# Patient Record
Sex: FEMALE | Race: WHITE | NOT HISPANIC OR LATINO | Employment: OTHER | URBAN - METROPOLITAN AREA
[De-identification: names, ages, dates, MRNs, and addresses within clinical notes are randomized per-mention and may not be internally consistent; named-entity substitution may affect disease eponyms.]

---

## 2017-11-15 ENCOUNTER — TRANSCRIBE ORDERS (OUTPATIENT)
Dept: MAMMOGRAPHY | Facility: HOSPITAL | Age: 72
End: 2017-11-15

## 2017-11-15 DIAGNOSIS — Z12.31 VISIT FOR SCREENING MAMMOGRAM: Primary | ICD-10-CM

## 2017-12-28 ENCOUNTER — HOSPITAL ENCOUNTER (OUTPATIENT)
Dept: MAMMOGRAPHY | Facility: HOSPITAL | Age: 72
Discharge: HOME | End: 2017-12-28
Attending: OBSTETRICS & GYNECOLOGY | Admitting: OBSTETRICS & GYNECOLOGY

## 2017-12-28 DIAGNOSIS — Z12.31 VISIT FOR SCREENING MAMMOGRAM: ICD-10-CM

## 2017-12-28 PROCEDURE — G0202 SCR MAMMO BI INCL CAD: HCPCS | Performed by: RADIOLOGY

## 2017-12-28 PROCEDURE — 77063 BREAST TOMOSYNTHESIS BI: CPT

## 2017-12-28 PROCEDURE — 77063 BREAST TOMOSYNTHESIS BI: CPT | Performed by: RADIOLOGY

## 2017-12-28 PROCEDURE — G0202 SCR MAMMO BI INCL CAD: HCPCS

## 2018-11-23 ENCOUNTER — TRANSCRIBE ORDERS (OUTPATIENT)
Dept: ADMINISTRATIVE | Facility: HOSPITAL | Age: 73
End: 2018-11-23

## 2018-11-23 DIAGNOSIS — Z12.31 VISIT FOR SCREENING MAMMOGRAM: Primary | ICD-10-CM

## 2019-01-14 ENCOUNTER — HOSPITAL ENCOUNTER (OUTPATIENT)
Dept: MAMMOGRAPHY | Facility: HOSPITAL | Age: 74
Discharge: HOME | End: 2019-01-14
Attending: OBSTETRICS & GYNECOLOGY | Admitting: OBSTETRICS & GYNECOLOGY

## 2019-01-14 DIAGNOSIS — Z12.31 VISIT FOR SCREENING MAMMOGRAM: ICD-10-CM

## 2019-01-14 PROCEDURE — 77063 BREAST TOMOSYNTHESIS BI: CPT

## 2019-01-14 PROCEDURE — 77063 BREAST TOMOSYNTHESIS BI: CPT | Performed by: RADIOLOGY

## 2019-01-14 PROCEDURE — 77067 SCR MAMMO BI INCL CAD: CPT

## 2019-01-14 PROCEDURE — 77067 SCR MAMMO BI INCL CAD: CPT | Performed by: RADIOLOGY

## 2020-01-07 ENCOUNTER — TRANSCRIBE ORDERS (OUTPATIENT)
Dept: ADMINISTRATIVE | Facility: HOSPITAL | Age: 75
End: 2020-01-07

## 2020-01-07 DIAGNOSIS — Z12.31 VISIT FOR SCREENING MAMMOGRAM: Primary | ICD-10-CM

## 2020-03-09 ENCOUNTER — HOSPITAL ENCOUNTER (OUTPATIENT)
Dept: MAMMOGRAPHY | Facility: HOSPITAL | Age: 75
Discharge: HOME | End: 2020-03-09
Admitting: OBSTETRICS & GYNECOLOGY

## 2020-03-09 DIAGNOSIS — Z12.31 VISIT FOR SCREENING MAMMOGRAM: ICD-10-CM

## 2020-03-09 PROCEDURE — 77067 SCR MAMMO BI INCL CAD: CPT | Performed by: RADIOLOGY

## 2020-03-09 PROCEDURE — 77063 BREAST TOMOSYNTHESIS BI: CPT | Performed by: RADIOLOGY

## 2020-03-09 PROCEDURE — 77063 BREAST TOMOSYNTHESIS BI: CPT

## 2020-03-09 PROCEDURE — 77067 SCR MAMMO BI INCL CAD: CPT

## 2020-11-20 ENCOUNTER — TRANSCRIBE ORDERS (OUTPATIENT)
Dept: ADMINISTRATIVE | Facility: HOSPITAL | Age: 75
End: 2020-11-20

## 2020-11-20 DIAGNOSIS — R94.39 ABNORMAL STRESS TEST: Primary | ICD-10-CM

## 2020-11-22 ENCOUNTER — APPOINTMENT (OUTPATIENT)
Dept: PREADMISSION TESTING | Facility: HOSPITAL | Age: 75
End: 2020-11-22

## 2020-11-22 PROCEDURE — U0004 COV-19 TEST NON-CDC HGH THRU: HCPCS

## 2020-11-22 PROCEDURE — C9803 HOPD COVID-19 SPEC COLLECT: HCPCS

## 2020-11-23 LAB — SARS-COV-2 RNA RESP QL NAA+PROBE: NOT DETECTED

## 2020-11-24 ENCOUNTER — APPOINTMENT (OUTPATIENT)
Dept: CARDIOLOGY | Facility: HOSPITAL | Age: 75
End: 2020-11-24

## 2020-11-24 ENCOUNTER — APPOINTMENT (OUTPATIENT)
Dept: GENERAL RADIOLOGY | Facility: HOSPITAL | Age: 75
End: 2020-11-24

## 2020-11-24 ENCOUNTER — ANESTHESIA EVENT (OUTPATIENT)
Dept: PERIOP | Facility: HOSPITAL | Age: 75
End: 2020-11-24

## 2020-11-24 ENCOUNTER — HOSPITAL ENCOUNTER (INPATIENT)
Facility: HOSPITAL | Age: 75
LOS: 8 days | Discharge: HOME HEALTH | End: 2020-12-03
Attending: INTERNAL MEDICINE | Admitting: THORACIC SURGERY (CARDIOTHORACIC VASCULAR SURGERY)

## 2020-11-24 DIAGNOSIS — R50.9 ACUTE FEBRILE ILLNESS: ICD-10-CM

## 2020-11-24 DIAGNOSIS — I48.0 PAROXYSMAL ATRIAL FIBRILLATION (HCC): ICD-10-CM

## 2020-11-24 DIAGNOSIS — R94.39 ABNORMAL STRESS TEST: ICD-10-CM

## 2020-11-24 DIAGNOSIS — I25.119 CORONARY ARTERY DISEASE INVOLVING NATIVE HEART WITH ANGINA PECTORIS, UNSPECIFIED VESSEL OR LESION TYPE (HCC): Primary | ICD-10-CM

## 2020-11-24 DIAGNOSIS — I25.119 CORONARY ARTERY DISEASE INVOLVING NATIVE CORONARY ARTERY OF NATIVE HEART WITH ANGINA PECTORIS (HCC): ICD-10-CM

## 2020-11-24 LAB
ABO GROUP BLD: NORMAL
ALBUMIN SERPL-MCNC: 4.4 G/DL (ref 3.5–5.2)
ALBUMIN/GLOB SERPL: 1.7 G/DL
ALP SERPL-CCNC: 76 U/L (ref 39–117)
ALT SERPL W P-5'-P-CCNC: 24 U/L (ref 1–33)
AMPHET+METHAMPHET UR QL: NEGATIVE
AMPHETAMINES UR QL: NEGATIVE
ANION GAP SERPL CALCULATED.3IONS-SCNC: 12 MMOL/L (ref 5–15)
ANION GAP SERPL CALCULATED.3IONS-SCNC: 15 MMOL/L (ref 5–15)
APTT PPP: 136.5 SECONDS (ref 24–37)
AST SERPL-CCNC: 31 U/L (ref 1–32)
B-HCG UR QL: NEGATIVE
BACTERIA UR QL AUTO: NORMAL /HPF
BARBITURATES UR QL SCN: NEGATIVE
BENZODIAZ UR QL SCN: NEGATIVE
BILIRUB SERPL-MCNC: 0.3 MG/DL (ref 0–1.2)
BILIRUB UR QL STRIP: NEGATIVE
BLD GP AB SCN SERPL QL: POSITIVE
BUN SERPL-MCNC: 26 MG/DL (ref 8–23)
BUN SERPL-MCNC: 26 MG/DL (ref 8–23)
BUN/CREAT SERPL: 21.8 (ref 7–25)
BUN/CREAT SERPL: 24.1 (ref 7–25)
BUPRENORPHINE SERPL-MCNC: NEGATIVE NG/ML
CALCIUM SPEC-SCNC: 9.4 MG/DL (ref 8.6–10.5)
CALCIUM SPEC-SCNC: 9.7 MG/DL (ref 8.6–10.5)
CANNABINOIDS SERPL QL: NEGATIVE
CHLORIDE SERPL-SCNC: 100 MMOL/L (ref 98–107)
CHLORIDE SERPL-SCNC: 101 MMOL/L (ref 98–107)
CHOLEST SERPL-MCNC: 181 MG/DL (ref 0–200)
CLARITY UR: CLEAR
CO2 SERPL-SCNC: 24 MMOL/L (ref 22–29)
CO2 SERPL-SCNC: 27 MMOL/L (ref 22–29)
COCAINE UR QL: NEGATIVE
COLOR UR: YELLOW
CREAT SERPL-MCNC: 1.08 MG/DL (ref 0.57–1)
CREAT SERPL-MCNC: 1.19 MG/DL (ref 0.57–1)
DEPRECATED RDW RBC AUTO: 47.3 FL (ref 37–54)
ERYTHROCYTE [DISTWIDTH] IN BLOOD BY AUTOMATED COUNT: 13.2 % (ref 12.3–15.4)
GFR SERPL CREATININE-BSD FRML MDRD: 44 ML/MIN/1.73
GFR SERPL CREATININE-BSD FRML MDRD: 49 ML/MIN/1.73
GLOBULIN UR ELPH-MCNC: 2.6 GM/DL
GLUCOSE BLDC GLUCOMTR-MCNC: 93 MG/DL (ref 70–130)
GLUCOSE SERPL-MCNC: 84 MG/DL (ref 65–99)
GLUCOSE SERPL-MCNC: 87 MG/DL (ref 65–99)
GLUCOSE UR STRIP-MCNC: NEGATIVE MG/DL
HBA1C MFR BLD: 5.5 % (ref 4.8–5.6)
HCT VFR BLD AUTO: 44.2 % (ref 34–46.6)
HDLC SERPL-MCNC: 50 MG/DL (ref 40–60)
HGB BLD-MCNC: 14.2 G/DL (ref 12–15.9)
HGB UR QL STRIP.AUTO: NEGATIVE
HYALINE CASTS UR QL AUTO: NORMAL /LPF
INR PPP: 1.19 (ref 0.85–1.16)
KETONES UR QL STRIP: NEGATIVE
LDLC SERPL CALC-MCNC: 105 MG/DL (ref 0–100)
LDLC/HDLC SERPL: 2.04 {RATIO}
LEUKOCYTE ESTERASE UR QL STRIP.AUTO: ABNORMAL
LISS SCREEN: NEGATIVE
MCH RBC QN AUTO: 31 PG (ref 26.6–33)
MCHC RBC AUTO-ENTMCNC: 32.1 G/DL (ref 31.5–35.7)
MCV RBC AUTO: 96.5 FL (ref 79–97)
METHADONE UR QL SCN: NEGATIVE
NITRITE UR QL STRIP: NEGATIVE
OPIATES UR QL: NEGATIVE
OXYCODONE UR QL SCN: NEGATIVE
PA ADP PRP-ACNC: 175 PRU
PCP UR QL SCN: NEGATIVE
PH UR STRIP.AUTO: 6.5 [PH] (ref 5–8)
PLATELET # BLD AUTO: 186 10*3/MM3 (ref 140–450)
PMV BLD AUTO: 9.8 FL (ref 6–12)
POTASSIUM SERPL-SCNC: 3.8 MMOL/L (ref 3.5–5.2)
POTASSIUM SERPL-SCNC: 3.8 MMOL/L (ref 3.5–5.2)
PROPOXYPH UR QL: NEGATIVE
PROT SERPL-MCNC: 7 G/DL (ref 6–8.5)
PROT UR QL STRIP: NEGATIVE
PROTHROMBIN TIME: 14.8 SECONDS (ref 11.5–14)
RBC # BLD AUTO: 4.58 10*6/MM3 (ref 3.77–5.28)
RBC # UR: NORMAL /HPF
REF LAB TEST METHOD: NORMAL
RH BLD: POSITIVE
SODIUM SERPL-SCNC: 139 MMOL/L (ref 136–145)
SODIUM SERPL-SCNC: 140 MMOL/L (ref 136–145)
SP GR UR STRIP: 1.02 (ref 1–1.03)
SQUAMOUS #/AREA URNS HPF: NORMAL /HPF
T&S EXPIRATION DATE: NORMAL
TRICYCLICS UR QL SCN: NEGATIVE
TRIGL SERPL-MCNC: 146 MG/DL (ref 0–150)
UROBILINOGEN UR QL STRIP: ABNORMAL
VLDLC SERPL-MCNC: 26 MG/DL (ref 5–40)
WARM AUTOANTIBODY: NORMAL
WBC # BLD AUTO: 5.96 10*3/MM3 (ref 3.4–10.8)
WBC UR QL AUTO: NORMAL /HPF

## 2020-11-24 PROCEDURE — 80053 COMPREHEN METABOLIC PANEL: CPT

## 2020-11-24 PROCEDURE — 93970 EXTREMITY STUDY: CPT

## 2020-11-24 PROCEDURE — 63710000001 MUPIROCIN 2 % OINTMENT 1 G TUBE: Performed by: PHYSICIAN ASSISTANT

## 2020-11-24 PROCEDURE — 85730 THROMBOPLASTIN TIME PARTIAL: CPT | Performed by: PHYSICIAN ASSISTANT

## 2020-11-24 PROCEDURE — 93458 L HRT ARTERY/VENTRICLE ANGIO: CPT | Performed by: INTERNAL MEDICINE

## 2020-11-24 PROCEDURE — 85576 BLOOD PLATELET AGGREGATION: CPT | Performed by: PHYSICIAN ASSISTANT

## 2020-11-24 PROCEDURE — 80061 LIPID PANEL: CPT | Performed by: PHYSICIAN ASSISTANT

## 2020-11-24 PROCEDURE — 94799 UNLISTED PULMONARY SVC/PX: CPT

## 2020-11-24 PROCEDURE — 25010000002 FENTANYL CITRATE (PF) 100 MCG/2ML SOLUTION: Performed by: INTERNAL MEDICINE

## 2020-11-24 PROCEDURE — 0 IOPAMIDOL PER 1 ML: Performed by: INTERNAL MEDICINE

## 2020-11-24 PROCEDURE — 63710000001 CHLORHEXIDINE 0.12 % SOLUTION: Performed by: PHYSICIAN ASSISTANT

## 2020-11-24 PROCEDURE — 93005 ELECTROCARDIOGRAM TRACING: CPT | Performed by: PHYSICIAN ASSISTANT

## 2020-11-24 PROCEDURE — 85610 PROTHROMBIN TIME: CPT | Performed by: PHYSICIAN ASSISTANT

## 2020-11-24 PROCEDURE — 86922 COMPATIBILITY TEST ANTIGLOB: CPT

## 2020-11-24 PROCEDURE — C1894 INTRO/SHEATH, NON-LASER: HCPCS | Performed by: INTERNAL MEDICINE

## 2020-11-24 PROCEDURE — 86870 RBC ANTIBODY IDENTIFICATION: CPT | Performed by: PHYSICIAN ASSISTANT

## 2020-11-24 PROCEDURE — 86901 BLOOD TYPING SEROLOGIC RH(D): CPT

## 2020-11-24 PROCEDURE — 99223 1ST HOSP IP/OBS HIGH 75: CPT | Performed by: THORACIC SURGERY (CARDIOTHORACIC VASCULAR SURGERY)

## 2020-11-24 PROCEDURE — 25010000002 MIDAZOLAM PER 1 MG: Performed by: INTERNAL MEDICINE

## 2020-11-24 PROCEDURE — C1769 GUIDE WIRE: HCPCS | Performed by: INTERNAL MEDICINE

## 2020-11-24 PROCEDURE — 80306 DRUG TEST PRSMV INSTRMNT: CPT | Performed by: PHYSICIAN ASSISTANT

## 2020-11-24 PROCEDURE — 83036 HEMOGLOBIN GLYCOSYLATED A1C: CPT | Performed by: PHYSICIAN ASSISTANT

## 2020-11-24 PROCEDURE — A9270 NON-COVERED ITEM OR SERVICE: HCPCS | Performed by: PHYSICIAN ASSISTANT

## 2020-11-24 PROCEDURE — 85027 COMPLETE CBC AUTOMATED: CPT

## 2020-11-24 PROCEDURE — 86901 BLOOD TYPING SEROLOGIC RH(D): CPT | Performed by: PHYSICIAN ASSISTANT

## 2020-11-24 PROCEDURE — 86850 RBC ANTIBODY SCREEN: CPT | Performed by: PHYSICIAN ASSISTANT

## 2020-11-24 PROCEDURE — 82962 GLUCOSE BLOOD TEST: CPT

## 2020-11-24 PROCEDURE — 86900 BLOOD TYPING SEROLOGIC ABO: CPT

## 2020-11-24 PROCEDURE — 81025 URINE PREGNANCY TEST: CPT | Performed by: PHYSICIAN ASSISTANT

## 2020-11-24 PROCEDURE — 81001 URINALYSIS AUTO W/SCOPE: CPT | Performed by: PHYSICIAN ASSISTANT

## 2020-11-24 PROCEDURE — 86920 COMPATIBILITY TEST SPIN: CPT

## 2020-11-24 PROCEDURE — 4A023N7 MEASUREMENT OF CARDIAC SAMPLING AND PRESSURE, LEFT HEART, PERCUTANEOUS APPROACH: ICD-10-PCS | Performed by: INTERNAL MEDICINE

## 2020-11-24 PROCEDURE — 86900 BLOOD TYPING SEROLOGIC ABO: CPT | Performed by: PHYSICIAN ASSISTANT

## 2020-11-24 PROCEDURE — 25010000002 HEPARIN (PORCINE) PER 1000 UNITS: Performed by: INTERNAL MEDICINE

## 2020-11-24 PROCEDURE — B2111ZZ FLUOROSCOPY OF MULTIPLE CORONARY ARTERIES USING LOW OSMOLAR CONTRAST: ICD-10-PCS | Performed by: INTERNAL MEDICINE

## 2020-11-24 PROCEDURE — 71045 X-RAY EXAM CHEST 1 VIEW: CPT

## 2020-11-24 RX ORDER — ENALAPRILAT 2.5 MG/2ML
1.25 INJECTION INTRAVENOUS ONCE
Status: COMPLETED | OUTPATIENT
Start: 2020-11-25 | End: 2020-11-24

## 2020-11-24 RX ORDER — CHLORHEXIDINE GLUCONATE 0.12 MG/ML
15 RINSE ORAL EVERY 12 HOURS
Status: COMPLETED | OUTPATIENT
Start: 2020-11-24 | End: 2020-11-25

## 2020-11-24 RX ORDER — IPRATROPIUM BROMIDE AND ALBUTEROL SULFATE 2.5; .5 MG/3ML; MG/3ML
3 SOLUTION RESPIRATORY (INHALATION) EVERY 4 HOURS PRN
Status: DISCONTINUED | OUTPATIENT
Start: 2020-11-24 | End: 2020-11-28

## 2020-11-24 RX ORDER — CHLORHEXIDINE GLUCONATE 500 MG/1
1 CLOTH TOPICAL EVERY 12 HOURS PRN
Status: COMPLETED | OUTPATIENT
Start: 2020-11-24 | End: 2020-11-25

## 2020-11-24 RX ORDER — FAMOTIDINE 10 MG/ML
20 INJECTION, SOLUTION INTRAVENOUS ONCE
Status: CANCELLED | OUTPATIENT
Start: 2020-11-24 | End: 2020-11-24

## 2020-11-24 RX ORDER — SODIUM CHLORIDE 0.9 % (FLUSH) 0.9 %
10 SYRINGE (ML) INJECTION EVERY 12 HOURS SCHEDULED
Status: DISCONTINUED | OUTPATIENT
Start: 2020-11-24 | End: 2020-12-02

## 2020-11-24 RX ORDER — NITROGLYCERIN 20 MG/100ML
10-50 INJECTION INTRAVENOUS
Status: DISCONTINUED | OUTPATIENT
Start: 2020-11-24 | End: 2020-11-26

## 2020-11-24 RX ORDER — SODIUM CHLORIDE 0.9 % (FLUSH) 0.9 %
10 SYRINGE (ML) INJECTION AS NEEDED
Status: CANCELLED | OUTPATIENT
Start: 2020-11-24

## 2020-11-24 RX ORDER — SODIUM CHLORIDE 9 MG/ML
250 INJECTION, SOLUTION INTRAVENOUS CONTINUOUS
Status: DISCONTINUED | OUTPATIENT
Start: 2020-11-24 | End: 2020-11-24

## 2020-11-24 RX ORDER — SODIUM CHLORIDE 0.9 % (FLUSH) 0.9 %
10 SYRINGE (ML) INJECTION AS NEEDED
Status: DISCONTINUED | OUTPATIENT
Start: 2020-11-24 | End: 2020-11-25

## 2020-11-24 RX ORDER — FENTANYL CITRATE 50 UG/ML
INJECTION, SOLUTION INTRAMUSCULAR; INTRAVENOUS AS NEEDED
Status: DISCONTINUED | OUTPATIENT
Start: 2020-11-24 | End: 2020-11-24 | Stop reason: HOSPADM

## 2020-11-24 RX ORDER — LIDOCAINE HYDROCHLORIDE 10 MG/ML
INJECTION, SOLUTION EPIDURAL; INFILTRATION; INTRACAUDAL; PERINEURAL AS NEEDED
Status: DISCONTINUED | OUTPATIENT
Start: 2020-11-24 | End: 2020-11-24 | Stop reason: HOSPADM

## 2020-11-24 RX ORDER — TEMAZEPAM 7.5 MG/1
7.5 CAPSULE ORAL NIGHTLY PRN
Status: DISCONTINUED | OUTPATIENT
Start: 2020-11-24 | End: 2020-12-03 | Stop reason: HOSPADM

## 2020-11-24 RX ORDER — ACETAMINOPHEN 325 MG/1
650 TABLET ORAL EVERY 4 HOURS PRN
Status: DISCONTINUED | OUTPATIENT
Start: 2020-11-24 | End: 2020-12-03 | Stop reason: HOSPADM

## 2020-11-24 RX ORDER — OLMESARTAN MEDOXOMIL 20 MG/1
20 TABLET ORAL DAILY
COMMUNITY

## 2020-11-24 RX ORDER — VITAMIN B COMPLEX
1 CAPSULE ORAL DAILY
COMMUNITY

## 2020-11-24 RX ORDER — ALPRAZOLAM 0.25 MG/1
0.25 TABLET ORAL 3 TIMES DAILY PRN
Status: DISCONTINUED | OUTPATIENT
Start: 2020-11-24 | End: 2020-12-03 | Stop reason: HOSPADM

## 2020-11-24 RX ORDER — ASPIRIN 325 MG
325 TABLET, DELAYED RELEASE (ENTERIC COATED) ORAL DAILY
Status: DISCONTINUED | OUTPATIENT
Start: 2020-11-24 | End: 2020-11-25

## 2020-11-24 RX ORDER — SODIUM CHLORIDE 0.9 % (FLUSH) 0.9 %
10 SYRINGE (ML) INJECTION EVERY 12 HOURS SCHEDULED
Status: CANCELLED | OUTPATIENT
Start: 2020-11-24

## 2020-11-24 RX ORDER — ASCORBIC ACID 500 MG
500 TABLET ORAL DAILY
Status: ON HOLD | COMMUNITY
End: 2020-12-03

## 2020-11-24 RX ORDER — MIDAZOLAM HYDROCHLORIDE 1 MG/ML
INJECTION INTRAMUSCULAR; INTRAVENOUS AS NEEDED
Status: DISCONTINUED | OUTPATIENT
Start: 2020-11-24 | End: 2020-11-24 | Stop reason: HOSPADM

## 2020-11-24 RX ADMIN — NITROGLYCERIN 20 MCG/MIN: 20 INJECTION INTRAVENOUS at 21:07

## 2020-11-24 RX ADMIN — CHLORHEXIDINE GLUCONATE 0.12% ORAL RINSE 15 ML: 1.2 LIQUID ORAL at 21:07

## 2020-11-24 RX ADMIN — SODIUM CHLORIDE, PRESERVATIVE FREE 10 ML: 5 INJECTION INTRAVENOUS at 21:10

## 2020-11-24 RX ADMIN — NITROGLYCERIN 35 MCG/MIN: 20 INJECTION INTRAVENOUS at 22:20

## 2020-11-24 RX ADMIN — NITROGLYCERIN 25 MCG/MIN: 20 INJECTION INTRAVENOUS at 21:17

## 2020-11-24 RX ADMIN — MUPIROCIN 1 APPLICATION: 20 OINTMENT TOPICAL at 21:07

## 2020-11-24 RX ADMIN — CHLORHEXIDINE GLUCONATE 1 APPLICATION: 500 CLOTH TOPICAL at 21:30

## 2020-11-24 RX ADMIN — NITROGLYCERIN 10 MCG/MIN: 20 INJECTION INTRAVENOUS at 18:36

## 2020-11-24 RX ADMIN — ENALAPRILAT 1.25 MG: 1.25 INJECTION INTRAVENOUS at 23:55

## 2020-11-24 RX ADMIN — ASPIRIN 325 MG: 325 TABLET, COATED ORAL at 12:53

## 2020-11-25 ENCOUNTER — ANESTHESIA (OUTPATIENT)
Dept: PERIOP | Facility: HOSPITAL | Age: 75
End: 2020-11-25

## 2020-11-25 ENCOUNTER — APPOINTMENT (OUTPATIENT)
Dept: PULMONOLOGY | Facility: HOSPITAL | Age: 75
End: 2020-11-25

## 2020-11-25 ENCOUNTER — APPOINTMENT (OUTPATIENT)
Dept: GENERAL RADIOLOGY | Facility: HOSPITAL | Age: 75
End: 2020-11-25

## 2020-11-25 LAB
ACT BLD: 120 SECONDS (ref 82–152)
ACT BLD: 125 SECONDS (ref 82–152)
ACT BLD: 142 SECONDS (ref 82–152)
ACT BLD: 499 SECONDS (ref 82–152)
ACT BLD: 527 SECONDS (ref 82–152)
ACT BLD: 560 SECONDS (ref 82–152)
ALBUMIN SERPL-MCNC: 4.2 G/DL (ref 3.5–5.2)
ALBUMIN SERPL-MCNC: 4.5 G/DL (ref 3.5–5.2)
ALBUMIN SERPL-MCNC: 4.8 G/DL (ref 3.5–5.2)
ALBUMIN/GLOB SERPL: 2.8 G/DL
ALP SERPL-CCNC: 46 U/L (ref 39–117)
ALT SERPL W P-5'-P-CCNC: 17 U/L (ref 1–33)
ANION GAP SERPL CALCULATED.3IONS-SCNC: 13 MMOL/L (ref 5–15)
ANION GAP SERPL CALCULATED.3IONS-SCNC: 13 MMOL/L (ref 5–15)
ANION GAP SERPL CALCULATED.3IONS-SCNC: 14 MMOL/L (ref 5–15)
ANION GAP SERPL CALCULATED.3IONS-SCNC: 15 MMOL/L (ref 5–15)
APTT PPP: 37.4 SECONDS (ref 24–37)
ARTERIAL PATENCY WRIST A: ABNORMAL
AST SERPL-CCNC: 34 U/L (ref 1–32)
ATMOSPHERIC PRESS: ABNORMAL MM[HG]
BASE EXCESS BLDA CALC-SCNC: -0.6 MMOL/L (ref 0–2)
BASE EXCESS BLDA CALC-SCNC: -1 MMOL/L (ref -5–5)
BASE EXCESS BLDA CALC-SCNC: -1.5 MMOL/L (ref 0–2)
BASE EXCESS BLDA CALC-SCNC: -1.9 MMOL/L (ref 0–2)
BASE EXCESS BLDA CALC-SCNC: -2 MMOL/L (ref -5–5)
BASE EXCESS BLDA CALC-SCNC: -2.2 MMOL/L (ref 0–2)
BASE EXCESS BLDA CALC-SCNC: 0 MMOL/L (ref -5–5)
BASE EXCESS BLDA CALC-SCNC: 0 MMOL/L (ref -5–5)
BASE EXCESS BLDA CALC-SCNC: 2 MMOL/L (ref -5–5)
BASE EXCESS BLDA CALC-SCNC: 3 MMOL/L (ref -5–5)
BDY SITE: ABNORMAL
BH CV ECHO MEAS - BSA(HAYCOCK): 2.1 M^2
BH CV ECHO MEAS - BSA: 2 M^2
BH CV ECHO MEAS - BZI_BMI: 38.3 KILOGRAMS/M^2
BH CV ECHO MEAS - BZI_METRIC_HEIGHT: 160 CM
BH CV ECHO MEAS - BZI_METRIC_WEIGHT: 98 KG
BH CV XLRA MEAS - DIST GSV CALF DIST LEFT: 0.4 CM
BH CV XLRA MEAS - DIST GSV CALF DIST RIGHT: 0.4 CM
BH CV XLRA MEAS - DIST LSV CALF DIST LEFT: 0.3 CM
BH CV XLRA MEAS - DIST LSV CALF DIST RIGHT: 0.3 CM
BH CV XLRA MEAS - GSV ANKLE DIST LEFT: 0.4 CM
BH CV XLRA MEAS - GSV ANKLE DIST RIGHT: 0.3 CM
BH CV XLRA MEAS - GSV ORIGIN DIST LEFT: 0.6 CM
BH CV XLRA MEAS - GSV ORIGIN DIST RIGHT: 0.7 CM
BH CV XLRA MEAS - MID GSV CALF LEFT: 0.4 CM
BH CV XLRA MEAS - MID GSV CALF RIGHT: 0.4 CM
BH CV XLRA MEAS - MID LSV CALF DIST LEFT: 0.4 CM
BH CV XLRA MEAS - MID LSV CALF DIST RIGHT: 0.4 CM
BH CV XLRA MEAS - PROX GSV CALF DIST LEFT: 0.3 CM
BH CV XLRA MEAS - PROX GSV THIGH  LEFT: 0.3 CM
BH CV XLRA MEAS - PROX GSV THIGH  RIGHT: 0.5 CM
BH CV XLRA MEAS - PROX LSV CALF DIST LEFT: 0.4 CM
BH CV XLRA MEAS - PROX LSV CALF DIST RIGHT: 0.3 CM
BILIRUB SERPL-MCNC: 1.1 MG/DL (ref 0–1.2)
BODY TEMPERATURE: 37 C
BUN SERPL-MCNC: 26 MG/DL (ref 8–23)
BUN SERPL-MCNC: 28 MG/DL (ref 8–23)
BUN/CREAT SERPL: 17.6 (ref 7–25)
BUN/CREAT SERPL: 20.6 (ref 7–25)
BUN/CREAT SERPL: 23.7 (ref 7–25)
BUN/CREAT SERPL: 24.3 (ref 7–25)
CA-I BLDA-SCNC: 0.92 MMOL/L (ref 1.2–1.32)
CA-I BLDA-SCNC: 0.96 MMOL/L (ref 1.2–1.32)
CA-I BLDA-SCNC: 0.99 MMOL/L (ref 1.2–1.32)
CA-I BLDA-SCNC: 1.09 MMOL/L (ref 1.2–1.32)
CA-I BLDA-SCNC: 1.17 MMOL/L (ref 1.2–1.32)
CA-I BLDA-SCNC: 1.19 MMOL/L (ref 1.2–1.32)
CA-I SERPL ISE-MCNC: 1.22 MMOL/L (ref 1.12–1.32)
CALCIUM SPEC-SCNC: 8.8 MG/DL (ref 8.6–10.5)
CALCIUM SPEC-SCNC: 8.9 MG/DL (ref 8.6–10.5)
CALCIUM SPEC-SCNC: 9.3 MG/DL (ref 8.6–10.5)
CALCIUM SPEC-SCNC: 9.5 MG/DL (ref 8.6–10.5)
CHLORIDE SERPL-SCNC: 101 MMOL/L (ref 98–107)
CHLORIDE SERPL-SCNC: 103 MMOL/L (ref 98–107)
CO2 BLDA-SCNC: 24 MMOL/L (ref 24–29)
CO2 BLDA-SCNC: 25 MMOL/L (ref 24–29)
CO2 BLDA-SCNC: 26 MMOL/L (ref 24–29)
CO2 BLDA-SCNC: 26 MMOL/L (ref 24–29)
CO2 BLDA-SCNC: 26.4 MMOL/L (ref 22–33)
CO2 BLDA-SCNC: 27 MMOL/L (ref 24–29)
CO2 BLDA-SCNC: 27 MMOL/L (ref 24–29)
CO2 BLDA-SCNC: 27.1 MMOL/L (ref 22–33)
CO2 BLDA-SCNC: 27.7 MMOL/L (ref 22–33)
CO2 BLDA-SCNC: 27.7 MMOL/L (ref 22–33)
CO2 SERPL-SCNC: 24 MMOL/L (ref 22–29)
CO2 SERPL-SCNC: 24 MMOL/L (ref 22–29)
CO2 SERPL-SCNC: 25 MMOL/L (ref 22–29)
CO2 SERPL-SCNC: 26 MMOL/L (ref 22–29)
COHGB MFR BLD: 0.9 % (ref 0–2)
COHGB MFR BLD: 1 % (ref 0–2)
COHGB MFR BLD: 1.1 % (ref 0–2)
COHGB MFR BLD: 1.2 % (ref 0–2)
CREAT SERPL-MCNC: 1.07 MG/DL (ref 0.57–1)
CREAT SERPL-MCNC: 1.18 MG/DL (ref 0.57–1)
CREAT SERPL-MCNC: 1.26 MG/DL (ref 0.57–1)
CREAT SERPL-MCNC: 1.48 MG/DL (ref 0.57–1)
DEPRECATED RDW RBC AUTO: 47.3 FL (ref 37–54)
DEPRECATED RDW RBC AUTO: 47.8 FL (ref 37–54)
DEPRECATED RDW RBC AUTO: 47.8 FL (ref 37–54)
EPAP: 0
ERYTHROCYTE [DISTWIDTH] IN BLOOD BY AUTOMATED COUNT: 13.2 % (ref 12.3–15.4)
ERYTHROCYTE [DISTWIDTH] IN BLOOD BY AUTOMATED COUNT: 13.2 % (ref 12.3–15.4)
ERYTHROCYTE [DISTWIDTH] IN BLOOD BY AUTOMATED COUNT: 13.4 % (ref 12.3–15.4)
GFR SERPL CREATININE-BSD FRML MDRD: 34 ML/MIN/1.73
GFR SERPL CREATININE-BSD FRML MDRD: 41 ML/MIN/1.73
GFR SERPL CREATININE-BSD FRML MDRD: 44 ML/MIN/1.73
GFR SERPL CREATININE-BSD FRML MDRD: 50 ML/MIN/1.73
GLOBULIN UR ELPH-MCNC: 1.7 GM/DL
GLUCOSE BLDC GLUCOMTR-MCNC: 104 MG/DL (ref 70–130)
GLUCOSE BLDC GLUCOMTR-MCNC: 106 MG/DL (ref 70–130)
GLUCOSE BLDC GLUCOMTR-MCNC: 106 MG/DL (ref 70–130)
GLUCOSE BLDC GLUCOMTR-MCNC: 107 MG/DL (ref 70–130)
GLUCOSE BLDC GLUCOMTR-MCNC: 107 MG/DL (ref 70–130)
GLUCOSE BLDC GLUCOMTR-MCNC: 108 MG/DL (ref 70–130)
GLUCOSE BLDC GLUCOMTR-MCNC: 129 MG/DL (ref 70–130)
GLUCOSE BLDC GLUCOMTR-MCNC: 130 MG/DL (ref 70–130)
GLUCOSE BLDC GLUCOMTR-MCNC: 130 MG/DL (ref 70–130)
GLUCOSE BLDC GLUCOMTR-MCNC: 133 MG/DL (ref 70–130)
GLUCOSE BLDC GLUCOMTR-MCNC: 135 MG/DL (ref 70–130)
GLUCOSE BLDC GLUCOMTR-MCNC: 141 MG/DL (ref 70–130)
GLUCOSE BLDC GLUCOMTR-MCNC: 147 MG/DL (ref 70–130)
GLUCOSE BLDC GLUCOMTR-MCNC: 151 MG/DL (ref 70–130)
GLUCOSE BLDC GLUCOMTR-MCNC: 156 MG/DL (ref 70–130)
GLUCOSE BLDC GLUCOMTR-MCNC: 175 MG/DL (ref 70–130)
GLUCOSE BLDC GLUCOMTR-MCNC: 179 MG/DL (ref 70–130)
GLUCOSE BLDC GLUCOMTR-MCNC: 191 MG/DL (ref 70–130)
GLUCOSE BLDC GLUCOMTR-MCNC: 192 MG/DL (ref 70–130)
GLUCOSE BLDC GLUCOMTR-MCNC: 194 MG/DL (ref 70–130)
GLUCOSE BLDC GLUCOMTR-MCNC: 96 MG/DL (ref 70–130)
GLUCOSE SERPL-MCNC: 101 MG/DL (ref 65–99)
GLUCOSE SERPL-MCNC: 124 MG/DL (ref 65–99)
GLUCOSE SERPL-MCNC: 130 MG/DL (ref 65–99)
GLUCOSE SERPL-MCNC: 180 MG/DL (ref 65–99)
HCO3 BLDA-SCNC: 23.2 MMOL/L (ref 22–26)
HCO3 BLDA-SCNC: 24 MMOL/L (ref 22–26)
HCO3 BLDA-SCNC: 24.6 MMOL/L (ref 22–26)
HCO3 BLDA-SCNC: 24.8 MMOL/L (ref 20–26)
HCO3 BLDA-SCNC: 25 MMOL/L (ref 22–26)
HCO3 BLDA-SCNC: 25.6 MMOL/L (ref 20–26)
HCO3 BLDA-SCNC: 25.6 MMOL/L (ref 22–26)
HCO3 BLDA-SCNC: 25.9 MMOL/L (ref 20–26)
HCO3 BLDA-SCNC: 26 MMOL/L (ref 20–26)
HCO3 BLDA-SCNC: 26.3 MMOL/L (ref 22–26)
HCT VFR BLD AUTO: 32 % (ref 34–46.6)
HCT VFR BLD AUTO: 34.5 % (ref 34–46.6)
HCT VFR BLD AUTO: 44.2 % (ref 34–46.6)
HCT VFR BLD CALC: 31.9 %
HCT VFR BLD CALC: 32 %
HCT VFR BLD CALC: 32.8 %
HCT VFR BLD CALC: 34.5 %
HCT VFR BLDA CALC: 27 % (ref 38–51)
HCT VFR BLDA CALC: 28 % (ref 38–51)
HCT VFR BLDA CALC: 30 % (ref 38–51)
HCT VFR BLDA CALC: 31 % (ref 38–51)
HCT VFR BLDA CALC: 34 % (ref 38–51)
HCT VFR BLDA CALC: 41 % (ref 38–51)
HGB BLD-MCNC: 10.1 G/DL (ref 12–15.9)
HGB BLD-MCNC: 10.7 G/DL (ref 12–15.9)
HGB BLD-MCNC: 13.5 G/DL (ref 12–15.9)
HGB BLDA-MCNC: 10.2 G/DL (ref 12–17)
HGB BLDA-MCNC: 10.4 G/DL (ref 14–18)
HGB BLDA-MCNC: 10.5 G/DL (ref 12–17)
HGB BLDA-MCNC: 10.5 G/DL (ref 14–18)
HGB BLDA-MCNC: 10.7 G/DL (ref 14–18)
HGB BLDA-MCNC: 11.3 G/DL (ref 14–18)
HGB BLDA-MCNC: 11.6 G/DL (ref 12–17)
HGB BLDA-MCNC: 13.9 G/DL (ref 12–17)
HGB BLDA-MCNC: 9.2 G/DL (ref 12–17)
HGB BLDA-MCNC: 9.5 G/DL (ref 12–17)
INHALED O2 CONCENTRATION: 100 %
INHALED O2 CONCENTRATION: 30 %
INHALED O2 CONCENTRATION: 40 %
INHALED O2 CONCENTRATION: 40 %
INR PPP: 1.26 (ref 0.85–1.16)
IPAP: 0
MAGNESIUM SERPL-MCNC: 1.9 MG/DL (ref 1.6–2.4)
MAGNESIUM SERPL-MCNC: 1.9 MG/DL (ref 1.6–2.4)
MAGNESIUM SERPL-MCNC: 2.1 MG/DL (ref 1.6–2.4)
MCH RBC QN AUTO: 29.3 PG (ref 26.6–33)
MCH RBC QN AUTO: 30.2 PG (ref 26.6–33)
MCH RBC QN AUTO: 30.6 PG (ref 26.6–33)
MCHC RBC AUTO-ENTMCNC: 30.5 G/DL (ref 31.5–35.7)
MCHC RBC AUTO-ENTMCNC: 31 G/DL (ref 31.5–35.7)
MCHC RBC AUTO-ENTMCNC: 31.6 G/DL (ref 31.5–35.7)
MCV RBC AUTO: 96.1 FL (ref 79–97)
MCV RBC AUTO: 97 FL (ref 79–97)
MCV RBC AUTO: 97.5 FL (ref 79–97)
METHGB BLD QL: 0.5 % (ref 0–1.5)
METHGB BLD QL: 0.5 % (ref 0–1.5)
METHGB BLD QL: 0.6 % (ref 0–1.5)
METHGB BLD QL: 0.7 % (ref 0–1.5)
MODALITY: ABNORMAL
NOTE: ABNORMAL
OXYHGB MFR BLDV: 95.3 % (ref 94–99)
OXYHGB MFR BLDV: 96.7 % (ref 94–99)
OXYHGB MFR BLDV: 96.8 % (ref 94–99)
OXYHGB MFR BLDV: 98.2 % (ref 94–99)
PA ADP PRP-ACNC: 207 PRU
PAW @ PEAK INSP FLOW SETTING VENT: 0 CMH2O
PCO2 BLDA: 34.5 MM HG (ref 35–45)
PCO2 BLDA: 35.6 MM HG (ref 35–45)
PCO2 BLDA: 35.8 MM HG (ref 35–45)
PCO2 BLDA: 37.2 MM HG (ref 35–45)
PCO2 BLDA: 40.3 MM HG (ref 35–45)
PCO2 BLDA: 42.2 MM HG (ref 35–45)
PCO2 BLDA: 47.7 MM HG (ref 35–45)
PCO2 BLDA: 52.3 MM HG (ref 35–45)
PCO2 BLDA: 56.7 MM HG (ref 35–45)
PCO2 BLDA: 58.5 MM HG (ref 35–45)
PCO2 TEMP ADJ BLD: 47.7 MM HG (ref 35–45)
PCO2 TEMP ADJ BLD: 52.3 MM HG (ref 35–45)
PCO2 TEMP ADJ BLD: 56.7 MM HG (ref 35–45)
PCO2 TEMP ADJ BLD: 58.5 MM HG (ref 35–45)
PEEP RESPIRATORY: 5 CM[H2O]
PH BLDA: 7.25 PH UNITS (ref 7.35–7.45)
PH BLDA: 7.27 PH UNITS (ref 7.35–7.45)
PH BLDA: 7.29 PH UNITS (ref 7.35–7.45)
PH BLDA: 7.34 PH UNITS (ref 7.35–7.45)
PH BLDA: 7.37 PH UNITS (ref 7.35–7.6)
PH BLDA: 7.4 PH UNITS (ref 7.35–7.6)
PH BLDA: 7.4 PH UNITS (ref 7.35–7.6)
PH BLDA: 7.45 PH UNITS (ref 7.35–7.6)
PH BLDA: 7.46 PH UNITS (ref 7.35–7.6)
PH BLDA: 7.48 PH UNITS (ref 7.35–7.6)
PH, TEMP CORRECTED: 7.25 PH UNITS
PH, TEMP CORRECTED: 7.27 PH UNITS
PH, TEMP CORRECTED: 7.29 PH UNITS
PH, TEMP CORRECTED: 7.34 PH UNITS
PHOSPHATE SERPL-MCNC: 2.9 MG/DL (ref 2.5–4.5)
PHOSPHATE SERPL-MCNC: 4.1 MG/DL (ref 2.5–4.5)
PLATELET # BLD AUTO: 166 10*3/MM3 (ref 140–450)
PLATELET # BLD AUTO: 180 10*3/MM3 (ref 140–450)
PLATELET # BLD AUTO: 193 10*3/MM3 (ref 140–450)
PMV BLD AUTO: 10.5 FL (ref 6–12)
PMV BLD AUTO: 9.8 FL (ref 6–12)
PMV BLD AUTO: 9.9 FL (ref 6–12)
PO2 BLDA: 108 MM HG (ref 83–108)
PO2 BLDA: 111 MM HG (ref 83–108)
PO2 BLDA: 201 MM HG (ref 83–108)
PO2 BLDA: 262 MMHG (ref 80–105)
PO2 BLDA: 322 MMHG (ref 80–105)
PO2 BLDA: 344 MMHG (ref 80–105)
PO2 BLDA: 388 MMHG (ref 80–105)
PO2 BLDA: 41 MMHG (ref 80–105)
PO2 BLDA: 77 MMHG (ref 80–105)
PO2 BLDA: 90.5 MM HG (ref 83–108)
PO2 TEMP ADJ BLD: 108 MM HG (ref 83–108)
PO2 TEMP ADJ BLD: 111 MM HG (ref 83–108)
PO2 TEMP ADJ BLD: 201 MM HG (ref 83–108)
PO2 TEMP ADJ BLD: 90.5 MM HG (ref 83–108)
POTASSIUM BLDA-SCNC: 3 MMOL/L (ref 3.5–4.9)
POTASSIUM BLDA-SCNC: 3.2 MMOL/L (ref 3.5–4.9)
POTASSIUM BLDA-SCNC: 3.4 MMOL/L (ref 3.5–4.9)
POTASSIUM BLDA-SCNC: 3.8 MMOL/L (ref 3.5–4.9)
POTASSIUM BLDA-SCNC: 4.1 MMOL/L (ref 3.5–4.9)
POTASSIUM BLDA-SCNC: 4.1 MMOL/L (ref 3.5–4.9)
POTASSIUM SERPL-SCNC: 3 MMOL/L (ref 3.5–5.2)
POTASSIUM SERPL-SCNC: 3.4 MMOL/L (ref 3.5–5.2)
POTASSIUM SERPL-SCNC: 3.5 MMOL/L (ref 3.5–5.2)
PROT SERPL-MCNC: 6.5 G/DL (ref 6–8.5)
PROTHROMBIN TIME: 15.5 SECONDS (ref 11.5–14)
PSV: 10 CMH2O
RBC # BLD AUTO: 3.3 10*6/MM3 (ref 3.77–5.28)
RBC # BLD AUTO: 3.54 10*6/MM3 (ref 3.77–5.28)
RBC # BLD AUTO: 4.6 10*6/MM3 (ref 3.77–5.28)
SAO2 % BLDA: 100 % (ref 95–98)
SAO2 % BLDA: 79 % (ref 95–98)
SAO2 % BLDA: 95 % (ref 95–98)
SET MECH RESP RATE: 10
SET MECH RESP RATE: 12
SODIUM BLD-SCNC: 138 MMOL/L (ref 138–146)
SODIUM BLD-SCNC: 138 MMOL/L (ref 138–146)
SODIUM BLD-SCNC: 139 MMOL/L (ref 138–146)
SODIUM BLD-SCNC: 139 MMOL/L (ref 138–146)
SODIUM BLD-SCNC: 140 MMOL/L (ref 138–146)
SODIUM BLD-SCNC: 141 MMOL/L (ref 138–146)
SODIUM SERPL-SCNC: 139 MMOL/L (ref 136–145)
SODIUM SERPL-SCNC: 141 MMOL/L (ref 136–145)
SODIUM SERPL-SCNC: 142 MMOL/L (ref 136–145)
SODIUM SERPL-SCNC: 142 MMOL/L (ref 136–145)
TOTAL RATE: 0 BREATHS/MINUTE
TOTAL RATE: 12 BREATHS/MINUTE
TOTAL RATE: 12 BREATHS/MINUTE
TOTAL RATE: 23 BREATHS/MINUTE
VENTILATOR MODE: ABNORMAL
VT ON VENT VENT: 400 ML
VT ON VENT VENT: 400 ML
WBC # BLD AUTO: 11.37 10*3/MM3 (ref 3.4–10.8)
WBC # BLD AUTO: 6.26 10*3/MM3 (ref 3.4–10.8)
WBC # BLD AUTO: 7.79 10*3/MM3 (ref 3.4–10.8)

## 2020-11-25 PROCEDURE — 94799 UNLISTED PULMONARY SVC/PX: CPT

## 2020-11-25 PROCEDURE — 02100Z9 BYPASS CORONARY ARTERY, ONE ARTERY FROM LEFT INTERNAL MAMMARY, OPEN APPROACH: ICD-10-PCS | Performed by: THORACIC SURGERY (CARDIOTHORACIC VASCULAR SURGERY)

## 2020-11-25 PROCEDURE — 06BP0ZZ EXCISION OF RIGHT SAPHENOUS VEIN, OPEN APPROACH: ICD-10-PCS | Performed by: THORACIC SURGERY (CARDIOTHORACIC VASCULAR SURGERY)

## 2020-11-25 PROCEDURE — 85347 COAGULATION TIME ACTIVATED: CPT

## 2020-11-25 PROCEDURE — 33517 CABG ARTERY-VEIN SINGLE: CPT | Performed by: THORACIC SURGERY (CARDIOTHORACIC VASCULAR SURGERY)

## 2020-11-25 PROCEDURE — P9041 ALBUMIN (HUMAN),5%, 50ML: HCPCS

## 2020-11-25 PROCEDURE — A9270 NON-COVERED ITEM OR SERVICE: HCPCS | Performed by: ANESTHESIOLOGY

## 2020-11-25 PROCEDURE — 85576 BLOOD PLATELET AGGREGATION: CPT | Performed by: PHYSICIAN ASSISTANT

## 2020-11-25 PROCEDURE — P9041 ALBUMIN (HUMAN),5%, 50ML: HCPCS | Performed by: PHYSICIAN ASSISTANT

## 2020-11-25 PROCEDURE — 63710000001 CHLORHEXIDINE 0.12 % SOLUTION: Performed by: PHYSICIAN ASSISTANT

## 2020-11-25 PROCEDURE — P9035 PLATELET PHERES LEUKOREDUCED: HCPCS

## 2020-11-25 PROCEDURE — 99024 POSTOP FOLLOW-UP VISIT: CPT | Performed by: THORACIC SURGERY (CARDIOTHORACIC VASCULAR SURGERY)

## 2020-11-25 PROCEDURE — 93005 ELECTROCARDIOGRAM TRACING: CPT | Performed by: THORACIC SURGERY (CARDIOTHORACIC VASCULAR SURGERY)

## 2020-11-25 PROCEDURE — 25010000002 FENTANYL CITRATE (PF) 100 MCG/2ML SOLUTION: Performed by: THORACIC SURGERY (CARDIOTHORACIC VASCULAR SURGERY)

## 2020-11-25 PROCEDURE — 021009W BYPASS CORONARY ARTERY, ONE ARTERY FROM AORTA WITH AUTOLOGOUS VENOUS TISSUE, OPEN APPROACH: ICD-10-PCS | Performed by: THORACIC SURGERY (CARDIOTHORACIC VASCULAR SURGERY)

## 2020-11-25 PROCEDURE — 83735 ASSAY OF MAGNESIUM: CPT | Performed by: PHYSICIAN ASSISTANT

## 2020-11-25 PROCEDURE — C1894 INTRO/SHEATH, NON-LASER: HCPCS | Performed by: THORACIC SURGERY (CARDIOTHORACIC VASCULAR SURGERY)

## 2020-11-25 PROCEDURE — 25010000002 VANCOMYCIN: Performed by: PHYSICIAN ASSISTANT

## 2020-11-25 PROCEDURE — 25010000002 MIDAZOLAM PER 1 MG: Performed by: ANESTHESIOLOGY

## 2020-11-25 PROCEDURE — 36430 TRANSFUSION BLD/BLD COMPNT: CPT

## 2020-11-25 PROCEDURE — 25010000003 POTASSIUM CHLORIDE PER 2 MEQ: Performed by: PHYSICIAN ASSISTANT

## 2020-11-25 PROCEDURE — 25810000003 DEXTROSE 5 % WITH KCL 20 MEQ 20-5 MEQ/L-% SOLUTION: Performed by: PHYSICIAN ASSISTANT

## 2020-11-25 PROCEDURE — 94010 BREATHING CAPACITY TEST: CPT | Performed by: INTERNAL MEDICINE

## 2020-11-25 PROCEDURE — 93005 ELECTROCARDIOGRAM TRACING: CPT | Performed by: PHYSICIAN ASSISTANT

## 2020-11-25 PROCEDURE — 80069 RENAL FUNCTION PANEL: CPT | Performed by: PHYSICIAN ASSISTANT

## 2020-11-25 PROCEDURE — 25010000002 ALBUMIN HUMAN 5% PER 50 ML

## 2020-11-25 PROCEDURE — C1751 CATH, INF, PER/CENT/MIDLINE: HCPCS | Performed by: ANESTHESIOLOGY

## 2020-11-25 PROCEDURE — 63710000001 FAMOTIDINE 20 MG TABLET: Performed by: ANESTHESIOLOGY

## 2020-11-25 PROCEDURE — 25010000002 PROPOFOL 10 MG/ML EMULSION: Performed by: ANESTHESIOLOGY

## 2020-11-25 PROCEDURE — 33517 CABG ARTERY-VEIN SINGLE: CPT | Performed by: PHYSICIAN ASSISTANT

## 2020-11-25 PROCEDURE — 25010000002 HEPARIN (PORCINE) PER 1000 UNITS: Performed by: ANESTHESIOLOGY

## 2020-11-25 PROCEDURE — A9270 NON-COVERED ITEM OR SERVICE: HCPCS | Performed by: PHYSICIAN ASSISTANT

## 2020-11-25 PROCEDURE — C1751 CATH, INF, PER/CENT/MIDLINE: HCPCS | Performed by: THORACIC SURGERY (CARDIOTHORACIC VASCULAR SURGERY)

## 2020-11-25 PROCEDURE — 82330 ASSAY OF CALCIUM: CPT

## 2020-11-25 PROCEDURE — 82947 ASSAY GLUCOSE BLOOD QUANT: CPT

## 2020-11-25 PROCEDURE — 85610 PROTHROMBIN TIME: CPT | Performed by: PHYSICIAN ASSISTANT

## 2020-11-25 PROCEDURE — A4648 IMPLANTABLE TISSUE MARKER: HCPCS | Performed by: THORACIC SURGERY (CARDIOTHORACIC VASCULAR SURGERY)

## 2020-11-25 PROCEDURE — 63710000001 MUPIROCIN 2 % OINTMENT 1 G TUBE: Performed by: PHYSICIAN ASSISTANT

## 2020-11-25 PROCEDURE — 25010000002 PROTAMINE SULFATE PER 10 MG: Performed by: PHYSICIAN ASSISTANT

## 2020-11-25 PROCEDURE — 25010000002 PROTAMINE SULFATE PER 10 MG: Performed by: ANESTHESIOLOGY

## 2020-11-25 PROCEDURE — 93010 ELECTROCARDIOGRAM REPORT: CPT | Performed by: INTERNAL MEDICINE

## 2020-11-25 PROCEDURE — 85014 HEMATOCRIT: CPT

## 2020-11-25 PROCEDURE — 71045 X-RAY EXAM CHEST 1 VIEW: CPT

## 2020-11-25 PROCEDURE — 25010000002 HEPARIN (PORCINE) PER 1000 UNITS: Performed by: THORACIC SURGERY (CARDIOTHORACIC VASCULAR SURGERY)

## 2020-11-25 PROCEDURE — 25010000002 ALBUMIN HUMAN 5% PER 50 ML: Performed by: PHYSICIAN ASSISTANT

## 2020-11-25 PROCEDURE — 82805 BLOOD GASES W/O2 SATURATION: CPT

## 2020-11-25 PROCEDURE — 25010000002 MAGNESIUM SULFATE 2 GM/50ML SOLUTION: Performed by: NURSE PRACTITIONER

## 2020-11-25 PROCEDURE — 84132 ASSAY OF SERUM POTASSIUM: CPT

## 2020-11-25 PROCEDURE — 84295 ASSAY OF SERUM SODIUM: CPT

## 2020-11-25 PROCEDURE — 06JY4ZZ INSPECTION OF LOWER VEIN, PERCUTANEOUS ENDOSCOPIC APPROACH: ICD-10-PCS | Performed by: THORACIC SURGERY (CARDIOTHORACIC VASCULAR SURGERY)

## 2020-11-25 PROCEDURE — 25010000002 PAPAVERINE PER 60 MG: Performed by: THORACIC SURGERY (CARDIOTHORACIC VASCULAR SURGERY)

## 2020-11-25 PROCEDURE — 85027 COMPLETE CBC AUTOMATED: CPT | Performed by: PHYSICIAN ASSISTANT

## 2020-11-25 PROCEDURE — 80053 COMPREHEN METABOLIC PANEL: CPT | Performed by: PHYSICIAN ASSISTANT

## 2020-11-25 PROCEDURE — 25010000003 CEFUROXIME SODIUM 1.5 G RECONSTITUTED SOLUTION: Performed by: PHYSICIAN ASSISTANT

## 2020-11-25 PROCEDURE — 82803 BLOOD GASES ANY COMBINATION: CPT

## 2020-11-25 PROCEDURE — 33533 CABG ARTERIAL SINGLE: CPT | Performed by: THORACIC SURGERY (CARDIOTHORACIC VASCULAR SURGERY)

## 2020-11-25 PROCEDURE — 85730 THROMBOPLASTIN TIME PARTIAL: CPT | Performed by: PHYSICIAN ASSISTANT

## 2020-11-25 PROCEDURE — 33533 CABG ARTERIAL SINGLE: CPT | Performed by: PHYSICIAN ASSISTANT

## 2020-11-25 PROCEDURE — 99222 1ST HOSP IP/OBS MODERATE 55: CPT | Performed by: INTERNAL MEDICINE

## 2020-11-25 PROCEDURE — 84132 ASSAY OF SERUM POTASSIUM: CPT | Performed by: PHYSICIAN ASSISTANT

## 2020-11-25 PROCEDURE — 25010000002 PROPOFOL 10 MG/ML EMULSION: Performed by: THORACIC SURGERY (CARDIOTHORACIC VASCULAR SURGERY)

## 2020-11-25 PROCEDURE — 82330 ASSAY OF CALCIUM: CPT | Performed by: PHYSICIAN ASSISTANT

## 2020-11-25 PROCEDURE — 94002 VENT MGMT INPAT INIT DAY: CPT

## 2020-11-25 PROCEDURE — 5A1221Z PERFORMANCE OF CARDIAC OUTPUT, CONTINUOUS: ICD-10-PCS | Performed by: THORACIC SURGERY (CARDIOTHORACIC VASCULAR SURGERY)

## 2020-11-25 PROCEDURE — 94010 BREATHING CAPACITY TEST: CPT

## 2020-11-25 DEVICE — DISK-SHAPED STYLE, SILICONE (1 PER STERILE PKG)
Type: IMPLANTABLE DEVICE | Site: HEART | Status: FUNCTIONAL
Brand: SCANLAN® RADIOMARK® GRAFT MARKERS

## 2020-11-25 DEVICE — LIGACLIP MCA MULTIPLE CLIP APPLIERS, 20 SMALL CLIPS
Type: IMPLANTABLE DEVICE | Site: LEG | Status: FUNCTIONAL
Brand: LIGACLIP

## 2020-11-25 RX ORDER — ALBUMIN, HUMAN INJ 5% 5 %
500 SOLUTION INTRAVENOUS ONCE
Status: COMPLETED | OUTPATIENT
Start: 2020-11-25 | End: 2020-11-25

## 2020-11-25 RX ORDER — NITROGLYCERIN 20 MG/100ML
5-200 INJECTION INTRAVENOUS CONTINUOUS PRN
Status: DISCONTINUED | OUTPATIENT
Start: 2020-11-25 | End: 2020-11-28

## 2020-11-25 RX ORDER — PROTAMINE SULFATE 10 MG/ML
INJECTION, SOLUTION INTRAVENOUS AS NEEDED
Status: DISCONTINUED | OUTPATIENT
Start: 2020-11-25 | End: 2020-11-25 | Stop reason: SURG

## 2020-11-25 RX ORDER — NITROGLYCERIN 20 MG/100ML
INJECTION INTRAVENOUS CONTINUOUS PRN
Status: DISCONTINUED | OUTPATIENT
Start: 2020-11-25 | End: 2020-11-25 | Stop reason: SURG

## 2020-11-25 RX ORDER — DEXMEDETOMIDINE HYDROCHLORIDE 4 UG/ML
.2-1.5 INJECTION, SOLUTION INTRAVENOUS CONTINUOUS PRN
Status: DISCONTINUED | OUTPATIENT
Start: 2020-11-25 | End: 2020-11-26

## 2020-11-25 RX ORDER — SODIUM CHLORIDE 9 MG/ML
INJECTION, SOLUTION INTRAVENOUS AS NEEDED
Status: DISCONTINUED | OUTPATIENT
Start: 2020-11-25 | End: 2020-11-25 | Stop reason: HOSPADM

## 2020-11-25 RX ORDER — ALBUMIN, HUMAN INJ 5% 5 %
SOLUTION INTRAVENOUS
Status: COMPLETED
Start: 2020-11-25 | End: 2020-11-25

## 2020-11-25 RX ORDER — SODIUM CHLORIDE, SODIUM LACTATE, POTASSIUM CHLORIDE, CALCIUM CHLORIDE 600; 310; 30; 20 MG/100ML; MG/100ML; MG/100ML; MG/100ML
9 INJECTION, SOLUTION INTRAVENOUS CONTINUOUS
Status: DISCONTINUED | OUTPATIENT
Start: 2020-11-25 | End: 2020-11-25

## 2020-11-25 RX ORDER — ASPIRIN 325 MG
325 TABLET, DELAYED RELEASE (ENTERIC COATED) ORAL DAILY
Status: DISCONTINUED | OUTPATIENT
Start: 2020-11-26 | End: 2020-12-02

## 2020-11-25 RX ORDER — LIDOCAINE HYDROCHLORIDE 10 MG/ML
0.5 INJECTION, SOLUTION EPIDURAL; INFILTRATION; INTRACAUDAL; PERINEURAL ONCE AS NEEDED
Status: DISCONTINUED | OUTPATIENT
Start: 2020-11-25 | End: 2020-11-25 | Stop reason: HOSPADM

## 2020-11-25 RX ORDER — POTASSIUM CHLORIDE 750 MG/1
40 CAPSULE, EXTENDED RELEASE ORAL AS NEEDED
Status: DISCONTINUED | OUTPATIENT
Start: 2020-11-25 | End: 2020-12-03 | Stop reason: HOSPADM

## 2020-11-25 RX ORDER — ONDANSETRON 2 MG/ML
4 INJECTION INTRAMUSCULAR; INTRAVENOUS EVERY 6 HOURS PRN
Status: DISCONTINUED | OUTPATIENT
Start: 2020-11-25 | End: 2020-12-03 | Stop reason: HOSPADM

## 2020-11-25 RX ORDER — MORPHINE SULFATE 2 MG/ML
2 INJECTION, SOLUTION INTRAMUSCULAR; INTRAVENOUS
Status: DISCONTINUED | OUTPATIENT
Start: 2020-11-25 | End: 2020-11-26

## 2020-11-25 RX ORDER — POTASSIUM CHLORIDE 1.5 G/1.77G
40 POWDER, FOR SOLUTION ORAL AS NEEDED
Status: DISCONTINUED | OUTPATIENT
Start: 2020-11-25 | End: 2020-12-03 | Stop reason: HOSPADM

## 2020-11-25 RX ORDER — OXYCODONE HYDROCHLORIDE AND ACETAMINOPHEN 5; 325 MG/1; MG/1
2 TABLET ORAL EVERY 4 HOURS PRN
Status: DISCONTINUED | OUTPATIENT
Start: 2020-11-25 | End: 2020-12-03 | Stop reason: HOSPADM

## 2020-11-25 RX ORDER — ALBUMIN, HUMAN INJ 5% 5 %
500 SOLUTION INTRAVENOUS AS NEEDED
Status: COMPLETED | OUTPATIENT
Start: 2020-11-25 | End: 2020-11-25

## 2020-11-25 RX ORDER — MAGNESIUM SULFATE HEPTAHYDRATE 40 MG/ML
2 INJECTION, SOLUTION INTRAVENOUS AS NEEDED
Status: DISCONTINUED | OUTPATIENT
Start: 2020-11-25 | End: 2020-12-03 | Stop reason: HOSPADM

## 2020-11-25 RX ORDER — PROTAMINE SULFATE 10 MG/ML
INJECTION, SOLUTION INTRAVENOUS
Status: DISPENSED
Start: 2020-11-25 | End: 2020-11-25

## 2020-11-25 RX ORDER — PHENYLEPHRINE HCL IN 0.9% NACL 0.5 MG/5ML
.5-3 SYRINGE (ML) INTRAVENOUS CONTINUOUS PRN
Status: DISCONTINUED | OUTPATIENT
Start: 2020-11-25 | End: 2020-11-28

## 2020-11-25 RX ORDER — POTASSIUM CHLORIDE 29.8 MG/ML
20 INJECTION INTRAVENOUS
Status: DISCONTINUED | OUTPATIENT
Start: 2020-11-25 | End: 2020-11-27

## 2020-11-25 RX ORDER — NOREPINEPHRINE BIT/0.9 % NACL 8 MG/250ML
.02-.3 INFUSION BOTTLE (ML) INTRAVENOUS CONTINUOUS PRN
Status: DISCONTINUED | OUTPATIENT
Start: 2020-11-25 | End: 2020-12-03 | Stop reason: HOSPADM

## 2020-11-25 RX ORDER — GLYCOPYRROLATE 0.2 MG/ML
INJECTION INTRAMUSCULAR; INTRAVENOUS AS NEEDED
Status: DISCONTINUED | OUTPATIENT
Start: 2020-11-25 | End: 2020-11-25 | Stop reason: SURG

## 2020-11-25 RX ORDER — HYDROCODONE BITARTRATE AND ACETAMINOPHEN 7.5; 325 MG/1; MG/1
1 TABLET ORAL EVERY 4 HOURS PRN
Status: DISCONTINUED | OUTPATIENT
Start: 2020-11-25 | End: 2020-12-03 | Stop reason: HOSPADM

## 2020-11-25 RX ORDER — DOBUTAMINE HYDROCHLORIDE 100 MG/100ML
2-20 INJECTION INTRAVENOUS CONTINUOUS PRN
Status: DISCONTINUED | OUTPATIENT
Start: 2020-11-25 | End: 2020-11-27

## 2020-11-25 RX ORDER — MIDAZOLAM HYDROCHLORIDE 1 MG/ML
INJECTION INTRAMUSCULAR; INTRAVENOUS AS NEEDED
Status: DISCONTINUED | OUTPATIENT
Start: 2020-11-25 | End: 2020-11-25 | Stop reason: SURG

## 2020-11-25 RX ORDER — PROTAMINE SULFATE 10 MG/ML
50 INJECTION, SOLUTION INTRAVENOUS ONCE
Status: COMPLETED | OUTPATIENT
Start: 2020-11-25 | End: 2020-11-25

## 2020-11-25 RX ORDER — METOPROLOL TARTRATE 5 MG/5ML
2.5 INJECTION INTRAVENOUS EVERY 6 HOURS SCHEDULED
Status: DISCONTINUED | OUTPATIENT
Start: 2020-11-25 | End: 2020-11-25

## 2020-11-25 RX ORDER — MEPERIDINE HYDROCHLORIDE 25 MG/ML
25 INJECTION INTRAMUSCULAR; INTRAVENOUS; SUBCUTANEOUS EVERY 4 HOURS PRN
Status: DISCONTINUED | OUTPATIENT
Start: 2020-11-25 | End: 2020-11-26

## 2020-11-25 RX ORDER — ACETAMINOPHEN 325 MG/1
650 TABLET ORAL EVERY 4 HOURS PRN
Status: DISCONTINUED | OUTPATIENT
Start: 2020-11-25 | End: 2020-11-26

## 2020-11-25 RX ORDER — THROMBIN HUMAN AND FIBRINOGEN 2; 5.5 [USP'U]/1; MG/1
PATCH TOPICAL AS NEEDED
Status: DISCONTINUED | OUTPATIENT
Start: 2020-11-25 | End: 2020-11-25 | Stop reason: HOSPADM

## 2020-11-25 RX ORDER — POTASSIUM CHLORIDE, DEXTROSE MONOHYDRATE 150; 5 MG/100ML; G/100ML
30 INJECTION, SOLUTION INTRAVENOUS CONTINUOUS
Status: DISCONTINUED | OUTPATIENT
Start: 2020-11-25 | End: 2020-11-27

## 2020-11-25 RX ORDER — SODIUM CHLORIDE 0.9 % (FLUSH) 0.9 %
30 SYRINGE (ML) INJECTION ONCE AS NEEDED
Status: DISCONTINUED | OUTPATIENT
Start: 2020-11-25 | End: 2020-11-25

## 2020-11-25 RX ORDER — CHLORHEXIDINE GLUCONATE 0.12 MG/ML
15 RINSE ORAL EVERY 12 HOURS SCHEDULED
Status: DISCONTINUED | OUTPATIENT
Start: 2020-11-25 | End: 2020-11-26

## 2020-11-25 RX ORDER — HEPARIN SODIUM 1000 [USP'U]/ML
INJECTION, SOLUTION INTRAVENOUS; SUBCUTANEOUS AS NEEDED
Status: DISCONTINUED | OUTPATIENT
Start: 2020-11-25 | End: 2020-11-25 | Stop reason: SURG

## 2020-11-25 RX ORDER — ATORVASTATIN CALCIUM 40 MG/1
40 TABLET, FILM COATED ORAL NIGHTLY
Status: DISCONTINUED | OUTPATIENT
Start: 2020-11-25 | End: 2020-12-03 | Stop reason: HOSPADM

## 2020-11-25 RX ORDER — NALOXONE HYDROCHLORIDE 0.4 MG/ML
0.2 INJECTION, SOLUTION INTRAMUSCULAR; INTRAVENOUS; SUBCUTANEOUS
Status: DISCONTINUED | OUTPATIENT
Start: 2020-11-25 | End: 2020-11-28

## 2020-11-25 RX ORDER — DOPAMINE HYDROCHLORIDE 160 MG/100ML
2-20 INJECTION, SOLUTION INTRAVENOUS CONTINUOUS PRN
Status: DISCONTINUED | OUTPATIENT
Start: 2020-11-25 | End: 2020-11-27

## 2020-11-25 RX ORDER — ASPIRIN 325 MG
325 TABLET ORAL ONCE
Status: COMPLETED | OUTPATIENT
Start: 2020-11-25 | End: 2020-11-25

## 2020-11-25 RX ORDER — MAGNESIUM SULFATE HEPTAHYDRATE 40 MG/ML
4 INJECTION, SOLUTION INTRAVENOUS AS NEEDED
Status: DISCONTINUED | OUTPATIENT
Start: 2020-11-25 | End: 2020-12-03 | Stop reason: HOSPADM

## 2020-11-25 RX ORDER — ROCURONIUM BROMIDE 10 MG/ML
INJECTION, SOLUTION INTRAVENOUS AS NEEDED
Status: DISCONTINUED | OUTPATIENT
Start: 2020-11-25 | End: 2020-11-25 | Stop reason: SURG

## 2020-11-25 RX ORDER — MAGNESIUM SULFATE 1 G/100ML
1 INJECTION INTRAVENOUS AS NEEDED
Status: DISCONTINUED | OUTPATIENT
Start: 2020-11-25 | End: 2020-12-03 | Stop reason: HOSPADM

## 2020-11-25 RX ORDER — ALBUTEROL SULFATE 2.5 MG/3ML
2.5 SOLUTION RESPIRATORY (INHALATION) EVERY 4 HOURS PRN
Status: DISCONTINUED | OUTPATIENT
Start: 2020-11-25 | End: 2020-11-26

## 2020-11-25 RX ORDER — PAPAVERINE HYDROCHLORIDE 30 MG/ML
INJECTION INTRAMUSCULAR; INTRAVENOUS AS NEEDED
Status: DISCONTINUED | OUTPATIENT
Start: 2020-11-25 | End: 2020-11-25 | Stop reason: HOSPADM

## 2020-11-25 RX ORDER — AMINOCAPROIC ACID 250 MG/ML
INJECTION, SOLUTION INTRAVENOUS AS NEEDED
Status: DISCONTINUED | OUTPATIENT
Start: 2020-11-25 | End: 2020-11-25 | Stop reason: SURG

## 2020-11-25 RX ORDER — FAMOTIDINE 20 MG/1
20 TABLET, FILM COATED ORAL ONCE
Status: COMPLETED | OUTPATIENT
Start: 2020-11-25 | End: 2020-11-25

## 2020-11-25 RX ORDER — SODIUM CHLORIDE 9 MG/ML
30 INJECTION, SOLUTION INTRAVENOUS CONTINUOUS PRN
Status: DISCONTINUED | OUTPATIENT
Start: 2020-11-25 | End: 2020-11-25

## 2020-11-25 RX ORDER — SUFENTANIL CITRATE 50 UG/ML
INJECTION EPIDURAL; INTRAVENOUS AS NEEDED
Status: DISCONTINUED | OUTPATIENT
Start: 2020-11-25 | End: 2020-11-25 | Stop reason: SURG

## 2020-11-25 RX ORDER — FENTANYL CITRATE 50 UG/ML
25 INJECTION, SOLUTION INTRAMUSCULAR; INTRAVENOUS
Status: DISCONTINUED | OUTPATIENT
Start: 2020-11-25 | End: 2020-11-26

## 2020-11-25 RX ADMIN — POTASSIUM CHLORIDE AND DEXTROSE MONOHYDRATE 30 ML/HR: 150; 5 INJECTION, SOLUTION INTRAVENOUS at 10:08

## 2020-11-25 RX ADMIN — ALBUMIN HUMAN 500 ML: 0.05 INJECTION, SOLUTION INTRAVENOUS at 15:14

## 2020-11-25 RX ADMIN — ROCURONIUM BROMIDE 30 MG: 10 INJECTION INTRAVENOUS at 09:06

## 2020-11-25 RX ADMIN — CHLORHEXIDINE GLUCONATE 1 APPLICATION: 500 CLOTH TOPICAL at 04:07

## 2020-11-25 RX ADMIN — ROCURONIUM BROMIDE 50 MG: 10 INJECTION INTRAVENOUS at 08:21

## 2020-11-25 RX ADMIN — POTASSIUM CHLORIDE 20 MEQ: 29.8 INJECTION, SOLUTION INTRAVENOUS at 18:52

## 2020-11-25 RX ADMIN — PROPOFOL 50 MCG/KG/MIN: 10 INJECTION, EMULSION INTRAVENOUS at 11:19

## 2020-11-25 RX ADMIN — ALBUMIN HUMAN 500 ML: 0.05 INJECTION, SOLUTION INTRAVENOUS at 12:44

## 2020-11-25 RX ADMIN — SUFENTANIL CITRATE 100 MCG: 50 INJECTION EPIDURAL; INTRAVENOUS at 08:21

## 2020-11-25 RX ADMIN — GLYCOPYRROLATE 0.4 MG: 0.2 INJECTION INTRAMUSCULAR; INTRAVENOUS at 07:04

## 2020-11-25 RX ADMIN — FENTANYL CITRATE 25 MCG: 0.05 INJECTION, SOLUTION INTRAMUSCULAR; INTRAVENOUS at 10:30

## 2020-11-25 RX ADMIN — SODIUM CHLORIDE 1.5 G: 900 INJECTION INTRAVENOUS at 13:10

## 2020-11-25 RX ADMIN — NITROGLYCERIN 0.1 MCG/KG/MIN: 20 INJECTION INTRAVENOUS at 07:58

## 2020-11-25 RX ADMIN — VANCOMYCIN HYDROCHLORIDE 1500 MG: 10 INJECTION, POWDER, LYOPHILIZED, FOR SOLUTION INTRAVENOUS at 06:42

## 2020-11-25 RX ADMIN — SUFENTANIL CITRATE 200 MCG: 50 INJECTION EPIDURAL; INTRAVENOUS at 07:04

## 2020-11-25 RX ADMIN — CHLORHEXIDINE GLUCONATE 0.12% ORAL RINSE 15 ML: 1.2 LIQUID ORAL at 06:15

## 2020-11-25 RX ADMIN — ASPIRIN 325 MG ORAL TABLET 325 MG: 325 PILL ORAL at 11:18

## 2020-11-25 RX ADMIN — PROPOFOL 35 MCG/KG/MIN: 10 INJECTION, EMULSION INTRAVENOUS at 09:37

## 2020-11-25 RX ADMIN — ALBUMIN, HUMAN INJ 5% 500 ML: 5 SOLUTION at 15:14

## 2020-11-25 RX ADMIN — MIDAZOLAM HYDROCHLORIDE 5 MG: 1 INJECTION, SOLUTION INTRAMUSCULAR; INTRAVENOUS at 08:21

## 2020-11-25 RX ADMIN — NOREPINEPHRINE BITARTRATE 0.02 MCG/KG/MIN: 1 INJECTION, SOLUTION, CONCENTRATE INTRAVENOUS at 18:46

## 2020-11-25 RX ADMIN — PROTAMINE SULFATE 50 MG: 10 INJECTION, SOLUTION INTRAVENOUS at 09:44

## 2020-11-25 RX ADMIN — CHLORHEXIDINE GLUCONATE 0.12% ORAL RINSE 15 ML: 1.2 LIQUID ORAL at 11:19

## 2020-11-25 RX ADMIN — AMINOCAPROIC ACID 10 G: 250 INJECTION, SOLUTION INTRAVENOUS at 07:04

## 2020-11-25 RX ADMIN — SODIUM CHLORIDE: 9 INJECTION, SOLUTION INTRAVENOUS at 07:04

## 2020-11-25 RX ADMIN — HEPARIN SODIUM 30000 UNITS: 1000 INJECTION, SOLUTION INTRAVENOUS; SUBCUTANEOUS at 08:01

## 2020-11-25 RX ADMIN — ALBUMIN HUMAN 500 ML: 0.05 INJECTION, SOLUTION INTRAVENOUS at 10:10

## 2020-11-25 RX ADMIN — EPHEDRINE SULFATE 5 MG: 50 INJECTION INTRAMUSCULAR; INTRAVENOUS; SUBCUTANEOUS at 07:04

## 2020-11-25 RX ADMIN — MIDAZOLAM HYDROCHLORIDE 5 MG: 1 INJECTION, SOLUTION INTRAMUSCULAR; INTRAVENOUS at 07:04

## 2020-11-25 RX ADMIN — HYDROCODONE BITARTRATE AND ACETAMINOPHEN 1 TABLET: 7.5; 325 TABLET ORAL at 21:11

## 2020-11-25 RX ADMIN — MUPIROCIN 1 APPLICATION: 20 OINTMENT TOPICAL at 06:15

## 2020-11-25 RX ADMIN — AMINOCAPROIC ACID 10 G: 250 INJECTION, SOLUTION INTRAVENOUS at 09:10

## 2020-11-25 RX ADMIN — ROCURONIUM BROMIDE 50 MG: 10 INJECTION INTRAVENOUS at 07:04

## 2020-11-25 RX ADMIN — CHLORHEXIDINE GLUCONATE 0.12% ORAL RINSE 15 ML: 1.2 LIQUID ORAL at 21:11

## 2020-11-25 RX ADMIN — PROTAMINE SULFATE 50 MG: 10 INJECTION, SOLUTION INTRAVENOUS at 10:44

## 2020-11-25 RX ADMIN — INSULIN HUMAN 2.8 UNITS/HR: 1 INJECTION, SOLUTION INTRAVENOUS at 16:27

## 2020-11-25 RX ADMIN — ATORVASTATIN CALCIUM 40 MG: 40 TABLET, FILM COATED ORAL at 21:11

## 2020-11-25 RX ADMIN — FENTANYL CITRATE 25 MCG: 0.05 INJECTION, SOLUTION INTRAMUSCULAR; INTRAVENOUS at 14:24

## 2020-11-25 RX ADMIN — FAMOTIDINE 20 MG: 20 TABLET, FILM COATED ORAL at 06:15

## 2020-11-25 RX ADMIN — SODIUM CHLORIDE, PRESERVATIVE FREE 10 ML: 5 INJECTION INTRAVENOUS at 21:12

## 2020-11-25 RX ADMIN — METOPROLOL TARTRATE 2.5 MG: 5 INJECTION INTRAVENOUS at 11:20

## 2020-11-25 RX ADMIN — MAGNESIUM SULFATE 2 G: 2 INJECTION INTRAVENOUS at 19:53

## 2020-11-25 RX ADMIN — ALPRAZOLAM 0.25 MG: 0.25 TABLET ORAL at 11:20

## 2020-11-25 RX ADMIN — PROTAMINE SULFATE 300 MG: 10 INJECTION, SOLUTION INTRAVENOUS at 09:09

## 2020-11-25 RX ADMIN — SODIUM CHLORIDE 1.5 G: 900 INJECTION INTRAVENOUS at 21:11

## 2020-11-25 RX ADMIN — OXYCODONE HYDROCHLORIDE AND ACETAMINOPHEN 2 TABLET: 5; 325 TABLET ORAL at 11:20

## 2020-11-26 ENCOUNTER — APPOINTMENT (OUTPATIENT)
Dept: GENERAL RADIOLOGY | Facility: HOSPITAL | Age: 75
End: 2020-11-26

## 2020-11-26 LAB
ALBUMIN SERPL-MCNC: 4.4 G/DL (ref 3.5–5.2)
ALBUMIN SERPL-MCNC: 4.6 G/DL (ref 3.5–5.2)
ALBUMIN/GLOB SERPL: 2 G/DL
ALP SERPL-CCNC: 53 U/L (ref 39–117)
ALT SERPL W P-5'-P-CCNC: 20 U/L (ref 1–33)
ANION GAP SERPL CALCULATED.3IONS-SCNC: 12 MMOL/L (ref 5–15)
ANION GAP SERPL CALCULATED.3IONS-SCNC: 13 MMOL/L (ref 5–15)
ARTERIAL PATENCY WRIST A: ABNORMAL
AST SERPL-CCNC: 46 U/L (ref 1–32)
ATMOSPHERIC PRESS: ABNORMAL MM[HG]
BASE EXCESS BLDA CALC-SCNC: -3.7 MMOL/L (ref 0–2)
BASE EXCESS BLDA CALC-SCNC: -4.3 MMOL/L (ref 0–2)
BASE EXCESS BLDA CALC-SCNC: -5.1 MMOL/L (ref 0–2)
BASOPHILS # BLD AUTO: 0.04 10*3/MM3 (ref 0–0.2)
BASOPHILS NFR BLD AUTO: 0.2 % (ref 0–1.5)
BDY SITE: ABNORMAL
BH BB BLOOD EXPIRATION DATE: NORMAL
BH BB BLOOD EXPIRATION DATE: NORMAL
BH BB BLOOD TYPE BARCODE: 5100
BH BB BLOOD TYPE BARCODE: 7300
BH BB DISPENSE STATUS: NORMAL
BH BB DISPENSE STATUS: NORMAL
BH BB PRODUCT CODE: NORMAL
BH BB PRODUCT CODE: NORMAL
BH BB UNIT NUMBER: NORMAL
BH BB UNIT NUMBER: NORMAL
BILIRUB SERPL-MCNC: 0.6 MG/DL (ref 0–1.2)
BODY TEMPERATURE: 37 C
BUN SERPL-MCNC: 29 MG/DL (ref 8–23)
BUN SERPL-MCNC: 29 MG/DL (ref 8–23)
BUN/CREAT SERPL: 16.9 (ref 7–25)
BUN/CREAT SERPL: 18.4 (ref 7–25)
CALCIUM SPEC-SCNC: 8.4 MG/DL (ref 8.6–10.5)
CALCIUM SPEC-SCNC: 8.8 MG/DL (ref 8.6–10.5)
CHLORIDE SERPL-SCNC: 104 MMOL/L (ref 98–107)
CHLORIDE SERPL-SCNC: 104 MMOL/L (ref 98–107)
CO2 BLDA-SCNC: 21.7 MMOL/L (ref 22–33)
CO2 BLDA-SCNC: 23.9 MMOL/L (ref 22–33)
CO2 BLDA-SCNC: 24.2 MMOL/L (ref 22–33)
CO2 SERPL-SCNC: 21 MMOL/L (ref 22–29)
CO2 SERPL-SCNC: 24 MMOL/L (ref 22–29)
COHGB MFR BLD: 0.9 % (ref 0–2)
COHGB MFR BLD: 1 % (ref 0–2)
COHGB MFR BLD: 1 % (ref 0–2)
CREAT SERPL-MCNC: 1.58 MG/DL (ref 0.57–1)
CREAT SERPL-MCNC: 1.72 MG/DL (ref 0.57–1)
DEPRECATED RDW RBC AUTO: 51.1 FL (ref 37–54)
EOSINOPHIL # BLD AUTO: 0.01 10*3/MM3 (ref 0–0.4)
EOSINOPHIL NFR BLD AUTO: 0.1 % (ref 0.3–6.2)
EPAP: 0
EPAP: 6
ERYTHROCYTE [DISTWIDTH] IN BLOOD BY AUTOMATED COUNT: 13.9 % (ref 12.3–15.4)
GFR SERPL CREATININE-BSD FRML MDRD: 29 ML/MIN/1.73
GFR SERPL CREATININE-BSD FRML MDRD: 32 ML/MIN/1.73
GLOBULIN UR ELPH-MCNC: 2.2 GM/DL
GLUCOSE BLDC GLUCOMTR-MCNC: 107 MG/DL (ref 70–130)
GLUCOSE BLDC GLUCOMTR-MCNC: 112 MG/DL (ref 70–130)
GLUCOSE BLDC GLUCOMTR-MCNC: 116 MG/DL (ref 70–130)
GLUCOSE BLDC GLUCOMTR-MCNC: 121 MG/DL (ref 70–130)
GLUCOSE BLDC GLUCOMTR-MCNC: 123 MG/DL (ref 70–130)
GLUCOSE BLDC GLUCOMTR-MCNC: 126 MG/DL (ref 70–130)
GLUCOSE BLDC GLUCOMTR-MCNC: 126 MG/DL (ref 70–130)
GLUCOSE BLDC GLUCOMTR-MCNC: 130 MG/DL (ref 70–130)
GLUCOSE BLDC GLUCOMTR-MCNC: 132 MG/DL (ref 70–130)
GLUCOSE BLDC GLUCOMTR-MCNC: 139 MG/DL (ref 70–130)
GLUCOSE BLDC GLUCOMTR-MCNC: 140 MG/DL (ref 70–130)
GLUCOSE BLDC GLUCOMTR-MCNC: 156 MG/DL (ref 70–130)
GLUCOSE BLDC GLUCOMTR-MCNC: 160 MG/DL (ref 70–130)
GLUCOSE BLDC GLUCOMTR-MCNC: 164 MG/DL (ref 70–130)
GLUCOSE BLDC GLUCOMTR-MCNC: 75 MG/DL (ref 70–130)
GLUCOSE BLDC GLUCOMTR-MCNC: 86 MG/DL (ref 70–130)
GLUCOSE BLDC GLUCOMTR-MCNC: 87 MG/DL (ref 70–130)
GLUCOSE SERPL-MCNC: 126 MG/DL (ref 65–99)
GLUCOSE SERPL-MCNC: 149 MG/DL (ref 65–99)
HCO3 BLDA-SCNC: 20.5 MMOL/L (ref 20–26)
HCO3 BLDA-SCNC: 22.5 MMOL/L (ref 20–26)
HCO3 BLDA-SCNC: 22.8 MMOL/L (ref 20–26)
HCT VFR BLD AUTO: 34.7 % (ref 34–46.6)
HCT VFR BLD CALC: 33.1 %
HCT VFR BLD CALC: 34.3 %
HCT VFR BLD CALC: 34.8 %
HGB BLD-MCNC: 10.5 G/DL (ref 12–15.9)
HGB BLDA-MCNC: 10.8 G/DL (ref 14–18)
HGB BLDA-MCNC: 11.2 G/DL (ref 14–18)
HGB BLDA-MCNC: 11.4 G/DL (ref 14–18)
IMM GRANULOCYTES # BLD AUTO: 0.13 10*3/MM3 (ref 0–0.05)
IMM GRANULOCYTES NFR BLD AUTO: 0.7 % (ref 0–0.5)
INHALED O2 CONCENTRATION: 32 %
INHALED O2 CONCENTRATION: 32 %
INHALED O2 CONCENTRATION: 45 %
INR PPP: 1.19 (ref 0.85–1.16)
IPAP: 0
IPAP: 16
LYMPHOCYTES # BLD AUTO: 0.63 10*3/MM3 (ref 0.7–3.1)
LYMPHOCYTES NFR BLD AUTO: 3.6 % (ref 19.6–45.3)
MAGNESIUM SERPL-MCNC: 2.7 MG/DL (ref 1.6–2.4)
MCH RBC QN AUTO: 30.3 PG (ref 26.6–33)
MCHC RBC AUTO-ENTMCNC: 30.3 G/DL (ref 31.5–35.7)
MCV RBC AUTO: 100 FL (ref 79–97)
METHGB BLD QL: 0.5 % (ref 0–1.5)
METHGB BLD QL: 0.5 % (ref 0–1.5)
METHGB BLD QL: 0.6 % (ref 0–1.5)
MODALITY: ABNORMAL
MONOCYTES # BLD AUTO: 0.76 10*3/MM3 (ref 0.1–0.9)
MONOCYTES NFR BLD AUTO: 4.3 % (ref 5–12)
NEUTROPHILS NFR BLD AUTO: 16.17 10*3/MM3 (ref 1.7–7)
NEUTROPHILS NFR BLD AUTO: 91.1 % (ref 42.7–76)
NOTE: ABNORMAL
NRBC BLD AUTO-RTO: 0 /100 WBC (ref 0–0.2)
OXYHGB MFR BLDV: 93 % (ref 94–99)
OXYHGB MFR BLDV: 95.2 % (ref 94–99)
OXYHGB MFR BLDV: 95.4 % (ref 94–99)
PAW @ PEAK INSP FLOW SETTING VENT: 0 CMH2O
PCO2 BLDA: 39.4 MM HG (ref 35–45)
PCO2 BLDA: 46.3 MM HG (ref 35–45)
PCO2 BLDA: 47.3 MM HG (ref 35–45)
PCO2 TEMP ADJ BLD: 39.4 MM HG (ref 35–45)
PCO2 TEMP ADJ BLD: 46.3 MM HG (ref 35–45)
PCO2 TEMP ADJ BLD: 47.3 MM HG (ref 35–45)
PH BLDA: 7.29 PH UNITS (ref 7.35–7.45)
PH BLDA: 7.3 PH UNITS (ref 7.35–7.45)
PH BLDA: 7.33 PH UNITS (ref 7.35–7.45)
PH, TEMP CORRECTED: 7.29 PH UNITS
PH, TEMP CORRECTED: 7.3 PH UNITS
PH, TEMP CORRECTED: 7.33 PH UNITS
PHOSPHATE SERPL-MCNC: 3.9 MG/DL (ref 2.5–4.5)
PLATELET # BLD AUTO: 218 10*3/MM3 (ref 140–450)
PMV BLD AUTO: 10.7 FL (ref 6–12)
PO2 BLDA: 68.6 MM HG (ref 83–108)
PO2 BLDA: 84.4 MM HG (ref 83–108)
PO2 BLDA: 85 MM HG (ref 83–108)
PO2 TEMP ADJ BLD: 68.6 MM HG (ref 83–108)
PO2 TEMP ADJ BLD: 84.4 MM HG (ref 83–108)
PO2 TEMP ADJ BLD: 85 MM HG (ref 83–108)
POTASSIUM SERPL-SCNC: 3.4 MMOL/L (ref 3.5–5.2)
POTASSIUM SERPL-SCNC: 3.9 MMOL/L (ref 3.5–5.2)
PROT SERPL-MCNC: 6.6 G/DL (ref 6–8.5)
PROTHROMBIN TIME: 14.8 SECONDS (ref 11.5–14)
QT INTERVAL: 554 MS
QT INTERVAL: 572 MS
QTC INTERVAL: 477 MS
QTC INTERVAL: 490 MS
RBC # BLD AUTO: 3.47 10*6/MM3 (ref 3.77–5.28)
SET MECH RESP RATE: 12
SODIUM SERPL-SCNC: 137 MMOL/L (ref 136–145)
SODIUM SERPL-SCNC: 141 MMOL/L (ref 136–145)
TOTAL RATE: 0 BREATHS/MINUTE
UNIT  ABO: NORMAL
UNIT  ABO: NORMAL
UNIT  RH: NORMAL
UNIT  RH: NORMAL
VENTILATOR MODE: ABNORMAL
WBC # BLD AUTO: 17.74 10*3/MM3 (ref 3.4–10.8)

## 2020-11-26 PROCEDURE — 25010000003 CEFUROXIME SODIUM 1.5 G RECONSTITUTED SOLUTION: Performed by: PHYSICIAN ASSISTANT

## 2020-11-26 PROCEDURE — 25010000002 FENTANYL CITRATE (PF) 100 MCG/2ML SOLUTION: Performed by: THORACIC SURGERY (CARDIOTHORACIC VASCULAR SURGERY)

## 2020-11-26 PROCEDURE — 99232 SBSQ HOSP IP/OBS MODERATE 35: CPT | Performed by: INTERNAL MEDICINE

## 2020-11-26 PROCEDURE — 85025 COMPLETE CBC W/AUTO DIFF WBC: CPT | Performed by: PHYSICIAN ASSISTANT

## 2020-11-26 PROCEDURE — 93005 ELECTROCARDIOGRAM TRACING: CPT | Performed by: THORACIC SURGERY (CARDIOTHORACIC VASCULAR SURGERY)

## 2020-11-26 PROCEDURE — 82962 GLUCOSE BLOOD TEST: CPT

## 2020-11-26 PROCEDURE — 85610 PROTHROMBIN TIME: CPT | Performed by: PHYSICIAN ASSISTANT

## 2020-11-26 PROCEDURE — 82805 BLOOD GASES W/O2 SATURATION: CPT

## 2020-11-26 PROCEDURE — 99024 POSTOP FOLLOW-UP VISIT: CPT | Performed by: THORACIC SURGERY (CARDIOTHORACIC VASCULAR SURGERY)

## 2020-11-26 PROCEDURE — 93005 ELECTROCARDIOGRAM TRACING: CPT | Performed by: PHYSICIAN ASSISTANT

## 2020-11-26 PROCEDURE — 93010 ELECTROCARDIOGRAM REPORT: CPT | Performed by: INTERNAL MEDICINE

## 2020-11-26 PROCEDURE — 94660 CPAP INITIATION&MGMT: CPT

## 2020-11-26 PROCEDURE — 80053 COMPREHEN METABOLIC PANEL: CPT | Performed by: THORACIC SURGERY (CARDIOTHORACIC VASCULAR SURGERY)

## 2020-11-26 PROCEDURE — 71045 X-RAY EXAM CHEST 1 VIEW: CPT

## 2020-11-26 PROCEDURE — 80069 RENAL FUNCTION PANEL: CPT | Performed by: PHYSICIAN ASSISTANT

## 2020-11-26 PROCEDURE — 94799 UNLISTED PULMONARY SVC/PX: CPT

## 2020-11-26 PROCEDURE — 83735 ASSAY OF MAGNESIUM: CPT | Performed by: PHYSICIAN ASSISTANT

## 2020-11-26 PROCEDURE — 63710000001 INSULIN DETEMIR PER 5 UNITS: Performed by: INTERNAL MEDICINE

## 2020-11-26 PROCEDURE — 25010000003 POTASSIUM CHLORIDE PER 2 MEQ: Performed by: PHYSICIAN ASSISTANT

## 2020-11-26 PROCEDURE — 25010000002 ATROPINE PER 0.01 MG: Performed by: INTERNAL MEDICINE

## 2020-11-26 RX ORDER — FENTANYL CITRATE 50 UG/ML
25 INJECTION, SOLUTION INTRAMUSCULAR; INTRAVENOUS
Status: DISCONTINUED | OUTPATIENT
Start: 2020-11-26 | End: 2020-11-28

## 2020-11-26 RX ORDER — CLOPIDOGREL BISULFATE 75 MG/1
75 TABLET ORAL DAILY
Status: DISCONTINUED | OUTPATIENT
Start: 2020-11-26 | End: 2020-12-03 | Stop reason: HOSPADM

## 2020-11-26 RX ORDER — POLYVINYL ALCOHOL 14 MG/ML
2 SOLUTION/ DROPS OPHTHALMIC
Status: DISCONTINUED | OUTPATIENT
Start: 2020-11-26 | End: 2020-12-03 | Stop reason: HOSPADM

## 2020-11-26 RX ORDER — ATROPINE SULFATE 0.4 MG/ML
0.4 AMPUL (ML) INJECTION ONCE
Status: COMPLETED | OUTPATIENT
Start: 2020-11-26 | End: 2020-11-26

## 2020-11-26 RX ADMIN — POLYVINYL ALCOHOL 2 DROP: 14 SOLUTION/ DROPS OPHTHALMIC at 21:25

## 2020-11-26 RX ADMIN — SODIUM CHLORIDE 1.5 G: 900 INJECTION INTRAVENOUS at 05:36

## 2020-11-26 RX ADMIN — ATORVASTATIN CALCIUM 40 MG: 40 TABLET, FILM COATED ORAL at 21:01

## 2020-11-26 RX ADMIN — HYDROCODONE BITARTRATE AND ACETAMINOPHEN 1 TABLET: 7.5; 325 TABLET ORAL at 18:04

## 2020-11-26 RX ADMIN — FENTANYL CITRATE 25 MCG: 0.05 INJECTION, SOLUTION INTRAMUSCULAR; INTRAVENOUS at 09:18

## 2020-11-26 RX ADMIN — TEMAZEPAM 7.5 MG: 7.5 CAPSULE ORAL at 21:38

## 2020-11-26 RX ADMIN — ATROPINE SULFATE 0.4 MG: 0.4 INJECTION, SOLUTION INTRAMUSCULAR; INTRAVENOUS; SUBCUTANEOUS at 11:45

## 2020-11-26 RX ADMIN — INSULIN DETEMIR 20 UNITS: 100 INJECTION, SOLUTION SUBCUTANEOUS at 11:56

## 2020-11-26 RX ADMIN — POTASSIUM CHLORIDE 20 MEQ: 29.8 INJECTION, SOLUTION INTRAVENOUS at 00:11

## 2020-11-26 RX ADMIN — CLOPIDOGREL BISULFATE 75 MG: 75 TABLET ORAL at 11:50

## 2020-11-26 RX ADMIN — SODIUM CHLORIDE 1.5 G: 900 INJECTION INTRAVENOUS at 14:55

## 2020-11-26 RX ADMIN — SODIUM CHLORIDE, PRESERVATIVE FREE 10 ML: 5 INJECTION INTRAVENOUS at 21:02

## 2020-11-26 RX ADMIN — SODIUM CHLORIDE 1.5 G: 900 INJECTION INTRAVENOUS at 21:01

## 2020-11-26 RX ADMIN — HYDROCODONE BITARTRATE AND ACETAMINOPHEN 1 TABLET: 7.5; 325 TABLET ORAL at 11:50

## 2020-11-26 RX ADMIN — POTASSIUM CHLORIDE 20 MEQ: 29.8 INJECTION, SOLUTION INTRAVENOUS at 06:57

## 2020-11-26 RX ADMIN — HYDROCODONE BITARTRATE AND ACETAMINOPHEN 1 TABLET: 7.5; 325 TABLET ORAL at 07:22

## 2020-11-26 RX ADMIN — FENTANYL CITRATE 25 MCG: 0.05 INJECTION, SOLUTION INTRAMUSCULAR; INTRAVENOUS at 18:09

## 2020-11-26 RX ADMIN — ASPIRIN 325 MG: 325 TABLET, COATED ORAL at 11:49

## 2020-11-26 RX ADMIN — POTASSIUM CHLORIDE 20 MEQ: 29.8 INJECTION, SOLUTION INTRAVENOUS at 05:36

## 2020-11-26 RX ADMIN — FENTANYL CITRATE 25 MCG: 0.05 INJECTION, SOLUTION INTRAMUSCULAR; INTRAVENOUS at 00:57

## 2020-11-26 RX ADMIN — OXYCODONE HYDROCHLORIDE AND ACETAMINOPHEN 2 TABLET: 5; 325 TABLET ORAL at 01:45

## 2020-11-27 ENCOUNTER — APPOINTMENT (OUTPATIENT)
Dept: GENERAL RADIOLOGY | Facility: HOSPITAL | Age: 75
End: 2020-11-27

## 2020-11-27 LAB
ANION GAP SERPL CALCULATED.3IONS-SCNC: 13 MMOL/L (ref 5–15)
ARTERIAL PATENCY WRIST A: ABNORMAL
ATMOSPHERIC PRESS: ABNORMAL MM[HG]
BASE EXCESS BLDA CALC-SCNC: -5.1 MMOL/L (ref 0–2)
BDY SITE: ABNORMAL
BH BB BLOOD EXPIRATION DATE: NORMAL
BH BB BLOOD EXPIRATION DATE: NORMAL
BH BB BLOOD TYPE BARCODE: 5100
BH BB BLOOD TYPE BARCODE: 5100
BH BB DISPENSE STATUS: NORMAL
BH BB DISPENSE STATUS: NORMAL
BH BB PRODUCT CODE: NORMAL
BH BB PRODUCT CODE: NORMAL
BH BB UNIT NUMBER: NORMAL
BH BB UNIT NUMBER: NORMAL
BODY TEMPERATURE: 37 C
BUN SERPL-MCNC: 25 MG/DL (ref 8–23)
BUN/CREAT SERPL: 16.2 (ref 7–25)
CALCIUM SPEC-SCNC: 8.3 MG/DL (ref 8.6–10.5)
CHLORIDE SERPL-SCNC: 103 MMOL/L (ref 98–107)
CO2 BLDA-SCNC: 20.8 MMOL/L (ref 22–33)
CO2 SERPL-SCNC: 21 MMOL/L (ref 22–29)
COHGB MFR BLD: 1 % (ref 0–2)
CREAT SERPL-MCNC: 1.54 MG/DL (ref 0.57–1)
CROSSMATCH INTERPRETATION: NORMAL
CROSSMATCH INTERPRETATION: NORMAL
DEPRECATED RDW RBC AUTO: 50.4 FL (ref 37–54)
EPAP: 0
ERYTHROCYTE [DISTWIDTH] IN BLOOD BY AUTOMATED COUNT: 14 % (ref 12.3–15.4)
GFR SERPL CREATININE-BSD FRML MDRD: 33 ML/MIN/1.73
GLUCOSE BLDC GLUCOMTR-MCNC: 104 MG/DL (ref 70–130)
GLUCOSE BLDC GLUCOMTR-MCNC: 116 MG/DL (ref 70–130)
GLUCOSE BLDC GLUCOMTR-MCNC: 119 MG/DL (ref 70–130)
GLUCOSE BLDC GLUCOMTR-MCNC: 124 MG/DL (ref 70–130)
GLUCOSE BLDC GLUCOMTR-MCNC: 94 MG/DL (ref 70–130)
GLUCOSE SERPL-MCNC: 110 MG/DL (ref 65–99)
HCO3 BLDA-SCNC: 19.7 MMOL/L (ref 20–26)
HCT VFR BLD AUTO: 34.5 % (ref 34–46.6)
HCT VFR BLD CALC: 32.7 %
HGB BLD-MCNC: 10.9 G/DL (ref 12–15.9)
HGB BLDA-MCNC: 10.7 G/DL (ref 14–18)
INHALED O2 CONCENTRATION: 32 %
IPAP: 0
MCH RBC QN AUTO: 30.7 PG (ref 26.6–33)
MCHC RBC AUTO-ENTMCNC: 31.6 G/DL (ref 31.5–35.7)
MCV RBC AUTO: 97.2 FL (ref 79–97)
METHGB BLD QL: 0.7 % (ref 0–1.5)
MODALITY: ABNORMAL
NOTE: ABNORMAL
OXYHGB MFR BLDV: 93.4 % (ref 94–99)
PAW @ PEAK INSP FLOW SETTING VENT: 0 CMH2O
PCO2 BLDA: 34.9 MM HG (ref 35–45)
PCO2 TEMP ADJ BLD: 34.9 MM HG (ref 35–45)
PH BLDA: 7.36 PH UNITS (ref 7.35–7.45)
PH, TEMP CORRECTED: 7.36 PH UNITS
PLATELET # BLD AUTO: 165 10*3/MM3 (ref 140–450)
PMV BLD AUTO: 10.7 FL (ref 6–12)
PO2 BLDA: 70.3 MM HG (ref 83–108)
PO2 TEMP ADJ BLD: 70.3 MM HG (ref 83–108)
POTASSIUM SERPL-SCNC: 3.3 MMOL/L (ref 3.5–5.2)
POTASSIUM SERPL-SCNC: 4.8 MMOL/L (ref 3.5–5.2)
QT INTERVAL: 462 MS
QT INTERVAL: 498 MS
QTC INTERVAL: 476 MS
QTC INTERVAL: 498 MS
RBC # BLD AUTO: 3.55 10*6/MM3 (ref 3.77–5.28)
SODIUM SERPL-SCNC: 137 MMOL/L (ref 136–145)
TOTAL RATE: 0 BREATHS/MINUTE
UNIT  ABO: NORMAL
UNIT  ABO: NORMAL
UNIT  RH: NORMAL
UNIT  RH: NORMAL
WBC # BLD AUTO: 13.76 10*3/MM3 (ref 3.4–10.8)

## 2020-11-27 PROCEDURE — 99232 SBSQ HOSP IP/OBS MODERATE 35: CPT | Performed by: INTERNAL MEDICINE

## 2020-11-27 PROCEDURE — 99024 POSTOP FOLLOW-UP VISIT: CPT | Performed by: THORACIC SURGERY (CARDIOTHORACIC VASCULAR SURGERY)

## 2020-11-27 PROCEDURE — 97162 PT EVAL MOD COMPLEX 30 MIN: CPT

## 2020-11-27 PROCEDURE — 93010 ELECTROCARDIOGRAM REPORT: CPT | Performed by: INTERNAL MEDICINE

## 2020-11-27 PROCEDURE — 82962 GLUCOSE BLOOD TEST: CPT

## 2020-11-27 PROCEDURE — 71045 X-RAY EXAM CHEST 1 VIEW: CPT

## 2020-11-27 PROCEDURE — 93280 PM DEVICE PROGR EVAL DUAL: CPT | Performed by: INTERNAL MEDICINE

## 2020-11-27 PROCEDURE — 80048 BASIC METABOLIC PNL TOTAL CA: CPT | Performed by: PHYSICIAN ASSISTANT

## 2020-11-27 PROCEDURE — 82805 BLOOD GASES W/O2 SATURATION: CPT

## 2020-11-27 PROCEDURE — 94799 UNLISTED PULMONARY SVC/PX: CPT

## 2020-11-27 PROCEDURE — 84132 ASSAY OF SERUM POTASSIUM: CPT | Performed by: NURSE PRACTITIONER

## 2020-11-27 PROCEDURE — 85027 COMPLETE CBC AUTOMATED: CPT | Performed by: PHYSICIAN ASSISTANT

## 2020-11-27 PROCEDURE — 93005 ELECTROCARDIOGRAM TRACING: CPT | Performed by: PHYSICIAN ASSISTANT

## 2020-11-27 PROCEDURE — 25010000002 FENTANYL CITRATE (PF) 100 MCG/2ML SOLUTION: Performed by: THORACIC SURGERY (CARDIOTHORACIC VASCULAR SURGERY)

## 2020-11-27 PROCEDURE — 94660 CPAP INITIATION&MGMT: CPT

## 2020-11-27 RX ORDER — DOPAMINE HYDROCHLORIDE 160 MG/100ML
2-20 INJECTION, SOLUTION INTRAVENOUS CONTINUOUS PRN
Status: DISCONTINUED | OUTPATIENT
Start: 2020-11-27 | End: 2020-12-02

## 2020-11-27 RX ORDER — DOBUTAMINE HYDROCHLORIDE 100 MG/100ML
2-20 INJECTION INTRAVENOUS CONTINUOUS PRN
Status: DISCONTINUED | OUTPATIENT
Start: 2020-11-27 | End: 2020-12-02

## 2020-11-27 RX ADMIN — POTASSIUM CHLORIDE 40 MEQ: 10 CAPSULE, COATED, EXTENDED RELEASE ORAL at 05:48

## 2020-11-27 RX ADMIN — HYDROCODONE BITARTRATE AND ACETAMINOPHEN 1 TABLET: 7.5; 325 TABLET ORAL at 13:57

## 2020-11-27 RX ADMIN — FENTANYL CITRATE 25 MCG: 0.05 INJECTION, SOLUTION INTRAMUSCULAR; INTRAVENOUS at 16:12

## 2020-11-27 RX ADMIN — FENTANYL CITRATE 25 MCG: 0.05 INJECTION, SOLUTION INTRAMUSCULAR; INTRAVENOUS at 12:50

## 2020-11-27 RX ADMIN — ALPRAZOLAM 0.25 MG: 0.25 TABLET ORAL at 01:53

## 2020-11-27 RX ADMIN — HYDROCODONE BITARTRATE AND ACETAMINOPHEN 1 TABLET: 7.5; 325 TABLET ORAL at 01:53

## 2020-11-27 RX ADMIN — OXYCODONE HYDROCHLORIDE AND ACETAMINOPHEN 2 TABLET: 5; 325 TABLET ORAL at 20:01

## 2020-11-27 RX ADMIN — HYDROCODONE BITARTRATE AND ACETAMINOPHEN 1 TABLET: 7.5; 325 TABLET ORAL at 05:48

## 2020-11-27 RX ADMIN — POTASSIUM CHLORIDE 40 MEQ: 1.5 FOR SOLUTION ORAL at 11:09

## 2020-11-27 RX ADMIN — CLOPIDOGREL BISULFATE 75 MG: 75 TABLET ORAL at 09:22

## 2020-11-27 RX ADMIN — ALPRAZOLAM 0.25 MG: 0.25 TABLET ORAL at 21:01

## 2020-11-27 RX ADMIN — FENTANYL CITRATE 25 MCG: 0.05 INJECTION, SOLUTION INTRAMUSCULAR; INTRAVENOUS at 10:26

## 2020-11-27 RX ADMIN — HYDROCODONE BITARTRATE AND ACETAMINOPHEN 1 TABLET: 7.5; 325 TABLET ORAL at 09:22

## 2020-11-27 RX ADMIN — ASPIRIN 325 MG: 325 TABLET, COATED ORAL at 09:22

## 2020-11-27 RX ADMIN — FENTANYL CITRATE 25 MCG: 0.05 INJECTION, SOLUTION INTRAMUSCULAR; INTRAVENOUS at 20:01

## 2020-11-27 RX ADMIN — ATORVASTATIN CALCIUM 40 MG: 40 TABLET, FILM COATED ORAL at 20:53

## 2020-11-28 ENCOUNTER — APPOINTMENT (OUTPATIENT)
Dept: GENERAL RADIOLOGY | Facility: HOSPITAL | Age: 75
End: 2020-11-28

## 2020-11-28 LAB
ANION GAP SERPL CALCULATED.3IONS-SCNC: 10 MMOL/L (ref 5–15)
BH BB BLOOD EXPIRATION DATE: NORMAL
BH BB BLOOD EXPIRATION DATE: NORMAL
BH BB BLOOD TYPE BARCODE: 5100
BH BB BLOOD TYPE BARCODE: 5100
BH BB DISPENSE STATUS: NORMAL
BH BB DISPENSE STATUS: NORMAL
BH BB PRODUCT CODE: NORMAL
BH BB PRODUCT CODE: NORMAL
BH BB UNIT NUMBER: NORMAL
BH BB UNIT NUMBER: NORMAL
BUN SERPL-MCNC: 33 MG/DL (ref 8–23)
BUN/CREAT SERPL: 25.6 (ref 7–25)
CALCIUM SPEC-SCNC: 8 MG/DL (ref 8.6–10.5)
CHLORIDE SERPL-SCNC: 106 MMOL/L (ref 98–107)
CO2 SERPL-SCNC: 21 MMOL/L (ref 22–29)
CREAT SERPL-MCNC: 1.29 MG/DL (ref 0.57–1)
CROSSMATCH INTERPRETATION: NORMAL
CROSSMATCH INTERPRETATION: NORMAL
DEPRECATED RDW RBC AUTO: 52.2 FL (ref 37–54)
ERYTHROCYTE [DISTWIDTH] IN BLOOD BY AUTOMATED COUNT: 14.3 % (ref 12.3–15.4)
GFR SERPL CREATININE-BSD FRML MDRD: 40 ML/MIN/1.73
GLUCOSE BLDC GLUCOMTR-MCNC: 114 MG/DL (ref 70–130)
GLUCOSE BLDC GLUCOMTR-MCNC: 159 MG/DL (ref 70–130)
GLUCOSE BLDC GLUCOMTR-MCNC: 165 MG/DL (ref 70–130)
GLUCOSE BLDC GLUCOMTR-MCNC: 98 MG/DL (ref 70–130)
GLUCOSE SERPL-MCNC: 97 MG/DL (ref 65–99)
HCT VFR BLD AUTO: 32.5 % (ref 34–46.6)
HGB BLD-MCNC: 10.1 G/DL (ref 12–15.9)
MCH RBC QN AUTO: 30.9 PG (ref 26.6–33)
MCHC RBC AUTO-ENTMCNC: 31.1 G/DL (ref 31.5–35.7)
MCV RBC AUTO: 99.4 FL (ref 79–97)
NT-PROBNP SERPL-MCNC: 6134 PG/ML (ref 0–1800)
PLATELET # BLD AUTO: 154 10*3/MM3 (ref 140–450)
PMV BLD AUTO: 10.4 FL (ref 6–12)
POTASSIUM SERPL-SCNC: 4.2 MMOL/L (ref 3.5–5.2)
QT INTERVAL: 466 MS
QT INTERVAL: 466 MS
QTC INTERVAL: 393 MS
QTC INTERVAL: 424 MS
RBC # BLD AUTO: 3.27 10*6/MM3 (ref 3.77–5.28)
SODIUM SERPL-SCNC: 137 MMOL/L (ref 136–145)
UNIT  ABO: NORMAL
UNIT  ABO: NORMAL
UNIT  RH: NORMAL
UNIT  RH: NORMAL
WBC # BLD AUTO: 9.53 10*3/MM3 (ref 3.4–10.8)

## 2020-11-28 PROCEDURE — 82962 GLUCOSE BLOOD TEST: CPT

## 2020-11-28 PROCEDURE — 83880 ASSAY OF NATRIURETIC PEPTIDE: CPT | Performed by: INTERNAL MEDICINE

## 2020-11-28 PROCEDURE — 71045 X-RAY EXAM CHEST 1 VIEW: CPT

## 2020-11-28 PROCEDURE — 99232 SBSQ HOSP IP/OBS MODERATE 35: CPT | Performed by: INTERNAL MEDICINE

## 2020-11-28 PROCEDURE — 97530 THERAPEUTIC ACTIVITIES: CPT

## 2020-11-28 PROCEDURE — 63710000001 INSULIN LISPRO (HUMAN) PER 5 UNITS: Performed by: INTERNAL MEDICINE

## 2020-11-28 PROCEDURE — 99231 SBSQ HOSP IP/OBS SF/LOW 25: CPT | Performed by: INTERNAL MEDICINE

## 2020-11-28 PROCEDURE — 25010000002 FENTANYL CITRATE (PF) 100 MCG/2ML SOLUTION: Performed by: THORACIC SURGERY (CARDIOTHORACIC VASCULAR SURGERY)

## 2020-11-28 PROCEDURE — 85027 COMPLETE CBC AUTOMATED: CPT | Performed by: PHYSICIAN ASSISTANT

## 2020-11-28 PROCEDURE — 80048 BASIC METABOLIC PNL TOTAL CA: CPT | Performed by: PHYSICIAN ASSISTANT

## 2020-11-28 RX ADMIN — CLOPIDOGREL BISULFATE 75 MG: 75 TABLET ORAL at 08:34

## 2020-11-28 RX ADMIN — ATORVASTATIN CALCIUM 40 MG: 40 TABLET, FILM COATED ORAL at 20:59

## 2020-11-28 RX ADMIN — ASPIRIN 325 MG: 325 TABLET, COATED ORAL at 08:34

## 2020-11-28 RX ADMIN — HYDROCODONE BITARTRATE AND ACETAMINOPHEN 1 TABLET: 7.5; 325 TABLET ORAL at 01:00

## 2020-11-28 RX ADMIN — HYDROCODONE BITARTRATE AND ACETAMINOPHEN 1 TABLET: 7.5; 325 TABLET ORAL at 05:07

## 2020-11-28 RX ADMIN — OXYCODONE HYDROCHLORIDE AND ACETAMINOPHEN 2 TABLET: 5; 325 TABLET ORAL at 08:34

## 2020-11-28 RX ADMIN — FENTANYL CITRATE 25 MCG: 0.05 INJECTION, SOLUTION INTRAMUSCULAR; INTRAVENOUS at 06:06

## 2020-11-28 RX ADMIN — SODIUM CHLORIDE, PRESERVATIVE FREE 10 ML: 5 INJECTION INTRAVENOUS at 20:59

## 2020-11-28 RX ADMIN — INSULIN LISPRO 3 UNITS: 100 INJECTION, SOLUTION INTRAVENOUS; SUBCUTANEOUS at 16:42

## 2020-11-28 RX ADMIN — INSULIN LISPRO 3 UNITS: 100 INJECTION, SOLUTION INTRAVENOUS; SUBCUTANEOUS at 08:34

## 2020-11-28 RX ADMIN — HYDROCODONE BITARTRATE AND ACETAMINOPHEN 1 TABLET: 7.5; 325 TABLET ORAL at 21:53

## 2020-11-29 LAB
ANION GAP SERPL CALCULATED.3IONS-SCNC: 9 MMOL/L (ref 5–15)
BASOPHILS # BLD AUTO: 0.05 10*3/MM3 (ref 0–0.2)
BASOPHILS NFR BLD AUTO: 0.5 % (ref 0–1.5)
BUN SERPL-MCNC: 33 MG/DL (ref 8–23)
BUN/CREAT SERPL: 27 (ref 7–25)
CALCIUM SPEC-SCNC: 8.7 MG/DL (ref 8.6–10.5)
CHLORIDE SERPL-SCNC: 109 MMOL/L (ref 98–107)
CO2 SERPL-SCNC: 23 MMOL/L (ref 22–29)
CREAT SERPL-MCNC: 1.22 MG/DL (ref 0.57–1)
DEPRECATED RDW RBC AUTO: 52.8 FL (ref 37–54)
EOSINOPHIL # BLD AUTO: 0.82 10*3/MM3 (ref 0–0.4)
EOSINOPHIL NFR BLD AUTO: 8.7 % (ref 0.3–6.2)
ERYTHROCYTE [DISTWIDTH] IN BLOOD BY AUTOMATED COUNT: 14.3 % (ref 12.3–15.4)
GFR SERPL CREATININE-BSD FRML MDRD: 43 ML/MIN/1.73
GLUCOSE BLDC GLUCOMTR-MCNC: 116 MG/DL (ref 70–130)
GLUCOSE BLDC GLUCOMTR-MCNC: 119 MG/DL (ref 70–130)
GLUCOSE BLDC GLUCOMTR-MCNC: 81 MG/DL (ref 70–130)
GLUCOSE BLDC GLUCOMTR-MCNC: 98 MG/DL (ref 70–130)
GLUCOSE SERPL-MCNC: 98 MG/DL (ref 65–99)
HCT VFR BLD AUTO: 34.9 % (ref 34–46.6)
HGB BLD-MCNC: 10.6 G/DL (ref 12–15.9)
IMM GRANULOCYTES # BLD AUTO: 0.08 10*3/MM3 (ref 0–0.05)
IMM GRANULOCYTES NFR BLD AUTO: 0.9 % (ref 0–0.5)
LYMPHOCYTES # BLD AUTO: 0.96 10*3/MM3 (ref 0.7–3.1)
LYMPHOCYTES NFR BLD AUTO: 10.2 % (ref 19.6–45.3)
MCH RBC QN AUTO: 30.5 PG (ref 26.6–33)
MCHC RBC AUTO-ENTMCNC: 30.4 G/DL (ref 31.5–35.7)
MCV RBC AUTO: 100.6 FL (ref 79–97)
MONOCYTES # BLD AUTO: 0.99 10*3/MM3 (ref 0.1–0.9)
MONOCYTES NFR BLD AUTO: 10.5 % (ref 5–12)
NEUTROPHILS NFR BLD AUTO: 6.5 10*3/MM3 (ref 1.7–7)
NEUTROPHILS NFR BLD AUTO: 69.2 % (ref 42.7–76)
NRBC BLD AUTO-RTO: 0 /100 WBC (ref 0–0.2)
PLATELET # BLD AUTO: 183 10*3/MM3 (ref 140–450)
PMV BLD AUTO: 10.3 FL (ref 6–12)
POTASSIUM SERPL-SCNC: 4.4 MMOL/L (ref 3.5–5.2)
RBC # BLD AUTO: 3.47 10*6/MM3 (ref 3.77–5.28)
SODIUM SERPL-SCNC: 141 MMOL/L (ref 136–145)
WBC # BLD AUTO: 9.4 10*3/MM3 (ref 3.4–10.8)

## 2020-11-29 PROCEDURE — 99231 SBSQ HOSP IP/OBS SF/LOW 25: CPT | Performed by: INTERNAL MEDICINE

## 2020-11-29 PROCEDURE — 97530 THERAPEUTIC ACTIVITIES: CPT

## 2020-11-29 PROCEDURE — 82962 GLUCOSE BLOOD TEST: CPT

## 2020-11-29 PROCEDURE — 85025 COMPLETE CBC W/AUTO DIFF WBC: CPT | Performed by: INTERNAL MEDICINE

## 2020-11-29 PROCEDURE — 94799 UNLISTED PULMONARY SVC/PX: CPT

## 2020-11-29 PROCEDURE — 99232 SBSQ HOSP IP/OBS MODERATE 35: CPT | Performed by: INTERNAL MEDICINE

## 2020-11-29 PROCEDURE — 80048 BASIC METABOLIC PNL TOTAL CA: CPT | Performed by: PHYSICIAN ASSISTANT

## 2020-11-29 RX ORDER — LOSARTAN POTASSIUM 50 MG/1
50 TABLET ORAL
Status: DISCONTINUED | OUTPATIENT
Start: 2020-11-29 | End: 2020-11-30

## 2020-11-29 RX ORDER — AMLODIPINE BESYLATE 10 MG/1
10 TABLET ORAL
Status: DISCONTINUED | OUTPATIENT
Start: 2020-11-29 | End: 2020-12-03 | Stop reason: HOSPADM

## 2020-11-29 RX ADMIN — ASPIRIN 325 MG: 325 TABLET, COATED ORAL at 08:31

## 2020-11-29 RX ADMIN — LOSARTAN POTASSIUM 50 MG: 50 TABLET, FILM COATED ORAL at 08:31

## 2020-11-29 RX ADMIN — ATORVASTATIN CALCIUM 40 MG: 40 TABLET, FILM COATED ORAL at 20:16

## 2020-11-29 RX ADMIN — OXYCODONE HYDROCHLORIDE AND ACETAMINOPHEN 2 TABLET: 5; 325 TABLET ORAL at 08:30

## 2020-11-29 RX ADMIN — CLOPIDOGREL BISULFATE 75 MG: 75 TABLET ORAL at 08:31

## 2020-11-29 RX ADMIN — OXYCODONE HYDROCHLORIDE AND ACETAMINOPHEN 2 TABLET: 5; 325 TABLET ORAL at 14:25

## 2020-11-29 RX ADMIN — SODIUM CHLORIDE, PRESERVATIVE FREE 10 ML: 5 INJECTION INTRAVENOUS at 22:31

## 2020-11-29 RX ADMIN — SODIUM CHLORIDE, PRESERVATIVE FREE 10 ML: 5 INJECTION INTRAVENOUS at 08:32

## 2020-11-29 RX ADMIN — AMLODIPINE BESYLATE 10 MG: 10 TABLET ORAL at 08:31

## 2020-11-29 RX ADMIN — HYDROCODONE BITARTRATE AND ACETAMINOPHEN 1 TABLET: 7.5; 325 TABLET ORAL at 03:01

## 2020-11-29 RX ADMIN — OXYCODONE HYDROCHLORIDE AND ACETAMINOPHEN 2 TABLET: 5; 325 TABLET ORAL at 20:16

## 2020-11-30 ENCOUNTER — TRANSCRIBE ORDERS (OUTPATIENT)
Dept: CARDIAC REHAB | Facility: HOSPITAL | Age: 75
End: 2020-11-30

## 2020-11-30 DIAGNOSIS — Z95.1 S/P CABG (CORONARY ARTERY BYPASS GRAFT): Primary | ICD-10-CM

## 2020-11-30 PROBLEM — I48.91 ATRIAL FIBRILLATION: Status: ACTIVE | Noted: 2020-11-30

## 2020-11-30 LAB
ALBUMIN SERPL-MCNC: 3.4 G/DL (ref 3.5–5.2)
ALBUMIN/GLOB SERPL: 1.3 G/DL
ALP SERPL-CCNC: 64 U/L (ref 39–117)
ALT SERPL W P-5'-P-CCNC: 14 U/L (ref 1–33)
ANION GAP SERPL CALCULATED.3IONS-SCNC: 11 MMOL/L (ref 5–15)
AST SERPL-CCNC: 23 U/L (ref 1–32)
BASOPHILS # BLD AUTO: 0.08 10*3/MM3 (ref 0–0.2)
BASOPHILS NFR BLD AUTO: 0.9 % (ref 0–1.5)
BILIRUB SERPL-MCNC: 0.4 MG/DL (ref 0–1.2)
BUN SERPL-MCNC: 36 MG/DL (ref 8–23)
BUN/CREAT SERPL: 30 (ref 7–25)
CALCIUM SPEC-SCNC: 8.6 MG/DL (ref 8.6–10.5)
CHLORIDE SERPL-SCNC: 108 MMOL/L (ref 98–107)
CO2 SERPL-SCNC: 20 MMOL/L (ref 22–29)
CREAT SERPL-MCNC: 1.2 MG/DL (ref 0.57–1)
DEPRECATED RDW RBC AUTO: 51.8 FL (ref 37–54)
EOSINOPHIL # BLD AUTO: 0.77 10*3/MM3 (ref 0–0.4)
EOSINOPHIL NFR BLD AUTO: 8.5 % (ref 0.3–6.2)
ERYTHROCYTE [DISTWIDTH] IN BLOOD BY AUTOMATED COUNT: 14.2 % (ref 12.3–15.4)
GFR SERPL CREATININE-BSD FRML MDRD: 44 ML/MIN/1.73
GLOBULIN UR ELPH-MCNC: 2.7 GM/DL
GLUCOSE BLDC GLUCOMTR-MCNC: 102 MG/DL (ref 70–130)
GLUCOSE BLDC GLUCOMTR-MCNC: 92 MG/DL (ref 70–130)
GLUCOSE BLDC GLUCOMTR-MCNC: 93 MG/DL (ref 70–130)
GLUCOSE BLDC GLUCOMTR-MCNC: 93 MG/DL (ref 70–130)
GLUCOSE SERPL-MCNC: 96 MG/DL (ref 65–99)
HCT VFR BLD AUTO: 34 % (ref 34–46.6)
HGB BLD-MCNC: 10.5 G/DL (ref 12–15.9)
IMM GRANULOCYTES # BLD AUTO: 0.1 10*3/MM3 (ref 0–0.05)
IMM GRANULOCYTES NFR BLD AUTO: 1.1 % (ref 0–0.5)
LYMPHOCYTES # BLD AUTO: 0.9 10*3/MM3 (ref 0.7–3.1)
LYMPHOCYTES NFR BLD AUTO: 9.9 % (ref 19.6–45.3)
MAGNESIUM SERPL-MCNC: 2.2 MG/DL (ref 1.6–2.4)
MCH RBC QN AUTO: 30.6 PG (ref 26.6–33)
MCHC RBC AUTO-ENTMCNC: 30.9 G/DL (ref 31.5–35.7)
MCV RBC AUTO: 99.1 FL (ref 79–97)
MONOCYTES # BLD AUTO: 0.94 10*3/MM3 (ref 0.1–0.9)
MONOCYTES NFR BLD AUTO: 10.4 % (ref 5–12)
NEUTROPHILS NFR BLD AUTO: 6.28 10*3/MM3 (ref 1.7–7)
NEUTROPHILS NFR BLD AUTO: 69.2 % (ref 42.7–76)
NRBC BLD AUTO-RTO: 0 /100 WBC (ref 0–0.2)
PHOSPHATE SERPL-MCNC: 2.9 MG/DL (ref 2.5–4.5)
PLATELET # BLD AUTO: 214 10*3/MM3 (ref 140–450)
PMV BLD AUTO: 9.6 FL (ref 6–12)
POTASSIUM SERPL-SCNC: 3.8 MMOL/L (ref 3.5–5.2)
PROT SERPL-MCNC: 6.1 G/DL (ref 6–8.5)
RBC # BLD AUTO: 3.43 10*6/MM3 (ref 3.77–5.28)
SODIUM SERPL-SCNC: 139 MMOL/L (ref 136–145)
WBC # BLD AUTO: 9.07 10*3/MM3 (ref 3.4–10.8)

## 2020-11-30 PROCEDURE — 93005 ELECTROCARDIOGRAM TRACING: CPT | Performed by: INTERNAL MEDICINE

## 2020-11-30 PROCEDURE — 99232 SBSQ HOSP IP/OBS MODERATE 35: CPT | Performed by: INTERNAL MEDICINE

## 2020-11-30 PROCEDURE — 85025 COMPLETE CBC W/AUTO DIFF WBC: CPT | Performed by: INTERNAL MEDICINE

## 2020-11-30 PROCEDURE — 94799 UNLISTED PULMONARY SVC/PX: CPT

## 2020-11-30 PROCEDURE — 82962 GLUCOSE BLOOD TEST: CPT

## 2020-11-30 PROCEDURE — 83735 ASSAY OF MAGNESIUM: CPT | Performed by: INTERNAL MEDICINE

## 2020-11-30 PROCEDURE — 25010000002 AMIODARONE IN DEXTROSE 5% 360-4.14 MG/200ML-% SOLUTION: Performed by: INTERNAL MEDICINE

## 2020-11-30 PROCEDURE — 80053 COMPREHEN METABOLIC PANEL: CPT | Performed by: INTERNAL MEDICINE

## 2020-11-30 PROCEDURE — 99024 POSTOP FOLLOW-UP VISIT: CPT | Performed by: THORACIC SURGERY (CARDIOTHORACIC VASCULAR SURGERY)

## 2020-11-30 PROCEDURE — 84100 ASSAY OF PHOSPHORUS: CPT | Performed by: INTERNAL MEDICINE

## 2020-11-30 PROCEDURE — 25010000002 AMIODARONE IN DEXTROSE 5% 150-4.21 MG/100ML-% SOLUTION

## 2020-11-30 RX ORDER — GABAPENTIN 300 MG/1
300 CAPSULE ORAL EVERY 12 HOURS SCHEDULED
Status: DISCONTINUED | OUTPATIENT
Start: 2020-11-30 | End: 2020-12-03 | Stop reason: HOSPADM

## 2020-11-30 RX ORDER — CETIRIZINE HYDROCHLORIDE 10 MG/1
10 TABLET ORAL DAILY
Status: DISCONTINUED | OUTPATIENT
Start: 2020-11-30 | End: 2020-12-03 | Stop reason: HOSPADM

## 2020-11-30 RX ORDER — LOSARTAN POTASSIUM 50 MG/1
100 TABLET ORAL
Status: DISCONTINUED | OUTPATIENT
Start: 2020-12-01 | End: 2020-12-03 | Stop reason: HOSPADM

## 2020-11-30 RX ORDER — DIPHENHYDRAMINE HYDROCHLORIDE, ZINC ACETATE 2; .1 G/100G; G/100G
CREAM TOPICAL 2 TIMES DAILY PRN
Status: DISCONTINUED | OUTPATIENT
Start: 2020-11-30 | End: 2020-12-03 | Stop reason: HOSPADM

## 2020-11-30 RX ORDER — LEVOTHYROXINE SODIUM 0.1 MG/1
100 TABLET ORAL
Status: DISCONTINUED | OUTPATIENT
Start: 2020-11-30 | End: 2020-12-03 | Stop reason: HOSPADM

## 2020-11-30 RX ADMIN — ASPIRIN 325 MG: 325 TABLET, COATED ORAL at 08:06

## 2020-11-30 RX ADMIN — LOSARTAN POTASSIUM 50 MG: 50 TABLET, FILM COATED ORAL at 08:06

## 2020-11-30 RX ADMIN — HYDROCODONE BITARTRATE AND ACETAMINOPHEN 1 TABLET: 7.5; 325 TABLET ORAL at 20:27

## 2020-11-30 RX ADMIN — GABAPENTIN 300 MG: 300 CAPSULE ORAL at 10:21

## 2020-11-30 RX ADMIN — AMIODARONE HYDROCHLORIDE 0.5 MG/MIN: 1.8 INJECTION, SOLUTION INTRAVENOUS at 10:21

## 2020-11-30 RX ADMIN — CLOPIDOGREL BISULFATE 75 MG: 75 TABLET ORAL at 08:06

## 2020-11-30 RX ADMIN — ATORVASTATIN CALCIUM 40 MG: 40 TABLET, FILM COATED ORAL at 20:00

## 2020-11-30 RX ADMIN — LEVOTHYROXINE SODIUM 100 MCG: 100 TABLET ORAL at 10:21

## 2020-11-30 RX ADMIN — SERTRALINE HYDROCHLORIDE 50 MG: 50 TABLET ORAL at 10:21

## 2020-11-30 RX ADMIN — GABAPENTIN 300 MG: 300 CAPSULE ORAL at 20:00

## 2020-11-30 RX ADMIN — AMLODIPINE BESYLATE 10 MG: 10 TABLET ORAL at 08:06

## 2020-11-30 RX ADMIN — CETIRIZINE HYDROCHLORIDE 10 MG: 10 TABLET, FILM COATED ORAL at 10:21

## 2020-11-30 RX ADMIN — SODIUM CHLORIDE, PRESERVATIVE FREE 10 ML: 5 INJECTION INTRAVENOUS at 20:00

## 2020-11-30 RX ADMIN — AMIODARONE HYDROCHLORIDE 150 MG: 1.5 INJECTION, SOLUTION INTRAVENOUS at 08:19

## 2020-12-01 ENCOUNTER — APPOINTMENT (OUTPATIENT)
Dept: GENERAL RADIOLOGY | Facility: HOSPITAL | Age: 75
End: 2020-12-01

## 2020-12-01 LAB
ANION GAP SERPL CALCULATED.3IONS-SCNC: 13 MMOL/L (ref 5–15)
BUN SERPL-MCNC: 28 MG/DL (ref 8–23)
BUN/CREAT SERPL: 26.7 (ref 7–25)
CALCIUM SPEC-SCNC: 9.1 MG/DL (ref 8.6–10.5)
CHLORIDE SERPL-SCNC: 107 MMOL/L (ref 98–107)
CO2 SERPL-SCNC: 21 MMOL/L (ref 22–29)
CREAT SERPL-MCNC: 1.05 MG/DL (ref 0.57–1)
DEPRECATED RDW RBC AUTO: 50 FL (ref 37–54)
ERYTHROCYTE [DISTWIDTH] IN BLOOD BY AUTOMATED COUNT: 14.3 % (ref 12.3–15.4)
GFR SERPL CREATININE-BSD FRML MDRD: 51 ML/MIN/1.73
GLUCOSE BLDC GLUCOMTR-MCNC: 112 MG/DL (ref 70–130)
GLUCOSE BLDC GLUCOMTR-MCNC: 116 MG/DL (ref 70–130)
GLUCOSE BLDC GLUCOMTR-MCNC: 148 MG/DL (ref 70–130)
GLUCOSE BLDC GLUCOMTR-MCNC: 98 MG/DL (ref 70–130)
GLUCOSE SERPL-MCNC: 109 MG/DL (ref 65–99)
HCT VFR BLD AUTO: 38.6 % (ref 34–46.6)
HGB BLD-MCNC: 11.9 G/DL (ref 12–15.9)
MAGNESIUM SERPL-MCNC: 1.9 MG/DL (ref 1.6–2.4)
MCH RBC QN AUTO: 29.5 PG (ref 26.6–33)
MCHC RBC AUTO-ENTMCNC: 30.8 G/DL (ref 31.5–35.7)
MCV RBC AUTO: 95.8 FL (ref 79–97)
PLATELET # BLD AUTO: 292 10*3/MM3 (ref 140–450)
PMV BLD AUTO: 9.7 FL (ref 6–12)
POTASSIUM SERPL-SCNC: 4.1 MMOL/L (ref 3.5–5.2)
RBC # BLD AUTO: 4.03 10*6/MM3 (ref 3.77–5.28)
SODIUM SERPL-SCNC: 141 MMOL/L (ref 136–145)
WBC # BLD AUTO: 11.63 10*3/MM3 (ref 3.4–10.8)

## 2020-12-01 PROCEDURE — 82962 GLUCOSE BLOOD TEST: CPT

## 2020-12-01 PROCEDURE — 97530 THERAPEUTIC ACTIVITIES: CPT

## 2020-12-01 PROCEDURE — 80048 BASIC METABOLIC PNL TOTAL CA: CPT | Performed by: THORACIC SURGERY (CARDIOTHORACIC VASCULAR SURGERY)

## 2020-12-01 PROCEDURE — 94799 UNLISTED PULMONARY SVC/PX: CPT

## 2020-12-01 PROCEDURE — 83735 ASSAY OF MAGNESIUM: CPT | Performed by: THORACIC SURGERY (CARDIOTHORACIC VASCULAR SURGERY)

## 2020-12-01 PROCEDURE — 25010000002 AMIODARONE IN DEXTROSE 5% 360-4.14 MG/200ML-% SOLUTION: Performed by: INTERNAL MEDICINE

## 2020-12-01 PROCEDURE — 99024 POSTOP FOLLOW-UP VISIT: CPT | Performed by: THORACIC SURGERY (CARDIOTHORACIC VASCULAR SURGERY)

## 2020-12-01 PROCEDURE — 85027 COMPLETE CBC AUTOMATED: CPT | Performed by: THORACIC SURGERY (CARDIOTHORACIC VASCULAR SURGERY)

## 2020-12-01 PROCEDURE — 71045 X-RAY EXAM CHEST 1 VIEW: CPT

## 2020-12-01 PROCEDURE — 93005 ELECTROCARDIOGRAM TRACING: CPT | Performed by: INTERNAL MEDICINE

## 2020-12-01 PROCEDURE — 99232 SBSQ HOSP IP/OBS MODERATE 35: CPT | Performed by: INTERNAL MEDICINE

## 2020-12-01 RX ORDER — AMOXICILLIN 250 MG
2 CAPSULE ORAL 2 TIMES DAILY
Status: DISCONTINUED | OUTPATIENT
Start: 2020-12-01 | End: 2020-12-03 | Stop reason: HOSPADM

## 2020-12-01 RX ORDER — HYDRALAZINE HYDROCHLORIDE 10 MG/1
10 TABLET, FILM COATED ORAL EVERY 12 HOURS SCHEDULED
Status: DISCONTINUED | OUTPATIENT
Start: 2020-12-01 | End: 2020-12-03 | Stop reason: HOSPADM

## 2020-12-01 RX ADMIN — GABAPENTIN 300 MG: 300 CAPSULE ORAL at 08:07

## 2020-12-01 RX ADMIN — GABAPENTIN 300 MG: 300 CAPSULE ORAL at 20:47

## 2020-12-01 RX ADMIN — DIPHENHYDRAMINE HYDROCHLORIDE, ZINC ACETATE 1 APPLICATION: 2; .1 CREAM TOPICAL at 00:08

## 2020-12-01 RX ADMIN — CETIRIZINE HYDROCHLORIDE 10 MG: 10 TABLET, FILM COATED ORAL at 08:07

## 2020-12-01 RX ADMIN — AMLODIPINE BESYLATE 10 MG: 10 TABLET ORAL at 08:07

## 2020-12-01 RX ADMIN — HYDRALAZINE HYDROCHLORIDE 10 MG: 10 TABLET ORAL at 13:10

## 2020-12-01 RX ADMIN — AMIODARONE HYDROCHLORIDE 0.5 MG/MIN: 1.8 INJECTION, SOLUTION INTRAVENOUS at 13:05

## 2020-12-01 RX ADMIN — HYDRALAZINE HYDROCHLORIDE 10 MG: 10 TABLET ORAL at 20:47

## 2020-12-01 RX ADMIN — CLOPIDOGREL BISULFATE 75 MG: 75 TABLET ORAL at 08:07

## 2020-12-01 RX ADMIN — SERTRALINE HYDROCHLORIDE 50 MG: 50 TABLET ORAL at 08:07

## 2020-12-01 RX ADMIN — DOCUSATE SODIUM 50 MG AND SENNOSIDES 8.6 MG 2 TABLET: 8.6; 5 TABLET, FILM COATED ORAL at 13:10

## 2020-12-01 RX ADMIN — SODIUM CHLORIDE, PRESERVATIVE FREE 10 ML: 5 INJECTION INTRAVENOUS at 08:07

## 2020-12-01 RX ADMIN — METOPROLOL TARTRATE 25 MG: 25 TABLET, FILM COATED ORAL at 20:47

## 2020-12-01 RX ADMIN — ASPIRIN 325 MG: 325 TABLET, COATED ORAL at 08:07

## 2020-12-01 RX ADMIN — SODIUM CHLORIDE, PRESERVATIVE FREE 10 ML: 5 INJECTION INTRAVENOUS at 20:50

## 2020-12-01 RX ADMIN — LOSARTAN POTASSIUM 100 MG: 50 TABLET, FILM COATED ORAL at 08:07

## 2020-12-01 RX ADMIN — LEVOTHYROXINE SODIUM 100 MCG: 100 TABLET ORAL at 06:48

## 2020-12-01 RX ADMIN — ATORVASTATIN CALCIUM 40 MG: 40 TABLET, FILM COATED ORAL at 20:47

## 2020-12-01 RX ADMIN — METOPROLOL TARTRATE 25 MG: 25 TABLET, FILM COATED ORAL at 08:08

## 2020-12-01 RX ADMIN — METOPROLOL TARTRATE 25 MG: 25 TABLET, FILM COATED ORAL at 15:50

## 2020-12-01 NOTE — NURSING NOTE
Cardiology Navigator Note      Patient Name:  Angélica De La Cruz  :  1945  DOS:  20    Active Hospital Problems    Diagnosis   • **CABG 20     Added automatically from request for surgery [id number]     • Atrial fibrillation (CMS/HCC)   • Hypertension   • CKD (chronic kidney disease)     elevated creat caused by long term use of lisinopril      • Abnormal stress test     Added automatically from request for surgery [id number]         Consult has been received.  Medical records have been reviewed.  Patient is a good candidate for the [facility].  Patient to be scheduled follow up one week  post discharge.     Echo Results:   Echo 2020 EF 55% with diastolic dysfunction.  Mild thickening of the mitral valve leaflets, mild MR, mitral annular calcification.  Moderate TR, RVSP 54 mmHg     Heart Failure Quality Measures    ACE I, ARB, ARNI (if EF <40%)     On losartan       Evidence-based Beta Blocker (EF<40%)    (Bisoprolol, Carvedilol, Metoprolol Succinate)  N/A      Aldosterone Antagonist (EF <40%)  N/A

## 2020-12-01 NOTE — PROGRESS NOTES
"                                 [facility] Progress Note      LOS: 6 days   Patient Care Team:  Sussy Bloom MD as PCP - General (Internal Medicine)    Chief Complaint: Chest pain    Subjective     Interval History: Return of atrial fibrillation with RVR this morning    Patient Complaints: Complains of palpitations this morning.  No chest pain or dyspnea      Review of Systems:   A 14 point review of systems was negative except as was stated in the HPI      Objective     Vital Sign Min/Max for last 24 hours  Temp  Min: 99 °F (37.2 °C)  Max: 100.1 °F (37.8 °C)   BP  Min: 106/68  Max: 180/61   Pulse  Min: 59  Max: 151   Resp  Min: 16  Max: 20   SpO2  Min: 88 %  Max: 96 %   No data recorded   No data recorded     Flowsheet Rows      First Filed Value   Admission Height  160 cm (63\") Documented at 11/24/2020 1219   Admission Weight  98.3 kg (216 lb 11.4 oz) Documented at 11/24/2020 1219          Physical Exam:  General Appearance: Alert, appears stated age and cooperative  Lungs: Clear to auscultation  Heart:: Irregular and rapid no Murmurs, Rubs or Gallops  Abdomen: Soft and nontender with adequate bowel sounds.  No organomegaly  Extremities: Trace edema  Pulses: Pulses palpable and equal bilaterally  Skin: Warm and dry with no rash  Psych: Normal     Results Review:     I reviewed the patient's new clinical results.  Results from last 7 days   Lab Units 12/01/20 0628 11/30/20 0336 11/29/20 0254   SODIUM mmol/L 141 139 141   POTASSIUM mmol/L 4.1 3.8 4.4   CHLORIDE mmol/L 107 108* 109*   CO2 mmol/L 21.0* 20.0* 23.0   BUN mg/dL 28* 36* 33*   CREATININE mg/dL 1.05* 1.20* 1.22*   GLUCOSE mg/dL 109* 96 98   CALCIUM mg/dL 9.1 8.6 8.7     Results from last 7 days   Lab Units 12/01/20 0628 11/30/20 0336 11/29/20  0254   WBC 10*3/mm3 11.63* 9.07 9.40   HEMOGLOBIN g/dL 11.9* 10.5* 10.6*   HEMATOCRIT % 38.6 34.0 34.9   PLATELETS 10*3/mm3 292 214 183     No results found for: TROPONINT  Results from last " 7 days   Lab Units 11/24/20  1230   CHOLESTEROL mg/dL 181   TRIGLYCERIDES mg/dL 146   HDL CHOL mg/dL 50   LDL CHOL mg/dL 105*     Results from last 7 days   Lab Units 11/26/20  0315 11/25/20  1024 11/24/20  1624   INR  1.19* 1.26* 1.19*     Results from last 7 days   Lab Units 11/27/20  0340   PH, ARTERIAL pH units 7.361   PO2 ART mm Hg 70.3*   PCO2, ARTERIAL mm Hg 34.9*   HCO3 ART mmol/L 19.7*           Medication Review: yes  Current Facility-Administered Medications   Medication Dose Route Frequency Provider Last Rate Last Admin   • acetaminophen (TYLENOL) tablet 650 mg  650 mg Oral Q4H PRN Jarred Che PA       • ALPRAZolam (XANAX) tablet 0.25 mg  0.25 mg Oral TID PRN Jarred Che PA   0.25 mg at 11/27/20 2101   • amiodarone 360 mg in 200 mL D5W infusion  0.5 mg/min Intravenous Continuous Timothy Albarran MD   Stopped at 11/30/20 1645   • amLODIPine (NORVASC) tablet 10 mg  10 mg Oral Q24H Leif Sahu MD   10 mg at 11/30/20 0806   • aspirin EC tablet 325 mg  325 mg Oral Daily Jarred Che PA   325 mg at 11/30/20 0806   • atorvastatin (LIPITOR) tablet 40 mg  40 mg Oral Nightly Jarred Che PA   40 mg at 11/30/20 2000   • cetirizine (zyrTEC) tablet 10 mg  10 mg Oral Daily Bola Forrester MD   10 mg at 11/30/20 1021   • clopidogrel (PLAVIX) tablet 75 mg  75 mg Oral Daily Jarred Che PA   75 mg at 11/30/20 0806   • diphenhydrAMINE-zinc acetate 2-0.1 % cream   Topical BID PRN Yair Walton APRN   1 application at 12/01/20 0008   • DOBUTamine (DOBUTREX) 1 mg/mL infusion  2-20 mcg/kg/min Intravenous Continuous PRN Chloe Mcfarland, PharmD       • DOPamine 400 mg in 250 mL D5W infusion  2-20 mcg/kg/min Intravenous Continuous PRN Chloe Mcfarland, PharmD       • gabapentin (NEURONTIN) capsule 300 mg  300 mg Oral Q12H Bola Forrester MD   300 mg at 11/30/20 2000   • HYDROcodone-acetaminophen (NORCO) 7.5-325 MG per tablet 1 tablet  1 tablet Oral Q4H PRN Wali Spicer  MD REINA   1 tablet at 11/30/20 2027   • insulin lispro (humaLOG) injection 0-14 Units  0-14 Units Subcutaneous TID Dale Henry MD   3 Units at 11/28/20 1642   • levothyroxine (SYNTHROID, LEVOTHROID) tablet 100 mcg  100 mcg Oral Q AM Bola Forrester MD   100 mcg at 12/01/20 0648   • losartan (COZAAR) tablet 100 mg  100 mg Oral Q24H Yovanny Jin PA       • magnesium sulfate 4 gram infusion - Mg less than or equal to 1mg/dL  4 g Intravenous PRN Shane Schmidt APRN        Or   • magnesium sulfate 3 gram infusion (1gm x 3) - Mg 1.1 - 1.5 mg/dL  1 g Intravenous PRN Shane Schmidt APRN        Or   • Magnesium Sulfate 2 gram infusion- Mg 1.6 - 1.9 mg/dL  2 g Intravenous PRN Shane Schmidt APRN 25 mL/hr at 11/25/20 1953 2 g at 11/25/20 1953   • norepinephrine (LEVOPHED) 8 mg in 250 mL NS infusion (premix)  0.02-0.3 mcg/kg/min Intravenous Continuous PRN Jarred Che PA   Stopped at 11/27/20 1000   • ondansetron (ZOFRAN) injection 4 mg  4 mg Intravenous Q6H PRN Jarred Che PA       • oxyCODONE-acetaminophen (PERCOCET) 5-325 MG per tablet 2 tablet  2 tablet Oral Q4H PRN Wali Spicer MD   2 tablet at 11/29/20 2016   • Pharmacy Consult - MTM   Does not apply Daily Chloe Mcfarland, PharmD   Given at 11/27/20 0922   • polyvinyl alcohol (LIQUIFILM) 1.4 % ophthalmic solution 2 drop  2 drop Both Eyes Q1H PRN Shane Schmidt APRN   2 drop at 11/26/20 2125   • potassium chloride (MICRO-K) CR capsule 40 mEq  40 mEq Oral PRN Jarred Che PA   40 mEq at 11/27/20 0548    Or   • potassium chloride (KLOR-CON) packet 40 mEq  40 mEq Oral PRN Jarred Che PA   40 mEq at 11/27/20 1109   • sertraline (ZOLOFT) tablet 50 mg  50 mg Oral Daily Bola Forrester MD   50 mg at 11/30/20 1021   • sodium chloride 0.9 % flush 10 mL  10 mL Intravenous Q12H Jarred Che PA   10 mL at 11/30/20 2000   • temazepam (RESTORIL) capsule 7.5 mg  7.5 mg Oral Nightly PRN Chinedu,  IDANIA Ríos   7.5 mg at 11/26/20 2138         CABG 11/25/20    Abnormal stress test    Hypertension    CKD (chronic kidney disease)    Atrial fibrillation (CMS/HCC)        Impression      CABG 11/25/2020; previously normal LV function by echocardiography 2015  Postop bradycardia   Atrial fibrillation with rapid ventricular rate this morning  Hypertension  Chronic kidney disease; stable    The patient converted to sinus rhythm on IV amiodarone yesterday.  IV amiodarone was discontinued because of heart rate in the 50s.  Subsequently she went back into atrial fibrillation with rapid ventricular rate earlier this morning.  She is now back on IV amiodarone at 0.5 mg/min    Plan     Continue IV amiodarone  Begin low-dose beta-blocker  We will defer anticoagulation in the event that the patient needs permanent pacer  We will keep in ICU for observation owing to the potential for bradycardia arrhythmias until this issue is resolved      Timothy Albarran MD  12/01/20  07:14 EST

## 2020-12-01 NOTE — PLAN OF CARE
Goal Outcome Evaluation:  Plan of Care Reviewed With: patient  Progress: improving  Outcome Summary: Outcome Summary: neuro: intact. Resp: RA-2L. Cardiac: has been in and out of a fib all day, if not in a.fib, then SB. Currently in A. Fib. GI: has tolerated diet well. NPO after MN for possible PPM tomorrow. : adequate uop. VSS

## 2020-12-01 NOTE — PROGRESS NOTES
Continued Stay Note  [facility]     Patient Name: Angélica De La Cruz  MRN: [id number]  Today's Date: 12/1/2020    Admit Date: 11/24/2020    Discharge Plan     Row Name 12/01/20 1044       Plan    Plan  Home with home health    Patient/Family in Agreement with Plan  yes    Plan Comments  Met with patient in the room.  Tubes out and patient feeling better. Today at 04:00, patient went in to a fib with RVR. Amiodarone drip restarted.  Patient not interested in rehab, but agrees to home health.  Referral called to Elsie with [facility].  Please notify of discharge.  Following    Final Discharge Disposition Code  06 - home with home health care        Discharge Codes    No documentation.       Expected Discharge Date and Time     Expected Discharge Date Expected Discharge Time    Nov 24, 2020             Humaira Jacob RN

## 2020-12-01 NOTE — PROGRESS NOTES
Intensive Care Follow-up     Hospital:  LOS: 6 days   Ms. Angélica De La Cruz, 75 y.o. female is followed for:   Coronary artery disease involving native heart with angina pectoris (CMS/HCC)   Followed postoperatively for medical needs including chronic kidney disease and hypertension     Subjective   Interval History:  The chart has been reviewed.  The patient has unfortunately gone back into atrial fibrillation.  Hemodynamics have remained otherwise stable and in fact she is a bit hypertensive particularly at nighttime.  Pain is well controlled.  Chest tubes were removed yesterday.    The patient's past medical, surgical and social history were reviewed and updated in Epic as appropriate.        Objective     Infusions:  amiodarone, 0.5 mg/min, Last Rate: Stopped (11/30/20 1645)  DOBUTamine, 2-20 mcg/kg/min  DOPamine, 2-20 mcg/kg/min  norepinephrine, 0.02-0.3 mcg/kg/min, Last Rate: Stopped (11/27/20 1000)      Medications:  amLODIPine, 10 mg, Oral, Q24H  aspirin, 325 mg, Oral, Daily  atorvastatin, 40 mg, Oral, Nightly  cetirizine, 10 mg, Oral, Daily  clopidogrel, 75 mg, Oral, Daily  gabapentin, 300 mg, Oral, Q12H  hydrALAZINE, 10 mg, Oral, Q12H  insulin lispro, 0-14 Units, Subcutaneous, TID AC  levothyroxine, 100 mcg, Oral, Q AM  losartan, 100 mg, Oral, Q24H  metoprolol tartrate, 25 mg, Oral, TID  pharmacy consult - MTM, , Does not apply, Daily  sertraline, 50 mg, Oral, Daily  sodium chloride, 10 mL, Intravenous, Q12H        Vital Sign Min/Max for last 24 hours  Temp  Min: 99 °F (37.2 °C)  Max: 100.1 °F (37.8 °C)   BP  Min: 106/68  Max: 180/61   Pulse  Min: 59  Max: 141   Resp  Min: 16  Max: 20   SpO2  Min: 88 %  Max: 96 %   No data recorded       Input/Output for last 24 hour shift  11/30 0701 - 12/01 0700  In: 758 [P.O.:711; I.V.:47]  Out: 1620 [Urine:1600]      Objective:  General Appearance:  Uncomfortable, well-appearing and in no acute distress.    Vital signs: (most recent): Blood pressure 116/84, pulse (!)  "141, temperature 99 °F (37.2 °C), temperature source Axillary, resp. rate 20, height 160 cm (62.99\"), weight 98 kg (216 lb), SpO2 93 %, not currently breastfeeding.    HEENT: Normal HEENT exam.    Lungs:  Normal effort and normal respiratory rate.  There are decreased breath sounds.  No rales, wheezes or rhonchi.    Heart: Tachycardia.  Irregular rhythm.  S1 normal and S2 normal.  No murmur or friction rub.   Chest: (Median sternotomy.   Wounds are dressed and dry.)  Abdomen: Bowel sounds are normal.   There is no abdominal tenderness.     Extremities: Normal range of motion.  There is no dependent edema.    Neurological: Patient is alert and oriented to person, place and time.    Pupils:  Pupils are equal, round, and reactive to light.  Pupils are equal.   Skin:  Warm.              Results from last 7 days   Lab Units 12/01/20  0628 11/30/20  0336 11/29/20  0254   WBC 10*3/mm3 11.63* 9.07 9.40   HEMOGLOBIN g/dL 11.9* 10.5* 10.6*   PLATELETS 10*3/mm3 292 214 183     Results from last 7 days   Lab Units 12/01/20  0628 11/30/20  0336 11/29/20  0254  11/26/20  0315  11/25/20  1425   SODIUM mmol/L 141 139 141   < > 141   < > 142   POTASSIUM mmol/L 4.1 3.8 4.4   < > 3.4*   < > 3.0*   CO2 mmol/L 21.0* 20.0* 23.0   < > 24.0   < > 26.0   BUN mg/dL 28* 36* 33*   < > 29*   < > 26*   CREATININE mg/dL 1.05* 1.20* 1.22*   < > 1.72*   < > 1.26*   MAGNESIUM mg/dL 1.9 2.2  --   --  2.7*   < > 1.9   PHOSPHORUS mg/dL  --  2.9  --   --  3.9  --  4.1   GLUCOSE mg/dL 109* 96 98   < > 149*   < > 124*    < > = values in this interval not displayed.     Estimated Creatinine Clearance: 50 mL/min (A) (by C-G formula based on SCr of 1.05 mg/dL (H)).    Results from last 7 days   Lab Units 11/27/20  0340   PH, ARTERIAL pH units 7.361   PCO2, ARTERIAL mm Hg 34.9*   PO2 ART mm Hg 70.3*         I reviewed the patient's results and images.     Assessment/Plan   Impression        CABG 11/25/20    Abnormal stress test    Hypertension    CKD (chronic " kidney disease)    Atrial fibrillation (CMS/HCC)       Plan        Restart home hydralazine twice daily.  Plan may be for eventual permanent pacemaker placement due to bradycardia which is intermittent.  Renal function has improved and is at or near her baseline currently.  Continue to avoid all nephrotoxins.  She will remain in the intensive care unit for close monitoring today.    Plan of care and goals reviewed with mulitdisciplinary/antibiotic stewardship team during rounds.   I discussed the patient's findings and my recommendations with patient and nursing staff       Bola Forrester MD, [facility]  Pulmonology and Critical Care Medicine

## 2020-12-01 NOTE — PLAN OF CARE
Problem: Adult Inpatient Plan of Care  Goal: Plan of Care Review  Recent Flowsheet Documentation  Taken 12/1/2020 0935 by Marlene Miller, PT  Progress: improving  Plan of Care Reviewed With: patient  Outcome Summary: patient is in A-fib with RVR but wants to try ambulation she is able to ambulate 40 ft with min assist with HR peaking at 160 for a short burst. Patient converts back to NSR with HR 60 when she returned to the room. patient is limited by ECG changes we will continue to progress mobility as tolerated   Goal Outcome Evaluation:  Plan of Care Reviewed With: patient  Progress: improving  Outcome Summary: patient is in A-fib with RVR but wants to try ambulation she is able to ambulate 40 ft with min assist with HR peaking at 160 for a short burst. Patient converts back to NSR with HR 60 when she returned to the room. patient is limited by ECG changes we will continue to progress mobility as tolerated

## 2020-12-01 NOTE — PROGRESS NOTES
CTS Progress Note       LOS: 6 days   Patient Care Team:  Sussy Bloom MD as PCP - General (Internal Medicine)    Chief Complaint: Coronary artery disease involving native heart with angina pectoris (CMS/HCC)    Vital Signs:  Temp:  [99 °F (37.2 °C)-100.1 °F (37.8 °C)] 99 °F (37.2 °C)  Heart Rate:  [] 141  Resp:  [16-20] 20  BP: (106-180)/() 116/84    Physical Exam:       Results:   Results from last 7 days   Lab Units 12/01/20  0628   WBC 10*3/mm3 11.63*   HEMOGLOBIN g/dL 11.9*   HEMATOCRIT % 38.6   PLATELETS 10*3/mm3 292     Results from last 7 days   Lab Units 12/01/20  0628   SODIUM mmol/L 141   POTASSIUM mmol/L 4.1   CHLORIDE mmol/L 107   CO2 mmol/L 21.0*   BUN mg/dL 28*   CREATININE mg/dL 1.05*   GLUCOSE mg/dL 109*   CALCIUM mg/dL 9.1           Imaging Results (Last 24 Hours)     Procedure Component Value Units Date/Time    XR Chest 1 View [[id number]] Resulted: 12/01/20 0410     Updated: 12/01/20 0416          Assessment      CABG 11/25/20    Abnormal stress test    Hypertension    CKD (chronic kidney disease)    Atrial fibrillation (CMS/HCC)    Currently on room air and progressing satisfactorily.  Still in atrial fibrillation.  Plans for plus or minus pacemaker need per cardiology.    Plan   As above    Please note that portions of this note were completed with a voice recognition program. Efforts were made to edit the dictations, but occasionally words are mistranscribed.    Wali Spicer MD  12/01/20  07:21 EST

## 2020-12-01 NOTE — THERAPY TREATMENT NOTE
Patient Name: Angélica De La Cruz  : 1945    MRN: [id number]                              Today's Date: 2020       Admit Date: 2020    Visit Dx:     ICD-10-CM ICD-9-CM   1. Coronary artery disease involving native heart with angina pectoris, unspecified vessel or lesion type (CMS/HCC)  I25.119 414.01     413.9   2. Abnormal stress test  R94.39 794.39     Patient Active Problem List   Diagnosis   • Acute febrile illness   • Abnormal stress test   • CABG 20   • Hypertension   • CKD (chronic kidney disease)   • Atrial fibrillation (CMS/HCC)     Past Medical History:   Diagnosis Date   • Arthritis    • CKD (chronic kidney disease)     elevated creat caused by long term use of lisinopril    • Fibromyalgia    • Hypertension    • Lung nodule seen on imaging study    • AMANDA treated with BiPAP    • Peritonitis (CMS/HCC)      Past Surgical History:   Procedure Laterality Date   • APPENDECTOMY     • CARDIAC CATHETERIZATION N/A 2020    Procedure: Left Heart Cath;  Surgeon: Sanya Borges MD;  Location: [facility] CATH INVASIVE LOCATION;  Service: Cardiovascular;  Laterality: N/A;   • CATARACT EXTRACTION, BILATERAL Bilateral    • CHOLECYSTECTOMY     • CORONARY ARTERY BYPASS GRAFT N/A 2020    Procedure: MEDIAN STERNOTOMY, CORONARY ARTERY BYPASS GRAFTING X2, UTILIZNG THE LEFT  INTERNAL MAMMARY ARTERY GRAFT, ENDOSCOPIC VEIN HARVESTING CONVERTED TO OPEN OF THE RIGHT GREATER SAPHENOUS VEIN;  Surgeon: Wali Spicer MD;  Location: [facility] OR;  Service: Cardiothoracic;  Laterality: N/A;  VO: 0807  VR: 0807     • HYSTERECTOMY  1984    with BSO     General Information     Row Name 20 0932          Physical Therapy Time and Intention    Document Type  therapy note (daily note)  -JOSEFINA     Mode of Treatment  physical therapy  -JOSEFINA     Row Name 20 0932          General Information    Patient Profile Reviewed  yes  -JOSEFINA     Prior Level of Function  independent:;gait;transfer;ADL's;bed  mobility  -JOSEFINA     Existing Precautions/Restrictions  cardiac;sternal  -JOSEFINA     Barriers to Rehab  medically complex  -JOSEFINA     Row Name 12/01/20 0932          Living Environment    Lives With  spouse  -JOSEFINA     Row Name 12/01/20 0932          Cognition    Orientation Status (Cognition)  oriented x 4  -JOSEFINA     Row Name 12/01/20 0932          Safety Issues, Functional Mobility    Safety Issues Affecting Function (Mobility)  safety precautions follow-through/compliance;safety precaution awareness  -JOSEFINA     Impairments Affecting Function (Mobility)  balance;endurance/activity tolerance;strength;shortness of breath  -JOSEFINA       User Key  (r) = Recorded By, (t) = Taken By, (c) = Cosigned By    Initials Name Provider Type    JOSEFINA Marlene Miller, PT Physical Therapist        Mobility     Row Name 12/01/20 0932          Bed Mobility    Bed Mobility  rolling left;rolling right;scooting/bridging;supine-sit  -JOSEFINA     Rolling Left Leland (Bed Mobility)  minimum assist (75% patient effort)  -JOSEFINA     Rolling Right [place] (Bed Mobility)  minimum assist (75% patient effort)  -JOSEFINA     Scooting/Bridging [place] (Bed Mobility)  minimum assist (75% patient effort)  -JOSEFINA     Supine-Sit [place] (Bed Mobility)  moderate assist (50% patient effort)  -JOSEFINA     Assistive Device (Bed Mobility)  draw sheet;head of bed elevated  -JOSEFINA     Row Name 12/01/20 0932          Transfers    Comment (Transfers)  patient transfers to and from the commode  -     Row Name 12/01/20 0932          Bed-Chair Transfer    Bed-Chair Leland (Transfers)  minimum assist (75% patient effort)  -JOSEFINA     Row Name 12/01/20 0932          Sit-Stand Transfer    Sit-Stand Leland (Transfers)  minimum assist (75% patient effort)  -JOSEFINA     Row Name 12/01/20 0932          Gait/Stairs (Locomotion)    Leland Level (Gait)  minimum assist (75% patient effort)  -JOSEFINA     Distance in Feet (Gait)  40  -JOSEFINA     Deviations/Abnormal Patterns (Gait)  stride length  decreased  -JOSEFINA     Bilateral Gait Deviations  forward flexed posture  -JOSEFINA     Comment (Gait/Stairs)  patient uses tele monitor for support. She is limited due to being in A-fib with RVR patient converts back to NSR post ambulating  -JOSEFINA       User Key  (r) = Recorded By, (t) = Taken By, (c) = Cosigned By    Initials Name Provider Type    Marlene Curiel PT Physical Therapist        Obj/Interventions     Row Name 12/01/20 0934          Motor Skills    Therapeutic Exercise  hip;knee;ankle  -     Row Name 12/01/20 0934          Hip (Therapeutic Exercise)    Hip (Therapeutic Exercise)  AROM (active range of motion)  -JOSEFINA     Hip AROM (Therapeutic Exercise)  bilateral;flexion;extension;sitting;10 repetitions  -     Row Name 12/01/20 0934          Knee (Therapeutic Exercise)    Knee AROM (Therapeutic Exercise)  bilateral;flexion;extension;sitting;10 repetitions  -     Row Name 12/01/20 0934          Ankle (Therapeutic Exercise)    Ankle (Therapeutic Exercise)  AROM (active range of motion)  -JOSEFINA     Ankle AROM (Therapeutic Exercise)  bilateral;dorsiflexion;plantarflexion;sitting;10 repetitions  -     Row Name 12/01/20 0934          Balance    Balance Assessment  sitting static balance;sitting dynamic balance;standing static balance;standing dynamic balance  -JOSEFINA     Static Sitting Balance  WNL  -JOSEFINA     Dynamic Sitting Balance  WNL  -JOSEFINA     Static Standing Balance  mild impairment  -JOSEFINA     Dynamic Standing Balance  mild impairment  -JOSEFINA     Balance Interventions  sit to stand;weight shifting activity;dynamic reaching  -JOSEFINA       User Key  (r) = Recorded By, (t) = Taken By, (c) = Cosigned By    Initials Name Provider Type    Marlene Curiel PT Physical Therapist        Goals/Plan    No documentation.       Clinical Impression     Row Name 12/01/20 0935          Pain Scale: Numbers Pre/Post-Treatment    Pretreatment Pain Rating  2/10  -JOSEFINA     Posttreatment Pain Rating  3/10  -JOSEFINA     Pain Location -  Orientation  incisional  -JOSEFINA     Pain Location  chest  -JOSEFINA     Pain Intervention(s)  Repositioned;Ambulation/increased activity;Splinting  -JOSEFINA     Row Name 12/01/20 0935          Plan of Care Review    Plan of Care Reviewed With  patient  -JOSEFINA     Progress  improving  -JOSEFINA     Outcome Summary  patient is in A-fib with RVR but wants to try ambulation she is able to ambulate 40 ft with min assist with HR peaking at 160 for a short burst. Patient converts back to NSR with HR 60 when she returned to the room. patient is limited by ECG changes we will continue to progress mobility as tolerated  -JOSEFINA     Row Name 12/01/20 0935          Therapy Assessment/Plan (PT)    Patient/Family Therapy Goals Statement (PT)  return to PLOF  -JOSEFINA     Rehab Potential (PT)  good, to achieve stated therapy goals  -JOSEFINA     Criteria for Skilled Interventions Met (PT)  yes;skilled treatment is necessary  -JOSEFINA     Row Name 12/01/20 0935          Vital Signs    Pre Systolic BP Rehab  104  -JOSEFINA     Pre Treatment Diastolic BP  56  -JOSEFINA     Post Systolic BP Rehab  116  -JOSEFINA     Post Treatment Diastolic BP  58  -JOSEFINA     Pretreatment Heart Rate (beats/min)  130 patient in A-fib with RVR Rn OK'd ambulation  -JOSEFINA     Intratreatment Heart Rate (beats/min)  160  -JOSEFINA     Posttreatment Heart Rate (beats/min)  60 patient converted to NSR post ambulation  -JOSEFINA     Pre SpO2 (%)  95  -JOSEFINA     O2 Delivery Pre Treatment  room air  -JOSEFINA     Post SpO2 (%)  94  -JOSEFINA     O2 Delivery Post Treatment  room air  -JOSEFINA     Pre Patient Position  Supine  -JOSEFINA     Intra Patient Position  Standing  -JOSEFINA     Post Patient Position  Sitting  -JOSEFINA     Row Name 12/01/20 0935          Positioning and Restraints    Pre-Treatment Position  in bed  -JOSEFINA     Post Treatment Position  chair  -JOSEFINA     In Chair  notified nsg;reclined;sitting;call light within reach;encouraged to call for assist  -JOSEFINA       User Key  (r) = Recorded By, (t) = Taken By, (c) = Cosigned By    Initials Name Provider Type    JOSEFINA  Marlene Miller, PT Physical Therapist        Outcome Measures     Row Name 12/01/20 0940          How much help from another person do you currently need...    Turning from your back to your side while in flat bed without using bedrails?  3  -JOSEFINA     Moving from lying on back to sitting on the side of a flat bed without bedrails?  2  -JOSEFINA     Moving to and from a bed to a chair (including a wheelchair)?  3  -JOSEFINA     Standing up from a chair using your arms (e.g., wheelchair, bedside chair)?  3  -JOSEFINA     Climbing 3-5 steps with a railing?  2  -JOSEFINA     To walk in hospital room?  3  -JOSEFINA     AM-PAC 6 Clicks Score (PT)  16  -JOSEFINA       User Key  (r) = Recorded By, (t) = Taken By, (c) = Cosigned By    Initials Name Provider Type    Marlene Curiel PT Physical Therapist        Physical Therapy Education                 Title: PT OT SLP Therapies (In Progress)     Topic: Physical Therapy (In Progress)     Point: Mobility training (In Progress)     Learning Progress Summary           Patient Acceptance, E, NR by JOSEFINA at 12/1/2020 0800    Acceptance, E, NR by KG at 11/29/2020 0844    Acceptance, E, NR by KG at 11/28/2020 0835    Acceptance, E, NR by JOSEFINA at 11/27/2020 1050                   Point: Home exercise program (In Progress)     Learning Progress Summary           Patient Acceptance, E, NR by JOSEFINA at 12/1/2020 0800    Acceptance, E, NR by KG at 11/29/2020 0844    Acceptance, E, NR by KG at 11/28/2020 0835    Acceptance, E, NR by JOSEFINA at 11/27/2020 1050                   Point: Body mechanics (In Progress)     Learning Progress Summary           Patient Acceptance, E, NR by JOSEFINA at 12/1/2020 0800    Acceptance, E, NR by KG at 11/29/2020 0844    Acceptance, E, NR by KG at 11/28/2020 0835    Acceptance, E, NR by JOSEFINA at 11/27/2020 1050                   Point: Precautions (In Progress)     Learning Progress Summary           Patient Acceptance, E, NR by JOSEFINA at 12/1/2020 0800    Acceptance, E, NR by KG at 11/29/2020 0844     Acceptance, E, NR by KG at 11/28/2020 0835    Acceptance, E, NR by JOSEFINA at 11/27/2020 1050                               User Key     Initials Effective Dates Name Provider Type Discipline    JOSEFINA 06/19/15 -  Marlene Miller PT Physical Therapist PT    KG 05/22/20 -  Lesley Hernandez PT Physical Therapist PT              PT Recommendation and Plan  Planned Therapy Interventions (PT): balance training, bed mobility training, gait training, home exercise program, transfer training, strengthening  Plan of Care Reviewed With: patient  Progress: improving  Outcome Summary: patient is in A-fib with RVR but wants to try ambulation she is able to ambulate 40 ft with min assist with HR peaking at 160 for a short burst. Patient converts back to NSR with HR 60 when she returned to the room. patient is limited by ECG changes we will continue to progress mobility as tolerated     Time Calculation:   PT Charges     Row Name 12/01/20 0941             Time Calculation    Start Time  0800  -JOSEFINA      PT Received On  12/01/20  -JOSEFINA      PT Goal Re-Cert Due Date  01/07/21  -JOSEFINA         Time Calculation- PT    Total Timed Code Minutes- PT  24 minute(s)  -JOSEFINA         Timed Charges    17609 - PT Therapeutic Activity Minutes  24  -JOSEFINA        User Key  (r) = Recorded By, (t) = Taken By, (c) = Cosigned By    Initials Name Provider Type    Marlene Curiel PT Physical Therapist        Therapy Charges for Today     Code Description Service Date Service Provider Modifiers Qty    [id number]  PT THERAPEUTIC ACT EA 15 MIN 12/1/2020 Marlene Miller PT GP 2          PT G-Codes  Outcome Measure Options: AM-PAC 6 Clicks Basic Mobility (PT)  AM-PAC 6 Clicks Score (PT): 16    Marlene Miller PT  12/1/2020

## 2020-12-01 NOTE — PLAN OF CARE
Goal Outcome Evaluation:  Plan of Care Reviewed With: patient  Progress: improving  Outcome Summary: Pt neuro intact. Pt sats >92% on room air.  Pt sr/sb until 0400 and went into afib with rvr.  Amiodarone gtt restarted. will continue to monitor.  at 12/1/2020 0734

## 2020-12-01 NOTE — PROGRESS NOTES
Clinical Nutrition     Multidisciplinary Rounds      Patient Name: Angélica De La Cruz  Date of Encounter: 12/01/20 09:53 EST  MRN: [id number]  Admission date: 11/24/2020  PAT. NAGA to continue to follow per protocol.       Current diet: Diet Regular; Cardiac, Consistent Carbohydrate  Orders Placed This Encounter      Dietary Nutrition Supplements Ensure HP   Ensure HP 2x/day    Intervention:  Follow treatment plan  Care plan reviewed    Follow up:   Per protocol      Vianney Doll RD  09:53 EST  Time: 10min

## 2020-12-02 LAB
ANION GAP SERPL CALCULATED.3IONS-SCNC: 12 MMOL/L (ref 5–15)
BASOPHILS # BLD AUTO: 0.04 10*3/MM3 (ref 0–0.2)
BASOPHILS NFR BLD AUTO: 0.3 % (ref 0–1.5)
BUN SERPL-MCNC: 34 MG/DL (ref 8–23)
BUN/CREAT SERPL: 30.6 (ref 7–25)
CALCIUM SPEC-SCNC: 8.8 MG/DL (ref 8.6–10.5)
CHLORIDE SERPL-SCNC: 105 MMOL/L (ref 98–107)
CO2 SERPL-SCNC: 20 MMOL/L (ref 22–29)
CREAT SERPL-MCNC: 1.11 MG/DL (ref 0.57–1)
DEPRECATED RDW RBC AUTO: 52.1 FL (ref 37–54)
EOSINOPHIL # BLD AUTO: 0.84 10*3/MM3 (ref 0–0.4)
EOSINOPHIL NFR BLD AUTO: 7.3 % (ref 0.3–6.2)
ERYTHROCYTE [DISTWIDTH] IN BLOOD BY AUTOMATED COUNT: 14.3 % (ref 12.3–15.4)
GFR SERPL CREATININE-BSD FRML MDRD: 48 ML/MIN/1.73
GLUCOSE BLDC GLUCOMTR-MCNC: 100 MG/DL (ref 70–130)
GLUCOSE BLDC GLUCOMTR-MCNC: 118 MG/DL (ref 70–130)
GLUCOSE BLDC GLUCOMTR-MCNC: 90 MG/DL (ref 70–130)
GLUCOSE SERPL-MCNC: 83 MG/DL (ref 65–99)
HCT VFR BLD AUTO: 35.2 % (ref 34–46.6)
HGB BLD-MCNC: 10.5 G/DL (ref 12–15.9)
IMM GRANULOCYTES # BLD AUTO: 0.14 10*3/MM3 (ref 0–0.05)
IMM GRANULOCYTES NFR BLD AUTO: 1.2 % (ref 0–0.5)
LYMPHOCYTES # BLD AUTO: 1.02 10*3/MM3 (ref 0.7–3.1)
LYMPHOCYTES NFR BLD AUTO: 8.8 % (ref 19.6–45.3)
MAGNESIUM SERPL-MCNC: 2 MG/DL (ref 1.6–2.4)
MCH RBC QN AUTO: 29.3 PG (ref 26.6–33)
MCHC RBC AUTO-ENTMCNC: 29.8 G/DL (ref 31.5–35.7)
MCV RBC AUTO: 98.3 FL (ref 79–97)
MONOCYTES # BLD AUTO: 1.11 10*3/MM3 (ref 0.1–0.9)
MONOCYTES NFR BLD AUTO: 9.6 % (ref 5–12)
NEUTROPHILS NFR BLD AUTO: 72.8 % (ref 42.7–76)
NEUTROPHILS NFR BLD AUTO: 8.41 10*3/MM3 (ref 1.7–7)
NRBC BLD AUTO-RTO: 0 /100 WBC (ref 0–0.2)
PLATELET # BLD AUTO: 266 10*3/MM3 (ref 140–450)
PMV BLD AUTO: 9.6 FL (ref 6–12)
POTASSIUM SERPL-SCNC: 3.5 MMOL/L (ref 3.5–5.2)
QT INTERVAL: 346 MS
QT INTERVAL: 562 MS
QTC INTERVAL: 522 MS
QTC INTERVAL: 522 MS
RBC # BLD AUTO: 3.58 10*6/MM3 (ref 3.77–5.28)
SODIUM SERPL-SCNC: 137 MMOL/L (ref 136–145)
WBC # BLD AUTO: 11.56 10*3/MM3 (ref 3.4–10.8)

## 2020-12-02 PROCEDURE — 93005 ELECTROCARDIOGRAM TRACING: CPT | Performed by: INTERNAL MEDICINE

## 2020-12-02 PROCEDURE — 85025 COMPLETE CBC W/AUTO DIFF WBC: CPT | Performed by: INTERNAL MEDICINE

## 2020-12-02 PROCEDURE — 97530 THERAPEUTIC ACTIVITIES: CPT

## 2020-12-02 PROCEDURE — 82962 GLUCOSE BLOOD TEST: CPT

## 2020-12-02 PROCEDURE — 99232 SBSQ HOSP IP/OBS MODERATE 35: CPT | Performed by: INTERNAL MEDICINE

## 2020-12-02 PROCEDURE — 83735 ASSAY OF MAGNESIUM: CPT | Performed by: PHYSICIAN ASSISTANT

## 2020-12-02 PROCEDURE — 94799 UNLISTED PULMONARY SVC/PX: CPT

## 2020-12-02 PROCEDURE — 80048 BASIC METABOLIC PNL TOTAL CA: CPT | Performed by: INTERNAL MEDICINE

## 2020-12-02 PROCEDURE — 99024 POSTOP FOLLOW-UP VISIT: CPT | Performed by: THORACIC SURGERY (CARDIOTHORACIC VASCULAR SURGERY)

## 2020-12-02 RX ORDER — ASPIRIN 81 MG/1
81 TABLET ORAL DAILY
Status: DISCONTINUED | OUTPATIENT
Start: 2020-12-03 | End: 2020-12-03 | Stop reason: HOSPADM

## 2020-12-02 RX ADMIN — ACETAMINOPHEN 650 MG: 325 TABLET, FILM COATED ORAL at 20:30

## 2020-12-02 RX ADMIN — GABAPENTIN 300 MG: 300 CAPSULE ORAL at 20:13

## 2020-12-02 RX ADMIN — ATORVASTATIN CALCIUM 40 MG: 40 TABLET, FILM COATED ORAL at 20:13

## 2020-12-02 RX ADMIN — HYDRALAZINE HYDROCHLORIDE 10 MG: 10 TABLET ORAL at 08:23

## 2020-12-02 RX ADMIN — LOSARTAN POTASSIUM 100 MG: 50 TABLET, FILM COATED ORAL at 08:23

## 2020-12-02 RX ADMIN — DOCUSATE SODIUM 50 MG AND SENNOSIDES 8.6 MG 2 TABLET: 8.6; 5 TABLET, FILM COATED ORAL at 08:23

## 2020-12-02 RX ADMIN — HYDRALAZINE HYDROCHLORIDE 10 MG: 10 TABLET ORAL at 20:13

## 2020-12-02 RX ADMIN — METOPROLOL TARTRATE 25 MG: 25 TABLET, FILM COATED ORAL at 08:23

## 2020-12-02 RX ADMIN — LEVOTHYROXINE SODIUM 100 MCG: 100 TABLET ORAL at 05:49

## 2020-12-02 RX ADMIN — CLOPIDOGREL BISULFATE 75 MG: 75 TABLET ORAL at 08:23

## 2020-12-02 RX ADMIN — SODIUM CHLORIDE, PRESERVATIVE FREE 10 ML: 5 INJECTION INTRAVENOUS at 08:31

## 2020-12-02 RX ADMIN — METOPROLOL TARTRATE 25 MG: 25 TABLET, FILM COATED ORAL at 20:13

## 2020-12-02 RX ADMIN — ASPIRIN 325 MG: 325 TABLET, COATED ORAL at 08:23

## 2020-12-02 RX ADMIN — SERTRALINE HYDROCHLORIDE 50 MG: 50 TABLET ORAL at 08:23

## 2020-12-02 RX ADMIN — CETIRIZINE HYDROCHLORIDE 10 MG: 10 TABLET, FILM COATED ORAL at 08:23

## 2020-12-02 RX ADMIN — GABAPENTIN 300 MG: 300 CAPSULE ORAL at 08:22

## 2020-12-02 RX ADMIN — AMLODIPINE BESYLATE 10 MG: 10 TABLET ORAL at 08:22

## 2020-12-02 NOTE — PLAN OF CARE
Goal Outcome Evaluation:  Plan of Care Reviewed With: patient  Progress: improving  Outcome Summary: Pt alert and oriented, sats >92% on room air. Sinus john rate 48-62 throughout day with no ectopy. Pt ambulated with minimal assistance. Pt tolerating regular diet. UOP adequate. Will continue to monitor.

## 2020-12-02 NOTE — PLAN OF CARE
Goal Outcome Evaluation:  Plan of Care Reviewed With: patient  Progress: improving    Patient Aox4/4. VSS. 2Lm of oxygen and amio at 0.25 per cardiology. Patient complains of no pain. Walked 220ft with standby assistance. NPO since midnight; awaiting cardiology final decision for pacemaker.

## 2020-12-02 NOTE — PROGRESS NOTES
"                                 [facility] Progress Note      LOS: 7 days   Patient Care Team:  Sussy Bloom MD as PCP - General (Internal Medicine)    Chief Complaint: Chest pain    Subjective     Interval History: No A. fib through the night    Patient Complaints: Complains of palpitations this morning.  No chest pain or dyspnea      Review of Systems:   A 14 point review of systems was negative except as was stated in the HPI      Objective     Vital Sign Min/Max for last 24 hours  Temp  Min: 98 °F (36.7 °C)  Max: 100.4 °F (38 °C)   BP  Min: 104/89  Max: 143/55   Pulse  Min: 45  Max: 156   Resp  Min: 20  Max: 22   SpO2  Min: 82 %  Max: 98 %   Flow (L/min)  Min: 2  Max: 2   No data recorded     Flowsheet Rows      First Filed Value   Admission Height  160 cm (63\") Documented at 11/24/2020 1219   Admission Weight  98.3 kg (216 lb 11.4 oz) Documented at 11/24/2020 1219          Physical Exam:  General Appearance: Alert, appears stated age and cooperative  Lungs: Clear to auscultation  Heart:: Regular rate and rhythm no Murmurs, Rubs or Gallops  Abdomen: Soft and nontender with adequate bowel sounds.  No organomegaly  Extremities: Trace edema  Pulses: Pulses palpable and equal bilaterally  Skin: Warm and dry with no rash  Psych: Normal     Results Review:     I reviewed the patient's new clinical results.  Results from last 7 days   Lab Units 12/02/20 0354 12/01/20 0628 11/30/20  0336   SODIUM mmol/L 137 141 139   POTASSIUM mmol/L 3.5 4.1 3.8   CHLORIDE mmol/L 105 107 108*   CO2 mmol/L 20.0* 21.0* 20.0*   BUN mg/dL 34* 28* 36*   CREATININE mg/dL 1.11* 1.05* 1.20*   GLUCOSE mg/dL 83 109* 96   CALCIUM mg/dL 8.8 9.1 8.6     Results from last 7 days   Lab Units 12/02/20 0354 12/01/20 0628 11/30/20  0336   WBC 10*3/mm3 11.56* 11.63* 9.07   HEMOGLOBIN g/dL 10.5* 11.9* 10.5*   HEMATOCRIT % 35.2 38.6 34.0   PLATELETS 10*3/mm3 266 292 214     No results found for: TROPONINT      Results from last 7 " days   Lab Units 11/26/20  0315 11/25/20  1024   INR  1.19* 1.26*     Results from last 7 days   Lab Units 11/27/20  0340   PH, ARTERIAL pH units 7.361   PO2 ART mm Hg 70.3*   PCO2, ARTERIAL mm Hg 34.9*   HCO3 ART mmol/L 19.7*           Medication Review: yes  Current Facility-Administered Medications   Medication Dose Route Frequency Provider Last Rate Last Admin   • acetaminophen (TYLENOL) tablet 650 mg  650 mg Oral Q4H PRN Jarred Che PA       • ALPRAZolam (XANAX) tablet 0.25 mg  0.25 mg Oral TID PRN Jarred Che PA   0.25 mg at 11/27/20 2101   • amiodarone 360 mg in 200 mL D5W infusion  0.25 mg/min Intravenous Continuous Timothy Albarran MD   Stopped at 12/02/20 0713   • amLODIPine (NORVASC) tablet 10 mg  10 mg Oral Q24H Leif Sahu MD   10 mg at 12/01/20 0807   • aspirin EC tablet 325 mg  325 mg Oral Daily Jarred Che PA   325 mg at 12/01/20 0807   • atorvastatin (LIPITOR) tablet 40 mg  40 mg Oral Nightly Jarred Che PA   40 mg at 12/01/20 2047   • cetirizine (zyrTEC) tablet 10 mg  10 mg Oral Daily Bola Forrester MD   10 mg at 12/01/20 0807   • clopidogrel (PLAVIX) tablet 75 mg  75 mg Oral Daily Jarred Che PA   75 mg at 12/01/20 0807   • diphenhydrAMINE-zinc acetate 2-0.1 % cream   Topical BID PRN Yair Walton APRN   1 application at 12/01/20 0008   • DOBUTamine (DOBUTREX) 1 mg/mL infusion  2-20 mcg/kg/min Intravenous Continuous PRN Chloe Mcfarland, PharmD       • DOPamine 400 mg in 250 mL D5W infusion  2-20 mcg/kg/min Intravenous Continuous PRN Chloe Mcfarland, PharmD       • gabapentin (NEURONTIN) capsule 300 mg  300 mg Oral Q12H Bola Forrester MD   300 mg at 12/01/20 2047   • hydrALAZINE (APRESOLINE) tablet 10 mg  10 mg Oral Q12H Bola Forrester MD   10 mg at 12/01/20 2047   • HYDROcodone-acetaminophen (NORCO) 7.5-325 MG per tablet 1 tablet  1 tablet Oral Q4H PRN Wali Spicer MD   1 tablet at 11/30/20 2027   • insulin lispro (humaLOG)  injection 0-14 Units  0-14 Units Subcutaneous TID AC Dale Lyon MD   3 Units at 11/28/20 1642   • levothyroxine (SYNTHROID, LEVOTHROID) tablet 100 mcg  100 mcg Oral Q AM Bola Forrester MD   100 mcg at 12/02/20 0549   • losartan (COZAAR) tablet 100 mg  100 mg Oral Q24H Yovanny Jin PA   100 mg at 12/01/20 0807   • magnesium sulfate 4 gram infusion - Mg less than or equal to 1mg/dL  4 g Intravenous PRN Shane Schmidt APRN        Or   • magnesium sulfate 3 gram infusion (1gm x 3) - Mg 1.1 - 1.5 mg/dL  1 g Intravenous PRN Shane Schmidt APRN        Or   • Magnesium Sulfate 2 gram infusion- Mg 1.6 - 1.9 mg/dL  2 g Intravenous PRN Shane Schmidt APRN 25 mL/hr at 11/25/20 1953 2 g at 11/25/20 1953   • metoprolol tartrate (LOPRESSOR) tablet 25 mg  25 mg Oral Q12H Timothy Albarran MD       • norepinephrine (LEVOPHED) 8 mg in 250 mL NS infusion (premix)  0.02-0.3 mcg/kg/min Intravenous Continuous PRN Jarred Che PA   Stopped at 11/27/20 1000   • ondansetron (ZOFRAN) injection 4 mg  4 mg Intravenous Q6H PRN Jarred Che PA       • oxyCODONE-acetaminophen (PERCOCET) 5-325 MG per tablet 2 tablet  2 tablet Oral Q4H PRN Wali Spicer MD   2 tablet at 11/29/20 2016   • Pharmacy Consult - MTM   Does not apply Daily Chloe Mcfarland, PharmD   Given at 11/27/20 0922   • polyvinyl alcohol (LIQUIFILM) 1.4 % ophthalmic solution 2 drop  2 drop Both Eyes Q1H PRN Shane Schmidt APRN   2 drop at 11/26/20 2125   • potassium chloride (MICRO-K) CR capsule 40 mEq  40 mEq Oral PRN Jarred Che PA   40 mEq at 11/27/20 0548    Or   • potassium chloride (KLOR-CON) packet 40 mEq  40 mEq Oral PRN Jarred Che PA   40 mEq at 11/27/20 1109   • sennosides-docusate (PERICOLACE) 8.6-50 MG per tablet 2 tablet  2 tablet Oral BID Bola Forrester MD   2 tablet at 12/01/20 1310   • sertraline (ZOLOFT) tablet 50 mg  50 mg Oral Daily Bola Forrester MD   50 mg at 12/01/20  0807   • sodium chloride 0.9 % flush 10 mL  10 mL Intravenous Q12H Jarred Che PA   10 mL at 12/01/20 2050   • temazepam (RESTORIL) capsule 7.5 mg  7.5 mg Oral Nightly PRN Jarred Che PA   7.5 mg at 11/26/20 2138         CABG 11/25/20    Abnormal stress test    Hypertension    CKD (chronic kidney disease)    Atrial fibrillation (CMS/HCC)        Impression      CABG 11/25/2020; previously normal LV function by echocardiography 2015  Postop bradycardia   Atrial fibrillation with rapid ventricular rate this morning  Hypertension  Chronic kidney disease; stable    The patient converted to sinus rhythm on IV amiodarone yesterday.  IV amiodarone was discontinued because of heart rate in the 50s.  Subsequently she went back into atrial fibrillation with rapid ventricular rate earlier this morning.  She is now back on IV amiodarone at 0.5 mg/min    Plan     Discontinue IV amnio  Continue metoprolol  Defer anticoagulation in the event that the patient needs permanent pacer  We will keep in ICU for observation owing to the potential for bradycardia arrhythmias until this issue is resolved.  She has maintained sinus rhythm overnight.  If she has no other tachy arrhythmias today I plan for discharge in the morning      Timothy Albarran MD  12/02/20  07:24 EST

## 2020-12-02 NOTE — PROGRESS NOTES
Continued Stay Note   [facility]     Patient Name: Angélica De La Cruz  MRN: [id number]  Today's Date: 12/2/2020    Admit Date: 11/24/2020    Discharge Plan     Row Name 12/02/20 1155       Plan    Plan  Home with home health    Patient/Family in Agreement with Plan  yes    Plan Comments  Met with patient in the room.  Working with therapy.  Orders for home health with  [facility].  Need to notify them of discharge.  Following    Final Discharge Disposition Code  06 - home with home health care        Discharge Codes    No documentation.       Expected Discharge Date and Time     Expected Discharge Date Expected Discharge Time    Nov 24, 2020             Humaira Jacob RN

## 2020-12-02 NOTE — PLAN OF CARE
Problem: Adult Inpatient Plan of Care  Goal: Plan of Care Review  Recent Flowsheet Documentation  Taken 12/2/2020 1149 by Lesley Hernandez, PT  Progress: improving  Plan of Care Reviewed With: patient  Outcome Summary: Pt increased ambulation distance to 220ft with CGA and B UE support on tele monitor. VC's for upright posture and increased stride length. Pt demonstrated improved manjinder and stability. Required three standing rest breaks due to increased fatigue. Continue to progress as appropriate.

## 2020-12-02 NOTE — PROGRESS NOTES
"Intensive Care Follow-up     Hospital:  LOS: 7 days   Ms. Angélica De La Cruz, 75 y.o. female is followed for:   Coronary artery disease involving native heart with angina pectoris (CMS/HCC)     Followed postoperatively for medical needs including hypertension and hyperglycemia  Subjective   Interval History:  The chart has been reviewed.  The patient is much more comfortable today.  She remains somewhat bradycardic but is not symptomatic with this currently.  Hemodynamics have been stable.    The patient's past medical, surgical and social history were reviewed and updated in Epic as appropriate.        Objective     Infusions:  norepinephrine, 0.02-0.3 mcg/kg/min, Last Rate: Stopped (11/27/20 1000)      Medications:  amLODIPine, 10 mg, Oral, Q24H  [START ON 12/3/2020] aspirin, 81 mg, Oral, Daily  atorvastatin, 40 mg, Oral, Nightly  cetirizine, 10 mg, Oral, Daily  clopidogrel, 75 mg, Oral, Daily  gabapentin, 300 mg, Oral, Q12H  hydrALAZINE, 10 mg, Oral, Q12H  insulin lispro, 0-14 Units, Subcutaneous, TID AC  levothyroxine, 100 mcg, Oral, Q AM  losartan, 100 mg, Oral, Q24H  metoprolol tartrate, 25 mg, Oral, Q12H  pharmacy consult - [place], , Does not apply, Daily  senna-docusate sodium, 2 tablet, Oral, BID  sertraline, 50 mg, Oral, Daily        Vital Sign Min/Max for last 24 hours  Temp  Min: 98 °F (36.7 °C)  Max: 99.6 °F (37.6 °C)   BP  Min: 105/39  Max: 143/55   Pulse  Min: 45  Max: 131   Resp  Min: 18  Max: 22   SpO2  Min: 82 %  Max: 98 %   Flow (L/min)  Min: 2  Max: 2       Input/Output for last 24 hour shift  12/01 0701 - 12/02 0700  In: 789 [P.O.:537; I.V.:252]  Out: 950 [Urine:950]      Objective:  General Appearance:  Uncomfortable, well-appearing and in no acute distress.    Vital signs: (most recent): Blood pressure (!) 105/39, pulse (!) 48, temperature 98.1 °F (36.7 °C), temperature source Oral, resp. rate 20, height 160 cm (62.99\"), weight 98 kg (216 lb), SpO2 96 %, not currently breastfeeding.    HEENT: " Normal HEENT exam.    Lungs:  Normal effort and normal respiratory rate.  There are decreased breath sounds.  No rales, wheezes or rhonchi.    Heart: Bradycardia.  Irregular rhythm.  S1 normal and S2 normal.  No murmur or friction rub.   Chest: (Median sternotomy.   Wounds are dry.)  Abdomen: Bowel sounds are normal.   There is no abdominal tenderness.     Extremities: Normal range of motion.  There is no dependent edema.    Neurological: Patient is alert and oriented to person, place and time.    Pupils:  Pupils are equal, round, and reactive to light.  Pupils are equal.   Skin:  Warm.              Results from last 7 days   Lab Units 12/02/20  0354 12/01/20 0628 11/30/20  0336   WBC 10*3/mm3 11.56* 11.63* 9.07   HEMOGLOBIN g/dL 10.5* 11.9* 10.5*   PLATELETS 10*3/mm3 266 292 214     Results from last 7 days   Lab Units 12/02/20  0354 12/01/20 0628 11/30/20  0336  11/26/20  0315  11/25/20  1425   SODIUM mmol/L 137 141 139   < > 141   < > 142   POTASSIUM mmol/L 3.5 4.1 3.8   < > 3.4*   < > 3.0*   CO2 mmol/L 20.0* 21.0* 20.0*   < > 24.0   < > 26.0   BUN mg/dL 34* 28* 36*   < > 29*   < > 26*   CREATININE mg/dL 1.11* 1.05* 1.20*   < > 1.72*   < > 1.26*   MAGNESIUM mg/dL 2.0 1.9 2.2  --  2.7*   < > 1.9   PHOSPHORUS mg/dL  --   --  2.9  --  3.9  --  4.1   GLUCOSE mg/dL 83 109* 96   < > 149*   < > 124*    < > = values in this interval not displayed.     Estimated Creatinine Clearance: 47.3 mL/min (A) (by C-G formula based on SCr of 1.11 mg/dL (H)).    Results from last 7 days   Lab Units 11/27/20  0340   PH, ARTERIAL pH units 7.361   PCO2, ARTERIAL mm Hg 34.9*   PO2 ART mm Hg 70.3*         I reviewed the patient's results and images.     Assessment/Plan   Impression        CABG 11/25/20    Abnormal stress test    Hypertension    CKD (chronic kidney disease)    Atrial fibrillation (CMS/HCC)       Plan        Patient will be watched off of amiodarone today.  She will remain in the unit and we will watch her bradycardia.   If she remains stable, she may not require a permanent pacemaker.  Avoid nephrotoxins.  Renal function is stable.  Mobilize as tolerated in the unit.    Plan of care and goals reviewed with mulitdisciplinary/antibiotic stewardship team during rounds.   I discussed the patient's findings and my recommendations with patient and nursing staff       Bola Forrester MD, [facility]  Pulmonology and Critical Care Medicine

## 2020-12-02 NOTE — THERAPY TREATMENT NOTE
Patient Name: Angélica De La Cruz  : 1945    MRN: [id number]                              Today's Date: 2020       Admit Date: 2020    Visit Dx:     ICD-10-CM ICD-9-CM   1. Coronary artery disease involving native heart with angina pectoris, unspecified vessel or lesion type (CMS/HCC)  I25.119 414.01     413.9   2. Abnormal stress test  R94.39 794.39   3. Coronary artery disease involving native coronary artery of native heart with angina pectoris (CMS/HCC)  I25.119 414.01     413.9   4. Paroxysmal atrial fibrillation (CMS/HCC)  I48.0 427.31   5. Acute febrile illness  R50.9 780.60     Patient Active Problem List   Diagnosis   • Acute febrile illness   • Abnormal stress test   • CABG 20   • Hypertension   • CKD (chronic kidney disease)   • Atrial fibrillation (CMS/HCC)     Past Medical History:   Diagnosis Date   • Arthritis    • CKD (chronic kidney disease)     elevated creat caused by long term use of lisinopril    • Fibromyalgia    • Hypertension    • Lung nodule seen on imaging study    • AMANDA treated with BiPAP    • Peritonitis (CMS/HCC)      Past Surgical History:   Procedure Laterality Date   • APPENDECTOMY     • CARDIAC CATHETERIZATION N/A 2020    Procedure: Left Heart Cath;  Surgeon: Sanya Borges MD;  Location:  [facility];  Service: Cardiovascular;  Laterality: N/A;   • CATARACT EXTRACTION, BILATERAL Bilateral    • CHOLECYSTECTOMY     • CORONARY ARTERY BYPASS GRAFT N/A 2020    Procedure: MEDIAN STERNOTOMY, CORONARY ARTERY BYPASS GRAFTING X2, UTILIZNG THE LEFT  INTERNAL MAMMARY ARTERY GRAFT, ENDOSCOPIC VEIN HARVESTING CONVERTED TO OPEN OF THE RIGHT GREATER SAPHENOUS VEIN;  Surgeon: Wali Spicer MD;  Location: [facility];  Service: Cardiothoracic;  Laterality: N/A;  VO: 0807  VR: 0807     • HYSTERECTOMY  1984    with BSO     General Information     Row Name 20 1142          Physical Therapy Time and Intention    Document Type   therapy note (daily note)  -KG     Mode of Treatment  physical therapy  -KG     Row Name 12/02/20 1142          General Information    Existing Precautions/Restrictions  cardiac;fall;sternal  -KG     Row Name 12/02/20 1142          Cognition    Orientation Status (Cognition)  oriented x 4  -KG     Row Name 12/02/20 1142          Safety Issues, Functional Mobility    Safety Issues Affecting Function (Mobility)  insight into deficits/self-awareness;safety precaution awareness;safety precautions follow-through/compliance  -KG     Impairments Affecting Function (Mobility)  balance;endurance/activity tolerance;postural/trunk control;strength;shortness of breath  -KG       User Key  (r) = Recorded By, (t) = Taken By, (c) = Cosigned By    Initials Name Provider Type    KG Lesley Hernandez, PT Physical Therapist        Mobility     Row Name 12/02/20 1144          Bed Mobility    Comment (Bed Mobility)  UIC  -KG     Row Name 12/02/20 1144          Transfers    Comment (Transfers)  VC's for sequencing and hand placement to maintain sternal precautions. Toilet transfer to [facility] with CGA.  -KG     Row Name 12/02/20 1144          Sit-Stand Transfer    Sit-Stand [place] (Transfers)  minimum assist (75% patient effort);verbal cues  -KG     Row Name 12/02/20 1144          Gait/Stairs (Locomotion)    [place] Level (Gait)  contact guard;verbal cues  -KG     Assistive Device (Gait)  other (see comments) B UE support on tele monitor  -KG     Distance in Feet (Gait)  220  -KG     Deviations/Abnormal Patterns (Gait)  base of support, narrow;manjinder decreased;stride length decreased  -KG     Bilateral Gait Deviations  forward flexed posture;heel strike decreased  -KG     Comment (Gait/Stairs)  Pt demonstrated step through gait pattern with slow manjinder and decreased step length. Improved manjinder with continued forward ambulation. VC's for upright posture. Pt required three standing rest breaks due to fatigue. Denied any c/o  pain.  -KG       User Key  (r) = Recorded By, (t) = Taken By, (c) = Cosigned By    Initials Name Provider Type    KG Lesley Hernandez PT Physical Therapist        Obj/Interventions     Row Name 12/02/20 1147          Balance    Balance Assessment  sitting static balance;standing static balance;standing dynamic balance  -KG     Static Sitting Balance  WFL;supported;sitting in chair  -KG     Static Standing Balance  mild impairment;supported;standing  -KG     Dynamic Standing Balance  mild impairment;supported;standing  -KG       User Key  (r) = Recorded By, (t) = Taken By, (c) = Cosigned By    Initials Name Provider Type    KG Lesley Hernandez, PT Physical Therapist        Goals/Plan    No documentation.       Clinical Impression     Row Name 12/02/20 1149          Pain    Additional Documentation  Pain Scale: Numbers Pre/Post-Treatment (Group)  -KG     Row Name 12/02/20 1149          Pain Scale: Numbers Pre/Post-Treatment    Pretreatment Pain Rating  0/10 - no pain  -KG     Posttreatment Pain Rating  0/10 - no pain  -KG     Row Name 12/02/20 1149          Plan of Care Review    Plan of Care Reviewed With  patient  -KG     Progress  improving  -KG     Outcome Summary  Pt increased ambulation distance to 220ft with CGA and B UE support on tele monitor. VC's for upright posture and increased stride length. Pt demonstrated improved manjinder and stability. Required three standing rest breaks due to increased fatigue. Continue to progress as appropriate.  -KG     Row Name 12/02/20 1149          Vital Signs    Pre Systolic BP Rehab  142  -KG     Pre Treatment Diastolic BP  64  -KG     Post Systolic BP Rehab  116  -KG     Post Treatment Diastolic BP  45  -KG     Pretreatment Heart Rate (beats/min)  52  -KG     Posttreatment Heart Rate (beats/min)  52  -KG     Pre SpO2 (%)  96  -KG     O2 Delivery Pre Treatment  room air  -KG     Post SpO2 (%)  99  -KG     O2 Delivery Post Treatment  room air  -KG     Pre Patient  Position  Sitting  -KG     Intra Patient Position  Standing  -KG     Post Patient Position  Sitting  -KG     Row Name 12/02/20 1149          Positioning and Restraints    Pre-Treatment Position  sitting in chair/recliner  -KG     Post Treatment Position  bsc  -KG     On BS commode  notified nsg;sitting;call light within reach;encouraged to call for assist  -KG       User Key  (r) = Recorded By, (t) = Taken By, (c) = Cosigned By    Initials Name Provider Type    Lesley Reddy PT Physical Therapist        Outcome Measures     Row Name 12/02/20 1151          How much help from another person do you currently need...    Turning from your back to your side while in flat bed without using bedrails?  3  -KG     Moving from lying on back to sitting on the side of a flat bed without bedrails?  2  -KG     Moving to and from a bed to a chair (including a wheelchair)?  3  -KG     Standing up from a chair using your arms (e.g., wheelchair, bedside chair)?  3  -KG     Climbing 3-5 steps with a railing?  2  -KG     To walk in hospital room?  3  -KG     AM-PAC 6 Clicks Score (PT)  16  -KG     Row Name 12/02/20 1151          Functional Assessment    Outcome Measure Options  AM-PAC 6 Clicks Basic Mobility (PT)  -KG       User Key  (r) = Recorded By, (t) = Taken By, (c) = Cosigned By    Initials Name Provider Type    Lesley Reddy PT Physical Therapist        Physical Therapy Education                 Title: PT OT SLP Therapies (Done)     Topic: Physical Therapy (Done)     Point: Mobility training (Done)     Learning Progress Summary           Patient Acceptance, E, NR,VU by KG at 12/2/2020 0918    Acceptance, E, NR by JOSEFINA at 12/1/2020 0800    Acceptance, E, NR by KG at 11/29/2020 0844    Acceptance, E, NR by KG at 11/28/2020 0835    Acceptance, E, NR by JOSEFINA at 11/27/2020 1050                   Point: Home exercise program (Done)     Learning Progress Summary           Patient Acceptance, E, NR,VU by KG at  12/2/2020 0918    Acceptance, E, NR by JOSEFINA at 12/1/2020 0800    Acceptance, E, NR by KG at 11/29/2020 0844    Acceptance, E, NR by KG at 11/28/2020 0835    Acceptance, E, NR by JOSEFINA at 11/27/2020 1050                   Point: Body mechanics (Done)     Learning Progress Summary           Patient Acceptance, E, NR,VU by KG at 12/2/2020 0918    Acceptance, E, NR by JOSEFINA at 12/1/2020 0800    Acceptance, E, NR by KG at 11/29/2020 0844    Acceptance, E, NR by KG at 11/28/2020 0835    Acceptance, E, NR by JOSEFINA at 11/27/2020 1050                   Point: Precautions (Done)     Learning Progress Summary           Patient Acceptance, E, NR,VU by KG at 12/2/2020 0918    Acceptance, E, NR by JOSEFINA at 12/1/2020 0800    Acceptance, E, NR by KG at 11/29/2020 0844    Acceptance, E, NR by KG at 11/28/2020 0835    Acceptance, E, NR by JOSEFINA at 11/27/2020 1050                               User Key     Initials Effective Dates Name Provider Type Discipline    JOSEFINA 06/19/15 -  Marlene Miller, PT Physical Therapist PT    KG 05/22/20 -  Lesley Hernandez, PT Physical Therapist PT              PT Recommendation and Plan     Plan of Care Reviewed With: patient  Progress: improving  Outcome Summary: Pt increased ambulation distance to 220ft with CGA and B UE support on tele monitor. VC's for upright posture and increased stride length. Pt demonstrated improved manjinder and stability. Required three standing rest breaks due to increased fatigue. Continue to progress as appropriate.     Time Calculation:   PT Charges     Row Name 12/02/20 0918             Time Calculation    Start Time  0918  -KG      PT Received On  12/02/20  -KG      PT Goal Re-Cert Due Date  12/07/20  -KG         Time Calculation- PT    Total Timed Code Minutes- PT  24 minute(s)  -KG         Timed Charges    08825 - PT Therapeutic Activity Minutes  24  -KG        User Key  (r) = Recorded By, (t) = Taken By, (c) = Cosigned By    Initials Name Provider Type    Lesley Reddy  LUIS, PT Physical Therapist        Therapy Charges for Today     Code Description Service Date Service Provider Modifiers Qty    [id number] HC PT THERAPEUTIC ACT EA 15 MIN 12/2/2020 Lesley Hernandez, PT GP 2          PT G-Codes  Outcome Measure Options: AM-PAC 6 Clicks Basic Mobility (PT)  AM-PAC 6 Clicks Score (PT): 16    Romina Hernandez, PT  12/2/2020

## 2020-12-02 NOTE — PROGRESS NOTES
Clinical Nutrition     Multidisciplinary Rounds      Patient Name: Angélica De La Cruz  Date of Encounter: 12/02/20 09:52 EST  MRN: [id number]  Admission date: 11/24/2020    PAT. NAGA to continue to follow per protocol.     Current diet: Diet Regular; Cardiac, Consistent Carbohydrate  Orders Placed This Encounter      Dietary Nutrition Supplements Ensure HP  Ensure HP 2x/day    Intervention:  Follow treatment plan  Care plan reviewed    Follow up:   Per protocol      Vianney Doll RD  09:52 EST  Time: 10min

## 2020-12-02 NOTE — PROGRESS NOTES
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 pectoris (CMS/HCC)    Vital Signs:  Temp:  [98 °F (36.7 °C)-100.4 °F (38 °C)] 98.1 °F (36.7 °C)  Heart Rate:  [] 51  Resp:  [20-22] 20  BP: (104-143)/(46-99) 133/79    Physical Exam:       Results:   Results from last 7 days   Lab Units 12/02/20  0354   WBC 10*3/mm3 11.56*   HEMOGLOBIN g/dL 10.5*   HEMATOCRIT % 35.2   PLATELETS 10*3/mm3 266     Results from last 7 days   Lab Units 12/02/20  0354   SODIUM mmol/L 137   POTASSIUM mmol/L 3.5   CHLORIDE mmol/L 105   CO2 mmol/L 20.0*   BUN mg/dL 34*   CREATININE mg/dL 1.11*   GLUCOSE mg/dL 83   CALCIUM mg/dL 8.8           Imaging Results (Last 24 Hours)     Procedure Component Value Units Date/Time    XR Chest 1 View [[id number]] Collected: 12/01/20 0822     Updated: 12/01/20 1304    Narrative:      EXAMINATION: XR CHEST 1 VW-      INDICATION: Pneumothorax.      COMPARISON: Chest x-ray 11/28/2020.     FINDINGS: Interval removal of mediastinal and pleural drains with  support hardware remaining only right internal jugular central venous  catheter. No pneumothorax or large effusion. Cardiac size enlarged  status post median sternotomy and CABG persistent increased central  pulmonary vascularity.           Impression:      Apart from right internal jugular central venous catheter  sheath support hardware has been removed without pneumothorax or large  effusion. Persistent central pulmonary vascular congestion.     D:  12/01/2020  E:  12/01/2020     This report was finalized on 12/1/2020 1:01 PM by Dr. Edinson Castle.             Assessment      CABG 11/25/20    Abnormal stress test    Hypertension    CKD (chronic kidney disease)    Atrial fibrillation (CMS/HCC)    Discussed with Dr. Albarran.  Rhythm per cardiology    Plan   As above    Please note that portions of this note were  completed with a voice recognition program. Efforts were made to edit the dictations, but occasionally words are mistranscribed.    Wali Spicer MD  12/02/20  07:32 EST

## 2020-12-03 VITALS
HEIGHT: 63 IN | BODY MASS INDEX: 38.27 KG/M2 | OXYGEN SATURATION: 97 % | WEIGHT: 216 LBS | HEART RATE: 51 BPM | DIASTOLIC BLOOD PRESSURE: 51 MMHG | SYSTOLIC BLOOD PRESSURE: 130 MMHG | RESPIRATION RATE: 18 BRPM | TEMPERATURE: 97.9 F

## 2020-12-03 LAB
ANION GAP SERPL CALCULATED.3IONS-SCNC: 14 MMOL/L (ref 5–15)
BUN SERPL-MCNC: 37 MG/DL (ref 8–23)
BUN/CREAT SERPL: 33.3 (ref 7–25)
CALCIUM SPEC-SCNC: 8.6 MG/DL (ref 8.6–10.5)
CHLORIDE SERPL-SCNC: 105 MMOL/L (ref 98–107)
CO2 SERPL-SCNC: 19 MMOL/L (ref 22–29)
CREAT SERPL-MCNC: 1.11 MG/DL (ref 0.57–1)
DEPRECATED RDW RBC AUTO: 51.3 FL (ref 37–54)
ERYTHROCYTE [DISTWIDTH] IN BLOOD BY AUTOMATED COUNT: 14.1 % (ref 12.3–15.4)
GFR SERPL CREATININE-BSD FRML MDRD: 48 ML/MIN/1.73
GLUCOSE BLDC GLUCOMTR-MCNC: 107 MG/DL (ref 70–130)
GLUCOSE BLDC GLUCOMTR-MCNC: 95 MG/DL (ref 70–130)
GLUCOSE SERPL-MCNC: 100 MG/DL (ref 65–99)
HCT VFR BLD AUTO: 38 % (ref 34–46.6)
HGB BLD-MCNC: 11.8 G/DL (ref 12–15.9)
MCH RBC QN AUTO: 30.6 PG (ref 26.6–33)
MCHC RBC AUTO-ENTMCNC: 31.1 G/DL (ref 31.5–35.7)
MCV RBC AUTO: 98.7 FL (ref 79–97)
PLATELET # BLD AUTO: 316 10*3/MM3 (ref 140–450)
PMV BLD AUTO: 9.4 FL (ref 6–12)
POTASSIUM SERPL-SCNC: 3.5 MMOL/L (ref 3.5–5.2)
QT INTERVAL: 568 MS
QTC INTERVAL: 523 MS
RBC # BLD AUTO: 3.85 10*6/MM3 (ref 3.77–5.28)
SODIUM SERPL-SCNC: 138 MMOL/L (ref 136–145)
WBC # BLD AUTO: 12.59 10*3/MM3 (ref 3.4–10.8)

## 2020-12-03 PROCEDURE — 93005 ELECTROCARDIOGRAM TRACING: CPT | Performed by: INTERNAL MEDICINE

## 2020-12-03 PROCEDURE — 97530 THERAPEUTIC ACTIVITIES: CPT

## 2020-12-03 PROCEDURE — 94640 AIRWAY INHALATION TREATMENT: CPT

## 2020-12-03 PROCEDURE — 82962 GLUCOSE BLOOD TEST: CPT

## 2020-12-03 PROCEDURE — 99024 POSTOP FOLLOW-UP VISIT: CPT | Performed by: PHYSICIAN ASSISTANT

## 2020-12-03 PROCEDURE — 94799 UNLISTED PULMONARY SVC/PX: CPT

## 2020-12-03 PROCEDURE — 99024 POSTOP FOLLOW-UP VISIT: CPT | Performed by: THORACIC SURGERY (CARDIOTHORACIC VASCULAR SURGERY)

## 2020-12-03 PROCEDURE — 80048 BASIC METABOLIC PNL TOTAL CA: CPT | Performed by: THORACIC SURGERY (CARDIOTHORACIC VASCULAR SURGERY)

## 2020-12-03 PROCEDURE — 85027 COMPLETE CBC AUTOMATED: CPT | Performed by: THORACIC SURGERY (CARDIOTHORACIC VASCULAR SURGERY)

## 2020-12-03 RX ORDER — IPRATROPIUM BROMIDE AND ALBUTEROL SULFATE 2.5; .5 MG/3ML; MG/3ML
3 SOLUTION RESPIRATORY (INHALATION) EVERY 4 HOURS PRN
Status: DISCONTINUED | OUTPATIENT
Start: 2020-12-03 | End: 2020-12-03 | Stop reason: HOSPADM

## 2020-12-03 RX ORDER — LOSARTAN POTASSIUM 100 MG/1
100 TABLET ORAL
Qty: 30 TABLET | Refills: 3 | Status: SHIPPED | OUTPATIENT
Start: 2020-12-04 | End: 2020-12-03

## 2020-12-03 RX ORDER — IPRATROPIUM BROMIDE AND ALBUTEROL SULFATE 2.5; .5 MG/3ML; MG/3ML
3 SOLUTION RESPIRATORY (INHALATION)
Status: DISCONTINUED | OUTPATIENT
Start: 2020-12-03 | End: 2020-12-03

## 2020-12-03 RX ORDER — AMLODIPINE BESYLATE 10 MG/1
10 TABLET ORAL
Qty: 10 TABLET | Refills: 3 | Status: SHIPPED | OUTPATIENT
Start: 2020-12-04

## 2020-12-03 RX ORDER — ATORVASTATIN CALCIUM 40 MG/1
40 TABLET, FILM COATED ORAL NIGHTLY
Qty: 30 TABLET | Refills: 3 | Status: SHIPPED | OUTPATIENT
Start: 2020-12-03 | End: 2023-02-28 | Stop reason: ALTCHOICE

## 2020-12-03 RX ORDER — CLOPIDOGREL BISULFATE 75 MG/1
75 TABLET ORAL DAILY
Qty: 30 TABLET | Refills: 3 | Status: SHIPPED | OUTPATIENT
Start: 2020-12-03 | End: 2020-12-03

## 2020-12-03 RX ORDER — HYDROCODONE BITARTRATE AND ACETAMINOPHEN 7.5; 325 MG/1; MG/1
1 TABLET ORAL EVERY 4 HOURS PRN
Qty: 20 TABLET | Refills: 0 | Status: SHIPPED | OUTPATIENT
Start: 2020-12-03 | End: 2020-12-05

## 2020-12-03 RX ORDER — CLOPIDOGREL BISULFATE 75 MG/1
75 TABLET ORAL DAILY
Qty: 30 TABLET | Refills: 3 | Status: SHIPPED | OUTPATIENT
Start: 2020-12-03 | End: 2023-02-28

## 2020-12-03 RX ADMIN — CETIRIZINE HYDROCHLORIDE 10 MG: 10 TABLET, FILM COATED ORAL at 08:55

## 2020-12-03 RX ADMIN — LOSARTAN POTASSIUM 100 MG: 50 TABLET, FILM COATED ORAL at 08:55

## 2020-12-03 RX ADMIN — IPRATROPIUM BROMIDE AND ALBUTEROL SULFATE 3 ML: 2.5; .5 SOLUTION RESPIRATORY (INHALATION) at 08:11

## 2020-12-03 RX ADMIN — LEVOTHYROXINE SODIUM 100 MCG: 100 TABLET ORAL at 05:49

## 2020-12-03 RX ADMIN — GABAPENTIN 300 MG: 300 CAPSULE ORAL at 08:55

## 2020-12-03 RX ADMIN — TEMAZEPAM 7.5 MG: 7.5 CAPSULE ORAL at 00:51

## 2020-12-03 RX ADMIN — POTASSIUM CHLORIDE 40 MEQ: 10 CAPSULE, COATED, EXTENDED RELEASE ORAL at 05:48

## 2020-12-03 RX ADMIN — SERTRALINE HYDROCHLORIDE 50 MG: 50 TABLET ORAL at 08:55

## 2020-12-03 RX ADMIN — HYDRALAZINE HYDROCHLORIDE 10 MG: 10 TABLET ORAL at 08:55

## 2020-12-03 RX ADMIN — CLOPIDOGREL BISULFATE 75 MG: 75 TABLET ORAL at 08:55

## 2020-12-03 RX ADMIN — METOPROLOL TARTRATE 25 MG: 25 TABLET, FILM COATED ORAL at 08:55

## 2020-12-03 RX ADMIN — AMLODIPINE BESYLATE 10 MG: 10 TABLET ORAL at 08:55

## 2020-12-03 RX ADMIN — IPRATROPIUM BROMIDE AND ALBUTEROL SULFATE 3 ML: 2.5; .5 SOLUTION RESPIRATORY (INHALATION) at 05:24

## 2020-12-03 RX ADMIN — ASPIRIN 81 MG: 81 TABLET, FILM COATED ORAL at 08:55

## 2020-12-03 RX ADMIN — DOCUSATE SODIUM 50 MG AND SENNOSIDES 8.6 MG 2 TABLET: 8.6; 5 TABLET, FILM COATED ORAL at 08:55

## 2020-12-03 NOTE — NURSING NOTE
Discharge info: D/C instructions explained to pt. She has the Norco script stapled to the instructions. The pharmacist is going over all meds w/ pt. He is printing a new Med Rec sheet for the pt to be added to the AVS forms. Pt states understanding of wound care and is aware of upcoming appointments and that Home Health will be following her. Case Management, Humaira, stated that she has set up the home health. Transport to be at bedside shortly. Family is to be waiting at main entrance. ----------MISTY Rand RN--------------------

## 2020-12-03 NOTE — PLAN OF CARE
Goal Outcome Evaluation:  Plan of Care Reviewed With: patient  Progress: no change  Outcome Summary: Pt ambulated 200ft with CGA and B UE support on tele monitor. Cueing for upright posture and increased stride length. Pt required multiple standing rest breaks due to c/o pain in R side. Pt denied any chest pain. Continue to progress as appropriate.

## 2020-12-03 NOTE — THERAPY TREATMENT NOTE
Patient Name: Angélica De La Cruz  : 1945    MRN: [id number]                              Today's Date: 12/3/2020       Admit Date: 2020    Visit Dx:     ICD-10-CM ICD-9-CM   1. Coronary artery disease involving native heart with angina pectoris, unspecified vessel or lesion type (CMS/HCC)  I25.119 414.01     413.9   2. Abnormal stress test  R94.39 794.39   3. Coronary artery disease involving native coronary artery of native heart with angina pectoris (CMS/HCC)  I25.119 414.01     413.9   4. Paroxysmal atrial fibrillation (CMS/HCC)  I48.0 427.31   5. Acute febrile illness  R50.9 780.60     Patient Active Problem List   Diagnosis   • Acute febrile illness   • Abnormal stress test   • CABG 20   • Hypertension   • CKD (chronic kidney disease)   • Atrial fibrillation (CMS/HCC)     Past Medical History:   Diagnosis Date   • Arthritis    • CKD (chronic kidney disease)     elevated creat caused by long term use of lisinopril    • Fibromyalgia    • Hypertension    • Lung nodule seen on imaging study    • AMANDA treated with BiPAP    • Peritonitis (CMS/HCC)      Past Surgical History:   Procedure Laterality Date   • APPENDECTOMY     • CARDIAC CATHETERIZATION N/A 2020    Procedure: Left Heart Cath;  Surgeon: Sanya Borges MD;  Location:  [facility];  Service: Cardiovascular;  Laterality: N/A;   • CATARACT EXTRACTION, BILATERAL Bilateral    • CHOLECYSTECTOMY     • CORONARY ARTERY BYPASS GRAFT N/A 2020    Procedure: MEDIAN STERNOTOMY, CORONARY ARTERY BYPASS GRAFTING X2, UTILIZNG THE LEFT  INTERNAL MAMMARY ARTERY GRAFT, ENDOSCOPIC VEIN HARVESTING CONVERTED TO OPEN OF THE RIGHT GREATER SAPHENOUS VEIN;  Surgeon: Wali Spicer MD;  Location: [facility] OR;  Service: Cardiothoracic;  Laterality: N/A;  VO: 0807  VR: 0807     • HYSTERECTOMY  1984    with BSO     General Information     Row Name 20 1158          Physical Therapy Time and Intention    Document Type   therapy note (daily note)  -KG     Mode of Treatment  physical therapy  -KG     Row Name 12/03/20 1158          General Information    Existing Precautions/Restrictions  cardiac;fall;sternal  -KG     Row Name 12/03/20 1158          Cognition    Orientation Status (Cognition)  oriented x 4  -KG     Row Name 12/03/20 1158          Safety Issues, Functional Mobility    Safety Issues Affecting Function (Mobility)  insight into deficits/self-awareness;safety precaution awareness;safety precautions follow-through/compliance  -KG     Impairments Affecting Function (Mobility)  balance;endurance/activity tolerance;postural/trunk control;strength;shortness of breath  -KG       User Key  (r) = Recorded By, (t) = Taken By, (c) = Cosigned By    Initials Name Provider Type    KG Lesley Hernandez, PT Physical Therapist        Mobility     Row Name 12/03/20 1158          Bed Mobility    Bed Mobility  scooting/bridging;sit-supine  -KG     Scooting/Bridging [place] (Bed Mobility)  moderate assist (50% patient effort);2 person assist;verbal cues  -KG     Sit-Supine [place] (Bed Mobility)  maximum assist (25% patient effort);2 person assist;verbal cues  -KG     Assistive Device (Bed Mobility)  draw sheet;head of bed elevated  -KG     Comment (Bed Mobility)  VC's for sequencing. Pt required assistance at trunk and BLEs when returning to supine position.  -KG     Row Name 12/03/20 1158          Transfers    Comment (Transfers)  VC's for sequencing and hand placement to maintain sternal precautions.  -KG     Row Name 12/03/20 1158          Sit-Stand Transfer    Sit-Stand [place] (Transfers)  contact guard;verbal cues  -KG     Row Name 12/03/20 1158          Gait/Stairs (Locomotion)    [place] Level (Gait)  contact guard;verbal cues  -KG     Assistive Device (Gait)  other (see comments) B UE support  -KG     Distance in Feet (Gait)  200  -KG     Deviations/Abnormal Patterns (Gait)  base of support, narrow;manjinder  decreased;stride length decreased  -KG     Bilateral Gait Deviations  forward flexed posture;heel strike decreased  -KG     Comment (Gait/Stairs)  Pt demonstrated step through gait pattern with slow manjinder and decreased step length. VC's for upright posture and increased stride length. Pt required several standing rest breaks due to c/o pain in R side; RN notified.  -KG       User Key  (r) = Recorded By, (t) = Taken By, (c) = Cosigned By    Initials Name Provider Type    Lesley Reddy PT Physical Therapist        Obj/Interventions     Row Name 12/03/20 1200          Balance    Balance Assessment  sitting static balance;standing static balance;standing dynamic balance  -KG     Static Sitting Balance  WFL;supported;sitting in chair  -KG     Static Standing Balance  WFL;supported;standing  -KG     Dynamic Standing Balance  mild impairment;supported;standing  -KG       User Key  (r) = Recorded By, (t) = Taken By, (c) = Cosigned By    Initials Name Provider Type    Lesley Reddy PT Physical Therapist        Goals/Plan    No documentation.       Clinical Impression     Row Name 12/03/20 1201          Pain    Additional Documentation  Pain Scale: Numbers Pre/Post-Treatment (Group)  -KG     Row Name 12/03/20 1201          Pain Scale: Numbers Pre/Post-Treatment    Pretreatment Pain Rating  0/10 - no pain  -KG     Posttreatment Pain Rating  0/10 - no pain  -KG     Row Name 12/03/20 1201          Plan of Care Review    Plan of Care Reviewed With  patient  -KG     Progress  no change  -KG     Outcome Summary  Pt ambulated 200ft with CGA and B UE support on tele monitor. Cueing for upright posture and increased stride length. Pt required multiple standing rest breaks due to c/o pain in R side. Pt denied any chest pain. Continue to progress as appropriate.  -KG     Row Name 12/03/20 1201          Vital Signs    Pre Systolic BP Rehab  157  -KG     Pre Treatment Diastolic BP  73  -KG     Post Systolic BP  Rehab  137  -KG     Post Treatment Diastolic BP  56  -KG     Pretreatment Heart Rate (beats/min)  57  -KG     Posttreatment Heart Rate (beats/min)  57  -KG     Pre SpO2 (%)  95  -KG     O2 Delivery Pre Treatment  room air  -KG     Post SpO2 (%)  94  -KG     O2 Delivery Post Treatment  room air  -KG     Pre Patient Position  Sitting  -KG     Intra Patient Position  Standing  -KG     Post Patient Position  Supine  -KG     Row Name 12/03/20 1201          Positioning and Restraints    Pre-Treatment Position  sitting in chair/recliner  -KG     Post Treatment Position  bed  -KG     In Bed  supine;call light within reach;encouraged to call for assist;with nsg;side rails up x3;RUE elevated;LUE elevated  -KG       User Key  (r) = Recorded By, (t) = Taken By, (c) = Cosigned By    Initials Name Provider Type    Lesley Reddy, PT Physical Therapist        Outcome Measures     Row Name 12/03/20 1203          How much help from another person do you currently need...    Turning from your back to your side while in flat bed without using bedrails?  3  -KG     Moving from lying on back to sitting on the side of a flat bed without bedrails?  2  -KG     Moving to and from a bed to a chair (including a wheelchair)?  3  -KG     Standing up from a chair using your arms (e.g., wheelchair, bedside chair)?  3  -KG     Climbing 3-5 steps with a railing?  2  -KG     To walk in hospital room?  3  -KG     AM-PAC 6 Clicks Score (PT)  16  -KG     Row Name 12/03/20 1203          Functional Assessment    Outcome Measure Options  AM-PAC 6 Clicks Basic Mobility (PT)  -KG       User Key  (r) = Recorded By, (t) = Taken By, (c) = Cosigned By    Initials Name Provider Type    Lesley Reddy, PT Physical Therapist        Physical Therapy Education                 Title: PT OT SLP Therapies (Done)     Topic: Physical Therapy (Done)     Point: Mobility training (Done)     Learning Progress Summary           Patient EMILIANO Wolf VU, DU  by KG at 12/3/2020 0917    Acceptance, E, NR,VU by KG at 12/2/2020 0918    Acceptance, E, NR by JOSEFINA at 12/1/2020 0800    Acceptance, E, NR by KG at 11/29/2020 0844    Acceptance, E, NR by KG at 11/28/2020 0835    Acceptance, E, NR by JOSEFINA at 11/27/2020 1050                   Point: Home exercise program (Done)     Learning Progress Summary           Patient Acceptance, E, VU,DU by KG at 12/3/2020 0917    Acceptance, E, NR,VU by KG at 12/2/2020 0918    Acceptance, E, NR by JOSEFINA at 12/1/2020 0800    Acceptance, E, NR by KG at 11/29/2020 0844    Acceptance, E, NR by KG at 11/28/2020 0835    Acceptance, E, NR by JOSEFINA at 11/27/2020 1050                   Point: Body mechanics (Done)     Learning Progress Summary           Patient Acceptance, E, VU,DU by KG at 12/3/2020 0917    Acceptance, E, NR,VU by KG at 12/2/2020 0918    Acceptance, E, NR by JOSEFINA at 12/1/2020 0800    Acceptance, E, NR by KG at 11/29/2020 0844    Acceptance, E, NR by KG at 11/28/2020 0835    Acceptance, E, NR by JOSEFINA at 11/27/2020 1050                   Point: Precautions (Done)     Learning Progress Summary           Patient Acceptance, E, VU,DU by KG at 12/3/2020 0917    Acceptance, E, NR,VU by KG at 12/2/2020 0918    Acceptance, E, NR by JOSEFINA at 12/1/2020 0800    Acceptance, E, NR by KG at 11/29/2020 0844    Acceptance, E, NR by KG at 11/28/2020 0835    Acceptance, E, NR by JOSEFINA at 11/27/2020 1050                               User Key     Initials Effective Dates Name Provider Type Discipline    JOSEFINA 06/19/15 -  Marlene Miller, PT Physical Therapist PT    KG 05/22/20 -  Lesley Hernandez PT Physical Therapist PT              PT Recommendation and Plan     Plan of Care Reviewed With: patient  Progress: no change  Outcome Summary: Pt ambulated 200ft with CGA and B UE support on tele monitor. Cueing for upright posture and increased stride length. Pt required multiple standing rest breaks due to c/o pain in R side. Pt denied any chest pain. Continue to  progress as appropriate.     Time Calculation:   PT Charges     Row Name 12/03/20 0917             Time Calculation    Start Time  0917  -KG      PT Received On  12/03/20  -KG      PT Goal Re-Cert Due Date  12/07/20  -KG         Time Calculation- PT    Total Timed Code Minutes- PT  23 minute(s)  -KG         Timed Charges    45358 - PT Therapeutic Activity Minutes  23  -KG        User Key  (r) = Recorded By, (t) = Taken By, (c) = Cosigned By    Initials Name Provider Type    KG Lesley Hernandez, PT Physical Therapist        Therapy Charges for Today     Code Description Service Date Service Provider Modifiers Qty    11984260190 HC PT THERAPEUTIC ACT EA 15 MIN 12/2/2020 Lesley Hernandez, PT GP 2    60598143657 HC PT THERAPEUTIC ACT EA 15 MIN 12/3/2020 Lesley Hernandez, PT GP 2          PT G-Codes  Outcome Measure Options: AM-PAC 6 Clicks Basic Mobility (PT)  AM-PAC 6 Clicks Score (PT): 16    Romina Hernandez PT  12/3/2020

## 2020-12-03 NOTE — PROGRESS NOTES
Clinical Nutrition     Reason for Visit: MDR, Follow-up protocol    Patient Name: Angélica De aL Cruz  YOB: 1945  MRN: [id number]  Date of Encounter: 12/03/20 10:36 EST  Admission date: 11/24/2020    Nutrition Assessment   Admission Problem List:  CAD  s/p CABG x2 11-25-20  Afib  Bradycardia    PMH:   She  has a past medical history of Arthritis, CKD (chronic kidney disease), Fibromyalgia, Hypertension, Lung nodule seen on imaging study, AMANDA treated with BiPAP, and Peritonitis (CMS/HCC).  PSxH:  She  has a past surgical history that includes Cholecystectomy; Appendectomy; Hysterectomy (1984); Cardiac catheterization (N/A, 11/24/2020); Cataract extraction, bilateral (Bilateral, 2018); and Coronary artery bypass graft (N/A, 11/25/2020).  Substance history: Tobacco remote    Reported/Observed/Food/Nutrition Related History:     Pt sitting up in chair, eating breakfast, reports fait appetite, is eager to go home    Labs reviewed     Results from last 7 days   Lab Units 12/03/20  0259  11/30/20  0336   SODIUM mmol/L 138   < > 139   POTASSIUM mmol/L 3.5   < > 3.8   CHLORIDE mmol/L 105   < > 108*   CO2 mmol/L 19.0*   < > 20.0*   BUN mg/dL 37*   < > 36*   CREATININE mg/dL 1.11*   < > 1.20*   CALCIUM mg/dL 8.6   < > 8.6   BILIRUBIN mg/dL  --   --  0.4   ALK PHOS U/L  --   --  64   ALT (SGPT) U/L  --   --  14   AST (SGOT) U/L  --   --  23   GLUCOSE mg/dL 100*   < > 96    < > = values in this interval not displayed.       Results from last 7 days   Lab Units 12/03/20  0656 12/02/20  2010 12/02/20  1607 12/02/20  1100 12/02/20  0655 12/01/20  2022   GLUCOSE mg/dL 95 100 118 107 90 112     Lab Results   Lab Value Date/Time    HGBA1C 5.50 11/24/2020 1230       Medications reviewed   Pertinent:insulin, pericolace    Intake/Ouptut 24 hrs (7:00AM - 6:59 AM)     Intake & Output (last day)       12/02 0701 - 12/03 0700 12/03 0701 - 12/04 0700    P.O. 750 250    I.V. (mL/kg)      Total Intake(mL/kg) 750  (7.7) 250 (2.6)    Urine (mL/kg/hr) 1000 (0.4)     Stool      Total Output 1000     Net -250 +250                     GI:wnl    SKIN: surgical sites,rash    Current Nutrition Prescription     PO: Diet Regular; Cardiac, Consistent Carbohydrate  Oral Nutrition Supplement: Ensure HP 2x/day    Intake: 36% of 7 meals    Nutrition Diagnosis     Date:11-29-20, 12-3  Problem Inadequate oral intake   Etiology Per Clinical Status: post op   Signs/Symptoms 36% intake      Nutrition Intervention   1.  Follow treatment progress, Interview for preferences, Menu provided, Menu adjusted, Adjusted supplement    ENSURE HP 3x/day  Goal:   General: Nutrition support treatment  PO: Increase intake      Monitoring/Evaluation:   Per protocol  Vianney Doll RD  Time Spent: 30min

## 2020-12-03 NOTE — PROGRESS NOTES
CTS Progress Note       LOS: 8 days   Patient Care Team:  Sussy Bloom MD as PCP - General (Internal Medicine)    Chief Complaint: Coronary artery disease involving native heart with angina pectoris (CMS/HCC)    Vital Signs:  Temp:  [98 °F (36.7 °C)-98.4 °F (36.9 °C)] 98.4 °F (36.9 °C)  Heart Rate:  [48-57] 53  Resp:  [16-20] 16  BP: (105-164)/(39-76) 141/53    Physical Exam:  Sternal incision is satisfactory     Results:   Results from last 7 days   Lab Units 12/03/20  0259   WBC 10*3/mm3 12.59*   HEMOGLOBIN g/dL 11.8*   HEMATOCRIT % 38.0   PLATELETS 10*3/mm3 316     Results from last 7 days   Lab Units 12/03/20  0259   SODIUM mmol/L 138   POTASSIUM mmol/L 3.5   CHLORIDE mmol/L 105   CO2 mmol/L 19.0*   BUN mg/dL 37*   CREATININE mg/dL 1.11*   GLUCOSE mg/dL 100*   CALCIUM mg/dL 8.6           Imaging Results (Last 24 Hours)     ** No results found for the last 24 hours. **          Assessment      CABG 11/25/20    Abnormal stress test    Hypertension    CKD (chronic kidney disease)    Atrial fibrillation (CMS/[facility])        Plan   Home when satisfactory with cardiology    Please note that portions of this note were completed with a voice recognition program. Efforts were made to edit the dictations, but occasionally words are mistranscribed.    Wali Spicer MD  12/03/20  07:00 EST

## 2020-12-03 NOTE — PLAN OF CARE
Goal Outcome Evaluation:  Plan of Care Reviewed With: patient  Progress: improving  Patient AOX4/4. Patient heart rhythm has been bradycardic all night, but patient has tolerated this well. Plans to discharge in AM if no further changes.

## 2020-12-03 NOTE — DISCHARGE SUMMARY
CTS Discharge Summary    Patient Care Team:  Sussy Bloom MD as PCP - General (Internal Medicine)      Date of Admission: 11/24/2020 12:02 PM  Date of Discharge: 12/3/2020    Discharge Diagnosis  Past Medical History:   Diagnosis Date   • Arthritis    • CKD (chronic kidney disease)     elevated creat caused by long term use of lisinopril    • Fibromyalgia    • Hypertension    • Lung nodule seen on imaging study    • AMANDA treated with BiPAP    • Peritonitis (CMS/HCC)      Patient Active Problem List   Diagnosis   • Acute febrile illness   • Abnormal stress test   • CABG 11/25/20   • Hypertension   • CKD (chronic kidney disease)   • Atrial fibrillation (CMS/HCC)           CABG 11/25/20    Abnormal stress test    Hypertension    CKD (chronic kidney disease)    Atrial fibrillation (CMS/HCC)      History of Present IllnessPatient is a 75 y.o. female with a history of hypertension, hyperlipidemia, pulmonary hypertension, and a family history of coronary artery disease who presented with complaints of progressively worsening fatigue over the last several months.  She reports shortness of breath with exertion, and intermittent chest tightness that radiates to her left shoulder.  Patient states that symptoms improve with rest.  She underwent abnormal stress test and cardiac catheterization today revealed severe LAD disease for which we have been asked to evaluate.  Of note, patient reports bilateral LE vein stripping. Patient denies current chest pain or shortness of breath.        Hospital Course  Patient is a 75 y.o. female after admission the patient remained hemodynamically stable.  Was seen by Dr. Spicer in consultation.  Was scheduled for surgery the following morning.  The following morning the patient taken operating suite and underwent coronary artery bypass grafting x2.  Patient telemetry procedure well.  Was closed, taken to the cardiothoracic unit in stable condition.  Stop was seen by the hospital  intensivist on the intensive care unit.  Postop day #1, the patient here and hemodynamically stable except her heart rate occasionally drops into the 30s.  She is currently paced.  [place] catheter is removed.  Patient was started on Plavix on 11/27/2020.  [place] catheter was able to be removed.  Day 2 postop, chest tubes with minimal drainage.  They were ready to come out however the patient still required pacing on a regular basis thus the tubes were left in as to not dislodge them.  Through the weekend the patient continued to use the pacing wires intermittently.  Still occasionally with heart rates in the 40s.  By Monday, the chest tubes minimal drainage and the patient was able to go without her pacing wires.  They were both removed on 11/30/2020.  She was now in and out of atrial fibrillation at times.  With controlled ventricular response.  She converted back to a normal sinus rhythm and was able to be discharged home on 12/3/2020.  Procedures Performed  Procedure(s):  MEDIAN STERNOTOMY, CORONARY ARTERY BYPASS GRAFTING X2, UTILIZNG THE LEFT  INTERNAL MAMMARY ARTERY GRAFT, ENDOSCOPIC VEIN HARVESTING CONVERTED TO OPEN OF THE RIGHT GREATER SAPHENOUS VEIN       Consults:   Consults     Date and Time Order Name Status Description    11/25/2020 1016 Inpatient Consult to Cardiology Completed             Discharge Medications     Discharge Medications      New Medications      Instructions Start Date   atorvastatin 40 MG tablet  Commonly known as: LIPITOR  Notes to patient: This medication is for your cholesterol  If you notice severe cramping pain in your extremities with no explanation, stop taking and call your doctor.  If you notice yellowing of the eyes or skin, call your doctor.   40 mg, Oral, Nightly      clopidogrel 75 MG tablet  Commonly known as: PLAVIX  Notes to patient: This medication is for your heart and patency of new grafts.  Monitor for uncontrolled bleeding.   75 mg, Oral, Daily       HYDROcodone-acetaminophen 7.5-325 MG per tablet  Commonly known as: NORCO  Notes to patient: This medication is for your post-operative pain.  Take only as needed.  May cause drowsiness or dizziness.   1 tablet, Oral, Every 4 Hours PRN      metoprolol tartrate 25 MG tablet  Commonly known as: LOPRESSOR  Notes to patient: This medication is for your heart and blood pressure.  Monitor your blood pressure and heart rate while taking this medication.   25 mg, Oral, Every 12 Hours Scheduled         Changes to Medications      Instructions Start Date   amLODIPine 10 MG tablet  Commonly known as: NORVASC  What changed:   · how much to take  · when to take this  Notes to patient: This medication is like what you took at home.  Note this is a dose change  Start tomorrow   10 mg, Oral, Every 24 Hours Scheduled   Start Date: December 4, 2020        Continue These Medications      Instructions Start Date   aspirin 81 MG chewable tablet   81 mg, Oral, Daily      estradiol 0.0375 MG/24HR  Commonly known as: CLIMARA   1 patch, Transdermal, Weekly      fluticasone 50 MCG/ACT nasal spray  Commonly known as: FLONASE   2 sprays, Nasal, Daily, Administer 2 sprays in each nostril for each dose.       furosemide 20 MG tablet  Commonly known as: LASIX   20 mg, Oral, Daily      gabapentin 600 MG tablet  Commonly known as: NEURONTIN   600 mg, Oral, 2 Times Daily      hydrALAZINE 25 MG tablet  Commonly known as: APRESOLINE   25 mg, Oral, 2 Times Daily      levothyroxine 100 MCG tablet  Commonly known as: SYNTHROID, LEVOTHROID   100 mcg, Oral, Daily      multivitamin tablet tablet  Commonly known as: THERAGRAN   1 tablet, Oral, Daily      olmesartan 20 MG tablet  Commonly known as: BENICAR   20 mg, Oral, Daily      potassium chloride 10 MEQ CR tablet  Commonly known as: K-DUR,KLOR-CON   10 mEq, Oral, Daily      sertraline 50 MG tablet  Commonly known as: ZOLOFT   50 mg, Oral, Daily      vitamin b complex capsule capsule   1 capsule, Oral,  Daily             Discharge Diet:   Diet Instructions     Diet: Cardiac; Thin      Discharge Diet: Cardiac    Fluid Consistency: Thin        Low Salt, low fat diet.                  Activity at Discharge:   Activity Instructions     Gradually Increase Activity Until at Pre-Hospitalization Level      Lifting Restrictions      Type of Restriction: Lifting    Lifting Restrictions: Lifting Restriction (Indicate Limit)    Weight Limit (Pounds): 10    Length of Lifting Restriction: 3 WEEKS        Do not drive while taking narcotics    Follow-up Appointments  Future Appointments   Date Time Provider Department Center   1/5/2021  1:00 PM ORIENTATION -  [facility]  [facility]     This discharge took greater than 30 minutes to compile     IDANIA Spivey  12/03/20  11:58 EST

## 2020-12-03 NOTE — PROGRESS NOTES
Continued Stay Note  [facility]     Patient Name: Angélica De La Cruz  MRN: [id number]  Today's Date: 12/3/2020    Admit Date: 11/24/2020    Discharge Plan     Row Name 12/03/20 1110       Plan    Plan  Home with home health    Patient/Family in Agreement with Plan  yes    Plan Comments  Met with patient in the room.  Plan for discharge home today.  Spouse is transporting her.  Home health arranged with [facility] and they will call to schedule first visit.  Phone call to Elsie with notification of discharge home today.   No DME needs.    Final Discharge Disposition Code  06 - home with home health care        Discharge Codes    No documentation.       Expected Discharge Date and Time     Expected Discharge Date Expected Discharge Time    Dec 3, 2020             Humaira Jacob RN

## 2020-12-03 NOTE — PROGRESS NOTES
"                                 [facility] Progress Note      LOS: 8 days   Patient Care Team:  Sussy Bloom MD as PCP - General (Internal Medicine)    Chief Complaint: Chest pain    Subjective     Interval History: No A. fib through the night    Patient Complaints:   No chest pain or dyspnea      Review of Systems:   A 14 point review of systems was negative except as was stated in the HPI      Objective     Vital Sign Min/Max for last 24 hours  Temp  Min: 98 °F (36.7 °C)  Max: 98.4 °F (36.9 °C)   BP  Min: 105/39  Max: 164/65   Pulse  Min: 48  Max: 57   Resp  Min: 16  Max: 20   SpO2  Min: 91 %  Max: 100 %   Flow (L/min)  Min: 2  Max: 2   No data recorded     Flowsheet Rows      First Filed Value   Admission Height  160 cm (63\") Documented at 11/24/2020 1219   Admission Weight  98.3 kg (216 lb 11.4 oz) Documented at 11/24/2020 1219          Physical Exam:  General Appearance: Alert, appears stated age and cooperative  Lungs: Clear to auscultation  Heart:: Regular rate and rhythm no Murmurs, Rubs or Gallops  Abdomen: Soft and nontender with adequate bowel sounds.  No organomegaly  Extremities: Trace edema  Pulses: Pulses palpable and equal bilaterally  Skin: Warm and dry with no rash  Psych: Normal     Results Review:     I reviewed the patient's new clinical results.  Results from last 7 days   Lab Units 12/03/20 0259 12/02/20 0354 12/01/20 0628   SODIUM mmol/L 138 137 141   POTASSIUM mmol/L 3.5 3.5 4.1   CHLORIDE mmol/L 105 105 107   CO2 mmol/L 19.0* 20.0* 21.0*   BUN mg/dL 37* 34* 28*   CREATININE mg/dL 1.11* 1.11* 1.05*   GLUCOSE mg/dL 100* 83 109*   CALCIUM mg/dL 8.6 8.8 9.1     Results from last 7 days   Lab Units 12/03/20 0259 12/02/20 0354 12/01/20 0628   WBC 10*3/mm3 12.59* 11.56* 11.63*   HEMOGLOBIN g/dL 11.8* 10.5* 11.9*   HEMATOCRIT % 38.0 35.2 38.6   PLATELETS 10*3/mm3 316 266 292     No results found for: TROPONINT          Results from last 7 days   Lab Units 11/27/20  0340 "   PH, ARTERIAL pH units 7.361   PO2 ART mm Hg 70.3*   PCO2, ARTERIAL mm Hg 34.9*   HCO3 ART mmol/L 19.7*           Medication Review: yes  Current Facility-Administered Medications   Medication Dose Route Frequency Provider Last Rate Last Admin   • acetaminophen (TYLENOL) tablet 650 mg  650 mg Oral Q4H PRN Jarred Che PA   650 mg at 12/02/20 2030   • ALPRAZolam (XANAX) tablet 0.25 mg  0.25 mg Oral TID PRN Jarred Che PA   0.25 mg at 11/27/20 2101   • amLODIPine (NORVASC) tablet 10 mg  10 mg Oral Q24H Leif Sahu MD   10 mg at 12/02/20 0822   • aspirin EC tablet 81 mg  81 mg Oral Daily Timothy Albarran MD       • atorvastatin (LIPITOR) tablet 40 mg  40 mg Oral Nightly Jarred Che PA   40 mg at 12/02/20 2013   • cetirizine (zyrTEC) tablet 10 mg  10 mg Oral Daily Bola Forrester MD   10 mg at 12/02/20 0823   • clopidogrel (PLAVIX) tablet 75 mg  75 mg Oral Daily Jarred Che PA   75 mg at 12/02/20 0823   • diphenhydrAMINE-zinc acetate 2-0.1 % cream   Topical BID PRN Yair Walton APRN   1 application at 12/01/20 0008   • gabapentin (NEURONTIN) capsule 300 mg  300 mg Oral Q12H Bola Forrester MD   300 mg at 12/02/20 2013   • hydrALAZINE (APRESOLINE) tablet 10 mg  10 mg Oral Q12H Bola Forrester MD   10 mg at 12/02/20 2013   • HYDROcodone-acetaminophen (NORCO) 7.5-325 MG per tablet 1 tablet  1 tablet Oral Q4H PRN Wali Spicer MD   1 tablet at 11/30/20 2027   • insulin lispro (humaLOG) injection 0-14 Units  0-14 Units Subcutaneous TID AC Dale Lyon MD   3 Units at 11/28/20 1642   • ipratropium-albuterol (DUO-NEB) nebulizer solution 3 mL  3 mL Nebulization Q4H PRN Harriett Muro DNP, APRN   3 mL at 12/03/20 0524   • levothyroxine (SYNTHROID, LEVOTHROID) tablet 100 mcg  100 mcg Oral Q AM Bola Forrester MD   100 mcg at 12/03/20 0549   • losartan (COZAAR) tablet 100 mg  100 mg Oral Q24H Yovanny Jin PA   100 mg at 12/02/20 0823   •  magnesium sulfate 4 gram infusion - Mg less than or equal to 1mg/dL  4 g Intravenous PRN Shane Schmidt APRN        Or   • magnesium sulfate 3 gram infusion (1gm x 3) - Mg 1.1 - 1.5 mg/dL  1 g Intravenous PRN Shane Schmidt APRN        Or   • Magnesium Sulfate 2 gram infusion- Mg 1.6 - 1.9 mg/dL  2 g Intravenous PRN Shane Schmidt APRN 25 mL/hr at 11/25/20 1953 2 g at 11/25/20 1953   • metoprolol tartrate (LOPRESSOR) tablet 25 mg  25 mg Oral Q12H Timothy Albarran MD   25 mg at 12/02/20 2013   • norepinephrine (LEVOPHED) 8 mg in 250 mL NS infusion (premix)  0.02-0.3 mcg/kg/min Intravenous Continuous PRN Jarred Che PA   Stopped at 11/27/20 1000   • ondansetron (ZOFRAN) injection 4 mg  4 mg Intravenous Q6H PRN aJrred Che PA       • oxyCODONE-acetaminophen (PERCOCET) 5-325 MG per tablet 2 tablet  2 tablet Oral Q4H PRN Wali Spicer MD   2 tablet at 11/29/20 2016   • Pharmacy Consult - MTM   Does not apply Daily Chloe Mcfarland, PharmD   Given at 11/27/20 0922   • polyvinyl alcohol (LIQUIFILM) 1.4 % ophthalmic solution 2 drop  2 drop Both Eyes Q1H PRN Shane Schmidt APRN   2 drop at 11/26/20 2125   • potassium chloride (MICRO-K) CR capsule 40 mEq  40 mEq Oral PRN Jarred Che PA   40 mEq at 12/03/20 0548    Or   • potassium chloride (KLOR-CON) packet 40 mEq  40 mEq Oral PRN Jarred Che PA   40 mEq at 11/27/20 1109   • sennosides-docusate (PERICOLACE) 8.6-50 MG per tablet 2 tablet  2 tablet Oral BID Bola Forrester MD   2 tablet at 12/02/20 0823   • sertraline (ZOLOFT) tablet 50 mg  50 mg Oral Daily Bola Forrester MD   50 mg at 12/02/20 0823   • temazepam (RESTORIL) capsule 7.5 mg  7.5 mg Oral Nightly PRN Jarred Che PA   7.5 mg at 12/03/20 0051         CABG 11/25/20    Abnormal stress test    Hypertension    CKD (chronic kidney disease)    Atrial fibrillation (CMS/HCC)        Impression      CABG 11/25/2020; previously normal LV  function by echocardiography 2015  Postop bradycardia   Atrial fibrillation maintaining sinus rhythm  Hypertension  Chronic kidney disease; stable        Plan     Discharge today okay from cardiology standpoint  Continue metoprolol 25 mg twice daily  Follow-up 1 month office      Timothy Albarran MD  12/03/20  07:49 EST

## 2020-12-04 ENCOUNTER — READMISSION MANAGEMENT (OUTPATIENT)
Dept: CALL CENTER | Facility: HOSPITAL | Age: 75
End: 2020-12-04

## 2020-12-04 NOTE — OUTREACH NOTE
Prep Survey      Responses   [facility] patient discharged from?  [place]   Is LACE score < 7 ?  No   Eligibility  Readm Mgmt   Discharge diagnosis  Severe LAD disease [s/p CABG 11/25/20]   Does the patient have one of the following disease processes/diagnoses(primary or secondary)?  Cardiothoracic surgery   Does the patient have Home health ordered?  Yes   What is the Home health agency?   [facility]    Is there a DME ordered?  No   Comments regarding appointments  Needs f/u scheduled   Medication alerts for this patient  continue aspirin, start plavix   Prep survey completed?  Yes          Jaylin Geiger RN

## 2020-12-07 ENCOUNTER — TELEPHONE (OUTPATIENT)
Dept: CARDIAC SURGERY | Facility: CLINIC | Age: 75
End: 2020-12-07

## 2020-12-07 LAB
QT INTERVAL: 334 MS
QTC INTERVAL: 517 MS

## 2020-12-07 NOTE — TELEPHONE ENCOUNTER
Daria, with [employer] , called to report that she stopped to do a visit with the patient. She thinks that the patient may have pulled a scab or a clot off with the dressing she was changing earlier and that it has stopped bleeding now. Daria changed the patient's dressing again and left her with some extra dressing supplies. She told the patient to wet the dressing tomorrow before she changes it, to avoid pulling anything off with it and causing the area to bleed again. Told Daria to call us with any other questions or concerns.

## 2020-12-07 NOTE — TELEPHONE ENCOUNTER
Home health OT called stating that home OT and PT was ordered but not skilled nursing. Mrs. De La Cruz is having bloody drainage from chest tube site. She has changed bandage this morning and by 1:00 it was saturated with blood again. Is it ok to order skilled nursing to evaluate? Also, is this just due to her being on Plavix? Does she need to be seen this week? She has a current appointment with heart and valve clinic this Thursday 12/10/20.      Thanks  Jason

## 2020-12-10 ENCOUNTER — OFFICE VISIT (OUTPATIENT)
Dept: CARDIOLOGY | Facility: HOSPITAL | Age: 75
End: 2020-12-10

## 2020-12-10 ENCOUNTER — READMISSION MANAGEMENT (OUTPATIENT)
Dept: CALL CENTER | Facility: HOSPITAL | Age: 75
End: 2020-12-10

## 2020-12-10 ENCOUNTER — HOSPITAL ENCOUNTER (OUTPATIENT)
Dept: CARDIOLOGY | Facility: HOSPITAL | Age: 75
Discharge: HOME | End: 2020-12-10

## 2020-12-10 ENCOUNTER — LAB (OUTPATIENT)
Dept: LAB | Facility: HOSPITAL | Age: 75
End: 2020-12-10

## 2020-12-10 VITALS
HEIGHT: 63 IN | DIASTOLIC BLOOD PRESSURE: 62 MMHG | SYSTOLIC BLOOD PRESSURE: 122 MMHG | BODY MASS INDEX: 38.18 KG/M2 | OXYGEN SATURATION: 97 % | WEIGHT: 215.5 LBS | TEMPERATURE: 97.7 F | RESPIRATION RATE: 18 BRPM | HEART RATE: 54 BPM

## 2020-12-10 DIAGNOSIS — I10 ESSENTIAL HYPERTENSION: ICD-10-CM

## 2020-12-10 DIAGNOSIS — R00.1 BRADYCARDIA: ICD-10-CM

## 2020-12-10 DIAGNOSIS — I48.0 PAF (PAROXYSMAL ATRIAL FIBRILLATION) (HCC): ICD-10-CM

## 2020-12-10 DIAGNOSIS — Z95.1 S/P CABG (CORONARY ARTERY BYPASS GRAFT): ICD-10-CM

## 2020-12-10 DIAGNOSIS — I25.10 CORONARY ARTERY DISEASE INVOLVING NATIVE CORONARY ARTERY OF NATIVE HEART WITHOUT ANGINA PECTORIS: ICD-10-CM

## 2020-12-10 DIAGNOSIS — I25.10 CORONARY ARTERY DISEASE INVOLVING NATIVE CORONARY ARTERY OF NATIVE HEART WITHOUT ANGINA PECTORIS: Primary | ICD-10-CM

## 2020-12-10 LAB
ANION GAP SERPL CALCULATED.3IONS-SCNC: 12.3 MMOL/L (ref 5–15)
BUN SERPL-MCNC: 27 MG/DL (ref 8–23)
BUN/CREAT SERPL: 21.3 (ref 7–25)
CALCIUM SPEC-SCNC: 9.7 MG/DL (ref 8.6–10.5)
CHLORIDE SERPL-SCNC: 106 MMOL/L (ref 98–107)
CO2 SERPL-SCNC: 24.7 MMOL/L (ref 22–29)
CREAT SERPL-MCNC: 1.27 MG/DL (ref 0.57–1)
DEPRECATED RDW RBC AUTO: 42.6 FL (ref 37–54)
ERYTHROCYTE [DISTWIDTH] IN BLOOD BY AUTOMATED COUNT: 12.7 % (ref 12.3–15.4)
GFR SERPL CREATININE-BSD FRML MDRD: 41 ML/MIN/1.73
GLUCOSE SERPL-MCNC: 69 MG/DL (ref 65–99)
HCT VFR BLD AUTO: 35.2 % (ref 34–46.6)
HGB BLD-MCNC: 11.3 G/DL (ref 12–15.9)
MCH RBC QN AUTO: 29.7 PG (ref 26.6–33)
MCHC RBC AUTO-ENTMCNC: 32.1 G/DL (ref 31.5–35.7)
MCV RBC AUTO: 92.4 FL (ref 79–97)
PLATELET # BLD AUTO: 414 10*3/MM3 (ref 140–450)
PMV BLD AUTO: 9.2 FL (ref 6–12)
POTASSIUM SERPL-SCNC: 4.1 MMOL/L (ref 3.5–5.2)
QT INTERVAL: 504 MS
QTC INTERVAL: 472 MS
RBC # BLD AUTO: 3.81 10*6/MM3 (ref 3.77–5.28)
SODIUM SERPL-SCNC: 143 MMOL/L (ref 136–145)
WBC # BLD AUTO: 14.21 10*3/MM3 (ref 3.4–10.8)

## 2020-12-10 PROCEDURE — 85027 COMPLETE CBC AUTOMATED: CPT

## 2020-12-10 PROCEDURE — 36415 COLL VENOUS BLD VENIPUNCTURE: CPT

## 2020-12-10 PROCEDURE — 80048 BASIC METABOLIC PNL TOTAL CA: CPT

## 2020-12-10 PROCEDURE — 99214 OFFICE O/P EST MOD 30 MIN: CPT | Performed by: NURSE PRACTITIONER

## 2020-12-10 PROCEDURE — 93010 ELECTROCARDIOGRAM REPORT: CPT | Performed by: INTERNAL MEDICINE

## 2020-12-10 PROCEDURE — 93005 ELECTROCARDIOGRAM TRACING: CPT | Performed by: NURSE PRACTITIONER

## 2020-12-10 RX ORDER — ESTRADIOL 0.07 MG/D
PATCH TRANSDERMAL
COMMUNITY
Start: 2020-10-16 | End: 2023-02-28 | Stop reason: ALTCHOICE

## 2020-12-10 RX ORDER — HYDROCHLOROTHIAZIDE 50 MG/1
50 TABLET ORAL DAILY
COMMUNITY

## 2020-12-10 RX ORDER — LEVOCETIRIZINE DIHYDROCHLORIDE 5 MG/1
5 TABLET, FILM COATED ORAL DAILY
COMMUNITY

## 2020-12-10 RX ORDER — GUAIFENESIN 600 MG/1
1 TABLET, EXTENDED RELEASE ORAL EVERY 24 HOURS
COMMUNITY

## 2020-12-10 NOTE — OUTREACH NOTE
CT Surgery Week 2 Survey      Responses   [facility] patient discharged from?  [place]   Does the patient have one of the following disease processes/diagnoses(primary or secondary)?  Cardiothoracic surgery   Week 2 attempt successful?  Yes   Call start time  1617   Call end time  1629   Discharge diagnosis  Severe LAD disease   Meds reviewed with patient/caregiver?  Yes   Is the patient having any side effects they believe may be caused by any medication additions or changes?  No   Does the patient have all medications related to this admission filled (includes all antibiotics, pain medications, cardiac medications, etc.)  Yes   Is the patient taking all medications as directed (includes completed medication regime)?  Yes   Does the patient have a primary care provider?   Yes   Does the patient have an appointment scheduled with their C/T surgeon?  Yes   Has the patient kept scheduled appointments due by today?  Yes   What is the Home health agency?   [facility]    Has home health visited the patient within 72 hours of discharge?  Yes   Home health comments  HH PT   Psychosocial issues?  No   Comments  states HH nurse made visit to check incisions and cT removal sites   Did the patient receive a copy of their discharge instructions?  Yes   Nursing interventions  Reviewed instructions with patient   What is the patient's perception of their health status since discharge?  Improving   Nursing interventions  Nurse provided patient education   Is the patient/caregiver able to teach back normal signs of recovery?  Pain or discomfort at incisional site   Nursing interventions  Reassured on normal signs of recovery   Is the patient /caregiver able to teach back basic post-op care?  Drive as instructed by MD in discharge instructions, Take showers only when approved by MD-sponge bathe until then, No tub bath, swimming, or hot tub until instructed by MD, Keep incision areas clean, dry and  protected, Do not remove steri-strips, Lifting as instructed by MD in discharge instructions, Continue use of incentive spirometry at least 1 week post discharge, Practice 'cough and deep breath' [uses pillow for deep breathing, uses Respirex]   Is the patient/caregiver able to teach back signs and symptoms of incisional infection?  Increased redness, swelling or pain at the incisonal site, Increased drainage or bleeding, Incisional warmth, Pus or odor from incision, Fever   Is the patient/caregiver able to teach back steps to recovery at home?  Set small, achievable goals for return to baseline health, Rest and rebuild strength, gradually increase activity, Eat a well-balance diet   Is the patient /caregiver able to teach back the importance of cardiac rehab?  Yes [will start 1/5/20]   Is the patient/caregiver able to teach back the hierarchy of who to call/visit for symptoms/problems? PCP, Specialist, Home health nurse, Urgent Care, ED, 911  Yes   Additional teach back comments  pt using pillow to cough, etc, denies pain, free of congestion, states is hopeful to have dietary consult with card rehab for tips on menus, etc for lowfat, low sodium   Week 2 call completed?  Yes          Dagmar Leyva RN

## 2020-12-16 ENCOUNTER — READMISSION MANAGEMENT (OUTPATIENT)
Dept: CALL CENTER | Facility: HOSPITAL | Age: 75
End: 2020-12-16

## 2020-12-16 NOTE — OUTREACH NOTE
CT Surgery Week 3 Survey      Responses   [facility] patient discharged from?  [place]   Does the patient have one of the following disease processes/diagnoses(primary or secondary)?  Cardiothoracic surgery   Week 3 attempt successful?  Yes   Call start time  1557   Call end time  1601   Medication alerts for this patient  no new medication addeds   Meds reviewed with patient/caregiver?  Yes   Is the patient taking all medications as directed (includes completed medication regime)?  Yes   Comments regarding appointments  appointments are scheduled   Has the patient kept scheduled appointments due by today?  Yes   What is the patient's perception of their health status since discharge?  Improving   Week 3 call completed?  Yes   Wrap up additional comments  concerned about leg where shania harvested but home health  feels it is fine          Keturah Stern RN

## 2020-12-29 ENCOUNTER — READMISSION MANAGEMENT (OUTPATIENT)
Dept: CALL CENTER | Facility: HOSPITAL | Age: 75
End: 2020-12-29

## 2020-12-29 NOTE — OUTREACH NOTE
CT Surgery Week 4 Survey      Responses   [facility] patient discharged from?  [place]   Does the patient have one of the following disease processes/diagnoses(primary or secondary)?  Cardiothoracic surgery   Week 4 attempt successful?  Yes   Call start time  0859   Call end time  0906   Discharge diagnosis  Severe LAD disease   Meds reviewed with patient/caregiver?  Yes   Is the patient taking all medications as directed (includes completed medication regime)?  Yes   Has the patient kept scheduled appointments due by today?  Yes   Comments  Have another appt 12/30   Is the patient still receiving Home Health Services?  Yes   What is the patient's perception of their health status since discharge?  Improving   If the patient is a current smoker, are they able to teach back resources for cessation?  Not a smoker   Week 4 Call Completed?  Yes   Would the patient like one additional call?  No   Graduated  Yes   Is the patient interested in additional calls from an ambulatory ?  NOTE:  applies to high risk patients requiring additional follow-up.  No   Did the patient feel the follow up calls were helpful during their recovery period?  No   Was the number of calls appropriate?  No   Wrap up additional comments  Seeing surgeon again 12/30. Patient had a scheduling conflict with cardiac rehab, is now down for 1/2/21.           Elsie Wong, RN

## 2020-12-30 ENCOUNTER — OFFICE VISIT (OUTPATIENT)
Dept: CARDIAC SURGERY | Facility: CLINIC | Age: 75
End: 2020-12-30

## 2020-12-30 VITALS
SYSTOLIC BLOOD PRESSURE: 175 MMHG | TEMPERATURE: 98.4 F | DIASTOLIC BLOOD PRESSURE: 85 MMHG | OXYGEN SATURATION: 93 % | WEIGHT: 211.8 LBS | HEART RATE: 57 BPM | HEIGHT: 63 IN | BODY MASS INDEX: 37.53 KG/M2

## 2020-12-30 DIAGNOSIS — I25.119 CORONARY ARTERY DISEASE INVOLVING NATIVE CORONARY ARTERY OF NATIVE HEART WITH ANGINA PECTORIS (HCC): ICD-10-CM

## 2020-12-30 PROCEDURE — 71046 X-RAY EXAM CHEST 2 VIEWS: CPT | Performed by: NURSE PRACTITIONER

## 2020-12-30 PROCEDURE — 99024 POSTOP FOLLOW-UP VISIT: CPT | Performed by: NURSE PRACTITIONER

## 2020-12-30 NOTE — PROGRESS NOTES
[facility] Cardiothoracic Surgery Office Follow Up Note     Date of Encounter: 12/30/2020     MRN Number: [id number]  Name: Angélica De La Cruz  Phone Number: [phone]     Referred By: No ref. provider found  PCP: Sussy Bloom MD    Chief Complaint:    Chief Complaint   Patient presents with   • Coronary Artery Disease     Hospital follow up s/p CABG on 11/25       History of Present Illness:    Angélica De La Cruz is a 75 y.o. female with a history of hypertension, chronic kidney disease and coronary artery disease status post CABG x2 with endoscopic exploration of the right great saphenous vein with an open harvesting of the right saphenous vein at the ankle on 11/25/2020 with Dr. Spicer.  She presents today in postoperative follow-up.  Patient did have postoperative atrial fibrillation as well as bradycardia into the 30s which stabilized.  Patient has been seen by her primary care provider and had labs obtained.  She is planning to initiate cardiac rehab.  She is not made an appointment for postop follow-up with Dr. Borges.  Patient does have some dyspnea on exertion, but this is improving.    Review of Systems:  Review of Systems   Constitution: Negative for chills, decreased appetite, fever and malaise/fatigue.   Cardiovascular: Positive for dyspnea on exertion. Negative for chest pain, claudication, irregular heartbeat, leg swelling, near-syncope, orthopnea, palpitations and syncope.   Respiratory: Negative for cough, hemoptysis, shortness of breath, sputum production and wheezing.    Hematologic/Lymphatic: Negative for bleeding problem. Does not bruise/bleed easily.   Skin: Negative for color change, poor wound healing and rash.   Musculoskeletal: Negative for back pain, falls and joint pain.   Gastrointestinal: Negative for abdominal pain, constipation, diarrhea, nausea and vomiting.   Neurological: Positive for dizziness (just a couple of times ). Negative for focal weakness, numbness and  paresthesias.   Psychiatric/Behavioral: Negative for depression. The patient does not have insomnia.        I have reviewed the review of systems as entered by my clinical support staff and have updated it as appropriate.       Allergies:  Allergies   Allergen Reactions   • Adhesive Tape Itching and Rash   • Codeine Delirium   • Morphine And Related Delirium   • Cefaclor Other (See Comments)   • Oxycodone-Acetaminophen Itching   • Sulfa Antibiotics    • Doxycycline Rash   • Erythromycin Rash   • Oxytetracycline Rash   • Penicillins Rash     Tolerates cefuroxime       Medications:      Current Outpatient Medications:   •  amLODIPine (NORVASC) 10 MG tablet, Take 1 tablet by mouth Daily., Disp: 10 tablet, Rfl: 3  •  aspirin 81 MG chewable tablet, Chew 81 mg daily., Disp: , Rfl:   •  atorvastatin (LIPITOR) 40 MG tablet, Take 1 tablet by mouth Every Night., Disp: 30 tablet, Rfl: 3  •  B Complex Vitamins (vitamin b complex) capsule capsule, Take 1 capsule by mouth Daily., Disp: , Rfl:   •  clopidogrel (PLAVIX) 75 MG tablet, Take 1 tablet by mouth Daily., Disp: 30 tablet, Rfl: 3  •  estradiol (CLIMARA) 0.0375 MG/24HR, Place 1 patch on the skin 1 (one) time per week., Disp: , Rfl:   •  estradiol (CLIMARA) 0.075 MG/24HR patch, , Disp: , Rfl:   •  fluticasone (FLONASE) 50 MCG/ACT nasal spray, 2 sprays into each nostril daily. Administer 2 sprays in each nostril for each dose., Disp: , Rfl:   •  furosemide (LASIX) 20 MG tablet, Take 20 mg by mouth daily., Disp: , Rfl:   •  gabapentin (NEURONTIN) 600 MG tablet, Take 600 mg by mouth 2 (Two) Times a Day., Disp: , Rfl:   •  guaiFENesin (Mucinex) 600 MG 12 hr tablet, Take 1 tablet by mouth Daily., Disp: , Rfl:   •  hydrALAZINE (APRESOLINE) 25 MG tablet, Take 25 mg by mouth 2 (Two) Times a Day., Disp: , Rfl:   •  hydroCHLOROthiazide (HYDRODIURIL) 50 MG tablet, Take 50 mg by mouth Daily., Disp: , Rfl:   •  levocetirizine (Xyzal) 5 MG tablet, Take 5 mg by mouth Daily., Disp: , Rfl:    •  levothyroxine (SYNTHROID, LEVOTHROID) 100 MCG tablet, Take 100 mcg by mouth daily., Disp: , Rfl:   •  metoprolol tartrate (LOPRESSOR) 25 MG tablet, Take 1 tablet by mouth Every 12 (Twelve) Hours., Disp: 30 tablet, Rfl: 3  •  Multiple Vitamins-Minerals (MULTIVITAMIN PO), Take 1 tablet by mouth Daily., Disp: , Rfl:   •  olmesartan (BENICAR) 20 MG tablet, Take 20 mg by mouth Daily., Disp: , Rfl:   •  potassium chloride (K-DUR,KLOR-CON) 10 MEQ CR tablet, Take 10 mEq by mouth daily., Disp: , Rfl:   •  sertraline (ZOLOFT) 50 MG tablet, Take 50 mg by mouth Daily., Disp: , Rfl:     Social History     Socioeconomic History   • Marital status:      Spouse name: Not on file   • Number of children: Not on file   • Years of education: Not on file   • Highest education level: Not on file   Tobacco Use   • Smoking status: Former Smoker     Quit date:      Years since quittin.0   • Smokeless tobacco: Never Used   Substance and Sexual Activity   • Alcohol use: No   • Drug use: No   • Sexual activity: Defer   Social History Narrative    Caffeine: half and half coffee daily , thomas tea daily        Family History   Problem Relation Age of Onset   • Heart disease Mother    • Cerebral aneurysm Father    • Heart disease Brother    • Heart disease Maternal Grandmother    • No Known Problems Half-Sister    • No Known Problems Half-Sister    • Kidney failure Half-Brother    • Sudden death Half-Brother    • Breast cancer Neg Hx    • Ovarian cancer Neg Hx        Past Medical History:   Diagnosis Date   • Arthritis    • CKD (chronic kidney disease)     elevated creat caused by long term use of lisinopril    • Fibromyalgia    • Hypertension    • Lung nodule seen on imaging study    • AMANDA treated with BiPAP    • Peritonitis (CMS/HCC)        Past Surgical History:   Procedure Laterality Date   • APPENDECTOMY     • CARDIAC CATHETERIZATION N/A 2020    Procedure: Left Heart Cath;  Surgeon: Sanya Borges MD;   "Location: [facility] CATH INVASIVE LOCATION;  Service: Cardiovascular;  Laterality: N/A;   • CATARACT EXTRACTION, BILATERAL Bilateral 2018   • CHOLECYSTECTOMY     • CORONARY ARTERY BYPASS GRAFT N/A 2020    Procedure: MEDIAN STERNOTOMY, CORONARY ARTERY BYPASS GRAFTING X2, UTILIZNG THE LEFT  INTERNAL MAMMARY ARTERY GRAFT, ENDOSCOPIC VEIN HARVESTING CONVERTED TO OPEN OF THE RIGHT GREATER SAPHENOUS VEIN;  Surgeon: Wali Spicer MD;  Location: [facility] OR;  Service: Cardiothoracic;  Laterality: N/A;  VO: 0807  VR: 0807     • HYSTERECTOMY  1984    with BSO       Physical Exam:  Vital Signs:    Vitals:    20 1308   BP: 175/85   BP Location: Right arm   Patient Position: Sitting   Pulse: 57   Temp: 98.4 °F (36.9 °C)   SpO2: 93%   Weight: 96.1 kg (211 lb 12.8 oz)   Height: 160 cm (63\")     Body mass index is 37.52 kg/m².     Physical Exam  Vitals signs and nursing note reviewed.   Constitutional:       Appearance: Normal appearance. She is well-developed and well-groomed.   HENT:      Head: Normocephalic and atraumatic.   Neck:      Musculoskeletal: Neck supple.   Cardiovascular:      Rate and Rhythm: Normal rate and regular rhythm.      Heart sounds: Normal heart sounds, S1 normal and S2 normal. No murmur. No friction rub.   Pulmonary:      Comments: Unlabored, Clear to auscultation bilaterally  Abdominal:      General: Bowel sounds are normal.      Palpations: Abdomen is soft.      Tenderness: There is no abdominal tenderness.   Musculoskeletal:      Right lower le+ Pitting Edema present.      Left lower leg: No edema.   Skin:     General: Skin is warm and dry.      Comments: Incisions: Sternal/MT sites intact  No surrounding erythema, edema or induration    Right ankle incision with scabbed area at the distal incision with small amount of sanguinous drainage.   Neurological:      Mental Status: She is alert and oriented to person, place, and time.   Psychiatric:         Attention and Perception: Attention " normal.         Mood and Affect: Mood normal.         Speech: Speech normal.         Behavior: Behavior is cooperative.         Labs/Imaging:    Xr Chest 2 View    Result Date: 12/31/2020  Stable chronic changes seen diffusely throughout the lung fields bilaterally. Patient is status post median sternotomy with borderline enlargement of the heart.  D:  12/31/2020 E:  12/31/2020      Xr Chest 1 View    Result Date: 12/1/2020  Apart from right internal jugular central venous catheter sheath support hardware has been removed without pneumothorax or large effusion. Persistent central pulmonary vascular congestion.  D:  12/01/2020 E:  12/01/2020  This report was finalized on 12/1/2020 1:01 PM by Dr. Edinson Castle.         Assessment / Plan:  Diagnoses and all orders for this visit:    1. Coronary artery disease involving native coronary artery of native heart with angina pectoris (CMS/[facility])     Angélica De La Cruz is a 75 y.o. female with a history of hypertension, chronic kidney disease and coronary artery disease status post CABG x2 with endoscopic exploration of the right great saphenous vein with an open harvesting of the right saphenous vein at the ankle on 11/25/2020 with Dr. Spicer.  Patient is doing well from postoperative standpoint.  Chest x-ray is stable.  She does have small amount of irritation at her right ankle open harvest SVG site.  Advised patient to avoid socks or shoes rubbing her incision.  Otherwise incisions are stable.  BP in office today 175/85.  Home BP log with 's-130's/50's-70's with HR's in the 50's.  Patient to make appointment with Dr. Borges as well as to initiate cardiac rehab.  Will have patient to follow-up in 2 to 3 weeks for wound check and at that time if her right ankle site has healed, we will follow her on an as-needed basis.    Vianney Faye, MARCO  [facility] Cardiothoracic Surgery    Please note that portions of this note may have been completed with a voice  recognition program. Efforts were made to edit the dictations, but occasionally words are mistranscribed.

## 2020-12-31 ENCOUNTER — OUTSIDE FACILITY SERVICE (OUTPATIENT)
Dept: CARDIAC SURGERY | Facility: CLINIC | Age: 75
End: 2020-12-31

## 2020-12-31 PROCEDURE — G0180 MD CERTIFICATION HHA PATIENT: HCPCS | Performed by: THORACIC SURGERY (CARDIOTHORACIC VASCULAR SURGERY)

## 2021-01-05 ENCOUNTER — TREATMENT (OUTPATIENT)
Dept: CARDIAC REHAB | Facility: HOSPITAL | Age: 76
End: 2021-01-05

## 2021-01-05 VITALS
OXYGEN SATURATION: 97 % | SYSTOLIC BLOOD PRESSURE: 126 MMHG | HEART RATE: 55 BPM | HEIGHT: 63 IN | WEIGHT: 210.8 LBS | BODY MASS INDEX: 37.35 KG/M2 | DIASTOLIC BLOOD PRESSURE: 64 MMHG

## 2021-01-05 DIAGNOSIS — Z95.1 S/P CABG (CORONARY ARTERY BYPASS GRAFT): ICD-10-CM

## 2021-01-05 PROCEDURE — 93798 PHYS/QHP OP CAR RHAB W/ECG: CPT

## 2021-01-05 NOTE — PROGRESS NOTES
Cardiac Rehab Initial Assessment      Name: Angélica De La Cruz  :1945 Allergies:Adhesive tape, Codeine, Morphine and related, Cefaclor, Oxycodone-acetaminophen, Sulfa antibiotics, Doxycycline, Erythromycin, Oxytetracycline, and Penicillins   MRN: [id number] 75 y.o. Physician: Sussy Bloom MD   Primary Diagnosis:    Diagnosis Plan   1. S/P CABG (coronary artery bypass graft)      Event Date: 2020 Specialist: SAULO Borges   Secondary Diagnosis: HTN Risk Stratification:High Risk Note Author: Dori Guardado RN     Cardiovascular History: Comments none     EXERCISE AT HOME  no  na  N/A    EF: na%      Source: na          Ambulatory Status:Independent  Ambulatory Fall Risk Assessed on Initial Visit: no 6 Minute Walk Pre- Cardiac Rehab:  Distance:324steps      RPE:11  Max. HR: 57       SPO2:97    MET: 1.8  MPH: na             RPD: na  Resting BP: 126/64 LA, na RA    Peak BP: 136/70  Recovery BP: 138/70  Comments: na      NUTRITION  Lipids:yes If yes, labs as follows;  Total: No components found for: CHOLESTEROL  HDL:   HDL Cholesterol   Date Value Ref Range Status   2020 50 40 - 60 mg/dL Final    Lipids continued:  LDL:  LDL Cholesterol    Date Value Ref Range Status   2020 105 (H) 0 - 100 mg/dL Final     Triglyceride: No components found for: TRIGLYCERIDE   Weight Management:                 Weight: 210.8lbs  Height: 63 inches                                   BMI: There is no height or weight on file to calculate BMI.  Waist Circumference: na  inches   Alcohol Use: none Diabetes:No    Last HGBA1C with date if applicable:No components found for: A1C         SOCIAL HISTORY  Social History     Socioeconomic History   • Marital status:      Spouse name: Not on file   • Number of children: Not on file   • Years of education: Not on file   • Highest education level: Not on file   Tobacco Use   • Smoking status: Former Smoker     Quit date:      Years since quittin.0   •  Smokeless tobacco: Never Used   Substance and Sexual Activity   • Alcohol use: No   • Drug use: No   • Sexual activity: Defer   Social History Narrative    Caffeine: half and half coffee daily , thomas tea daily        Educational Level (choose one that applies) high school diploma/GED Learning Barriers:Ready to Learn    Family Support:yes    Living Arrangement: lives with their family    Risk Factors: Heredity  Yes If Yes mother and Obesity  Yes     Tobacco Adjunct: N/A  na      Comorbidities: na     PSYCHOSOCIAL  Clinical Depression: no    Stress: yes     Assess presence or absence of depression using a valid screening tool: yes      PHYSICAL ASSESSMENT  Influenza vaccine: yes  Pneumococcal vaccine: yes          Angina: no    Describe angina scale of 0 - 4: 0 = none    Today are you having incisional pain? Yes. If, Yes, Scale: 4    na    Today are you having any other pain? N/A. If, Yes, Scale: 0    na Diagnosed with Hypertension:yes    Heart Sounds: S1S2     Lung Sounds: diminished breath sounds:  diffuse         Assessment: na Orthopedic Problems: left knee arthritis    Are you being hurt, hit, or frightened by anyone at home or in your life? no    Are you being neglected by a caregiver? No Shoulder flexibility/Range of motion: Average     Recommended arm activity: Arm Ergometer    Chair sit and reach within: 0 inches   Leg flexibility: Average    Leg Strength/Balance/Five times sit to stand: 0 seconds.   na  Chose one: Fall Risk    Recommended stretching: Chair    Assessment: na    Family attends IA: no Time of arrival: 0915  Time of departure: 1030     Patient Goals: Exercise 150 minutes per week by discharge         1/5/2021  09:41 EDNA Guardado RN

## 2021-01-08 ENCOUNTER — DOCUMENTATION (OUTPATIENT)
Dept: CARDIAC REHAB | Facility: HOSPITAL | Age: 76
End: 2021-01-08

## 2021-01-08 NOTE — PROGRESS NOTES
Patient called today to cancel Phase II cardiac rehab appointment.  Her  fell and she is attending to him.

## 2021-01-11 ENCOUNTER — TELEPHONE (OUTPATIENT)
Dept: CARDIAC SURGERY | Facility: CLINIC | Age: 76
End: 2021-01-11

## 2021-01-11 ENCOUNTER — TREATMENT (OUTPATIENT)
Dept: CARDIAC REHAB | Facility: HOSPITAL | Age: 76
End: 2021-01-11

## 2021-01-11 DIAGNOSIS — Z95.1 S/P CABG (CORONARY ARTERY BYPASS GRAFT): Primary | ICD-10-CM

## 2021-01-11 PROCEDURE — 93798 PHYS/QHP OP CAR RHAB W/ECG: CPT

## 2021-01-11 NOTE — TELEPHONE ENCOUNTER
Pt called stating she is having diarrhea and nose bleeds for about a week now. Pt had CABG x2 on 11/25/20 and D/C on 12/03/20. I explained to her to see ENT or PCP , she does see Cardiology on Wednesday (1/13/21) which she will explain this to him as well. She f/u with us next on 1/21/21 with MARCO Carrasco.

## 2021-01-12 ENCOUNTER — APPOINTMENT (OUTPATIENT)
Dept: CARDIAC REHAB | Facility: HOSPITAL | Age: 76
End: 2021-01-12

## 2021-01-13 ENCOUNTER — APPOINTMENT (OUTPATIENT)
Dept: CARDIAC REHAB | Facility: HOSPITAL | Age: 76
End: 2021-01-13

## 2021-01-13 ENCOUNTER — TREATMENT (OUTPATIENT)
Dept: CARDIAC REHAB | Facility: HOSPITAL | Age: 76
End: 2021-01-13

## 2021-01-13 DIAGNOSIS — Z95.1 S/P CABG (CORONARY ARTERY BYPASS GRAFT): Primary | ICD-10-CM

## 2021-01-13 PROCEDURE — 93798 PHYS/QHP OP CAR RHAB W/ECG: CPT

## 2021-01-14 ENCOUNTER — APPOINTMENT (OUTPATIENT)
Dept: CARDIAC REHAB | Facility: HOSPITAL | Age: 76
End: 2021-01-14

## 2021-01-15 ENCOUNTER — TELEPHONE (OUTPATIENT)
Dept: CARDIAC REHAB | Facility: HOSPITAL | Age: 76
End: 2021-01-15

## 2021-01-15 ENCOUNTER — APPOINTMENT (OUTPATIENT)
Dept: CARDIAC REHAB | Facility: HOSPITAL | Age: 76
End: 2021-01-15

## 2021-01-18 ENCOUNTER — TELEPHONE (OUTPATIENT)
Dept: CARDIAC REHAB | Facility: HOSPITAL | Age: 76
End: 2021-01-18

## 2021-01-18 ENCOUNTER — APPOINTMENT (OUTPATIENT)
Dept: CARDIAC REHAB | Facility: HOSPITAL | Age: 76
End: 2021-01-18

## 2021-01-19 ENCOUNTER — APPOINTMENT (OUTPATIENT)
Dept: CARDIAC REHAB | Facility: HOSPITAL | Age: 76
End: 2021-01-19

## 2021-01-20 ENCOUNTER — APPOINTMENT (OUTPATIENT)
Dept: CARDIAC REHAB | Facility: HOSPITAL | Age: 76
End: 2021-01-20

## 2021-01-20 ENCOUNTER — TELEPHONE (OUTPATIENT)
Dept: CARDIAC REHAB | Facility: HOSPITAL | Age: 76
End: 2021-01-20

## 2021-01-21 ENCOUNTER — APPOINTMENT (OUTPATIENT)
Dept: CARDIAC REHAB | Facility: HOSPITAL | Age: 76
End: 2021-01-21

## 2021-01-21 ENCOUNTER — OFFICE VISIT (OUTPATIENT)
Dept: CARDIAC SURGERY | Facility: CLINIC | Age: 76
End: 2021-01-21

## 2021-01-21 VITALS
BODY MASS INDEX: 36.68 KG/M2 | TEMPERATURE: 97.3 F | SYSTOLIC BLOOD PRESSURE: 170 MMHG | OXYGEN SATURATION: 98 % | HEART RATE: 54 BPM | HEIGHT: 63 IN | WEIGHT: 207 LBS | DIASTOLIC BLOOD PRESSURE: 100 MMHG

## 2021-01-21 DIAGNOSIS — I25.119 CORONARY ARTERY DISEASE INVOLVING NATIVE HEART WITH ANGINA PECTORIS, UNSPECIFIED VESSEL OR LESION TYPE (HCC): Primary | ICD-10-CM

## 2021-01-21 PROCEDURE — 99024 POSTOP FOLLOW-UP VISIT: CPT | Performed by: NURSE PRACTITIONER

## 2021-01-21 NOTE — PROGRESS NOTES
[facility] Cardiothoracic Surgery Office Follow Up Note     Date of Encounter: 01/21/2021     MRN Number: [id number]  Name: Angélica De La Cruz  Phone Number: [phone]     Referred By: No ref. provider found  PCP: Sussy Bloom MD    Chief Complaint:    Chief Complaint   Patient presents with   • Post-op Follow-up     2-3 WK FU to Eval Right Lower Extremity Vein [place]-Nosebleeds have stopped, But Diarrhea Continues   • Coronary Artery Disease       History of Present Illness:    Angélica De La Cruz is a 76 y.o. female with a history of hypertension, chronic kidney disease and CAD s/p CABG x2 with endoscopic exploration of the right great saphenous vein with an open harvesting of the right saphenous vein at the ankle on 11/25/2020 with Dr. Spicer.  She presents today for wound check of her right SVG ankle site.  Last seen in the office on 12/30/2020 and postoperative follow-up.  She has been doing well and has been attending cardiac rehab.  She has follow-up with Dr. Borges.  She has had some issues with nosebleeds and diarrhea, which are improving, and is being followed by her PCP/ENT.  Her right lower extremity wound has improved.  She denies any erythema, drainage, fevers or chills.  She does have some ongoing chest tenderness.    Review of Systems:  Review of Systems   Constitution: Negative for chills, decreased appetite, fever and malaise/fatigue.   Cardiovascular: Positive for dyspnea on exertion. Negative for chest pain, claudication, irregular heartbeat, leg swelling, near-syncope, orthopnea, palpitations and syncope.   Respiratory: Negative for cough, hemoptysis, shortness of breath, sputum production and wheezing.    Hematologic/Lymphatic: Negative for bleeding problem. Bruises/bleeds easily.   Skin: Positive for poor wound healing (right lower exremity harvest site). Negative for color change and rash.   Musculoskeletal: Positive for arthritis. Negative for back pain, falls and  joint pain.   Gastrointestinal: Positive for diarrhea. Negative for abdominal pain, constipation, nausea and vomiting.   Neurological: Negative for focal weakness, numbness and paresthesias.   Psychiatric/Behavioral: Negative for depression. The patient does not have insomnia.        I have reviewed the review of systems as entered by my clinical support staff and have updated it as appropriate.       Allergies:  Allergies   Allergen Reactions   • Adhesive Tape Itching and Rash   • Codeine Delirium   • Morphine And Related Delirium   • Cefaclor Other (See Comments)   • Oxycodone-Acetaminophen Itching   • Sulfa Antibiotics    • Doxycycline Rash   • Erythromycin Rash   • Oxytetracycline Rash   • Penicillins Rash     Tolerates cefuroxime       Medications:      Current Outpatient Medications:   •  amLODIPine (NORVASC) 10 MG tablet, Take 1 tablet by mouth Daily., Disp: 10 tablet, Rfl: 3  •  aspirin 81 MG chewable tablet, Chew 81 mg daily., Disp: , Rfl:   •  atorvastatin (LIPITOR) 40 MG tablet, Take 1 tablet by mouth Every Night., Disp: 30 tablet, Rfl: 3  •  B Complex Vitamins (vitamin b complex) capsule capsule, Take 1 capsule by mouth Daily., Disp: , Rfl:   •  clopidogrel (PLAVIX) 75 MG tablet, Take 1 tablet by mouth Daily., Disp: 30 tablet, Rfl: 3  •  gabapentin (NEURONTIN) 600 MG tablet, Take 600 mg by mouth 2 (Two) Times a Day., Disp: , Rfl:   •  guaiFENesin (Mucinex) 600 MG 12 hr tablet, Take 1 tablet by mouth Daily., Disp: , Rfl:   •  hydrALAZINE (APRESOLINE) 25 MG tablet, Take 10 mg by mouth 2 (Two) Times a Day., Disp: , Rfl:   •  hydroCHLOROthiazide (HYDRODIURIL) 50 MG tablet, Take 50 mg by mouth Daily., Disp: , Rfl:   •  levocetirizine (Xyzal) 5 MG tablet, Take 5 mg by mouth Daily., Disp: , Rfl:   •  levothyroxine (SYNTHROID, LEVOTHROID) 100 MCG tablet, Take 100 mcg by mouth daily., Disp: , Rfl:   •  metoprolol tartrate (LOPRESSOR) 25 MG tablet, Take 1 tablet by mouth Every 12 (Twelve) Hours., Disp: 30 tablet,  Rfl: 3  •  Multiple Vitamins-Minerals (MULTIVITAMIN PO), Take 1 tablet by mouth Daily., Disp: , Rfl:   •  olmesartan (BENICAR) 20 MG tablet, Take 40 mg by mouth Daily., Disp: , Rfl:   •  potassium chloride (K-DUR,KLOR-CON) 10 MEQ CR tablet, Take 10 mEq by mouth daily., Disp: , Rfl:   •  estradiol (CLIMARA) 0.0375 MG/24HR, Place 1 patch on the skin 1 (one) time per week., Disp: , Rfl:   •  estradiol (CLIMARA) 0.075 MG/24HR patch, , Disp: , Rfl:   •  fluticasone (FLONASE) 50 MCG/ACT nasal spray, 2 sprays into each nostril daily. Administer 2 sprays in each nostril for each dose., Disp: , Rfl:   •  furosemide (LASIX) 20 MG tablet, Take 20 mg by mouth daily., Disp: , Rfl:   •  sertraline (ZOLOFT) 50 MG tablet, Take 50 mg by mouth Daily., Disp: , Rfl:     Social History     Socioeconomic History   • Marital status:      Spouse name: Not on file   • Number of children: 2   • Years of education: Not on file   • Highest education level: Not on file   Occupational History   • Occupation: Hairdresser     Comment: Retired   Tobacco Use   • Smoking status: Former Smoker     Packs/day: 1.00     Years: 15.00     Pack years: 15.00     Types: Cigarettes     Quit date:      Years since quittin.0   • Smokeless tobacco: Never Used   Substance and Sexual Activity   • Alcohol use: No   • Drug use: No   • Sexual activity: Defer   Social History Narrative    Caffeine: half and half coffee daily , thomas tea daily. Lives in [place].       Family History   Problem Relation Age of Onset   • Heart disease Mother    • Cerebral aneurysm Father    • Heart disease Brother    • Heart disease Maternal Grandmother    • No Known Problems Half-Sister    • No Known Problems Half-Sister    • Kidney failure Half-Brother    • Sudden death Half-Brother    • Breast cancer Neg Hx    • Ovarian cancer Neg Hx        Past Medical History:   Diagnosis Date   • Arthritis    • CKD (chronic kidney disease)     elevated creat caused by long term use  "of lisinopril    • Coronary artery disease    • Fibromyalgia    • Hypertension    • Lung nodule seen on imaging study    • AMANDA treated with BiPAP    • Peritonitis (CMS/HCC)        Past Surgical History:   Procedure Laterality Date   • APPENDECTOMY     • CARDIAC CATHETERIZATION N/A 11/24/2020    Procedure: Left Heart Cath;  Surgeon: Sanya Borges MD;  Location:  [facility];  Service: Cardiovascular;  Laterality: N/A;   • CATARACT EXTRACTION, BILATERAL Bilateral 2018   • CHOLECYSTECTOMY     • CORONARY ARTERY BYPASS GRAFT N/A 11/25/2020    Procedure: MEDIAN STERNOTOMY, CORONARY ARTERY BYPASS GRAFTING X2, UTILIZNG THE LEFT  INTERNAL MAMMARY ARTERY GRAFT, ENDOSCOPIC VEIN HARVESTING CONVERTED TO OPEN OF THE RIGHT GREATER SAPHENOUS VEIN;  Surgeon: Wali Spicer MD;  Location:  [facility] OR;  Service: Cardiothoracic;  Laterality: N/A;  VO: 0807  VR: 0807     • HYSTERECTOMY  1984    with BSO       Physical Exam:  Vital Signs:    Vitals:    01/21/21 0809   BP: 170/100   BP Location: Left arm   Patient Position: Sitting   Pulse: 54   Temp: 97.3 °F (36.3 °C)   SpO2: 98%   Weight: 93.9 kg (207 lb)   Height: 160 cm (63\")     Body mass index is 36.67 kg/m².     Physical Exam  Vitals signs and nursing note reviewed.   Constitutional:       Appearance: Normal appearance. She is well-developed and well-groomed.   HENT:      Head: Normocephalic and atraumatic.   Neck:      Musculoskeletal: Neck supple.   Cardiovascular:      Rate and Rhythm: Normal rate and regular rhythm.      Heart sounds: Normal heart sounds, S1 normal and S2 normal. No murmur. No friction rub.   Pulmonary:      Comments: Unlabored, Clear to auscultation bilaterally  Abdominal:      General: Bowel sounds are normal.      Palpations: Abdomen is soft.      Tenderness: There is no abdominal tenderness.   Musculoskeletal:      Right lower leg: No edema.      Left lower leg: No edema.   Skin:     General: Skin is warm and dry.      Comments: " Incisions: Sternal/MT/EVH site intact  No surrounding erythema, hematoma or induration    Right SVG ankle site: Scabbed area with minimal surrounding erythema, appears to be irritation.  With no induration or drainage.   Neurological:      Mental Status: She is alert and oriented to person, place, and time.   Psychiatric:         Attention and Perception: Attention normal.         Mood and Affect: Mood normal.         Speech: Speech normal.         Behavior: Behavior is cooperative.         Labs/Imaging:    Xr Chest 2 View    Result Date: 12/31/2020  Stable chronic changes seen diffusely throughout the lung fields bilaterally. Patient is status post median sternotomy with borderline enlargement of the heart.  D:  12/31/2020 E:  12/31/2020  This report was finalized on 12/31/2020 3:51 PM by Dr. Shannon Adams MD.         Assessment / Plan:  Diagnoses and all orders for this visit:    1. Coronary artery disease involving native heart with angina pectoris, unspecified vessel or lesion type (CMS/HCC) (Primary)     Angélica De La Cruz is a 76 y.o. female with a history of hypertension, chronic kidney disease and CAD s/p CABG x2 with endoscopic exploration of the right great saphenous vein with an open harvesting of the right saphenous vein at the ankle on 11/25/2020 with Dr. Spicer.  Patient is doing well from a postoperative standpoint.  Have advised patient to place a gauze on right SVG ankle site when wearing shoes that will rub on the incision.  Patient to call our office with any worsening erythema, drainage or induration, fevers or chills.  /100 in office, but pt hasn't taken her morning BP meds.  Home BP's running 130's/70's.  She follows closely with her PCP and Dr. Borges.  Patient is a part-time hairdresser and have asked her to wait until 12 weeks to restart doing hair secondary to chest tenderness. At this time we will follow-up with patient on an as-needed basis.    Vianney Faye,  [facility]  Psychiatric Cardiothoracic Surgery    Please note that portions of this note may have been completed with a voice recognition program. Efforts were made to edit the dictations, but occasionally words are mistranscribed.

## 2021-01-22 ENCOUNTER — APPOINTMENT (OUTPATIENT)
Dept: CARDIAC REHAB | Facility: HOSPITAL | Age: 76
End: 2021-01-22

## 2021-01-25 ENCOUNTER — APPOINTMENT (OUTPATIENT)
Dept: CARDIAC REHAB | Facility: HOSPITAL | Age: 76
End: 2021-01-25

## 2021-01-26 ENCOUNTER — APPOINTMENT (OUTPATIENT)
Dept: CARDIAC REHAB | Facility: HOSPITAL | Age: 76
End: 2021-01-26

## 2021-01-27 ENCOUNTER — APPOINTMENT (OUTPATIENT)
Dept: CARDIAC REHAB | Facility: HOSPITAL | Age: 76
End: 2021-01-27

## 2021-01-28 ENCOUNTER — APPOINTMENT (OUTPATIENT)
Dept: CARDIAC REHAB | Facility: HOSPITAL | Age: 76
End: 2021-01-28

## 2021-01-29 ENCOUNTER — APPOINTMENT (OUTPATIENT)
Dept: CARDIAC REHAB | Facility: HOSPITAL | Age: 76
End: 2021-01-29

## 2021-02-01 ENCOUNTER — APPOINTMENT (OUTPATIENT)
Dept: CARDIAC REHAB | Facility: HOSPITAL | Age: 76
End: 2021-02-01

## 2021-02-02 ENCOUNTER — APPOINTMENT (OUTPATIENT)
Dept: CARDIAC REHAB | Facility: HOSPITAL | Age: 76
End: 2021-02-02

## 2021-02-03 ENCOUNTER — APPOINTMENT (OUTPATIENT)
Dept: CARDIAC REHAB | Facility: HOSPITAL | Age: 76
End: 2021-02-03

## 2021-02-04 ENCOUNTER — APPOINTMENT (OUTPATIENT)
Dept: CARDIAC REHAB | Facility: HOSPITAL | Age: 76
End: 2021-02-04

## 2021-02-05 ENCOUNTER — APPOINTMENT (OUTPATIENT)
Dept: CARDIAC REHAB | Facility: HOSPITAL | Age: 76
End: 2021-02-05

## 2021-02-08 ENCOUNTER — APPOINTMENT (OUTPATIENT)
Dept: CARDIAC REHAB | Facility: HOSPITAL | Age: 76
End: 2021-02-08

## 2021-02-09 ENCOUNTER — APPOINTMENT (OUTPATIENT)
Dept: CARDIAC REHAB | Facility: HOSPITAL | Age: 76
End: 2021-02-09

## 2021-02-10 ENCOUNTER — APPOINTMENT (OUTPATIENT)
Dept: CARDIAC REHAB | Facility: HOSPITAL | Age: 76
End: 2021-02-10

## 2021-02-11 ENCOUNTER — APPOINTMENT (OUTPATIENT)
Dept: CARDIAC REHAB | Facility: HOSPITAL | Age: 76
End: 2021-02-11

## 2021-02-12 ENCOUNTER — APPOINTMENT (OUTPATIENT)
Dept: CARDIAC REHAB | Facility: HOSPITAL | Age: 76
End: 2021-02-12

## 2021-02-15 ENCOUNTER — APPOINTMENT (OUTPATIENT)
Dept: CARDIAC REHAB | Facility: HOSPITAL | Age: 76
End: 2021-02-15

## 2021-02-16 ENCOUNTER — APPOINTMENT (OUTPATIENT)
Dept: CARDIAC REHAB | Facility: HOSPITAL | Age: 76
End: 2021-02-16

## 2021-02-17 ENCOUNTER — APPOINTMENT (OUTPATIENT)
Dept: CARDIAC REHAB | Facility: HOSPITAL | Age: 76
End: 2021-02-17

## 2021-02-18 ENCOUNTER — APPOINTMENT (OUTPATIENT)
Dept: CARDIAC REHAB | Facility: HOSPITAL | Age: 76
End: 2021-02-18

## 2021-02-19 ENCOUNTER — APPOINTMENT (OUTPATIENT)
Dept: CARDIAC REHAB | Facility: HOSPITAL | Age: 76
End: 2021-02-19

## 2021-02-22 ENCOUNTER — APPOINTMENT (OUTPATIENT)
Dept: CARDIAC REHAB | Facility: HOSPITAL | Age: 76
End: 2021-02-22

## 2021-02-23 ENCOUNTER — APPOINTMENT (OUTPATIENT)
Dept: CARDIAC REHAB | Facility: HOSPITAL | Age: 76
End: 2021-02-23

## 2021-02-24 ENCOUNTER — APPOINTMENT (OUTPATIENT)
Dept: CARDIAC REHAB | Facility: HOSPITAL | Age: 76
End: 2021-02-24

## 2021-02-25 ENCOUNTER — TELEPHONE (OUTPATIENT)
Dept: CARDIAC REHAB | Facility: HOSPITAL | Age: 76
End: 2021-02-25

## 2021-02-25 ENCOUNTER — APPOINTMENT (OUTPATIENT)
Dept: CARDIAC REHAB | Facility: HOSPITAL | Age: 76
End: 2021-02-25

## 2021-02-25 NOTE — TELEPHONE ENCOUNTER
Staff contacted Patient in regards to her return to Phase II Cardiac Rehab. Patient states that she is at her sister's  and will call staff back next week.

## 2021-02-26 ENCOUNTER — APPOINTMENT (OUTPATIENT)
Dept: CARDIAC REHAB | Facility: HOSPITAL | Age: 76
End: 2021-02-26

## 2021-03-01 ENCOUNTER — APPOINTMENT (OUTPATIENT)
Dept: CARDIAC REHAB | Facility: HOSPITAL | Age: 76
End: 2021-03-01

## 2021-03-02 ENCOUNTER — APPOINTMENT (OUTPATIENT)
Dept: CARDIAC REHAB | Facility: HOSPITAL | Age: 76
End: 2021-03-02

## 2021-03-03 ENCOUNTER — APPOINTMENT (OUTPATIENT)
Dept: CARDIAC REHAB | Facility: HOSPITAL | Age: 76
End: 2021-03-03

## 2021-03-04 ENCOUNTER — APPOINTMENT (OUTPATIENT)
Dept: CARDIAC REHAB | Facility: HOSPITAL | Age: 76
End: 2021-03-04

## 2021-03-05 ENCOUNTER — APPOINTMENT (OUTPATIENT)
Dept: CARDIAC REHAB | Facility: HOSPITAL | Age: 76
End: 2021-03-05

## 2021-03-08 ENCOUNTER — APPOINTMENT (OUTPATIENT)
Dept: CARDIAC REHAB | Facility: HOSPITAL | Age: 76
End: 2021-03-08

## 2021-03-09 ENCOUNTER — APPOINTMENT (OUTPATIENT)
Dept: CARDIAC REHAB | Facility: HOSPITAL | Age: 76
End: 2021-03-09

## 2021-03-10 ENCOUNTER — APPOINTMENT (OUTPATIENT)
Dept: CARDIAC REHAB | Facility: HOSPITAL | Age: 76
End: 2021-03-10

## 2021-03-11 ENCOUNTER — APPOINTMENT (OUTPATIENT)
Dept: CARDIAC REHAB | Facility: HOSPITAL | Age: 76
End: 2021-03-11

## 2021-03-15 ENCOUNTER — TELEPHONE (OUTPATIENT)
Dept: CARDIAC REHAB | Facility: HOSPITAL | Age: 76
End: 2021-03-15

## 2021-03-15 NOTE — TELEPHONE ENCOUNTER
Patient's  called to cancel his wife's Phase II Cardiac Rehab session related to arthritic pain. She plans to return on 3/17/21.

## 2021-03-19 ENCOUNTER — TELEPHONE (OUTPATIENT)
Dept: CARDIAC REHAB | Facility: HOSPITAL | Age: 76
End: 2021-03-19

## 2021-03-26 ENCOUNTER — APPOINTMENT (OUTPATIENT)
Dept: CARDIAC REHAB | Facility: HOSPITAL | Age: 76
End: 2021-03-26

## 2021-03-29 ENCOUNTER — TELEPHONE (OUTPATIENT)
Dept: CARDIAC REHAB | Facility: HOSPITAL | Age: 76
End: 2021-03-29

## 2021-03-29 ENCOUNTER — APPOINTMENT (OUTPATIENT)
Dept: CARDIAC REHAB | Facility: HOSPITAL | Age: 76
End: 2021-03-29

## 2021-03-29 NOTE — TELEPHONE ENCOUNTER
Staff called patient related to her absence from Phase II Cardiac Rehab program since 1/13/21.  She states she has ongoing health issues she is addressing with her physician. She is unable to return to program. Staff educated Ms. De La Cruz on home exercise guidelines. Teach back verified. Staff will close her account at this time.

## 2021-03-31 ENCOUNTER — APPOINTMENT (OUTPATIENT)
Dept: CARDIAC REHAB | Facility: HOSPITAL | Age: 76
End: 2021-03-31

## 2021-04-02 ENCOUNTER — APPOINTMENT (OUTPATIENT)
Dept: CARDIAC REHAB | Facility: HOSPITAL | Age: 76
End: 2021-04-02

## 2021-04-03 ENCOUNTER — HOSPITAL ENCOUNTER (EMERGENCY)
Facility: HOSPITAL | Age: 76
Discharge: HOME | End: 2021-04-04
Attending: EMERGENCY MEDICINE | Admitting: EMERGENCY MEDICINE

## 2021-04-03 VITALS
TEMPERATURE: 97.9 F | SYSTOLIC BLOOD PRESSURE: 175 MMHG | BODY MASS INDEX: 34.49 KG/M2 | RESPIRATION RATE: 16 BRPM | WEIGHT: 202 LBS | OXYGEN SATURATION: 99 % | HEIGHT: 64 IN | DIASTOLIC BLOOD PRESSURE: 72 MMHG | HEART RATE: 62 BPM

## 2021-04-03 DIAGNOSIS — R04.0 NOSEBLEED: Primary | ICD-10-CM

## 2021-04-03 DIAGNOSIS — Z79.01 CHRONIC ANTICOAGULATION: ICD-10-CM

## 2021-04-03 PROCEDURE — 99281 EMR DPT VST MAYX REQ PHY/QHP: CPT

## 2021-04-03 RX ORDER — LIDOCAINE HYDROCHLORIDE 40 MG/ML
1 SOLUTION TOPICAL ONCE
Status: DISCONTINUED | OUTPATIENT
Start: 2021-04-03 | End: 2021-04-04 | Stop reason: HOSPADM

## 2021-04-04 NOTE — ED PROVIDER NOTES
EMERGENCY DEPARTMENT ENCOUNTER      Pt Name: Angélica De La Cruz  MRN: [id number]  YOB: 1945  Date of evaluation: 4/3/2021  Provider: Tom Chino DO    CHIEF COMPLAINT       Chief Complaint   Patient presents with   • Nose Bleed         HISTORY OF PRESENT ILLNESS  (Location/Symptom, Timing/Onset, Context/Setting, Quality, Duration, Modifying Factors, Severity.)   Angélica De La Cruz is a 76 y.o. female who presents to the emergency department for evaluation of a recurrent nosebleed of on the right nares.  She does follow with ENT specialist for this issue and she is on blood thinners.  She states she had bleeding on and off since this morning.  After arrival to the ED she used some of her Afrin nasal spray the bleeding has since subsided.  She does not have any current packing in place.  Denies any falls, injury or trauma.  Besides a nosebleed the patient states she feels well with normal at her baseline.  Denies any chest pain or difficulty breathing, no other acute systemic complaints.      Nursing notes were reviewed.    REVIEW OF SYSTEMS    (2-9 systems for level 4, 10 or more for level 5)   ROS:  General:  No fevers, no chills, no weakness  Cardiovascular:  No chest pain, no palpitations  Respiratory:  No shortness of breath, no cough, no wheezing  Gastrointestinal:  No pain, no nausea, no vomiting, no diarrhea  Musculoskeletal:  No muscle pain, no joint pain  Skin:  No rash  Neurologic:  No headache  Psychiatric:  No anxiety  Genitourinary:  No dysuria, no hematuria    Except as noted above the remainder of the review of systems was reviewed and negative.       PAST MEDICAL HISTORY     Past Medical History:   Diagnosis Date   • Arthritis    • CKD (chronic kidney disease)     elevated creat caused by long term use of lisinopril    • Coronary artery disease    • Fibromyalgia    • Hypertension    • Lung nodule seen on imaging study    • AMANDA treated with BiPAP    • Peritonitis (CMS/HCC)           SURGICAL HISTORY       Past Surgical History:   Procedure Laterality Date   • APPENDECTOMY     • CARDIAC CATHETERIZATION N/A 11/24/2020    Procedure: Left Heart Cath;  Surgeon: Sanya Borges MD;  Location:  [facility];  Service: Cardiovascular;  Laterality: N/A;   • CATARACT EXTRACTION, BILATERAL Bilateral 2018   • CHOLECYSTECTOMY     • CORONARY ARTERY BYPASS GRAFT N/A 11/25/2020    Procedure: MEDIAN STERNOTOMY, CORONARY ARTERY BYPASS GRAFTING X2, UTILIZNG THE LEFT  INTERNAL MAMMARY ARTERY GRAFT, ENDOSCOPIC VEIN HARVESTING CONVERTED TO OPEN OF THE RIGHT GREATER SAPHENOUS VEIN;  Surgeon: Wali Spicer MD;  Location:  [facility] OR;  Service: Cardiothoracic;  Laterality: N/A;  VO: 0807  VR: 0807     • HYSTERECTOMY  1984    with BSO         CURRENT MEDICATIONS       Current Facility-Administered Medications:   •  lidocaine (XYLOCAINE) 4 % external solution 1 application, 1 application, Topical, Once, Tom Chino DO  •  phenylephrine (RAFA-SYNEPHRINE) 0.25 % nasal spray 2 spray, 2 spray, Each Nare, Q4H PRN, Tom Chino DO  •  Tranexamic Acid Sterile Solution 500 mg, 500 mg, Topical, Once, Tom Chino DO    Current Outpatient Medications:   •  amLODIPine (NORVASC) 10 MG tablet, Take 1 tablet by mouth Daily., Disp: 10 tablet, Rfl: 3  •  aspirin 81 MG chewable tablet, Chew 81 mg daily., Disp: , Rfl:   •  atorvastatin (LIPITOR) 40 MG tablet, Take 1 tablet by mouth Every Night., Disp: 30 tablet, Rfl: 3  •  B Complex Vitamins (vitamin b complex) capsule capsule, Take 1 capsule by mouth Daily., Disp: , Rfl:   •  clopidogrel (PLAVIX) 75 MG tablet, Take 1 tablet by mouth Daily., Disp: 30 tablet, Rfl: 3  •  estradiol (CLIMARA) 0.0375 MG/24HR, Place 1 patch on the skin 1 (one) time per week., Disp: , Rfl:   •  estradiol (CLIMARA) 0.075 MG/24HR patch, , Disp: , Rfl:   •  fluticasone (FLONASE) 50 MCG/ACT nasal spray, 2 sprays into each nostril daily. Administer 2  sprays in each nostril for each dose., Disp: , Rfl:   •  furosemide (LASIX) 20 MG tablet, Take 20 mg by mouth daily., Disp: , Rfl:   •  gabapentin (NEURONTIN) 600 MG tablet, Take 600 mg by mouth 2 (Two) Times a Day., Disp: , Rfl:   •  guaiFENesin (Mucinex) 600 MG 12 hr tablet, Take 1 tablet by mouth Daily., Disp: , Rfl:   •  hydrALAZINE (APRESOLINE) 25 MG tablet, Take 10 mg by mouth 2 (Two) Times a Day., Disp: , Rfl:   •  hydroCHLOROthiazide (HYDRODIURIL) 50 MG tablet, Take 50 mg by mouth Daily., Disp: , Rfl:   •  levocetirizine (Xyzal) 5 MG tablet, Take 5 mg by mouth Daily., Disp: , Rfl:   •  levothyroxine (SYNTHROID, LEVOTHROID) 100 MCG tablet, Take 100 mcg by mouth daily., Disp: , Rfl:   •  metoprolol tartrate (LOPRESSOR) 25 MG tablet, Take 1 tablet by mouth Every 12 (Twelve) Hours., Disp: 30 tablet, Rfl: 3  •  Multiple Vitamins-Minerals (MULTIVITAMIN PO), Take 1 tablet by mouth Daily., Disp: , Rfl:   •  olmesartan (BENICAR) 20 MG tablet, Take 40 mg by mouth Daily., Disp: , Rfl:   •  potassium chloride (K-DUR,KLOR-CON) 10 MEQ CR tablet, Take 10 mEq by mouth daily., Disp: , Rfl:   •  sertraline (ZOLOFT) 50 MG tablet, Take 50 mg by mouth Daily., Disp: , Rfl:     ALLERGIES     Adhesive tape, Codeine, Morphine and related, Cefaclor, Oxycodone-acetaminophen, Sulfa antibiotics, Doxycycline, Erythromycin, Oxytetracycline, and Penicillins    FAMILY HISTORY       Family History   Problem Relation Age of Onset   • Heart disease Mother    • Cerebral aneurysm Father    • Heart disease Brother    • Heart disease Maternal Grandmother    • No Known Problems Half-Sister    • No Known Problems Half-Sister    • Kidney failure Half-Brother    • Sudden death Half-Brother    • Breast cancer Neg Hx    • Ovarian cancer Neg Hx           SOCIAL HISTORY       Social History     Socioeconomic History   • Marital status:      Spouse name: Not on file   • Number of children: 2   • Years of education: Not on file   • Highest education  "level: Not on file   Tobacco Use   • Smoking status: Former Smoker     Packs/day: 1.00     Years: 15.00     Pack years: 15.00     Types: Cigarettes     Quit date:      Years since quittin.2   • Smokeless tobacco: Never Used   Substance and Sexual Activity   • Alcohol use: No   • Drug use: No   • Sexual activity: Defer         PHYSICAL EXAM    (up to 7 for level 4, 8 or more for level 5)     Vitals:    21 2150   BP: 175/72   BP Location: Right arm   Patient Position: Sitting   Pulse: 62   Resp: 16   Temp: 97.9 °F (36.6 °C)   TempSrc: Oral   SpO2: 99%   Weight: 91.6 kg (202 lb)   Height: 162.6 cm (64\")       Physical Exam  General : Patient is awake, alert, oriented, in no acute distress, nontoxic appearing  HEENT: Pupils are equally round and reactive to light, EOMI, conjunctivae clear, sclerae white, there is no injection no icterus.  Oral mucosa is moist, no exudate. Uvula is midline.  Bilateral nares are clear, there is a scab formation along the inner aspect of the nasal septum, there is no septal hematoma, no active bleeding or drainage, no drainage in the posterior pharynx.  Neck: Neck is supple, full range of motion, trachea midline  Cardiac: Heart regular rate, rhythm  Lungs: Lungs are clear to auscultation  Abdomen: Abdomen is soft, nontender, nondistended. There are no firm or pulsatile masses, no rebound rigidity or guarding.   Musculoskeletal: 5 out of 5 strength in all 4 extremities.  No focal muscle deficits are appreciated  Neuro: Motor intact, sensory intact, level of consciousness is normal  Dermatology: Skin is warm and dry  Psych: Mentation is grossly normal, cognition is grossly normal. Affect is appropriate.      DIAGNOSTIC RESULTS     EKG: All EKG's are interpreted by the Emergency Department Physician who either signs or Co-signs this chart in the absence of a cardiologist.    No orders to display       RADIOLOGY:   Non-plain film images such as CT, Ultrasound and MRI are read by " "the radiologist. Plain radiographic images are visualized and preliminarily interpreted by the emergency physician with the below findings:      [] Radiologist's Report Reviewed:  No orders to display         ED BEDSIDE ULTRASOUND:   Performed by ED Physician - none    LABS:    I have reviewed and interpreted all of the currently available lab results from this visit (if applicable):  Results for orders placed or performed during the hospital encounter of 12/10/20   ECG 12 Lead   Result Value Ref Range    QT Interval 504 ms    QTC Interval 472 ms        All other labs were within normal range or not returned as of this dictation.      EMERGENCY DEPARTMENT COURSE and DIFFERENTIAL DIAGNOSIS/MDM:   Vitals:    Vitals:    04/03/21 2150   BP: 175/72   BP Location: Right arm   Patient Position: Sitting   Pulse: 62   Resp: 16   Temp: 97.9 °F (36.6 °C)   TempSrc: Oral   SpO2: 99%   Weight: 91.6 kg (202 lb)   Height: 162.6 cm (64\")            Patient with right nosebleed prior to arrival, which did resolve prior to my evaluation.  No active bleeding or posterior pharyngeal bleeding.  I discussed with the patient that the there is no signs of current bleeding which I feel we can withhold any type of packing at this time.  Patient would prefer this as well she does have her materials at home she can continue to use for nosebleed control should it return.  She states she can follow-up with her ENT specialist this upcoming week as previously planned.  If she has any recurrent bleed which is uncontrollable at home she can always return back to the emergency department. I encouraged the patient to return to the emergency department immediately for ANY concerns, worsening, new complaints, or if symptoms persist and unable to seek follow-up in a timely fashion.  The patient/family expressed understanding and agreement with this plan.  The patient will follow-up with their PCP in 1-2 days for reevaluation.       MEDICATIONS " ADMINISTERED IN ED:  Medications   phenylephrine (RAFA-SYNEPHRINE) 0.25 % nasal spray 2 spray (has no administration in time range)   lidocaine (XYLOCAINE) 4 % external solution 1 application (has no administration in time range)   Tranexamic Acid Sterile Solution 500 mg (has no administration in time range)       PROCEDURES:  Procedures    CRITICAL CARE TIME    Total Critical Care time was 0 minutes, excluding separately reportable procedures.   There was a high probability of clinically significant/life threatening deterioration in the patient's condition which required my urgent intervention.      FINAL IMPRESSION      1. Nosebleed    2. Chronic anticoagulation          DISPOSITION/PLAN     ED Disposition     ED Disposition Condition Comment    Discharge Stable           PATIENT REFERRED TO:  Luis Alfredo Rachel MD  [address]  [place] [address]  [phone]    Schedule an appointment as soon as possible for a visit   Your ENT specialist    Sussy Bloom MD  [address] [id number] [phone]    In 2 days      [facility] Emergency Department  [address] [id number] [phone]    If symptoms worsen      DISCHARGE MEDICATIONS:     Medication List      CONTINUE taking these medications    amLODIPine 10 MG tablet  Commonly known as: NORVASC  Take 1 tablet by mouth Daily.     aspirin 81 MG chewable tablet     atorvastatin 40 MG tablet  Commonly known as: LIPITOR  Take 1 tablet by mouth Every Night.     clopidogrel 75 MG tablet  Commonly known as: PLAVIX  Take 1 tablet by mouth Daily.     * estradiol 0.0375 MG/24HR  Commonly known as: CLIMARA     * estradiol 0.075 MG/24HR patch  Commonly known as: CLIMARA     fluticasone 50 MCG/ACT nasal spray  Commonly known as: FLONASE     furosemide 20 MG tablet  Commonly known as: LASIX     gabapentin 600 MG tablet  Commonly known as: NEURONTIN     hydrALAZINE 25 MG tablet  Commonly  known as: APRESOLINE     hydroCHLOROthiazide 50 MG tablet  Commonly known as: HYDRODIURIL     levothyroxine 100 MCG tablet  Commonly known as: SYNTHROID, LEVOTHROID     metoprolol tartrate 25 MG tablet  Commonly known as: LOPRESSOR  Take 1 tablet by mouth Every 12 (Twelve) Hours.     Mucinex 600 MG 12 hr tablet  Generic drug: guaiFENesin     multivitamin tablet tablet  Commonly known as: THERAGRAN     olmesartan 20 MG tablet  Commonly known as: BENICAR     potassium chloride 10 MEQ CR tablet  Commonly known as: K-DUR,KLOR-CON     sertraline 50 MG tablet  Commonly known as: ZOLOFT     vitamin b complex capsule capsule     Xyzal 5 MG tablet  Generic drug: levocetirizine         * This list has 2 medication(s) that are the same as other medications prescribed for you. Read the directions carefully, and ask your doctor or other care provider to review them with you.                    Comment: Please note this report has been produced using speech recognition software.      Tom Chino DO  Attending Emergency Physician               Tom Chino DO  04/04/21 3275

## 2021-04-04 NOTE — DISCHARGE INSTRUCTIONS
Take your medications as prescribed, humidified air, use your home therapies if your nosebleed restarts as well as the items given to you today.  Return to the ED if your symptoms return and you cannot stop the bleeding.

## 2021-04-05 ENCOUNTER — APPOINTMENT (OUTPATIENT)
Dept: CARDIAC REHAB | Facility: HOSPITAL | Age: 76
End: 2021-04-05

## 2021-04-07 ENCOUNTER — APPOINTMENT (OUTPATIENT)
Dept: CARDIAC REHAB | Facility: HOSPITAL | Age: 76
End: 2021-04-07

## 2021-04-09 ENCOUNTER — APPOINTMENT (OUTPATIENT)
Dept: CARDIAC REHAB | Facility: HOSPITAL | Age: 76
End: 2021-04-09

## 2021-04-12 ENCOUNTER — APPOINTMENT (OUTPATIENT)
Dept: CARDIAC REHAB | Facility: HOSPITAL | Age: 76
End: 2021-04-12

## 2021-04-14 ENCOUNTER — APPOINTMENT (OUTPATIENT)
Dept: CARDIAC REHAB | Facility: HOSPITAL | Age: 76
End: 2021-04-14

## 2021-04-16 ENCOUNTER — APPOINTMENT (OUTPATIENT)
Dept: CARDIAC REHAB | Facility: HOSPITAL | Age: 76
End: 2021-04-16

## 2021-04-19 ENCOUNTER — APPOINTMENT (OUTPATIENT)
Dept: CARDIAC REHAB | Facility: HOSPITAL | Age: 76
End: 2021-04-19

## 2021-04-21 ENCOUNTER — APPOINTMENT (OUTPATIENT)
Dept: CARDIAC REHAB | Facility: HOSPITAL | Age: 76
End: 2021-04-21

## 2021-06-08 ENCOUNTER — TRANSCRIBE ORDERS (OUTPATIENT)
Dept: ADMINISTRATIVE | Facility: HOSPITAL | Age: 76
End: 2021-06-08

## 2021-06-08 DIAGNOSIS — Z12.31 VISIT FOR SCREENING MAMMOGRAM: Primary | ICD-10-CM

## 2021-07-21 ENCOUNTER — HOSPITAL ENCOUNTER (OUTPATIENT)
Dept: MAMMOGRAPHY | Facility: HOSPITAL | Age: 76
Discharge: HOME | End: 2021-07-21
Admitting: OBSTETRICS & GYNECOLOGY

## 2021-07-21 DIAGNOSIS — Z12.31 VISIT FOR SCREENING MAMMOGRAM: ICD-10-CM

## 2021-07-21 PROCEDURE — 77063 BREAST TOMOSYNTHESIS BI: CPT | Performed by: RADIOLOGY

## 2021-07-21 PROCEDURE — 77067 SCR MAMMO BI INCL CAD: CPT | Performed by: RADIOLOGY

## 2021-07-21 PROCEDURE — 77067 SCR MAMMO BI INCL CAD: CPT

## 2021-07-21 PROCEDURE — 77063 BREAST TOMOSYNTHESIS BI: CPT

## 2022-06-21 ENCOUNTER — TRANSCRIBE ORDERS (OUTPATIENT)
Dept: ADMINISTRATIVE | Facility: HOSPITAL | Age: 77
End: 2022-06-21

## 2022-06-21 DIAGNOSIS — Z12.31 VISIT FOR SCREENING MAMMOGRAM: Primary | ICD-10-CM

## 2022-07-22 ENCOUNTER — HOSPITAL ENCOUNTER (OUTPATIENT)
Dept: MAMMOGRAPHY | Facility: HOSPITAL | Age: 77
Discharge: HOME | End: 2022-07-22
Admitting: INTERNAL MEDICINE

## 2022-07-22 DIAGNOSIS — Z12.31 VISIT FOR SCREENING MAMMOGRAM: ICD-10-CM

## 2022-07-22 PROCEDURE — 77067 SCR MAMMO BI INCL CAD: CPT

## 2022-07-22 PROCEDURE — 77067 SCR MAMMO BI INCL CAD: CPT | Performed by: RADIOLOGY

## 2022-07-22 PROCEDURE — 77063 BREAST TOMOSYNTHESIS BI: CPT

## 2022-07-22 PROCEDURE — 77063 BREAST TOMOSYNTHESIS BI: CPT | Performed by: RADIOLOGY

## 2023-02-28 ENCOUNTER — LAB (OUTPATIENT)
Dept: PULMONOLOGY | Facility: CLINIC | Age: 78
End: 2023-02-28
Payer: MEDICARE

## 2023-02-28 ENCOUNTER — OFFICE VISIT (OUTPATIENT)
Dept: PULMONOLOGY | Facility: CLINIC | Age: 78
End: 2023-02-28
Payer: MEDICARE

## 2023-02-28 VITALS
BODY MASS INDEX: 31.41 KG/M2 | RESPIRATION RATE: 16 BRPM | TEMPERATURE: 98 F | HEART RATE: 56 BPM | OXYGEN SATURATION: 92 % | SYSTOLIC BLOOD PRESSURE: 124 MMHG | WEIGHT: 184 LBS | DIASTOLIC BLOOD PRESSURE: 54 MMHG | HEIGHT: 64 IN

## 2023-02-28 DIAGNOSIS — J30.89 NON-SEASONAL ALLERGIC RHINITIS, UNSPECIFIED TRIGGER: ICD-10-CM

## 2023-02-28 DIAGNOSIS — J45.998 OTHER ASTHMA: ICD-10-CM

## 2023-02-28 DIAGNOSIS — G47.33 OSA (OBSTRUCTIVE SLEEP APNEA): ICD-10-CM

## 2023-02-28 DIAGNOSIS — R05.3 CHRONIC COUGH: ICD-10-CM

## 2023-02-28 DIAGNOSIS — R06.02 SHORTNESS OF BREATH: Primary | ICD-10-CM

## 2023-02-28 LAB
ALBUMIN SERPL-MCNC: 4.2 G/DL (ref 3.5–5.2)
ALBUMIN/GLOB SERPL: 1.4 G/DL
ALP SERPL-CCNC: 96 U/L (ref 39–117)
ALT SERPL W P-5'-P-CCNC: 18 U/L (ref 1–33)
ANION GAP SERPL CALCULATED.3IONS-SCNC: 12 MMOL/L (ref 5–15)
AST SERPL-CCNC: 27 U/L (ref 1–32)
BASOPHILS # BLD AUTO: 0.06 10*3/MM3 (ref 0–0.2)
BASOPHILS NFR BLD AUTO: 1.1 % (ref 0–1.5)
BILIRUB SERPL-MCNC: 0.4 MG/DL (ref 0–1.2)
BUN SERPL-MCNC: 27 MG/DL (ref 8–23)
BUN/CREAT SERPL: 21.1 (ref 7–25)
CALCIUM SPEC-SCNC: 9.4 MG/DL (ref 8.6–10.5)
CHLORIDE SERPL-SCNC: 105 MMOL/L (ref 98–107)
CO2 SERPL-SCNC: 25 MMOL/L (ref 22–29)
CREAT SERPL-MCNC: 1.28 MG/DL (ref 0.57–1)
DEPRECATED RDW RBC AUTO: 46.2 FL (ref 37–54)
EGFRCR SERPLBLD CKD-EPI 2021: 42.4 ML/MIN/1.73
EOSINOPHIL # BLD AUTO: 0.4 10*3/MM3 (ref 0–0.4)
EOSINOPHIL NFR BLD AUTO: 7.4 % (ref 0.3–6.2)
ERYTHROCYTE [DISTWIDTH] IN BLOOD BY AUTOMATED COUNT: 14.2 % (ref 12.3–15.4)
GLOBULIN UR ELPH-MCNC: 3.1 GM/DL
GLUCOSE SERPL-MCNC: 80 MG/DL (ref 65–99)
HCT VFR BLD AUTO: 40.5 % (ref 34–46.6)
HGB BLD-MCNC: 12.8 G/DL (ref 12–15.9)
IMM GRANULOCYTES # BLD AUTO: 0.02 10*3/MM3 (ref 0–0.05)
IMM GRANULOCYTES NFR BLD AUTO: 0.4 % (ref 0–0.5)
LYMPHOCYTES # BLD AUTO: 0.89 10*3/MM3 (ref 0.7–3.1)
LYMPHOCYTES NFR BLD AUTO: 16.5 % (ref 19.6–45.3)
MCH RBC QN AUTO: 28.3 PG (ref 26.6–33)
MCHC RBC AUTO-ENTMCNC: 31.6 G/DL (ref 31.5–35.7)
MCV RBC AUTO: 89.6 FL (ref 79–97)
MONOCYTES # BLD AUTO: 0.42 10*3/MM3 (ref 0.1–0.9)
MONOCYTES NFR BLD AUTO: 7.8 % (ref 5–12)
NEUTROPHILS NFR BLD AUTO: 3.62 10*3/MM3 (ref 1.7–7)
NEUTROPHILS NFR BLD AUTO: 66.8 % (ref 42.7–76)
NRBC BLD AUTO-RTO: 0 /100 WBC (ref 0–0.2)
PLATELET # BLD AUTO: 183 10*3/MM3 (ref 140–450)
PMV BLD AUTO: 10.9 FL (ref 6–12)
POTASSIUM SERPL-SCNC: 3.5 MMOL/L (ref 3.5–5.2)
PROT SERPL-MCNC: 7.3 G/DL (ref 6–8.5)
RBC # BLD AUTO: 4.52 10*6/MM3 (ref 3.77–5.28)
SODIUM SERPL-SCNC: 142 MMOL/L (ref 136–145)
WBC NRBC COR # BLD: 5.41 10*3/MM3 (ref 3.4–10.8)

## 2023-02-28 PROCEDURE — 86235 NUCLEAR ANTIGEN ANTIBODY: CPT | Performed by: NURSE PRACTITIONER

## 2023-02-28 PROCEDURE — 86003 ALLG SPEC IGE CRUDE XTRC EA: CPT | Performed by: NURSE PRACTITIONER

## 2023-02-28 PROCEDURE — 86606 ASPERGILLUS ANTIBODY: CPT | Performed by: NURSE PRACTITIONER

## 2023-02-28 PROCEDURE — 86602 ANTINOMYCES ANTIBODY: CPT | Performed by: NURSE PRACTITIONER

## 2023-02-28 PROCEDURE — 86038 ANTINUCLEAR ANTIBODIES: CPT | Performed by: NURSE PRACTITIONER

## 2023-02-28 PROCEDURE — 94726 PLETHYSMOGRAPHY LUNG VOLUMES: CPT | Performed by: NURSE PRACTITIONER

## 2023-02-28 PROCEDURE — 82785 ASSAY OF IGE: CPT | Performed by: NURSE PRACTITIONER

## 2023-02-28 PROCEDURE — 80053 COMPREHEN METABOLIC PANEL: CPT | Performed by: NURSE PRACTITIONER

## 2023-02-28 PROCEDURE — 86037 ANCA TITER EACH ANTIBODY: CPT | Performed by: NURSE PRACTITIONER

## 2023-02-28 PROCEDURE — 85025 COMPLETE CBC W/AUTO DIFF WBC: CPT | Performed by: NURSE PRACTITIONER

## 2023-02-28 PROCEDURE — 86225 DNA ANTIBODY NATIVE: CPT | Performed by: NURSE PRACTITIONER

## 2023-02-28 PROCEDURE — 83516 IMMUNOASSAY NONANTIBODY: CPT | Performed by: NURSE PRACTITIONER

## 2023-02-28 PROCEDURE — 87206 SMEAR FLUORESCENT/ACID STAI: CPT | Performed by: NURSE PRACTITIONER

## 2023-02-28 PROCEDURE — 86671 FUNGUS NES ANTIBODY: CPT | Performed by: NURSE PRACTITIONER

## 2023-02-28 PROCEDURE — 87205 SMEAR GRAM STAIN: CPT | Performed by: NURSE PRACTITIONER

## 2023-02-28 PROCEDURE — 94729 DIFFUSING CAPACITY: CPT | Performed by: NURSE PRACTITIONER

## 2023-02-28 PROCEDURE — 87116 MYCOBACTERIA CULTURE: CPT | Performed by: NURSE PRACTITIONER

## 2023-02-28 PROCEDURE — 99213 OFFICE O/P EST LOW 20 MIN: CPT | Performed by: NURSE PRACTITIONER

## 2023-02-28 PROCEDURE — 86609 BACTERIUM ANTIBODY: CPT | Performed by: NURSE PRACTITIONER

## 2023-02-28 PROCEDURE — 94010 BREATHING CAPACITY TEST: CPT | Performed by: NURSE PRACTITIONER

## 2023-02-28 PROCEDURE — 87070 CULTURE OTHR SPECIMN AEROBIC: CPT | Performed by: NURSE PRACTITIONER

## 2023-02-28 PROCEDURE — 86331 IMMUNODIFFUSION OUCHTERLONY: CPT | Performed by: NURSE PRACTITIONER

## 2023-02-28 PROCEDURE — 82104 ALPHA-1-ANTITRYPSIN PHENO: CPT | Performed by: NURSE PRACTITIONER

## 2023-02-28 PROCEDURE — 82103 ALPHA-1-ANTITRYPSIN TOTAL: CPT | Performed by: NURSE PRACTITIONER

## 2023-02-28 RX ORDER — ATORVASTATIN CALCIUM 40 MG/1
40 TABLET, FILM COATED ORAL DAILY
COMMUNITY

## 2023-02-28 NOTE — PROGRESS NOTES
[facility] Pulmonary Initial Evaluation    CHIEF COMPLAINT    Shortness of breath/cough    Referred by:  Sussy Bloom MD    HISTORY OF PRESENT ILLNESS    Angélica De La Cruz is a 78 y.o.female here today for an initial evaluation of her cough and shortness of breath.  She states that she has been on several rounds of antibiotics and steroids per PCP since January and has had a lingering nonproductive cough since this time.  She was referred to our office for further interventions.    She states she did have severe allergies as a child and had recurrent infections with the season changes.  She denies any exposure to TB.  She denies any chemical or environmental exposures in the past.  She had to use heavy cleaning supplies when growing up but never had any difficulties breathing.  She denies any history of lung cancer in her first-degree relatives.    She quit smoking in 1973 and has a 15-pack-year history.    She denies any sputum production or hemoptysis.  She typically will produce clear sputum.  She denies any chest pain or palpitations.  She does follow closely with Dr. Borges.  She did miss her appointment last month and needs to reschedule.  She denies any lower extremity edema or calf tenderness.    She does have a lot of allergy symptoms.  She is taking Xyzal daily.  She has Flonase but does not use it on a regular basis.  She will occasionally cough up some light yellow secretions in the mornings.    She does have a CPAP and wears it nightly.  She denies any sleeping difficulties.    Patient Active Problem List   Diagnosis   • Acute febrile illness   • Abnormal stress test   • CABG 11/25/20   • Hypertension   • CKD (chronic kidney disease)   • Atrial fibrillation (HCC)   • Chronic cough   • Non-seasonal allergic rhinitis   • AMANDA (obstructive sleep apnea)       Allergies   Allergen Reactions   • Adhesive Tape Itching and Rash   • Codeine Delirium   • Morphine And Related Delirium   • Cefaclor Other (See Comments)    • Oxycodone-Acetaminophen Itching   • Sulfa Antibiotics    • Doxycycline Rash   • Erythromycin Rash   • Oxytetracycline Rash   • Penicillins Rash     Tolerates cefuroxime       Current Outpatient Medications:   •  amLODIPine (NORVASC) 10 MG tablet, Take 1 tablet by mouth Daily. (Patient taking differently: Take 1 tablet by mouth 2 (Two) Times a Day.), Disp: 10 tablet, Rfl: 3  •  aspirin 81 MG chewable tablet, Chew 1 tablet Daily., Disp: , Rfl:   •  atorvastatin (LIPITOR) 40 MG tablet, Take 1 tablet by mouth Daily., Disp: , Rfl:   •  B Complex Vitamins (vitamin b complex) capsule capsule, Take 1 capsule by mouth Daily., Disp: , Rfl:   •  fluticasone (FLONASE) 50 MCG/ACT nasal spray, 2 sprays into the nostril(s) as directed by provider Daily. Administer 2 sprays in each nostril for each dose., Disp: , Rfl:   •  gabapentin (NEURONTIN) 600 MG tablet, Take 300 mg by mouth 4 (Four) Times a Day., Disp: , Rfl:   •  guaiFENesin (MUCINEX) 600 MG 12 hr tablet, Take 1 tablet by mouth Daily., Disp: , Rfl:   •  hydrALAZINE (APRESOLINE) 25 MG tablet, Take 1 tablet by mouth 2 (Two) Times a Day., Disp: , Rfl:   •  hydroCHLOROthiazide (HYDRODIURIL) 50 MG tablet, Take 1 tablet by mouth Daily., Disp: , Rfl:   •  levocetirizine (XYZAL) 5 MG tablet, Take 1 tablet by mouth Daily., Disp: , Rfl:   •  levothyroxine (SYNTHROID, LEVOTHROID) 100 MCG tablet, Take 1 tablet by mouth Daily., Disp: , Rfl:   •  Multiple Vitamins-Minerals (MULTIVITAMIN PO), Take 1 tablet by mouth Daily., Disp: , Rfl:   •  olmesartan (BENICAR) 20 MG tablet, Take 1 tablet by mouth Daily., Disp: , Rfl:   •  potassium chloride (K-DUR,KLOR-CON) 10 MEQ CR tablet, Take 1 tablet by mouth Daily., Disp: , Rfl:   •  sertraline (ZOLOFT) 50 MG tablet, Take 1 tablet by mouth Daily., Disp: , Rfl:   MEDICATION LIST AND ALLERGIES REVIEWED.    Social History     Tobacco Use   • Smoking status: Former     Packs/day: 1.00     Years: 15.00     Pack years: 15.00     Types: Cigarettes      "Quit date: 1973     Years since quittin.1   • Smokeless tobacco: Never   Substance Use Topics   • Alcohol use: No   • Drug use: No       FAMILY AND SOCIAL HISTORY REVIEWED.    Review of Systems   Constitutional: Positive for activity change and fatigue. Negative for appetite change, fever and unexpected weight change.   HENT: Positive for congestion, postnasal drip, rhinorrhea, sinus pressure, sore throat, tinnitus and voice change.    Eyes: Negative for visual disturbance.   Respiratory: Positive for cough and shortness of breath. Negative for chest tightness and wheezing.    Cardiovascular: Negative for chest pain, palpitations and leg swelling.   Gastrointestinal: Negative for abdominal distention, abdominal pain, nausea and vomiting.   Endocrine: Positive for polydipsia. Negative for cold intolerance, heat intolerance and polyphagia.   Genitourinary: Negative for difficulty urinating and urgency.   Musculoskeletal: Negative for arthralgias, back pain and neck pain.   Skin: Negative for color change and pallor.   Allergic/Immunologic: Positive for environmental allergies and food allergies.   Neurological: Positive for light-headedness. Negative for dizziness, syncope and weakness.   Hematological: Negative for adenopathy. Bruises/bleeds easily.   Psychiatric/Behavioral: Negative for agitation and behavioral problems.   .    /54 (BP Location: Left arm, Patient Position: Sitting, Cuff Size: Adult)   Pulse 56   Temp 98 °F (36.7 °C) (Infrared)   Resp 16   Ht 162.6 cm (64\")   Wt 83.5 kg (184 lb)   SpO2 92% Comment: RRA  BMI 31.58 kg/m²     Immunization History   Administered Date(s) Administered   • COVID-19 (PFIZER) PURPLE CAP 2021, 2021, 10/21/2021, 10/29/2021   • Fluzone High Dose =>65 Years ([employer] ONLY) 09/10/2018, 2019   • Fluzone High-Dose 65+yrs 10/29/2021, 10/10/2022   • Fluzone Quad >6mos (Multi-dose) 2017, 2020, 10/21/2021   • Hepatitis A 2018   • " INFLUENZA SPLIT TRI 10/29/2021   • Pneumococcal Conjugate 13-Valent (PCV13) 04/17/2017   • Pneumococcal Polysaccharide (PPSV23) 04/28/2009   • TD Preservative Free 07/01/2019   • Zostavax 10/07/2016       Physical Exam  Vitals and nursing note reviewed.   Constitutional:       Appearance: She is well-developed. She is not diaphoretic.   HENT:      Head: Normocephalic and atraumatic.   Eyes:      Pupils: Pupils are equal, round, and reactive to light.   Neck:      Thyroid: No thyromegaly.   Cardiovascular:      Rate and Rhythm: Normal rate and regular rhythm.      Heart sounds: Normal heart sounds. No murmur heard.    No friction rub. No gallop.   Pulmonary:      Effort: Pulmonary effort is normal. No respiratory distress.      Breath sounds: Normal breath sounds. No wheezing or rales.   Chest:      Chest wall: No tenderness.   Abdominal:      General: Bowel sounds are normal.      Palpations: Abdomen is soft.      Tenderness: There is no abdominal tenderness.   Musculoskeletal:         General: No swelling. Normal range of motion.      Cervical back: Normal range of motion and neck supple.   Lymphadenopathy:      Cervical: No cervical adenopathy.   Skin:     General: Skin is warm and dry.      Capillary Refill: Capillary refill takes less than 2 seconds.   Neurological:      Mental Status: She is alert and oriented to person, place, and time.   Psychiatric:         Behavior: Behavior normal.           RESULTS    PFTS in the office today, read by me, FVC 2.15 86% predicted, FEV1 1.53 83% predicted, FEV1/FVC 71% predicted, TLC 4.34 96% predicted, DLCO 52% predicted, no obstruction, no restriction and reduced DLCO.    Chest x-ray on 1/11/2023: No acute cardiopulmonary changes.    PROBLEM LIST    Problem List Items Addressed This Visit        Allergies and Adverse Reactions    Non-seasonal allergic rhinitis    Relevant Orders    Allergens, Zone 8       Pulmonary and Pneumonias    Chronic cough    Relevant Orders     Respiratory Culture - Sputum, Cough    AFB Culture - , Cough    CT Chest Hi Resolution    Hypersensitivity Pneumonitis Profile    CBC & Differential    Comprehensive Metabolic Panel    FABIOLA With / DsDNA, RNP, Sjogrens A / B, Aguilar    ANCA Panel    Aspergillus Antibodies    Aspergillus Fumigatus IgE    IgE Level    Alpha - 1 - Antitrypsin Phenotype    Fungal Antibodies, Quantitative Double Immunodiffusion    Pulmonary Function Test (Completed)       Sleep    AMANDA (obstructive sleep apnea)   Other Visit Diagnoses     Shortness of breath    -  Primary    Relevant Orders    CT Chest Hi Resolution    Hypersensitivity Pneumonitis Profile    CBC & Differential    Comprehensive Metabolic Panel    FABIOLA With / DsDNA, RNP, Sjogrens A / B, Aguilar    ANCA Panel    Aspergillus Antibodies    Aspergillus Fumigatus IgE    IgE Level    Alpha - 1 - Antitrypsin Phenotype    Fungal Antibodies, Quantitative Double Immunodiffusion    Other asthma        Relevant Orders    Aspergillus Fumigatus IgE            DISCUSSION    Ms. De La Cruz is here for an initial evaluation of her shortness of breath.  We did review her full PFTs in the office today and she has no obstruction or restriction.  She is currently using the ProAir rescue inhaler as needed.  I did advise her to continue using this as needed for shortness of breath or wheezing.  She does not meet any criteria for inhalers at this time.    We did review her chest x-ray in the office today and I am going to order an HRCT to further evaluate her shortness of breath and coughing.  I will call her once I receive the results.    We will draw some labs today to rule out any other causes of her cough or shortness of breath.    I do suspect that her allergies are playing a role with her coughing and did encourage her to use the Flonase 1 spray daily with the Xyzal regularly.    I did encourage her to use Mucinex twice a day instead of daily to see if this help thin her secretions out at  nighttime.    She will follow-up in 4 to 6 weeks or sooner if her symptoms worsen.  Advised her to call with any additional concerns or questions.    I personally spent a total of 35 minutes on patient visit today including chart review, face to face with the patient obtaining the history and physical exam, review of pertinent images and tests, counseling and discussion and/or coordination of care as described above, and documentation.  Total time excludes time spent on other separate services such as performing procedures or test interpretation, if applicable.        Taylor Carroin, APRN  02/28/202309:29 EST  Electronically signed     Please note that portions of this note were completed with a voice recognition program.        CC: Sussy Bloom MD

## 2023-03-02 LAB
ANA SER QL: POSITIVE
BACTERIA SPEC RESP CULT: NORMAL
CENTROMERE B AB SER-ACNC: <0.2 AI (ref 0–0.9)
CHROMATIN AB SERPL-ACNC: 0.4 AI (ref 0–0.9)
DSDNA AB SER-ACNC: 10 IU/ML (ref 0–9)
ENA JO1 AB SER-ACNC: <0.2 AI (ref 0–0.9)
ENA RNP AB SER-ACNC: 0.2 AI (ref 0–0.9)
ENA SCL70 AB SER-ACNC: <0.2 AI (ref 0–0.9)
ENA SM AB SER-ACNC: <0.2 AI (ref 0–0.9)
ENA SS-A AB SER-ACNC: <0.2 AI (ref 0–0.9)
ENA SS-B AB SER-ACNC: <0.2 AI (ref 0–0.9)
GRAM STN SPEC: NORMAL
Lab: ABNORMAL

## 2023-03-03 LAB
C-ANCA TITR SER IF: ABNORMAL TITER
MYELOPEROXIDASE AB SER IA-ACNC: 1.7 UNITS (ref 0–0.9)
P-ANCA ATYPICAL TITR SER IF: ABNORMAL TITER
P-ANCA TITR SER IF: ABNORMAL TITER
PROTEINASE3 AB SER IA-ACNC: 2.5 UNITS (ref 0–0.9)

## 2023-03-05 LAB
A FLAVUS AB SER QL ID: NEGATIVE
A FUMIGATUS AB SER QL ID: NEGATIVE
A NIGER AB SER QL ID: NEGATIVE

## 2023-03-06 LAB
A ALTERNATA IGE QN: <0.1 KU/L
A FUMIGATUS IGE QN: <0.1 KU/L
A1AT PHENOTYP SERPL IFE: ABNORMAL
A1AT SERPL-MCNC: 194 MG/DL (ref 101–187)
AMER ROACH IGE QN: <0.1 KU/L
BAHIA GRASS IGE QN: <0.1 KU/L
BERMUDA GRASS IGE QN: <0.1 KU/L
BOXELDER IGE QN: <0.1 KU/L
C HERBARUM IGE QN: <0.1 KU/L
CAT DANDER IGE QN: <0.1 KU/L
CMN PIGWEED IGE QN: <0.1 KU/L
COMMON RAGWEED IGE QN: <0.1 KU/L
CONV CLASS DESCRIPTION: NORMAL
D FARINAE IGE QN: <0.1 KU/L
D PTERONYSS IGE QN: <0.1 KU/L
DOG DANDER IGE QN: <0.1 KU/L
ENGL PLANTAIN IGE QN: <0.1 KU/L
HAZELNUT POLN IGE QN: <0.1 KU/L
JOHNSON GRASS IGE QN: <0.1 KU/L
KENT BLUE GRASS IGE QN: <0.1 KU/L
LONDON PLANE IGE QN: <0.1 KU/L
M RACEMOSUS IGE QN: <0.1 KU/L
MT JUNIPER IGE QN: <0.1 KU/L
MUGWORT IGE QN: <0.1 KU/L
NETTLE IGE QN: <0.1 KU/L
P NOTATUM IGE QN: <0.1 KU/L
S BOTRYOSUM IGE QN: <0.1 KU/L
SHEEP SORREL IGE QN: <0.1 KU/L
SWEET GUM IGE QN: <0.1 KU/L
WHITE ELM IGE QN: <0.1 KU/L
WHITE HICKORY IGE QN: <0.1 KU/L
WHITE MULBERRY IGE QN: <0.1 KU/L
WHITE OAK IGE QN: <0.1 KU/L

## 2023-03-08 LAB
A FUMIGATUS IGE QN: <0.1 KU/L
A FUMIGATUS IGG SER QL: NEGATIVE
A PULLULANS IGG SER QL: NEGATIVE
IGE SERPL-ACNC: 10 IU/ML (ref 6–495)
LACEYELLA SACCHARI AB SER QL ID: NEGATIVE
PIGEON SERUM IGG QL: NEGATIVE
S RECTIVIRGULA IGG SER QL ID: NEGATIVE
T VULGARIS IGG SER QL: NEGATIVE

## 2023-03-22 ENCOUNTER — HOSPITAL ENCOUNTER (OUTPATIENT)
Dept: CT IMAGING | Facility: HOSPITAL | Age: 78
Discharge: HOME | End: 2023-03-22
Admitting: NURSE PRACTITIONER
Payer: MEDICARE

## 2023-03-22 DIAGNOSIS — R06.02 SHORTNESS OF BREATH: ICD-10-CM

## 2023-03-22 DIAGNOSIS — R05.3 CHRONIC COUGH: ICD-10-CM

## 2023-03-22 PROCEDURE — 71250 CT THORAX DX C-: CPT

## 2023-03-28 ENCOUNTER — OFFICE VISIT (OUTPATIENT)
Dept: PULMONOLOGY | Facility: CLINIC | Age: 78
End: 2023-03-28
Payer: MEDICARE

## 2023-03-28 VITALS
BODY MASS INDEX: 32.42 KG/M2 | TEMPERATURE: 97.6 F | SYSTOLIC BLOOD PRESSURE: 122 MMHG | OXYGEN SATURATION: 97 % | HEIGHT: 64 IN | DIASTOLIC BLOOD PRESSURE: 70 MMHG | HEART RATE: 78 BPM | WEIGHT: 189.9 LBS

## 2023-03-28 DIAGNOSIS — R05.3 CHRONIC COUGH: ICD-10-CM

## 2023-03-28 DIAGNOSIS — G47.33 OSA (OBSTRUCTIVE SLEEP APNEA): ICD-10-CM

## 2023-03-28 DIAGNOSIS — J30.89 NON-SEASONAL ALLERGIC RHINITIS, UNSPECIFIED TRIGGER: Primary | ICD-10-CM

## 2023-03-28 PROCEDURE — 1159F MED LIST DOCD IN RCRD: CPT | Performed by: NURSE PRACTITIONER

## 2023-03-28 PROCEDURE — 99214 OFFICE O/P EST MOD 30 MIN: CPT | Performed by: NURSE PRACTITIONER

## 2023-03-28 PROCEDURE — 3074F SYST BP LT 130 MM HG: CPT | Performed by: NURSE PRACTITIONER

## 2023-03-28 PROCEDURE — 3078F DIAST BP <80 MM HG: CPT | Performed by: NURSE PRACTITIONER

## 2023-03-28 PROCEDURE — 1160F RVW MEDS BY RX/DR IN RCRD: CPT | Performed by: NURSE PRACTITIONER

## 2023-03-29 NOTE — PROGRESS NOTES
[facility] Pulmonary Follow up    CHIEF COMPLAINT    Cough    HISTORY OF PRESENT ILLNESS    Angélica De La Cruz is a 78 y.o.female here today for follow-up of her cough.  She was last seen in the office by me about a month ago.  She has had lab work and an HRCT performed since her last appointment and is here today to discuss results.    She was originally referred to our office for chronic cough that she had had for about 6 weeks after having bronchitis and upper respiratory infection.    She states that her cough has improved she does continue to have a dry nonproductive cough occasionally.    She denies any sputum production or hemoptysis.  She denies any chest pain or palpitations.  She denies any lower extremity edema or calf tenderness.    She does continue to take Xyzal, Flonase for her allergies.    She also uses her CPAP every night for AMANDA.  She denies any sleeping difficulties.    She quit smoking in 1973.    She does follow with a rheumatologist for her arthritis.    Patient Active Problem List   Diagnosis   • Acute febrile illness   • Abnormal stress test   • CABG 11/25/20   • Hypertension   • CKD (chronic kidney disease)   • Atrial fibrillation (HCC)   • Chronic cough   • Non-seasonal allergic rhinitis   • AMANDA (obstructive sleep apnea)       Allergies   Allergen Reactions   • Adhesive Tape Itching and Rash   • Codeine Delirium   • Morphine And Related Delirium   • Cefaclor Other (See Comments)   • Oxycodone-Acetaminophen Itching   • Sulfa Antibiotics    • Doxycycline Rash   • Erythromycin Rash   • Oxytetracycline Rash   • Penicillins Rash     Tolerates cefuroxime       Current Outpatient Medications:   •  amLODIPine (NORVASC) 10 MG tablet, Take 1 tablet by mouth Daily. (Patient taking differently: Take 1 tablet by mouth 2 (Two) Times a Day.), Disp: 10 tablet, Rfl: 3  •  aspirin 81 MG chewable tablet, Chew 1 tablet Daily., Disp: , Rfl:   •  atorvastatin (LIPITOR) 40 MG tablet, Take 1 tablet by mouth Daily.,  Disp: , Rfl:   •  B Complex Vitamins (vitamin b complex) capsule capsule, Take 1 capsule by mouth Daily., Disp: , Rfl:   •  fluticasone (FLONASE) 50 MCG/ACT nasal spray, 2 sprays into the nostril(s) as directed by provider Daily. Administer 2 sprays in each nostril for each dose., Disp: , Rfl:   •  gabapentin (NEURONTIN) 600 MG tablet, Take 300 mg by mouth 4 (Four) Times a Day., Disp: , Rfl:   •  guaiFENesin (MUCINEX) 600 MG 12 hr tablet, Take 1 tablet by mouth Daily., Disp: , Rfl:   •  hydrALAZINE (APRESOLINE) 25 MG tablet, Take 1 tablet by mouth 2 (Two) Times a Day., Disp: , Rfl:   •  hydroCHLOROthiazide (HYDRODIURIL) 50 MG tablet, Take 1 tablet by mouth Daily., Disp: , Rfl:   •  levocetirizine (XYZAL) 5 MG tablet, Take 1 tablet by mouth Daily., Disp: , Rfl:   •  levothyroxine (SYNTHROID, LEVOTHROID) 100 MCG tablet, Take 1 tablet by mouth Daily., Disp: , Rfl:   •  Multiple Vitamins-Minerals (MULTIVITAMIN PO), Take 1 tablet by mouth Daily., Disp: , Rfl:   •  olmesartan (BENICAR) 20 MG tablet, Take 1 tablet by mouth Daily., Disp: , Rfl:   •  potassium chloride (K-DUR,KLOR-CON) 10 MEQ CR tablet, Take 1 tablet by mouth Daily., Disp: , Rfl:   •  sertraline (ZOLOFT) 50 MG tablet, Take 1 tablet by mouth Daily., Disp: , Rfl:   MEDICATION LIST AND ALLERGIES REVIEWED.    Social History     Tobacco Use   • Smoking status: Former     Packs/day: 1.00     Years: 15.00     Pack years: 15.00     Types: Cigarettes     Quit date:      Years since quittin.2   • Smokeless tobacco: Never   Substance Use Topics   • Alcohol use: No   • Drug use: No       FAMILY AND SOCIAL HISTORY REVIEWED.    Review of Systems   Constitutional: Positive for fatigue. Negative for activity change, appetite change, fever and unexpected weight change.   HENT: Positive for congestion and postnasal drip. Negative for rhinorrhea, sinus pressure, sore throat and voice change.    Eyes: Negative for visual disturbance.   Respiratory: Positive for cough  "and shortness of breath. Negative for chest tightness and wheezing.    Cardiovascular: Negative for chest pain, palpitations and leg swelling.   Gastrointestinal: Negative for abdominal distention, abdominal pain, nausea and vomiting.   Endocrine: Negative for cold intolerance and heat intolerance.   Genitourinary: Negative for difficulty urinating and urgency.   Musculoskeletal: Negative for arthralgias, back pain and neck pain.   Skin: Negative for color change and pallor.   Allergic/Immunologic: Negative for environmental allergies and food allergies.   Neurological: Negative for dizziness, syncope, weakness and light-headedness.   Hematological: Negative for adenopathy. Does not bruise/bleed easily.   Psychiatric/Behavioral: Negative for agitation and behavioral problems.   .    /70   Pulse 78   Temp 97.6 °F (36.4 °C)   Ht 162.6 cm (64\")   Wt 86.1 kg (189 lb 14.4 oz)   SpO2 97% Comment: resting, room air  BMI 32.60 kg/m²     Immunization History   Administered Date(s) Administered   • COVID-19 (PFIZER) PURPLE CAP 02/26/2021, 03/20/2021, 10/21/2021, 10/29/2021   • Fluzone High Dose =>65 Years (Vaxcare ONLY) 09/10/2018, 09/30/2019   • Fluzone High-Dose 65+yrs 10/29/2021, 10/10/2022   • Fluzone Quad >6mos (Multi-dose) 09/30/2017, 09/29/2020, 10/21/2021   • Hepatitis A 12/02/2018   • INFLUENZA SPLIT TRI 10/29/2021   • Pneumococcal Conjugate 13-Valent (PCV13) 04/17/2017   • Pneumococcal Polysaccharide (PPSV23) 04/28/2009   • TD Preservative Free 07/01/2019   • Zostavax 10/07/2016       Physical Exam  Vitals and nursing note reviewed.   Constitutional:       Appearance: She is well-developed. She is not diaphoretic.   HENT:      Head: Normocephalic and atraumatic.   Eyes:      Pupils: Pupils are equal, round, and reactive to light.   Neck:      Thyroid: No thyromegaly.   Cardiovascular:      Rate and Rhythm: Normal rate and regular rhythm.      Heart sounds: Normal heart sounds. No murmur heard.    No " friction rub. No gallop.   Pulmonary:      Effort: Pulmonary effort is normal. No respiratory distress.      Breath sounds: Normal breath sounds. No wheezing or rales.   Chest:      Chest wall: No tenderness.   Abdominal:      General: Bowel sounds are normal.      Palpations: Abdomen is soft.      Tenderness: There is no abdominal tenderness.   Musculoskeletal:         General: No swelling. Normal range of motion.      Cervical back: Normal range of motion and neck supple.   Lymphadenopathy:      Cervical: No cervical adenopathy.   Skin:     General: Skin is warm and dry.      Capillary Refill: Capillary refill takes less than 2 seconds.   Neurological:      Mental Status: She is alert and oriented to person, place, and time.   Psychiatric:         Mood and Affect: Mood normal.         Behavior: Behavior normal.           RESULTS    CT Chest Hi Resolution Diagnostic    Result Date: 3/22/2023  Impression: Nonspecific findings of interstitial lung disease, with moderate bronchiectasis and some early areas of peripheral reticulation, without yunior honeycombing. Electronically Signed: Rafy Elise  3/22/2023 9:06 PM EDT  Workstation ID: [id number]    PROBLEM LIST    Problem List Items Addressed This Visit        Allergies and Adverse Reactions    Non-seasonal allergic rhinitis - Primary       Pulmonary and Pneumonias    Chronic cough       Sleep    AMANDA (obstructive sleep apnea)         DISCUSSION    Ms. De La Cruz was here for follow-up of her cough.  She states that her cough has improved since her last appointment and was probably due to a postinfectious cough.    We did review her HRCT that does show bronchiectasis and we will continue to follow this closely.  I did discuss with her taking Mucinex twice a day and using a flutter valve.  This is also contributing to her cough.    She is currently not using any inhalers.    She will continue to use her CPAP every night for AMANDA.    She will continue to take Xyzal and  Flonase regularly for her allergic rhinitis.    We did go over her blood work in the office today and her eosinophils were little elevated but we are going to continue to follow closely as she was fairly asymptomatic in the office today.  We also reviewed her sputum cultures and her cultures are negative today we will continue to follow fungal cultures for 6 weeks.    She will follow-up in 1 year or sooner if her symptoms worsen.  Advised her to call with any additional concerns or questions.    I personally spent a total of 33 minutes on patient visit today including chart review, face to face with the patient obtaining the history and physical exam, review of pertinent images and tests, counseling and discussion and/or coordination of care as described above, and documentation.  Total time excludes time spent on other separate services such as performing procedures or test interpretation, if applicable.        Taylor Carrion, APRN  03/28/202308:32 EDT  Electronically signed     Please note that portions of this note were completed with a voice recognition program.        CC: Sussy Bloom MD

## 2023-04-11 LAB
MYCOBACTERIUM SPEC CULT: NORMAL
NIGHT BLUE STAIN TISS: NORMAL

## 2023-06-26 PROBLEM — I65.21 CAROTID STENOSIS, ASYMPTOMATIC, RIGHT: Status: ACTIVE | Noted: 2023-06-26

## 2023-06-26 PROBLEM — I16.1 HYPERTENSIVE EMERGENCY: Status: ACTIVE | Noted: 2023-06-26

## 2023-06-26 PROBLEM — I25.10 CAD (CORONARY ARTERY DISEASE): Status: ACTIVE | Noted: 2023-06-26

## 2023-06-26 PROBLEM — R41.82 ALTERED MENTAL STATUS, UNSPECIFIED ALTERED MENTAL STATUS TYPE: Status: ACTIVE | Noted: 2023-06-26

## 2023-06-28 PROBLEM — I16.1 HYPERTENSIVE EMERGENCY: Status: RESOLVED | Noted: 2023-06-26 | Resolved: 2023-06-28

## 2023-06-28 PROBLEM — R41.82 ALTERED MENTAL STATUS, UNSPECIFIED ALTERED MENTAL STATUS TYPE: Status: RESOLVED | Noted: 2023-06-26 | Resolved: 2023-06-28

## 2023-07-24 ENCOUNTER — HOSPITAL ENCOUNTER (OUTPATIENT)
Dept: MAMMOGRAPHY | Facility: HOSPITAL | Age: 78
Discharge: HOME | End: 2023-07-24
Admitting: OBSTETRICS & GYNECOLOGY
Payer: MEDICARE

## 2023-07-24 DIAGNOSIS — Z12.31 VISIT FOR SCREENING MAMMOGRAM: ICD-10-CM

## 2023-07-24 PROCEDURE — 77063 BREAST TOMOSYNTHESIS BI: CPT

## 2023-07-24 PROCEDURE — 77067 SCR MAMMO BI INCL CAD: CPT

## 2023-12-27 ENCOUNTER — OFFICE VISIT (OUTPATIENT)
Dept: PULMONOLOGY | Facility: CLINIC | Age: 78
End: 2023-12-27
Payer: MEDICARE

## 2023-12-27 VITALS
WEIGHT: 189 LBS | HEART RATE: 70 BPM | HEIGHT: 64 IN | SYSTOLIC BLOOD PRESSURE: 108 MMHG | BODY MASS INDEX: 32.27 KG/M2 | OXYGEN SATURATION: 98 % | TEMPERATURE: 98 F | DIASTOLIC BLOOD PRESSURE: 52 MMHG

## 2023-12-27 DIAGNOSIS — R05.3 CHRONIC COUGH: Primary | ICD-10-CM

## 2023-12-27 PROCEDURE — 3074F SYST BP LT 130 MM HG: CPT | Performed by: NURSE PRACTITIONER

## 2023-12-27 PROCEDURE — 1159F MED LIST DOCD IN RCRD: CPT | Performed by: NURSE PRACTITIONER

## 2023-12-27 PROCEDURE — 1160F RVW MEDS BY RX/DR IN RCRD: CPT | Performed by: NURSE PRACTITIONER

## 2023-12-27 PROCEDURE — 99213 OFFICE O/P EST LOW 20 MIN: CPT | Performed by: NURSE PRACTITIONER

## 2023-12-27 PROCEDURE — 3078F DIAST BP <80 MM HG: CPT | Performed by: NURSE PRACTITIONER

## 2023-12-27 RX ORDER — POTASSIUM CHLORIDE 1500 MG/1
TABLET, EXTENDED RELEASE ORAL
COMMUNITY
Start: 2023-10-20

## 2023-12-27 RX ORDER — CLOPIDOGREL BISULFATE 75 MG/1
1 TABLET ORAL DAILY
COMMUNITY
Start: 2023-07-05

## 2023-12-27 RX ORDER — PREDNISONE 10 MG/1
TABLET ORAL
COMMUNITY
Start: 2023-12-08

## 2023-12-27 RX ORDER — AZITHROMYCIN 500 MG/1
TABLET, FILM COATED ORAL
COMMUNITY
Start: 2023-12-26

## 2023-12-27 RX ORDER — ALBUTEROL SULFATE 2.5 MG/3ML
SOLUTION RESPIRATORY (INHALATION)
COMMUNITY
Start: 2023-12-19

## 2023-12-27 NOTE — PROGRESS NOTES
[facility] Pulmonary Follow up    CHIEF COMPLAINT    cough    HISTORY OF PRESENT ILLNESS    Angélica De La Cruz is a 78 y.o.female here today for follow up.  She was last seen in the office by me in March.  She was ill a couple weeks ago and saw her PCP and was given a round of antibiotics and steroids.  Her symptoms did not improve and she had a chest x-ray performed yesterday.  She was told that she had pneumonia.  She was started on azithromycin.    She states she feels weak and fatigued.  She has a lot of mucus production.  She is coughing up some yellow to green sputum.  She denies any hemoptysis.  She has been having some chills.  She denies fever or night sweats.    She denies chest pain or palpitations.  She denies any lower extremity edema or calf tenderness.    She denies any reflux symptoms.    She quit smoking 51 years ago.    Patient Active Problem List   Diagnosis    Acute febrile illness    Abnormal stress test    CABG 11/25/20    Hypertension    CKD (chronic kidney disease)    Atrial fibrillation    Chronic cough    Non-seasonal allergic rhinitis    AMANDA (obstructive sleep apnea)    CAD (coronary artery disease) s/p CABG    Carotid stenosis, asymptomatic, right       Allergies   Allergen Reactions    Adhesive Tape Itching and Rash    Codeine Delirium    Morphine And Related Delirium    Cefaclor Other (See Comments)    Oxycodone-Acetaminophen Itching    Sulfa Antibiotics     Doxycycline Rash    Erythromycin Rash    Levofloxacin Rash    Oxytetracycline Rash    Penicillins Rash     Tolerates cefuroxime       Current Outpatient Medications:     albuterol (PROVENTIL) (2.5 MG/3ML) 0.083% nebulizer solution, Inhale by nebulization route for 8 days., Disp: , Rfl:     amLODIPine (NORVASC) 10 MG tablet, Take 1 tablet by mouth Daily., Disp: 30 tablet, Rfl: 0    aspirin 81 MG chewable tablet, Chew 1 tablet Daily. OTC, Disp: , Rfl:     atorvastatin (LIPITOR) 40 MG tablet, Take 1 tablet by mouth Daily., Disp: , Rfl:      azithromycin (ZITHROMAX) 500 MG tablet, Take 1 tablet every day by oral route for 3 days., Disp: , Rfl:     B Complex Vitamins (vitamin b complex) capsule capsule, Take 1 capsule by mouth Daily. OTC, Disp: , Rfl:     clopidogrel (PLAVIX) 75 MG tablet, Take 1 tablet by mouth Daily., Disp: , Rfl:     estradiol (ESTRACE) 0.1 MG/GM vaginal cream, Insert 1 gram vaginally 3 times per week for 90 days, Disp: , Rfl:     gabapentin (NEURONTIN) 300 MG capsule, Take 2 capsules by mouth 2 (Two) Times a Day., Disp: , Rfl:     guaiFENesin (MUCINEX) 600 MG 12 hr tablet, Take 1 tablet by mouth Daily As Needed for Congestion or Cough. OTC, Disp: , Rfl:     hydrALAZINE (APRESOLINE) 25 MG tablet, Take 1 tablet by mouth 2 (Two) Times a Day., Disp: , Rfl:     hydroCHLOROthiazide (HYDRODIURIL) 50 MG tablet, Take 1 tablet by mouth Daily., Disp: , Rfl:     levocetirizine (XYZAL) 5 MG tablet, Take 1 tablet by mouth Daily., Disp: , Rfl:     levothyroxine (SYNTHROID, LEVOTHROID) 100 MCG tablet, Take 1 tablet by mouth Daily., Disp: , Rfl:     Multiple Vitamins-Minerals (MULTIVITAMIN PO), Take 1 tablet by mouth Daily. OTC, Disp: , Rfl:     olmesartan (BENICAR) 20 MG tablet, Take 1 tablet by mouth Daily., Disp: , Rfl:     potassium chloride (K-DUR,KLOR-CON) 10 MEQ CR tablet, Take 1 tablet by mouth Daily., Disp: , Rfl:     potassium chloride ER (K-TAB) 20 MEQ tablet controlled-release ER tablet, , Disp: , Rfl:     predniSONE (DELTASONE) 10 MG (21) dose pack, , Disp: , Rfl:     sertraline (ZOLOFT) 50 MG tablet, Take 1 tablet by mouth Daily., Disp: , Rfl:   MEDICATION LIST AND ALLERGIES REVIEWED.    Social History     Tobacco Use    Smoking status: Former     Packs/day: 1.00     Years: 15.00     Additional pack years: 0.00     Total pack years: 15.00     Types: Cigarettes     Quit date:      Years since quittin.0    Smokeless tobacco: Never   Vaping Use    Vaping Use: Never used   Substance Use Topics    Alcohol use: No    Drug use: No  "      FAMILY AND SOCIAL HISTORY REVIEWED.    Review of Systems   Constitutional:  Positive for fatigue. Negative for activity change, appetite change, fever and unexpected weight change.   HENT:  Positive for congestion, postnasal drip and rhinorrhea. Negative for sinus pressure, sore throat and voice change.    Eyes:  Negative for visual disturbance.   Respiratory:  Positive for cough and shortness of breath. Negative for chest tightness and wheezing.    Cardiovascular:  Negative for chest pain, palpitations and leg swelling.   Gastrointestinal:  Negative for abdominal distention, abdominal pain, nausea and vomiting.   Endocrine: Negative for cold intolerance and heat intolerance.   Genitourinary:  Negative for difficulty urinating and urgency.   Musculoskeletal:  Negative for arthralgias, back pain and neck pain.   Skin:  Negative for color change and pallor.   Allergic/Immunologic: Negative for environmental allergies and food allergies.   Neurological:  Negative for dizziness, syncope, weakness and light-headedness.   Hematological:  Negative for adenopathy. Does not bruise/bleed easily.   Psychiatric/Behavioral:  Negative for agitation and behavioral problems.    .    /52 (BP Location: Left arm, Patient Position: Sitting, Cuff Size: Adult)   Pulse 70   Temp 98 °F (36.7 °C)   Ht 162.6 cm (64\")   Wt 85.7 kg (189 lb)   SpO2 98% Comment: Room air at rest  BMI 32.44 kg/m²     Immunization History   Administered Date(s) Administered    COVID-19 (PFIZER) Purple Cap Monovalent 02/26/2021, 03/20/2021, 10/21/2021    COVID-19 F23 (MODERNA) 12YRS+ (SPIKEVAX) 11/08/2023    Fluzone High Dose =>65 Years (Vaxcare ONLY) 09/10/2018, 09/30/2019    Fluzone High-Dose 65+yrs 10/29/2021, 10/10/2022, 09/18/2023    Fluzone Quad >6mos (Multi-dose) 09/30/2017, 09/29/2020, 10/21/2021    Hepatitis A 12/02/2018, 09/18/2023    INFLUENZA SPLIT TRI 10/29/2021    Pneumococcal Conjugate 13-Valent (PCV13) 04/17/2017    Pneumococcal " Conjugate 20-Valent (PCV20) 09/18/2023    Pneumococcal Polysaccharide (PPSV23) 04/28/2009    TD Preservative Free (Tenivac) 07/01/2019    Zostavax 10/07/2016       Physical Exam  Vitals and nursing note reviewed.   Constitutional:       Appearance: She is well-developed. She is not diaphoretic.   HENT:      Head: Normocephalic and atraumatic.   Eyes:      Pupils: Pupils are equal, round, and reactive to light.   Neck:      Thyroid: No thyromegaly.   Cardiovascular:      Rate and Rhythm: Normal rate and regular rhythm.      Heart sounds: Normal heart sounds. No murmur heard.     No friction rub. No gallop.   Pulmonary:      Effort: Pulmonary effort is normal. No respiratory distress.      Breath sounds: Normal breath sounds. No wheezing or rales.   Chest:      Chest wall: No tenderness.   Abdominal:      General: Bowel sounds are normal.      Palpations: Abdomen is soft.      Tenderness: There is no abdominal tenderness.   Musculoskeletal:         General: No swelling. Normal range of motion.      Cervical back: Normal range of motion and neck supple.   Lymphadenopathy:      Cervical: No cervical adenopathy.   Skin:     General: Skin is warm and dry.      Capillary Refill: Capillary refill takes less than 2 seconds.   Neurological:      Mental Status: She is alert and oriented to person, place, and time.   Psychiatric:         Mood and Affect: Mood normal.         Behavior: Behavior normal.           RESULTS      PROBLEM LIST    Problem List Items Addressed This Visit          Pulmonary and Pneumonias    Chronic cough - Primary         DISCUSSION    Ms. De La Cruz was here for follow-up.  We did review her chest x-ray and the report was showing a new right lower lobe opacity.  They went ahead and treated her with azithromycin and I did advise her to complete this.  She was not wheezing in the office today.    I did advise her that it will take her 4 to 6 weeks to fully recover from this illness.  I did encourage her to  continue taking over-the-counter Delsym cough syrup and Mucinex for secretion clearance.    I would like for her to follow-up in 4 weeks with repeat chest x-ray to make sure that her symptoms are improving.  She is agreeable with this plan.    I personally spent a total of 25 minutes on patient visit today including chart review, face to face with the patient obtaining the history and physical exam, review of pertinent images and tests, counseling and discussion and/or coordination of care as described above, and documentation.  Total time excludes time spent on other separate services such as performing procedures or test interpretation, if applicable.        Taylor Carrion, APRN  12/27/202315:17 EST  Electronically signed     Please note that portions of this note were completed with a voice recognition program.        CC: Sussy Bloom MD

## 2024-01-08 DIAGNOSIS — R05.8 OTHER COUGH: ICD-10-CM

## 2024-01-08 DIAGNOSIS — R05.3 CHRONIC COUGH: Primary | ICD-10-CM

## 2024-01-08 RX ORDER — PREDNISONE 10 MG/1
TABLET ORAL
Qty: 31 TABLET | Refills: 0 | Status: SHIPPED | OUTPATIENT
Start: 2024-01-08

## 2024-01-24 ENCOUNTER — OFFICE VISIT (OUTPATIENT)
Dept: PULMONOLOGY | Facility: CLINIC | Age: 79
End: 2024-01-24
Payer: MEDICARE

## 2024-01-24 VITALS
BODY MASS INDEX: 31.76 KG/M2 | DIASTOLIC BLOOD PRESSURE: 64 MMHG | TEMPERATURE: 97.6 F | HEIGHT: 64 IN | SYSTOLIC BLOOD PRESSURE: 128 MMHG | WEIGHT: 186 LBS | HEART RATE: 78 BPM | OXYGEN SATURATION: 95 %

## 2024-01-24 DIAGNOSIS — J18.9 PNEUMONIA DUE TO INFECTIOUS ORGANISM, UNSPECIFIED LATERALITY, UNSPECIFIED PART OF LUNG: Primary | ICD-10-CM

## 2024-01-24 DIAGNOSIS — J30.89 NON-SEASONAL ALLERGIC RHINITIS, UNSPECIFIED TRIGGER: ICD-10-CM

## 2024-01-24 DIAGNOSIS — G47.33 OSA (OBSTRUCTIVE SLEEP APNEA): ICD-10-CM

## 2024-01-24 DIAGNOSIS — R05.3 CHRONIC COUGH: ICD-10-CM

## 2024-01-24 PROCEDURE — 3074F SYST BP LT 130 MM HG: CPT | Performed by: NURSE PRACTITIONER

## 2024-01-24 PROCEDURE — 3078F DIAST BP <80 MM HG: CPT | Performed by: NURSE PRACTITIONER

## 2024-01-24 PROCEDURE — 1159F MED LIST DOCD IN RCRD: CPT | Performed by: NURSE PRACTITIONER

## 2024-01-24 PROCEDURE — 1160F RVW MEDS BY RX/DR IN RCRD: CPT | Performed by: NURSE PRACTITIONER

## 2024-01-24 PROCEDURE — 99214 OFFICE O/P EST MOD 30 MIN: CPT | Performed by: NURSE PRACTITIONER

## 2024-01-25 NOTE — PROGRESS NOTES
[facility]monary Follow up    CHIEF COMPLAINT    dyspnea    HISTORY OF PRESENT ILLNESS    Angélica De La Cruz is a 79 y.o.female here today for follow-up.  She was last seen by me at the end of December.  She had been sick with pneumonia.  She was given antibiotics.    Her symptoms did improve.    She states that her cough has almost completely resolved.  She does feel much better than she did the last time she was here.    She denies any sputum production or hemoptysis.  She denies any chest pain or palpitations.  Denies any lower extremity edema or calf tenderness.    She is using her albuterol as needed.  She does have some shortness of breath with activity but does recover very quickly at rest.    She continues to use her CPAP nightly for MAANDA.  She denies any sleeping difficulties.    She quit smoking 51 years ago.    Patient Active Problem List   Diagnosis    Acute febrile illness    Abnormal stress test    CABG 11/25/20    Hypertension    CKD (chronic kidney disease)    Atrial fibrillation    Chronic cough    Non-seasonal allergic rhinitis    AMANDA (obstructive sleep apnea)    CAD (coronary artery disease) s/p CABG    Carotid stenosis, asymptomatic, right       Allergies   Allergen Reactions    Adhesive Tape Itching and Rash    Codeine Delirium    Morphine And Related Delirium    Cefaclor Other (See Comments)    Oxycodone-Acetaminophen Itching    Sulfa Antibiotics     Doxycycline Rash    Erythromycin Rash    Levofloxacin Rash    Oxytetracycline Rash    Penicillins Rash     Tolerates cefuroxime       Current Outpatient Medications:     albuterol (PROVENTIL) (2.5 MG/3ML) 0.083% nebulizer solution, Inhale by nebulization route for 8 days., Disp: , Rfl:     amLODIPine (NORVASC) 10 MG tablet, Take 1 tablet by mouth Daily., Disp: 30 tablet, Rfl: 0    aspirin 81 MG chewable tablet, Chew 1 tablet Daily. OTC, Disp: , Rfl:     atorvastatin (LIPITOR) 40 MG tablet, Take 1 tablet by mouth Daily., Disp: , Rfl:     B Complex  Vitamins (vitamin b complex) capsule capsule, Take 1 capsule by mouth Daily. OTC, Disp: , Rfl:     clopidogrel (PLAVIX) 75 MG tablet, Take 1 tablet by mouth Daily., Disp: , Rfl:     estradiol (ESTRACE) 0.1 MG/GM vaginal cream, Insert 1 gram vaginally 3 times per week for 90 days, Disp: , Rfl:     gabapentin (NEURONTIN) 300 MG capsule, Take 2 capsules by mouth 2 (Two) Times a Day., Disp: , Rfl:     guaiFENesin (MUCINEX) 600 MG 12 hr tablet, Take 1 tablet by mouth Daily As Needed for Congestion or Cough. OTC, Disp: , Rfl:     hydrALAZINE (APRESOLINE) 25 MG tablet, Take 1 tablet by mouth 2 (Two) Times a Day., Disp: , Rfl:     hydroCHLOROthiazide (HYDRODIURIL) 50 MG tablet, Take 1 tablet by mouth Daily., Disp: , Rfl:     levocetirizine (XYZAL) 5 MG tablet, Take 1 tablet by mouth Daily., Disp: , Rfl:     levothyroxine (SYNTHROID, LEVOTHROID) 100 MCG tablet, Take 1 tablet by mouth Daily., Disp: , Rfl:     Multiple Vitamins-Minerals (MULTIVITAMIN PO), Take 1 tablet by mouth Daily. OTC, Disp: , Rfl:     olmesartan (BENICAR) 20 MG tablet, Take 1 tablet by mouth Daily., Disp: , Rfl:     potassium chloride (K-DUR,KLOR-CON) 10 MEQ CR tablet, Take 1 tablet by mouth Daily., Disp: , Rfl:     potassium chloride ER (K-TAB) 20 MEQ tablet controlled-release ER tablet, , Disp: , Rfl:     sertraline (ZOLOFT) 50 MG tablet, Take 1 tablet by mouth Daily., Disp: , Rfl:   MEDICATION LIST AND ALLERGIES REVIEWED.    Social History     Tobacco Use    Smoking status: Former     Packs/day: 1.00     Years: 15.00     Additional pack years: 0.00     Total pack years: 15.00     Types: Cigarettes     Quit date:      Years since quittin.0    Smokeless tobacco: Never   Vaping Use    Vaping Use: Never used   Substance Use Topics    Alcohol use: No    Drug use: No       FAMILY AND SOCIAL HISTORY REVIEWED.    Review of Systems   Constitutional:  Negative for activity change, appetite change, fatigue, fever and unexpected weight change.   HENT:   "Negative for congestion, postnasal drip, rhinorrhea, sinus pressure, sore throat and voice change.    Eyes:  Negative for visual disturbance.   Respiratory:  Positive for cough and shortness of breath. Negative for chest tightness and wheezing.    Cardiovascular:  Negative for chest pain, palpitations and leg swelling.   Gastrointestinal:  Negative for abdominal distention, abdominal pain, nausea and vomiting.   Endocrine: Negative for cold intolerance and heat intolerance.   Genitourinary:  Negative for difficulty urinating and urgency.   Musculoskeletal:  Negative for arthralgias, back pain and neck pain.   Skin:  Negative for color change and pallor.   Allergic/Immunologic: Negative for environmental allergies and food allergies.   Neurological:  Negative for dizziness, syncope, weakness and light-headedness.   Hematological:  Negative for adenopathy. Does not bruise/bleed easily.   Psychiatric/Behavioral:  Negative for agitation and behavioral problems.    .    /64   Pulse 78   Temp 97.6 °F (36.4 °C)   Ht 162.6 cm (64\")   Wt 84.4 kg (186 lb)   SpO2 95% Comment: resting, room air  BMI 31.93 kg/m²     Immunization History   Administered Date(s) Administered    COVID-19 (PFIZER) Purple Cap Monovalent 02/26/2021, 03/20/2021, 10/21/2021    COVID-19 F23 (MODERNA) 12YRS+ (SPIKEVAX) 11/08/2023    Fluzone High Dose =>65 Years (Vaxcare ONLY) 09/10/2018, 09/30/2019    Fluzone High-Dose 65+yrs 10/29/2021, 10/10/2022, 09/18/2023    Fluzone Quad >6mos (Multi-dose) 09/30/2017, 09/29/2020, 10/21/2021    Hepatitis A 12/02/2018, 09/18/2023    INFLUENZA SPLIT TRI 10/29/2021    Pneumococcal Conjugate 13-Valent (PCV13) 04/17/2017    Pneumococcal Conjugate 20-Valent (PCV20) 09/18/2023    Pneumococcal Polysaccharide (PPSV23) 04/28/2009    TD Preservative Free (Tenivac) 07/01/2019    Zostavax 10/07/2016       Physical Exam  Vitals and nursing note reviewed.   Constitutional:       Appearance: She is well-developed. She is " not diaphoretic.   HENT:      Head: Normocephalic and atraumatic.   Eyes:      Pupils: Pupils are equal, round, and reactive to light.   Neck:      Thyroid: No thyromegaly.   Cardiovascular:      Rate and Rhythm: Normal rate and regular rhythm.      Heart sounds: Normal heart sounds. No murmur heard.     No friction rub. No gallop.   Pulmonary:      Effort: Pulmonary effort is normal. No respiratory distress.      Breath sounds: Normal breath sounds. No wheezing or rales.   Chest:      Chest wall: No tenderness.   Abdominal:      General: Bowel sounds are normal.      Palpations: Abdomen is soft.      Tenderness: There is no abdominal tenderness.   Musculoskeletal:         General: No swelling. Normal range of motion.      Cervical back: Normal range of motion and neck supple.   Lymphadenopathy:      Cervical: No cervical adenopathy.   Skin:     General: Skin is warm and dry.      Capillary Refill: Capillary refill takes less than 2 seconds.   Neurological:      Mental Status: She is alert and oriented to person, place, and time.   Psychiatric:         Mood and Affect: Mood normal.         Behavior: Behavior normal.           RESULTS    XR Chest PA & Lateral    Result Date: 1/24/2024  Impression: 1. Resolution of right lower lobe airspace disease. 2. No acute process. Electronically Signed: Erik Neff MD  1/24/2024 10:56 PM EST  Workstation ID: [id number]     PROBLEM LIST    Problem List Items Addressed This Visit          Allergies and Adverse Reactions    Non-seasonal allergic rhinitis       Pulmonary and Pneumonias    Chronic cough       Sleep    AMANDA (obstructive sleep apnea)     Other Visit Diagnoses       Pneumonia due to infectious organism, unspecified laterality, unspecified part of lung    -  Primary    Relevant Orders    XR Chest PA & Lateral (Completed)              DISCUSSION    Ms. De La Cruz was here for follow-up.  She seems to be doing well from a pulmonary standpoint.  We did review her chest x-ray and  it shows the resolution of the right lower lobe.    She continue to use her albuterol as needed for shortness of breath or coughing.    She continue to use over-the-counter medication as needed for allergies.    She will wear her CPAP nightly for her AMANDA.    We will have her schedule a follow-up in 6 months.    I personally spent a total of 32 minutes on patient visit today including chart review, face to face with the patient obtaining the history and physical exam, review of pertinent images and tests, counseling and discussion and/or coordination of care as described above, and documentation.  Total time excludes time spent on other separate services such as performing procedures or test interpretation, if applicable.        Taylor Carrion, APRN  01/24/202408:43 EST  Electronically signed     Please note that portions of this note were completed with a voice recognition program.        CC: Sussy Bloom MD

## 2024-01-29 DIAGNOSIS — J01.80 OTHER ACUTE SINUSITIS, RECURRENCE NOT SPECIFIED: Primary | ICD-10-CM

## 2024-01-29 RX ORDER — AZITHROMYCIN 250 MG/1
TABLET, FILM COATED ORAL
Qty: 6 TABLET | Refills: 0 | Status: SHIPPED | OUTPATIENT
Start: 2024-01-29

## 2024-03-05 ENCOUNTER — OFFICE VISIT (OUTPATIENT)
Dept: PULMONOLOGY | Facility: CLINIC | Age: 79
End: 2024-03-05
Payer: MEDICARE

## 2024-03-05 DIAGNOSIS — R05.3 CHRONIC COUGH: ICD-10-CM

## 2024-03-05 DIAGNOSIS — J96.11 CHRONIC RESPIRATORY FAILURE WITH HYPOXIA: Primary | ICD-10-CM

## 2024-03-05 DIAGNOSIS — R50.9 ACUTE FEBRILE ILLNESS: Primary | ICD-10-CM

## 2024-03-05 DIAGNOSIS — J30.89 NON-SEASONAL ALLERGIC RHINITIS, UNSPECIFIED TRIGGER: ICD-10-CM

## 2024-03-05 DIAGNOSIS — G47.33 OSA (OBSTRUCTIVE SLEEP APNEA): ICD-10-CM

## 2024-03-05 PROCEDURE — 1160F RVW MEDS BY RX/DR IN RCRD: CPT | Performed by: NURSE PRACTITIONER

## 2024-03-05 PROCEDURE — 87206 SMEAR FLUORESCENT/ACID STAI: CPT | Performed by: NURSE PRACTITIONER

## 2024-03-05 PROCEDURE — 87205 SMEAR GRAM STAIN: CPT | Performed by: NURSE PRACTITIONER

## 2024-03-05 PROCEDURE — 3078F DIAST BP <80 MM HG: CPT | Performed by: NURSE PRACTITIONER

## 2024-03-05 PROCEDURE — 99214 OFFICE O/P EST MOD 30 MIN: CPT | Performed by: NURSE PRACTITIONER

## 2024-03-05 PROCEDURE — 1159F MED LIST DOCD IN RCRD: CPT | Performed by: NURSE PRACTITIONER

## 2024-03-05 PROCEDURE — 87116 MYCOBACTERIA CULTURE: CPT | Performed by: NURSE PRACTITIONER

## 2024-03-05 PROCEDURE — 87070 CULTURE OTHR SPECIMN AEROBIC: CPT | Performed by: NURSE PRACTITIONER

## 2024-03-05 PROCEDURE — 3075F SYST BP GE 130 - 139MM HG: CPT | Performed by: NURSE PRACTITIONER

## 2024-03-05 RX ORDER — PREDNISONE 10 MG/1
TABLET ORAL
Qty: 31 TABLET | Refills: 0 | Status: SHIPPED | OUTPATIENT
Start: 2024-03-05

## 2024-03-05 RX ORDER — AZITHROMYCIN 250 MG/1
TABLET, FILM COATED ORAL
Qty: 6 TABLET | Refills: 0 | Status: SHIPPED | OUTPATIENT
Start: 2024-03-05

## 2024-03-06 VITALS
BODY MASS INDEX: 30.22 KG/M2 | TEMPERATURE: 97.6 F | HEIGHT: 64 IN | DIASTOLIC BLOOD PRESSURE: 60 MMHG | WEIGHT: 177 LBS | HEART RATE: 84 BPM | OXYGEN SATURATION: 87 % | SYSTOLIC BLOOD PRESSURE: 130 MMHG

## 2024-03-06 NOTE — PROGRESS NOTES
[facility] Pulmonary Follow up    CHIEF COMPLAINT    Dyspnea/cough    HISTORY OF PRESENT ILLNESS    Angélica De La Cruz is a 79 y.o.female here today for follow-up.  She states that she has felt poorly for the last week.  She has noticed more sinus drainage recently and a cough.    She has been seen in our office for a chronic cough in the past.    She states she been coughing up some thick yellow sputum recently.  She denies any hemoptysis.  She denies any fever, chills or night sweats.    She has been using her nebulizers regularly.  She does feel like these help her.    She denies any chest pain or palpitations.  She denies any lower extremity edema or calf tenderness.    She has noticed a drop in her oxygen level with activity recently.  She would drop below 87% with activity but does recover very quickly with rest.    She continues to wear CPAP nightly.  She denies any sleeping difficulties.    She denies any reflux symptoms currently.    She quit smoking 51 years ago.    Patient Active Problem List   Diagnosis    Acute febrile illness    Abnormal stress test    CABG 11/25/20    Hypertension    CKD (chronic kidney disease)    Atrial fibrillation    Chronic cough    Non-seasonal allergic rhinitis    AMANDA (obstructive sleep apnea)    CAD (coronary artery disease) s/p CABG    Carotid stenosis, asymptomatic, right       Allergies   Allergen Reactions    Adhesive Tape Itching and Rash    Codeine Delirium    Morphine And Related Delirium    Cefaclor Other (See Comments)    Oxycodone-Acetaminophen Itching    Sulfa Antibiotics     Doxycycline Rash    Erythromycin Rash    Levofloxacin Rash    Oxytetracycline Rash    Penicillins Rash     Tolerates cefuroxime       Current Outpatient Medications:     albuterol (PROVENTIL) (2.5 MG/3ML) 0.083% nebulizer solution, Inhale by nebulization route for 8 days., Disp: , Rfl:     amLODIPine (NORVASC) 10 MG tablet, Take 1 tablet by mouth Daily., Disp: 30 tablet, Rfl: 0    aspirin 81 MG  chewable tablet, Chew 1 tablet Daily. OTC, Disp: , Rfl:     atorvastatin (LIPITOR) 40 MG tablet, Take 1 tablet by mouth Daily., Disp: , Rfl:     B Complex Vitamins (vitamin b complex) capsule capsule, Take 1 capsule by mouth Daily. OTC, Disp: , Rfl:     clopidogrel (PLAVIX) 75 MG tablet, Take 1 tablet by mouth Daily., Disp: , Rfl:     estradiol (ESTRACE) 0.1 MG/GM vaginal cream, Insert 1 gram vaginally 3 times per week for 90 days, Disp: , Rfl:     gabapentin (NEURONTIN) 300 MG capsule, Take 2 capsules by mouth 2 (Two) Times a Day., Disp: , Rfl:     guaiFENesin (MUCINEX) 600 MG 12 hr tablet, Take 1 tablet by mouth Daily As Needed for Congestion or Cough. OTC, Disp: , Rfl:     hydrALAZINE (APRESOLINE) 25 MG tablet, Take 1 tablet by mouth 2 (Two) Times a Day., Disp: , Rfl:     hydroCHLOROthiazide (HYDRODIURIL) 50 MG tablet, Take 1 tablet by mouth Daily., Disp: , Rfl:     levocetirizine (XYZAL) 5 MG tablet, Take 1 tablet by mouth Daily., Disp: , Rfl:     levothyroxine (SYNTHROID, LEVOTHROID) 100 MCG tablet, Take 1 tablet by mouth Daily., Disp: , Rfl:     Multiple Vitamins-Minerals (MULTIVITAMIN PO), Take 1 tablet by mouth Daily. OTC, Disp: , Rfl:     olmesartan (BENICAR) 20 MG tablet, Take 1 tablet by mouth Daily., Disp: , Rfl:     potassium chloride ER (K-TAB) 20 MEQ tablet controlled-release ER tablet, , Disp: , Rfl:     sertraline (ZOLOFT) 50 MG tablet, Take 1 tablet by mouth Daily., Disp: , Rfl:     azithromycin (ZITHROMAX) 250 MG tablet, Take 2 by mouth today then 1 daily for 4 days, Disp: 6 tablet, Rfl: 0    predniSONE (DELTASONE) 10 MG tablet, Take 4 tabs daily x 3 days, then take 3 tabs daily x 3 days, then take 2 tabs daily x 3 days, then take 1 tab daily x 3 days, Disp: 31 tablet, Rfl: 0  MEDICATION LIST AND ALLERGIES REVIEWED.    Social History     Tobacco Use    Smoking status: Former     Current packs/day: 0.00     Average packs/day: 1 pack/day for 15.0 years (15.0 ttl pk-yrs)     Types: Cigarettes      "Start date:      Quit date: 1973     Years since quittin.2    Smokeless tobacco: Never   Vaping Use    Vaping status: Never Used   Substance Use Topics    Alcohol use: No    Drug use: No       FAMILY AND SOCIAL HISTORY REVIEWED.    Review of Systems   Constitutional:  Positive for fatigue. Negative for activity change, appetite change, fever and unexpected weight change.   HENT:  Positive for congestion. Negative for postnasal drip, rhinorrhea, sinus pressure, sore throat and voice change.    Eyes:  Negative for visual disturbance.   Respiratory:  Positive for cough. Negative for chest tightness, shortness of breath and wheezing.    Cardiovascular:  Negative for chest pain, palpitations and leg swelling.   Gastrointestinal:  Negative for abdominal distention, abdominal pain, nausea and vomiting.   Endocrine: Negative for cold intolerance and heat intolerance.   Genitourinary:  Negative for difficulty urinating and urgency.   Musculoskeletal:  Negative for arthralgias, back pain and neck pain.   Skin:  Negative for color change and pallor.   Allergic/Immunologic: Negative for environmental allergies and food allergies.   Neurological:  Negative for dizziness, syncope, weakness and light-headedness.   Hematological:  Negative for adenopathy. Does not bruise/bleed easily.   Psychiatric/Behavioral:  Negative for agitation and behavioral problems.    .    /60   Pulse 84   Temp 97.6 °F (36.4 °C)   Ht 162.6 cm (64\")   Wt 80.3 kg (177 lb)   SpO2 90% Comment: resting, room air  BMI 30.38 kg/m²     Immunization History   Administered Date(s) Administered    COVID-19 (PFIZER) Purple Cap Monovalent 2021, 2021, 10/21/2021    COVID-19 F23 (MODERNA) 12YRS+ (SPIKEVAX) 2023    Fluzone High Dose =>65 Years (Vaxcare ONLY) 09/10/2018, 2019    Fluzone High-Dose 65+yrs 10/29/2021, 10/10/2022, 2023    Fluzone Quad >6mos (Multi-dose) 2017, 2020, 10/21/2021    Hepatitis A " 12/02/2018, 09/18/2023    INFLUENZA SPLIT TRI 10/29/2021    Pneumococcal Conjugate 13-Valent (PCV13) 04/17/2017    Pneumococcal Conjugate 20-Valent (PCV20) 09/18/2023    Pneumococcal Polysaccharide (PPSV23) 04/28/2009    TD Preservative Free (Tenivac) 07/01/2019    Zostavax 10/07/2016       Physical Exam  Vitals and nursing note reviewed.   Constitutional:       Appearance: She is well-developed. She is not diaphoretic.   HENT:      Head: Normocephalic and atraumatic.   Eyes:      Pupils: Pupils are equal, round, and reactive to light.   Neck:      Thyroid: No thyromegaly.   Cardiovascular:      Rate and Rhythm: Normal rate and regular rhythm.      Heart sounds: Normal heart sounds. No murmur heard.     No friction rub. No gallop.   Pulmonary:      Effort: Pulmonary effort is normal. No respiratory distress.      Breath sounds: Normal breath sounds. No wheezing or rales.   Chest:      Chest wall: No tenderness.   Abdominal:      General: Bowel sounds are normal.      Palpations: Abdomen is soft.      Tenderness: There is no abdominal tenderness.   Musculoskeletal:         General: No swelling. Normal range of motion.      Cervical back: Normal range of motion and neck supple.   Lymphadenopathy:      Cervical: No cervical adenopathy.   Skin:     General: Skin is warm and dry.      Capillary Refill: Capillary refill takes less than 2 seconds.   Neurological:      Mental Status: She is alert and oriented to person, place, and time.   Psychiatric:         Mood and Affect: Mood normal.         Behavior: Behavior normal.           RESULTS    CPAP download: Patient is 100% compliant, average use 7 hours 27 minutes, she is on a a spontaneous BiPAP, IPAP of 10, EPAP of 6.  Average AHI 0.6    PROBLEM LIST    Problem List Items Addressed This Visit          Allergies and Adverse Reactions    Non-seasonal allergic rhinitis       Pulmonary and Pneumonias    Chronic cough       Sleep    AMANDA (obstructive sleep apnea)     Other  Visit Diagnoses       Chronic respiratory failure with hypoxia    -  Primary    Relevant Orders    Oxygen Therapy    Respiratory Culture - Sputum, Cough (Completed)    AFB Culture - , Cough              DISCUSSION    Ms. De La Cruz was here for follow-up.  She seems to be doing okay from a pulmonary standpoint.  She was 87% on room air at rest today in the office.  I am going to go ahead and get her started on portable oxygen.  I am also going to order her a portable concentrator.  She will need to use 2 L pulsed dose with activity.    I do suspect that her sinuses are contributing to some of her cough.  I did encourage her to take over-the-counter medication regularly and to use her sinus spray and Stephanie pot regularly.    She will continue his BiPAP nightly.  I did review her download and she is compliant.    I did advise her if her symptoms were to worsen she is to present to the ED for further evaluation.    I personally spent a total of 31 minutes on patient visit today including chart review, face to face with the patient obtaining the history and physical exam, review of pertinent images and tests, counseling and discussion and/or coordination of care as described above, and documentation.  Total time excludes time spent on other separate services such as performing procedures or test interpretation, if applicable.        Taylor Carrion, APRN  03/05/202408:15 EST  Electronically signed     Please note that portions of this note were completed with a voice recognition program.        CC: Sussy Bloom MD

## 2024-03-07 LAB
BACTERIA SPEC RESP CULT: NORMAL
GRAM STN SPEC: NORMAL

## 2024-03-10 LAB
MYCOBACTERIUM SPEC CULT: NORMAL
NIGHT BLUE STAIN TISS: NORMAL

## 2024-03-12 LAB
MYCOBACTERIUM SPEC CULT: NORMAL
NIGHT BLUE STAIN TISS: NORMAL

## 2024-03-19 LAB
MYCOBACTERIUM SPEC CULT: NORMAL
NIGHT BLUE STAIN TISS: NORMAL

## 2024-03-26 LAB
MYCOBACTERIUM SPEC CULT: NORMAL
NIGHT BLUE STAIN TISS: NORMAL

## 2024-04-03 LAB
MYCOBACTERIUM SPEC CULT: NORMAL
NIGHT BLUE STAIN TISS: NORMAL

## 2024-04-17 LAB
MYCOBACTERIUM SPEC CULT: NORMAL
NIGHT BLUE STAIN TISS: NORMAL

## 2024-06-14 ENCOUNTER — OFFICE VISIT (OUTPATIENT)
Age: 79
End: 2024-06-14
Payer: MEDICARE

## 2024-06-14 ENCOUNTER — HOSPITAL ENCOUNTER (OUTPATIENT)
Facility: HOSPITAL | Age: 79
Discharge: HOME | End: 2024-06-14
Payer: MEDICARE

## 2024-06-14 VITALS
SYSTOLIC BLOOD PRESSURE: 130 MMHG | OXYGEN SATURATION: 98 % | BODY MASS INDEX: 29.71 KG/M2 | DIASTOLIC BLOOD PRESSURE: 68 MMHG | TEMPERATURE: 98.6 F | HEART RATE: 55 BPM | WEIGHT: 174 LBS | HEIGHT: 64 IN

## 2024-06-14 DIAGNOSIS — R06.00 DYSPNEA, UNSPECIFIED TYPE: Primary | ICD-10-CM

## 2024-06-14 DIAGNOSIS — R05.3 CHRONIC COUGH: ICD-10-CM

## 2024-06-14 DIAGNOSIS — G47.33 OSA (OBSTRUCTIVE SLEEP APNEA): ICD-10-CM

## 2024-06-14 DIAGNOSIS — R06.00 DYSPNEA, UNSPECIFIED TYPE: ICD-10-CM

## 2024-06-14 PROCEDURE — 1160F RVW MEDS BY RX/DR IN RCRD: CPT | Performed by: NURSE PRACTITIONER

## 2024-06-14 PROCEDURE — 71046 X-RAY EXAM CHEST 2 VIEWS: CPT

## 2024-06-14 PROCEDURE — 1159F MED LIST DOCD IN RCRD: CPT | Performed by: NURSE PRACTITIONER

## 2024-06-14 PROCEDURE — 99214 OFFICE O/P EST MOD 30 MIN: CPT | Performed by: NURSE PRACTITIONER

## 2024-06-14 PROCEDURE — 3075F SYST BP GE 130 - 139MM HG: CPT | Performed by: NURSE PRACTITIONER

## 2024-06-14 PROCEDURE — 3078F DIAST BP <80 MM HG: CPT | Performed by: NURSE PRACTITIONER

## 2024-06-14 RX ORDER — CEFDINIR 300 MG/1
CAPSULE ORAL
COMMUNITY
Start: 2024-04-09 | End: 2024-06-14

## 2024-06-14 RX ORDER — PANTOPRAZOLE SODIUM 40 MG/1
40 TABLET, DELAYED RELEASE ORAL DAILY
Status: ON HOLD | COMMUNITY
Start: 2024-06-11

## 2024-06-14 RX ORDER — AZELASTINE HYDROCHLORIDE 137 UG/1
1 SPRAY, METERED NASAL 2 TIMES DAILY PRN
Status: ON HOLD | COMMUNITY
Start: 2024-03-29

## 2024-06-14 RX ORDER — FLUTICASONE PROPIONATE 50 MCG
2 SPRAY, SUSPENSION (ML) NASAL DAILY PRN
Status: ON HOLD | COMMUNITY

## 2024-06-14 RX ORDER — FAMOTIDINE 40 MG/1
40 TABLET, FILM COATED ORAL NIGHTLY
Status: ON HOLD | COMMUNITY
Start: 2024-06-03

## 2024-06-17 NOTE — PROGRESS NOTES
[facility] Pulmonary Follow up    CHIEF COMPLAINT    Shortness of breath    HISTORY OF PRESENT ILLNESS    Angélica De La Cruz is a 79 y.o.female here today for follow-up.  She was last seen in the office by me In March.  She denies any rest or illnesses since her last appointment.  Over the last several weeks she has noticed progressing shortness of breath.  She was concerned when to be seen in the office.    She was originally referred to our office in 2023 for shortness of breath.    She denies any cough or sputum production.  She denies any hemoptysis.  She denies any fever, chills or night sweats.  She denies any chest pain or palpitations.  Denies any lower extremity edema or calf tenderness.    She continues to wear 2 L majority of the time.  She typically has not been on oxygen during the day.    She does follow with nephrology for kidney disease.  She has also been told she is anemic.    She continues to wear CPAP with 2 L bled in the machine at night.  She denies any sleeping difficulties.    She denies any reflux symptoms.    She is accompanied with her family.    Patient Active Problem List   Diagnosis    Acute febrile illness    Abnormal stress test    CABG 11/25/20    Hypertension    CKD (chronic kidney disease)    Atrial fibrillation    Chronic cough    Non-seasonal allergic rhinitis    AMANDA (obstructive sleep apnea)    CAD (coronary artery disease) s/p CABG    Carotid stenosis, asymptomatic, right       Allergies   Allergen Reactions    Adhesive Tape Itching and Rash    Codeine Delirium    Morphine And Codeine Delirium    Cefaclor Other (See Comments)    Oxycodone-Acetaminophen Itching    Sulfa Antibiotics     Doxycycline Rash    Erythromycin Rash    Levofloxacin Rash    Oxytetracycline Rash    Penicillins Rash     Tolerates cefuroxime       Current Outpatient Medications:     albuterol (PROVENTIL) (2.5 MG/3ML) 0.083% nebulizer solution, Inhale by nebulization route for 8 days., Disp: , Rfl:     amLODIPine  (NORVASC) 10 MG tablet, Take 1 tablet by mouth Daily., Disp: 30 tablet, Rfl: 0    aspirin 81 MG chewable tablet, Chew 1 tablet Daily. OTC, Disp: , Rfl:     atorvastatin (LIPITOR) 40 MG tablet, Take 1 tablet by mouth Daily., Disp: , Rfl:     Azelastine HCl 137 MCG/SPRAY solution, , Disp: , Rfl:     B Complex Vitamins (vitamin b complex) capsule capsule, Take 1 capsule by mouth Daily. OTC, Disp: , Rfl:     clopidogrel (PLAVIX) 75 MG tablet, Take 1 tablet by mouth Daily., Disp: , Rfl:     estradiol (ESTRACE) 0.1 MG/GM vaginal cream, Insert 1 gram vaginally 3 times per week for 90 days, Disp: , Rfl:     famotidine (PEPCID) 40 MG tablet, , Disp: , Rfl:     fluticasone (FLONASE) 50 MCG/ACT nasal spray, 2 sprays into the nostril(s) as directed by provider Daily., Disp: , Rfl:     gabapentin (NEURONTIN) 300 MG capsule, Take 2 capsules by mouth 2 (Two) Times a Day., Disp: , Rfl:     guaiFENesin (MUCINEX) 600 MG 12 hr tablet, Take 1 tablet by mouth Daily As Needed for Congestion or Cough. OTC, Disp: , Rfl:     hydrALAZINE (APRESOLINE) 25 MG tablet, Take 1 tablet by mouth 2 (Two) Times a Day., Disp: , Rfl:     hydroCHLOROthiazide (HYDRODIURIL) 50 MG tablet, Take 1 tablet by mouth Daily., Disp: , Rfl:     levocetirizine (XYZAL) 5 MG tablet, Take 1 tablet by mouth Daily., Disp: , Rfl:     levothyroxine (SYNTHROID, LEVOTHROID) 100 MCG tablet, Take 1 tablet by mouth Daily., Disp: , Rfl:     Multiple Vitamins-Minerals (MULTIVITAMIN PO), Take 1 tablet by mouth Daily. OTC, Disp: , Rfl:     olmesartan (BENICAR) 20 MG tablet, Take 1 tablet by mouth Daily., Disp: , Rfl:     pantoprazole (PROTONIX) 40 MG EC tablet, , Disp: , Rfl:     Pediatric Multivitamins-Iron (CENTRUM JR/IRON PO), Take  by mouth., Disp: , Rfl:     potassium chloride ER (K-TAB) 20 MEQ tablet controlled-release ER tablet, , Disp: , Rfl:     sertraline (ZOLOFT) 50 MG tablet, Take 1 tablet by mouth Daily., Disp: , Rfl:   MEDICATION LIST AND ALLERGIES REVIEWED.    Social  "History     Tobacco Use    Smoking status: Former     Current packs/day: 0.00     Average packs/day: 1 pack/day for 15.0 years (15.0 ttl pk-yrs)     Types: Cigarettes     Start date:      Quit date:      Years since quittin.4    Smokeless tobacco: Never   Vaping Use    Vaping status: Never Used   Substance Use Topics    Alcohol use: No    Drug use: No       FAMILY AND SOCIAL HISTORY REVIEWED.    Review of Systems   Constitutional:  Negative for activity change, appetite change, fatigue, fever and unexpected weight change.   HENT:  Negative for congestion, postnasal drip, rhinorrhea, sinus pressure, sore throat and voice change.    Eyes:  Negative for visual disturbance.   Respiratory:  Positive for shortness of breath. Negative for cough, chest tightness and wheezing.    Cardiovascular:  Negative for chest pain, palpitations and leg swelling.   Gastrointestinal:  Negative for abdominal distention, abdominal pain, nausea and vomiting.   Endocrine: Negative for cold intolerance and heat intolerance.   Genitourinary:  Negative for difficulty urinating and urgency.   Musculoskeletal:  Negative for arthralgias, back pain and neck pain.   Skin:  Negative for color change and pallor.   Allergic/Immunologic: Negative for environmental allergies and food allergies.   Neurological:  Negative for dizziness, syncope, weakness and light-headedness.   Hematological:  Negative for adenopathy. Does not bruise/bleed easily.   Psychiatric/Behavioral:  Negative for agitation and behavioral problems.    .    /68 (BP Location: Right arm, Patient Position: Sitting, Cuff Size: Adult)   Pulse 55   Temp 98.6 °F (37 °C)   Ht 162.6 cm (64\")   Wt 78.9 kg (174 lb)   SpO2 98% Comment: 2 L intermittent  BMI 29.87 kg/m²     Immunization History   Administered Date(s) Administered    COVID-19 (PFIZER) Purple Cap Monovalent 2021, 2021, 10/21/2021    COVID-19 F23 (MODERNA) 12YRS+ (SPIKEVAX) 2023    Fluzone " High Dose =>65 Years (Vaxcare ONLY) 09/10/2018, 09/30/2019    Fluzone High-Dose 65+yrs 10/29/2021, 10/10/2022, 09/18/2023    Fluzone Quad >6mos (Multi-dose) 09/30/2017, 09/29/2020, 10/21/2021    Hepatitis A 12/02/2018, 09/18/2023    INFLUENZA SPLIT TRI 10/29/2021    Pneumococcal Conjugate 13-Valent (PCV13) 04/17/2017    Pneumococcal Conjugate 20-Valent (PCV20) 09/18/2023    Pneumococcal Polysaccharide (PPSV23) 04/28/2009    TD Preservative Free (Tenivac) 07/01/2019    Zostavax 10/07/2016       Physical Exam  Vitals and nursing note reviewed.   Constitutional:       Appearance: She is well-developed. She is not diaphoretic.   HENT:      Head: Normocephalic and atraumatic.   Eyes:      Pupils: Pupils are equal, round, and reactive to light.   Neck:      Thyroid: No thyromegaly.   Cardiovascular:      Rate and Rhythm: Normal rate and regular rhythm.      Heart sounds: Normal heart sounds. No murmur heard.     No friction rub. No gallop.   Pulmonary:      Effort: Pulmonary effort is normal. No respiratory distress.      Breath sounds: Normal breath sounds. No wheezing or rales.   Chest:      Chest wall: No tenderness.   Abdominal:      General: Bowel sounds are normal.      Palpations: Abdomen is soft.      Tenderness: There is no abdominal tenderness.   Musculoskeletal:         General: No swelling. Normal range of motion.      Cervical back: Normal range of motion and neck supple.   Lymphadenopathy:      Cervical: No cervical adenopathy.   Skin:     General: Skin is warm and dry.      Capillary Refill: Capillary refill takes less than 2 seconds.   Neurological:      Mental Status: She is alert and oriented to person, place, and time.   Psychiatric:         Mood and Affect: Mood normal.         Behavior: Behavior normal.           RESULTS    Chest PA/lateral: Official report pending    PROBLEM LIST    Problem List Items Addressed This Visit          Pulmonary and Pneumonias    Chronic cough       Sleep    AMANDA  (obstructive sleep apnea)     Other Visit Diagnoses       Dyspnea, unspecified type    -  Primary    Relevant Orders    XR Chest 2 View    Adult Transthoracic Echo Complete w/ Color, Spectral and Contrast if necessary per protocol              DISCUSSION    Ms. De La Cruz was here for a sick visit.  She has had noticed more shortness of breath over the last few weeks.  She does not appear to be in any respiratory distress in the office today.  I did encourage her to wear her oxygen to maintain saturation above 88% at all times.  She has been wearing her oxygen majority of the time.    I reviewed her chest x-ray and it concern me for possible pulmonary edema.  I we will get her most recent echocardiogram for review.    I did advise her she probably needs to follow-up with Dr. Borges is going to call him to make an appointment soon.  I reviewed her echocardiogram and this was performed in 2020 that showed an EF of 55%, mild MR and moderate TR, RVSP 54.  I do think an echocardiogram needs to be repeated.    Her CBC from 5/23/2024 showed a hemoglobin of 8.8 and a hematocrit of 28.9.  I did advise her to follow-up with her PCP for further management of this as well.  This is probably contributing to her shortness of breath as well.    She will continue to wear CPAP nightly for AMANDA    I did advise her if her symptoms were to worsen she needs to present to the ED for further evaluation.    She will keep her follow-up in July as scheduled    I personally spent a total of 31 minutes on patient visit today including chart review, face to face with the patient obtaining the history and physical exam, review of pertinent images and tests, counseling and discussion and/or coordination of care as described above, and documentation.  Total time excludes time spent on other separate services such as performing procedures or test interpretation, if applicable.        Taylor Carrion, APRN  06/14/202408:16 EDT  Electronically signed      Please note that portions of this note were completed with a voice recognition program.        CC: Sussy Bloom MD

## 2024-06-19 ENCOUNTER — HOSPITAL ENCOUNTER (INPATIENT)
Facility: HOSPITAL | Age: 79
LOS: 11 days | Discharge: NURSING, REHAB OR LONG-TERM CARE | End: 2024-07-01
Attending: STUDENT IN AN ORGANIZED HEALTH CARE EDUCATION/TRAINING PROGRAM | Admitting: STUDENT IN AN ORGANIZED HEALTH CARE EDUCATION/TRAINING PROGRAM
Payer: MEDICARE

## 2024-06-19 ENCOUNTER — APPOINTMENT (OUTPATIENT)
Dept: CT IMAGING | Facility: HOSPITAL | Age: 79
End: 2024-06-19
Payer: MEDICARE

## 2024-06-19 ENCOUNTER — APPOINTMENT (OUTPATIENT)
Dept: GENERAL RADIOLOGY | Facility: HOSPITAL | Age: 79
End: 2024-06-19
Payer: MEDICARE

## 2024-06-19 DIAGNOSIS — D64.9 ANEMIA, UNSPECIFIED TYPE: ICD-10-CM

## 2024-06-19 DIAGNOSIS — I25.119 CORONARY ARTERY DISEASE INVOLVING NATIVE HEART WITH ANGINA PECTORIS, UNSPECIFIED VESSEL OR LESION TYPE: ICD-10-CM

## 2024-06-19 DIAGNOSIS — D50.0 IRON DEFICIENCY ANEMIA DUE TO CHRONIC BLOOD LOSS: ICD-10-CM

## 2024-06-19 DIAGNOSIS — I21.4 NSTEMI (NON-ST ELEVATED MYOCARDIAL INFARCTION): Primary | ICD-10-CM

## 2024-06-19 DIAGNOSIS — I48.91 ATRIAL FIBRILLATION WITH RVR: ICD-10-CM

## 2024-06-19 DIAGNOSIS — I48.0 PAROXYSMAL ATRIAL FIBRILLATION: ICD-10-CM

## 2024-06-19 PROBLEM — E03.9 HYPOTHYROIDISM (ACQUIRED): Status: ACTIVE | Noted: 2024-06-19

## 2024-06-19 PROBLEM — M06.9 RHEUMATOID ARTHRITIS INVOLVING MULTIPLE SITES: Status: ACTIVE | Noted: 2024-06-19

## 2024-06-19 LAB
ABO GROUP BLD: NORMAL
ALBUMIN SERPL-MCNC: 3.6 G/DL (ref 3.5–5.2)
ALBUMIN/GLOB SERPL: 1.1 G/DL
ALP SERPL-CCNC: 111 U/L (ref 39–117)
ALT SERPL W P-5'-P-CCNC: 15 U/L (ref 1–33)
ANION GAP SERPL CALCULATED.3IONS-SCNC: 10 MMOL/L (ref 5–15)
ANISOCYTOSIS BLD QL: NORMAL
APTT PPP: 39.3 SECONDS (ref 60–90)
ARTERIAL PATENCY WRIST A: ABNORMAL
AST SERPL-CCNC: 32 U/L (ref 1–32)
ATMOSPHERIC PRESS: ABNORMAL MM[HG]
BASE EXCESS BLDA CALC-SCNC: 0.7 MMOL/L (ref 0–2)
BASOPHILS # BLD AUTO: 0.04 10*3/MM3 (ref 0–0.2)
BASOPHILS NFR BLD AUTO: 0.7 % (ref 0–1.5)
BDY SITE: ABNORMAL
BILIRUB SERPL-MCNC: 0.3 MG/DL (ref 0–1.2)
BILIRUB UR QL STRIP: NEGATIVE
BLD GP AB SCN SERPL QL: NEGATIVE
BODY TEMPERATURE: 37
BUN SERPL-MCNC: 24 MG/DL (ref 8–23)
BUN/CREAT SERPL: 16 (ref 7–25)
CALCIUM SPEC-SCNC: 9.2 MG/DL (ref 8.6–10.5)
CHLORIDE SERPL-SCNC: 106 MMOL/L (ref 98–107)
CLARITY UR: CLEAR
CO2 BLDA-SCNC: 27.1 MMOL/L (ref 22–33)
CO2 SERPL-SCNC: 25 MMOL/L (ref 22–29)
COHGB MFR BLD: 1.7 % (ref 0–2)
COLOR UR: YELLOW
CREAT SERPL-MCNC: 1.5 MG/DL (ref 0.57–1)
D DIMER PPP FEU-MCNC: 1.5 MCGFEU/ML (ref 0–0.81)
DEPRECATED RDW RBC AUTO: 47 FL (ref 37–54)
DEVELOPER EXPIRATION DATE: NORMAL
DEVELOPER LOT NUMBER: NORMAL
EGFRCR SERPLBLD CKD-EPI 2021: 34.9 ML/MIN/1.73
EOSINOPHIL # BLD AUTO: 0.29 10*3/MM3 (ref 0–0.4)
EOSINOPHIL NFR BLD AUTO: 4.7 % (ref 0.3–6.2)
ERYTHROCYTE [DISTWIDTH] IN BLOOD BY AUTOMATED COUNT: 16.6 % (ref 12.3–15.4)
EXPIRATION DATE: NORMAL
FECAL OCCULT BLOOD SCREEN, POC: NEGATIVE
FERRITIN SERPL-MCNC: 41.03 NG/ML (ref 13–150)
GEN 5 2HR TROPONIN T REFLEX: 229 NG/L
GLOBULIN UR ELPH-MCNC: 3.4 GM/DL
GLUCOSE SERPL-MCNC: 100 MG/DL (ref 65–99)
GLUCOSE UR STRIP-MCNC: NEGATIVE MG/DL
HCO3 BLDA-SCNC: 25.8 MMOL/L (ref 20–26)
HCT VFR BLD AUTO: 28.5 % (ref 34–46.6)
HCT VFR BLD CALC: 24.9 % (ref 38–51)
HGB BLD-MCNC: 7.7 G/DL (ref 12–15.9)
HGB BLDA-MCNC: 8.1 G/DL (ref 14–18)
HGB UR QL STRIP.AUTO: NEGATIVE
HOLD SPECIMEN: NORMAL
HYPOCHROMIA BLD QL: NORMAL
IMM GRANULOCYTES # BLD AUTO: 0.02 10*3/MM3 (ref 0–0.05)
IMM GRANULOCYTES NFR BLD AUTO: 0.3 % (ref 0–0.5)
INHALED O2 CONCENTRATION: 28 %
INR PPP: 1.26 (ref 0.89–1.12)
IRON 24H UR-MRATE: 14 MCG/DL (ref 37–145)
IRON SATN MFR SERPL: 4 % (ref 20–50)
KETONES UR QL STRIP: NEGATIVE
LEUKOCYTE ESTERASE UR QL STRIP.AUTO: NEGATIVE
LYMPHOCYTES # BLD AUTO: 0.55 10*3/MM3 (ref 0.7–3.1)
LYMPHOCYTES NFR BLD AUTO: 9 % (ref 19.6–45.3)
Lab: NORMAL
MAGNESIUM SERPL-MCNC: 2.3 MG/DL (ref 1.6–2.4)
MCH RBC QN AUTO: 21.2 PG (ref 26.6–33)
MCHC RBC AUTO-ENTMCNC: 27 G/DL (ref 31.5–35.7)
MCV RBC AUTO: 78.5 FL (ref 79–97)
METHGB BLD QL: 0.1 % (ref 0–1.5)
MICROCYTES BLD QL: NORMAL
MODALITY: ABNORMAL
MONOCYTES # BLD AUTO: 0.42 10*3/MM3 (ref 0.1–0.9)
MONOCYTES NFR BLD AUTO: 6.9 % (ref 5–12)
NEGATIVE CONTROL: NEGATIVE
NEUTROPHILS NFR BLD AUTO: 4.8 10*3/MM3 (ref 1.7–7)
NEUTROPHILS NFR BLD AUTO: 78.4 % (ref 42.7–76)
NITRITE UR QL STRIP: NEGATIVE
NRBC BLD AUTO-RTO: 0 /100 WBC (ref 0–0.2)
NT-PROBNP SERPL-MCNC: 4207 PG/ML (ref 0–1800)
OXYHGB MFR BLDV: 96 % (ref 94–99)
PCO2 BLDA: 42.7 MM HG (ref 35–45)
PCO2 TEMP ADJ BLD: 42.7 MM HG (ref 35–45)
PH BLDA: 7.39 PH UNITS (ref 7.35–7.45)
PH UR STRIP.AUTO: 7.5 [PH] (ref 5–8)
PH, TEMP CORRECTED: 7.39 PH UNITS
PLAT MORPH BLD: NORMAL
PLATELET # BLD AUTO: 246 10*3/MM3 (ref 140–450)
PMV BLD AUTO: 10.3 FL (ref 6–12)
PO2 BLDA: 88.3 MM HG (ref 83–108)
PO2 TEMP ADJ BLD: 88.3 MM HG (ref 83–108)
POSITIVE CONTROL: POSITIVE
POTASSIUM SERPL-SCNC: 4.3 MMOL/L (ref 3.5–5.2)
PROT SERPL-MCNC: 7 G/DL (ref 6–8.5)
PROT UR QL STRIP: NEGATIVE
PROTHROMBIN TIME: 16 SECONDS (ref 12.2–14.5)
QT INTERVAL: 294 MS
QTC INTERVAL: 461 MS
RBC # BLD AUTO: 3.63 10*6/MM3 (ref 3.77–5.28)
RH BLD: POSITIVE
SODIUM SERPL-SCNC: 141 MMOL/L (ref 136–145)
SP GR UR STRIP: 1.01 (ref 1–1.03)
T&S EXPIRATION DATE: NORMAL
T4 FREE SERPL-MCNC: 1.3 NG/DL (ref 0.92–1.68)
TIBC SERPL-MCNC: 399 MCG/DL (ref 298–536)
TRANSFERRIN SERPL-MCNC: 268 MG/DL (ref 200–360)
TROPONIN T DELTA: 149 NG/L
TROPONIN T SERPL HS-MCNC: 36 NG/L
TROPONIN T SERPL HS-MCNC: 80 NG/L
TSH SERPL DL<=0.05 MIU/L-ACNC: 0.39 UIU/ML (ref 0.27–4.2)
UFH PPP CHRO-ACNC: 0.1 IU/ML (ref 0.3–0.7)
UROBILINOGEN UR QL STRIP: NORMAL
VENTILATOR MODE: ABNORMAL
WBC MORPH BLD: NORMAL
WBC NRBC COR # BLD AUTO: 6.12 10*3/MM3 (ref 3.4–10.8)
WHOLE BLOOD HOLD COAG: NORMAL
WHOLE BLOOD HOLD SPECIMEN: NORMAL

## 2024-06-19 PROCEDURE — P9016 RBC LEUKOCYTES REDUCED: HCPCS

## 2024-06-19 PROCEDURE — 86922 COMPATIBILITY TEST ANTIGLOB: CPT

## 2024-06-19 PROCEDURE — 36430 TRANSFUSION BLD/BLD COMPNT: CPT

## 2024-06-19 PROCEDURE — 82375 ASSAY CARBOXYHB QUANT: CPT

## 2024-06-19 PROCEDURE — 84443 ASSAY THYROID STIM HORMONE: CPT | Performed by: STUDENT IN AN ORGANIZED HEALTH CARE EDUCATION/TRAINING PROGRAM

## 2024-06-19 PROCEDURE — G0378 HOSPITAL OBSERVATION PER HR: HCPCS

## 2024-06-19 PROCEDURE — 86850 RBC ANTIBODY SCREEN: CPT | Performed by: STUDENT IN AN ORGANIZED HEALTH CARE EDUCATION/TRAINING PROGRAM

## 2024-06-19 PROCEDURE — 83050 HGB METHEMOGLOBIN QUAN: CPT

## 2024-06-19 PROCEDURE — 99291 CRITICAL CARE FIRST HOUR: CPT

## 2024-06-19 PROCEDURE — 86920 COMPATIBILITY TEST SPIN: CPT

## 2024-06-19 PROCEDURE — 82607 VITAMIN B-12: CPT | Performed by: NURSE PRACTITIONER

## 2024-06-19 PROCEDURE — 25810000003 SODIUM CHLORIDE 0.9 % SOLUTION 250 ML FLEX CONT: Performed by: NURSE PRACTITIONER

## 2024-06-19 PROCEDURE — 25810000003 SODIUM CHLORIDE 0.9 % SOLUTION: Performed by: STUDENT IN AN ORGANIZED HEALTH CARE EDUCATION/TRAINING PROGRAM

## 2024-06-19 PROCEDURE — 83540 ASSAY OF IRON: CPT | Performed by: NURSE PRACTITIONER

## 2024-06-19 PROCEDURE — 85379 FIBRIN DEGRADATION QUANT: CPT | Performed by: STUDENT IN AN ORGANIZED HEALTH CARE EDUCATION/TRAINING PROGRAM

## 2024-06-19 PROCEDURE — 86901 BLOOD TYPING SEROLOGIC RH(D): CPT | Performed by: STUDENT IN AN ORGANIZED HEALTH CARE EDUCATION/TRAINING PROGRAM

## 2024-06-19 PROCEDURE — 84466 ASSAY OF TRANSFERRIN: CPT | Performed by: NURSE PRACTITIONER

## 2024-06-19 PROCEDURE — 25010000002 HEPARIN (PORCINE) 25000-0.45 UT/250ML-% SOLUTION: Performed by: STUDENT IN AN ORGANIZED HEALTH CARE EDUCATION/TRAINING PROGRAM

## 2024-06-19 PROCEDURE — 81003 URINALYSIS AUTO W/O SCOPE: CPT | Performed by: STUDENT IN AN ORGANIZED HEALTH CARE EDUCATION/TRAINING PROGRAM

## 2024-06-19 PROCEDURE — 83880 ASSAY OF NATRIURETIC PEPTIDE: CPT | Performed by: STUDENT IN AN ORGANIZED HEALTH CARE EDUCATION/TRAINING PROGRAM

## 2024-06-19 PROCEDURE — 85007 BL SMEAR W/DIFF WBC COUNT: CPT | Performed by: STUDENT IN AN ORGANIZED HEALTH CARE EDUCATION/TRAINING PROGRAM

## 2024-06-19 PROCEDURE — 36415 COLL VENOUS BLD VENIPUNCTURE: CPT

## 2024-06-19 PROCEDURE — 85025 COMPLETE CBC W/AUTO DIFF WBC: CPT | Performed by: STUDENT IN AN ORGANIZED HEALTH CARE EDUCATION/TRAINING PROGRAM

## 2024-06-19 PROCEDURE — 83735 ASSAY OF MAGNESIUM: CPT | Performed by: STUDENT IN AN ORGANIZED HEALTH CARE EDUCATION/TRAINING PROGRAM

## 2024-06-19 PROCEDURE — 84439 ASSAY OF FREE THYROXINE: CPT | Performed by: STUDENT IN AN ORGANIZED HEALTH CARE EDUCATION/TRAINING PROGRAM

## 2024-06-19 PROCEDURE — 71045 X-RAY EXAM CHEST 1 VIEW: CPT

## 2024-06-19 PROCEDURE — 99222 1ST HOSP IP/OBS MODERATE 55: CPT | Performed by: INTERNAL MEDICINE

## 2024-06-19 PROCEDURE — 86900 BLOOD TYPING SEROLOGIC ABO: CPT

## 2024-06-19 PROCEDURE — 82270 OCCULT BLOOD FECES: CPT | Performed by: STUDENT IN AN ORGANIZED HEALTH CARE EDUCATION/TRAINING PROGRAM

## 2024-06-19 PROCEDURE — 36600 WITHDRAWAL OF ARTERIAL BLOOD: CPT

## 2024-06-19 PROCEDURE — 82805 BLOOD GASES W/O2 SATURATION: CPT

## 2024-06-19 PROCEDURE — 86900 BLOOD TYPING SEROLOGIC ABO: CPT | Performed by: STUDENT IN AN ORGANIZED HEALTH CARE EDUCATION/TRAINING PROGRAM

## 2024-06-19 PROCEDURE — 71275 CT ANGIOGRAPHY CHEST: CPT

## 2024-06-19 PROCEDURE — 25810000003 SODIUM CHLORIDE 0.9 % SOLUTION: Performed by: NURSE PRACTITIONER

## 2024-06-19 PROCEDURE — 85730 THROMBOPLASTIN TIME PARTIAL: CPT | Performed by: STUDENT IN AN ORGANIZED HEALTH CARE EDUCATION/TRAINING PROGRAM

## 2024-06-19 PROCEDURE — 82728 ASSAY OF FERRITIN: CPT | Performed by: NURSE PRACTITIONER

## 2024-06-19 PROCEDURE — 85520 HEPARIN ASSAY: CPT | Performed by: STUDENT IN AN ORGANIZED HEALTH CARE EDUCATION/TRAINING PROGRAM

## 2024-06-19 PROCEDURE — 25510000001 IOPAMIDOL PER 1 ML: Performed by: STUDENT IN AN ORGANIZED HEALTH CARE EDUCATION/TRAINING PROGRAM

## 2024-06-19 PROCEDURE — 85610 PROTHROMBIN TIME: CPT | Performed by: STUDENT IN AN ORGANIZED HEALTH CARE EDUCATION/TRAINING PROGRAM

## 2024-06-19 PROCEDURE — 93005 ELECTROCARDIOGRAM TRACING: CPT | Performed by: STUDENT IN AN ORGANIZED HEALTH CARE EDUCATION/TRAINING PROGRAM

## 2024-06-19 PROCEDURE — 84484 ASSAY OF TROPONIN QUANT: CPT | Performed by: STUDENT IN AN ORGANIZED HEALTH CARE EDUCATION/TRAINING PROGRAM

## 2024-06-19 PROCEDURE — 80053 COMPREHEN METABOLIC PANEL: CPT | Performed by: STUDENT IN AN ORGANIZED HEALTH CARE EDUCATION/TRAINING PROGRAM

## 2024-06-19 RX ORDER — SODIUM CHLORIDE 9 MG/ML
40 INJECTION, SOLUTION INTRAVENOUS AS NEEDED
Status: DISCONTINUED | OUTPATIENT
Start: 2024-06-19 | End: 2024-07-01 | Stop reason: HOSPADM

## 2024-06-19 RX ORDER — SODIUM CHLORIDE 9 MG/ML
75 INJECTION, SOLUTION INTRAVENOUS CONTINUOUS
Status: DISCONTINUED | OUTPATIENT
Start: 2024-06-19 | End: 2024-06-20

## 2024-06-19 RX ORDER — ASPIRIN 81 MG/1
324 TABLET, CHEWABLE ORAL DAILY
Status: DISCONTINUED | OUTPATIENT
Start: 2024-06-20 | End: 2024-06-20

## 2024-06-19 RX ORDER — AMOXICILLIN 250 MG
2 CAPSULE ORAL 2 TIMES DAILY PRN
Status: DISCONTINUED | OUTPATIENT
Start: 2024-06-19 | End: 2024-07-01 | Stop reason: HOSPADM

## 2024-06-19 RX ORDER — NITROGLYCERIN 0.4 MG/1
0.4 TABLET SUBLINGUAL
Status: DISCONTINUED | OUTPATIENT
Start: 2024-06-19 | End: 2024-07-01 | Stop reason: HOSPADM

## 2024-06-19 RX ORDER — DILTIAZEM HCL/D5W 125 MG/125
5-15 PLASTIC BAG, INJECTION (ML) INTRAVENOUS
Status: DISCONTINUED | OUTPATIENT
Start: 2024-06-19 | End: 2024-06-19

## 2024-06-19 RX ORDER — GABAPENTIN 300 MG/1
300 CAPSULE ORAL EVERY 12 HOURS SCHEDULED
Status: DISCONTINUED | OUTPATIENT
Start: 2024-06-19 | End: 2024-07-01 | Stop reason: HOSPADM

## 2024-06-19 RX ORDER — PANTOPRAZOLE SODIUM 40 MG/1
40 TABLET, DELAYED RELEASE ORAL
Status: DISCONTINUED | OUTPATIENT
Start: 2024-06-19 | End: 2024-06-20

## 2024-06-19 RX ORDER — CETIRIZINE HYDROCHLORIDE 10 MG/1
10 TABLET ORAL DAILY
Status: DISCONTINUED | OUTPATIENT
Start: 2024-06-20 | End: 2024-07-01 | Stop reason: HOSPADM

## 2024-06-19 RX ORDER — SODIUM CHLORIDE 0.9 % (FLUSH) 0.9 %
10 SYRINGE (ML) INJECTION EVERY 12 HOURS SCHEDULED
Status: DISCONTINUED | OUTPATIENT
Start: 2024-06-19 | End: 2024-07-01 | Stop reason: HOSPADM

## 2024-06-19 RX ORDER — METOPROLOL TARTRATE 1 MG/ML
5 INJECTION, SOLUTION INTRAVENOUS
Status: COMPLETED | OUTPATIENT
Start: 2024-06-19 | End: 2024-06-19

## 2024-06-19 RX ORDER — SODIUM CHLORIDE 0.9 % (FLUSH) 0.9 %
10 SYRINGE (ML) INJECTION AS NEEDED
Status: DISCONTINUED | OUTPATIENT
Start: 2024-06-19 | End: 2024-07-01 | Stop reason: HOSPADM

## 2024-06-19 RX ORDER — HYDRALAZINE HYDROCHLORIDE 25 MG/1
25 TABLET, FILM COATED ORAL 2 TIMES DAILY
Status: DISCONTINUED | OUTPATIENT
Start: 2024-06-19 | End: 2024-06-21

## 2024-06-19 RX ORDER — POLYETHYLENE GLYCOL 3350 17 G/17G
17 POWDER, FOR SOLUTION ORAL DAILY PRN
Status: DISCONTINUED | OUTPATIENT
Start: 2024-06-19 | End: 2024-07-01 | Stop reason: HOSPADM

## 2024-06-19 RX ORDER — ASPIRIN 81 MG/1
263 TABLET, CHEWABLE ORAL ONCE
Status: COMPLETED | OUTPATIENT
Start: 2024-06-19 | End: 2024-06-19

## 2024-06-19 RX ORDER — BISACODYL 5 MG/1
5 TABLET, DELAYED RELEASE ORAL DAILY PRN
Status: DISCONTINUED | OUTPATIENT
Start: 2024-06-19 | End: 2024-07-01 | Stop reason: HOSPADM

## 2024-06-19 RX ORDER — ATORVASTATIN CALCIUM 40 MG/1
40 TABLET, FILM COATED ORAL DAILY
Status: DISCONTINUED | OUTPATIENT
Start: 2024-06-19 | End: 2024-07-01 | Stop reason: HOSPADM

## 2024-06-19 RX ORDER — ASPIRIN 81 MG/1
324 TABLET, CHEWABLE ORAL ONCE
Status: DISCONTINUED | OUTPATIENT
Start: 2024-06-19 | End: 2024-06-19

## 2024-06-19 RX ORDER — HYDROCHLOROTHIAZIDE 25 MG/1
50 TABLET ORAL DAILY
Status: DISCONTINUED | OUTPATIENT
Start: 2024-06-19 | End: 2024-06-21

## 2024-06-19 RX ORDER — CLOPIDOGREL BISULFATE 75 MG/1
75 TABLET ORAL DAILY
Status: DISCONTINUED | OUTPATIENT
Start: 2024-06-19 | End: 2024-06-19

## 2024-06-19 RX ORDER — FAMOTIDINE 20 MG/1
20 TABLET, FILM COATED ORAL DAILY
Status: DISCONTINUED | OUTPATIENT
Start: 2024-06-20 | End: 2024-06-20

## 2024-06-19 RX ORDER — LEVOTHYROXINE SODIUM 0.1 MG/1
100 TABLET ORAL DAILY
Status: DISCONTINUED | OUTPATIENT
Start: 2024-06-20 | End: 2024-07-01 | Stop reason: HOSPADM

## 2024-06-19 RX ORDER — BISACODYL 10 MG
10 SUPPOSITORY, RECTAL RECTAL DAILY PRN
Status: DISCONTINUED | OUTPATIENT
Start: 2024-06-19 | End: 2024-07-01 | Stop reason: HOSPADM

## 2024-06-19 RX ORDER — HEPARIN SODIUM 10000 [USP'U]/100ML
14 INJECTION, SOLUTION INTRAVENOUS
Status: DISCONTINUED | OUTPATIENT
Start: 2024-06-19 | End: 2024-06-20

## 2024-06-19 RX ORDER — AMLODIPINE BESYLATE 5 MG/1
10 TABLET ORAL
Status: DISCONTINUED | OUTPATIENT
Start: 2024-06-19 | End: 2024-06-21

## 2024-06-19 RX ADMIN — SODIUM CHLORIDE 500 ML: 9 INJECTION, SOLUTION INTRAVENOUS at 12:32

## 2024-06-19 RX ADMIN — HEPARIN SODIUM 12 UNITS/KG/HR: 10000 INJECTION, SOLUTION INTRAVENOUS at 16:41

## 2024-06-19 RX ADMIN — METOPROLOL TARTRATE 5 MG: 5 INJECTION INTRAVENOUS at 13:29

## 2024-06-19 RX ADMIN — ATORVASTATIN CALCIUM 40 MG: 40 TABLET, FILM COATED ORAL at 18:16

## 2024-06-19 RX ADMIN — METOPROLOL TARTRATE 5 MG: 5 INJECTION INTRAVENOUS at 13:03

## 2024-06-19 RX ADMIN — PANTOPRAZOLE SODIUM 40 MG: 40 TABLET, DELAYED RELEASE ORAL at 18:16

## 2024-06-19 RX ADMIN — IOPAMIDOL 70 ML: 755 INJECTION, SOLUTION INTRAVENOUS at 14:46

## 2024-06-19 RX ADMIN — METOPROLOL TARTRATE 5 MG: 5 INJECTION INTRAVENOUS at 13:53

## 2024-06-19 RX ADMIN — Medication 10 ML: at 20:25

## 2024-06-19 RX ADMIN — GABAPENTIN 300 MG: 300 CAPSULE ORAL at 20:25

## 2024-06-19 RX ADMIN — ASPIRIN 81 MG CHEWABLE TABLET 243 MG: 81 TABLET CHEWABLE at 16:23

## 2024-06-19 RX ADMIN — SODIUM CHLORIDE 40 ML: 9 INJECTION, SOLUTION INTRAVENOUS at 20:26

## 2024-06-19 RX ADMIN — SODIUM CHLORIDE 75 ML/HR: 900 INJECTION, SOLUTION INTRAVENOUS at 18:11

## 2024-06-19 NOTE — PLAN OF CARE
"Goal Outcome Evaluation:         Pt is Aox4 and on 3 L NC. Upon arrival, blood pressure and HR low - NP aware and stopped home antihypertensives. Blood pressure cycling every 5 minutes. Daughter at bedside. Bed locked and lowered, call bell nearby, and head of bed slightly elevated. No complaints of pain. Complaints of SOA that, per pt, has \"been there for weeks\". Complaints of light headedness upon standing up during transfer. No further requests.                                     "

## 2024-06-19 NOTE — ED NOTES
Angélica De La Cruz    Nursing Report ED to Floor:  Mental status: A&Ox4  Ambulatory status: x2 assist  Oxygen Therapy:  2L NC  Cardiac Rhythm: Afib RVR upon arrival to ED has converted Sinus Alexis  Admitted from: ED/Home  Safety Concerns:  Fall risk  Social Issues: none  ED Room #:  32    ED Nurse Phone Extension - [phone] or may call [phone].      HPI:   Chief Complaint   Patient presents with    Weakness - Generalized       Past Medical History:  Past Medical History:   Diagnosis Date    Arthritis     CKD (chronic kidney disease)     elevated creat caused by long term use of lisinopril     Coronary artery disease     Fibromyalgia     Hypertension     Lung nodule seen on imaging study     AMANDA treated with BiPAP     Peritonitis         Past Surgical History:  Past Surgical History:   Procedure Laterality Date    APPENDECTOMY      CARDIAC CATHETERIZATION N/A 11/24/2020    Procedure: Left Heart Cath;  Surgeon: Sanya Borges MD;  Location:  [facility];  Service: Cardiovascular;  Laterality: N/A;    CATARACT EXTRACTION, BILATERAL Bilateral 2018    CHOLECYSTECTOMY      CORONARY ARTERY BYPASS GRAFT N/A 11/25/2020    Procedure: MEDIAN STERNOTOMY, CORONARY ARTERY BYPASS GRAFTING X2, UTILIZNG THE LEFT  INTERNAL MAMMARY ARTERY GRAFT, ENDOSCOPIC VEIN HARVESTING CONVERTED TO OPEN OF THE RIGHT GREATER SAPHENOUS VEIN;  Surgeon: Wali Spicer MD;  Location:  [facility];  Service: Cardiothoracic;  Laterality: N/A;  VO: 0807  VR: 0807      HYSTERECTOMY  1984    with BSO    OOPHORECTOMY Bilateral 1984        Admitting Doctor:   Riccardo Crenshaw DO    Consulting Provider(s):  Consults       No orders found from 5/21/2024 to 6/20/2024.             Admitting Diagnosis:   There were no encounter diagnoses.    Most Recent Vitals:   Vitals:    06/19/24 1414 06/19/24 1500 06/19/24 1515 06/19/24 1530   BP: 118/81 (!) 85/73 106/51 98/54   BP Location:       Patient Position:       Pulse:  (!) 135 (!) 38 (!) 47    Resp:       Temp:       TempSrc:       SpO2:  99% 100% 100%   Weight:       Height:           Active LDAs/IV Access:   Lines, Drains & Airways       Active LDAs       Name Placement date Placement time Site Days    Peripheral IV 06/19/24 1107 Right Antecubital 06/19/24  1107  Antecubital  less than 1                    Labs (abnormal labs have a star):   Labs Reviewed   COMPREHENSIVE METABOLIC PANEL - Abnormal; Notable for the following components:       Result Value    Glucose 100 (*)     BUN 24 (*)     Creatinine 1.50 (*)     eGFR 34.9 (*)     All other components within normal limits    Narrative:     GFR Normal >60  Chronic Kidney Disease <60  Kidney Failure <15    The GFR formula is only valid for adults with stable renal function between ages 18 and 70.   SINGLE HS TROPONIN T - Abnormal; Notable for the following components:    HS Troponin T 36 (*)     All other components within normal limits    Narrative:     High Sensitive Troponin T Reference Range:  <14.0 ng/L- Negative Female for AMI  <22.0 ng/L- Negative Male for AMI  >=14 - Abnormal Female indicating possible myocardial injury.  >=22 - Abnormal Male indicating possible myocardial injury.   Clinicians would have to utilize clinical acumen, EKG, Troponin, and serial changes to determine if it is an Acute Myocardial Infarction or myocardial injury due to an underlying chronic condition.        CBC WITH AUTO DIFFERENTIAL - Abnormal; Notable for the following components:    RBC 3.63 (*)     Hemoglobin 7.7 (*)     Hematocrit 28.5 (*)     MCV 78.5 (*)     MCH 21.2 (*)     MCHC 27.0 (*)     RDW 16.6 (*)     Neutrophil % 78.4 (*)     Lymphocyte % 9.0 (*)     Lymphocytes, Absolute 0.55 (*)     All other components within normal limits   D-DIMER, QUANTITATIVE - Abnormal; Notable for the following components:    D-Dimer, Quantitative 1.50 (*)     All other components within normal limits    Narrative:     According to the assay 's published package  "insert, a normal (<0.50 MCGFEU/mL) D-dimer result in conjunction with a non-high clinical probability assessment, excludes deep vein thrombosis (DVT) and pulmonary embolism (PE) with high sensitivity.    D-dimer values increase with age and this can make VTE exclusion of an older population difficult. To address this, the [employer], based on best available evidence and recent guidelines, recommends that clinicians use age-adjusted D-dimer thresholds in patients greater than 50 years of age with: a) a low probability of PE who do not meet all Pulmonary Embolism Rule Out Criteria, or b) in those with intermediate probability of PE.   The formula for an age-adjusted D-dimer cut-off is \"age/100\".  For example, a 60 year old patient would have an age-adjusted cut-off of 0.60 MCGFEU/mL and an 80 year old 0.80 MCGFEU/mL.   BNP (IN-HOUSE) - Abnormal; Notable for the following components:    proBNP 4,207.0 (*)     All other components within normal limits    Narrative:     This assay is used as an aid in the diagnosis of individuals suspected of having heart failure. It can be used as an aid in the diagnosis of acute decompensated heart failure (ADHF) in patients presenting with signs and symptoms of ADHF to the emergency department (ED). In addition, NT-proBNP of <300 pg/mL indicates ADHF is not likely.    Age Range Result Interpretation  NT-proBNP Concentration (pg/mL:      <50             Positive            >450                   Gray                 300-450                    Negative             <300    50-75           Positive            >900                  Gray                300-900                  Negative            <300      >75             Positive            >1800                  Gray                300-1800                  Negative            <300   BLOOD GAS, ARTERIAL W/CO-OXIMETRY - Abnormal; Notable for the following components:    Hemoglobin, Blood Gas 8.1 (*)     Hematocrit, " Blood Gas 24.9 (*)     All other components within normal limits   SINGLE HS TROPONIN T - Abnormal; Notable for the following components:    HS Troponin T 80 (*)     All other components within normal limits    Narrative:     High Sensitive Troponin T Reference Range:  <14.0 ng/L- Negative Female for AMI  <22.0 ng/L- Negative Male for AMI  >=14 - Abnormal Female indicating possible myocardial injury.  >=22 - Abnormal Male indicating possible myocardial injury.   Clinicians would have to utilize clinical acumen, EKG, Troponin, and serial changes to determine if it is an Acute Myocardial Infarction or myocardial injury due to an underlying chronic condition.        MAGNESIUM - Normal   URINALYSIS W/ MICROSCOPIC IF INDICATED (NO CULTURE) - Normal    Narrative:     Urine microscopic not indicated.   TSH - Normal   T4, FREE - Normal   POCT OCCULT BLOOD STOOL (ED ONLY) - Normal   RAINBOW DRAW    Narrative:     The following orders were created for panel order [place] Draw.  Procedure                               Abnormality         Status                     ---------                               -----------         ------                     Green Top (Gel)[[id number]]                                  Final result               Lavender Top[[id number]]                                     Final result               Gold Top - SST[[id number]]                                   Final result               Gray Top[[id number]]                                         Final result               Light Blue Top[[id number]]                                   Final result                 Please view results for these tests on the individual orders.   SCAN SLIDE   BLOOD GAS, ARTERIAL   HIGH SENSITIVITIY TROPONIN T 2HR   HEPARIN ANTI XA   PROTIME-INR   APTT   TYPE AND SCREEN   CBC AND DIFFERENTIAL    Narrative:     The following orders were created for panel order CBC & Differential.  Procedure                               Abnormality          Status                     ---------                               -----------         ------                     CBC Auto Differential[[id number]]        Abnormal            Final result               Scan Slide[[id number]]                                       Final result                 Please view results for these tests on the individual orders.   GREEN TOP   LAVENDER TOP   GOLD TOP - SST   GRAY TOP   LIGHT BLUE TOP       Meds Given in ED:   Medications   sodium chloride 0.9 % flush 10 mL (has no administration in time range)   aspirin chewable tablet 324 mg (has no administration in time range)   heparin 18230 units/250 mL (100 units/mL) in 0.45 % NaCl infusion (has no administration in time range)   Pharmacy to Dose Heparin (has no administration in time range)   dilTIAZem (CARDIZEM) 125 mg in 125 mL D5W infusion (has no administration in time range)   sodium chloride 0.9 % bolus 500 mL (0 mL Intravenous Stopped 6/19/24 1305)   metoprolol tartrate (LOPRESSOR) injection 5 mg (5 mg Intravenous Given 6/19/24 1353)   iopamidol (ISOVUE-370) 76 % injection 100 mL (70 mL Intravenous Given 6/19/24 1446)     dilTIAZem, 5-15 mg/hr  heparin, 12 Units/kg/hr  Pharmacy to Dose Heparin,          Last NIH score:                                                          Dysphagia screening results:  Patient Factors Component (Dysphagia:Stroke or Rule-out)  Best Eye Response: 4-->(E4) spontaneous (06/19/24 1118)  Best Motor Response: 6-->(M6) obeys commands (06/19/24 1118)  Best Verbal Response: 5-->(V5) oriented (06/19/24 1118)  Zohreh Coma Scale Score: 15 (06/19/24 1118)     Zohreh Coma Scale:  No data recorded     CIWA:        Restraint Type:            Isolation Status:  No active isolations

## 2024-06-19 NOTE — ED PROVIDER NOTES
EMERGENCY DEPARTMENT ENCOUNTER    Pt Name: Angélica De La Cruz  MRN: [id number]  Pt :   1945  Room Number:  S529/1  Date of encounter:  2024  PCP: Sussy Bloom MD  ED Provider: Antoni Jacques MD    Historian: Patient, daughter, EMS      HPI:  Chief Complaint: Chest pain        Context: Angélica De La Cruz is a  79-year-old woman who presents because of palpitations that resolved and generalized malaise since she woke this morning.  She says she has history of atrial fibrillation.  When she woke this morning she felt weak and she could feel her heart racing without actual chest pain.  Upon arrival here she is in atrial fibrillation with RVR but says she is not able to appreciate any palpitations at this time.  She follows with Dr. Borges and has known history of atrial fibrillation but does not know if she is on any rate control she does take blood thinners.  She contacted cardiology this morning they wanted her to come to the clinic but because of logistical reasons she says that was not possible so called EMS and was brought here for evaluation.      PAST MEDICAL HISTORY  Past Medical History:   Diagnosis Date    Arthritis     CKD (chronic kidney disease)     elevated creat caused by long term use of lisinopril     Coronary artery disease     Fibromyalgia     Hypertension     Lung nodule seen on imaging study     AMANDA treated with BiPAP     Peritonitis          PAST SURGICAL HISTORY  Past Surgical History:   Procedure Laterality Date    APPENDECTOMY      CARDIAC CATHETERIZATION N/A 2020    Procedure: Left Heart Cath;  Surgeon: Sanya Borges MD;  Location: [facility] INVASIVE LOCATION;  Service: Cardiovascular;  Laterality: N/A;    CATARACT EXTRACTION, BILATERAL Bilateral 2018    CHOLECYSTECTOMY      CORONARY ARTERY BYPASS GRAFT N/A 2020    Procedure: MEDIAN STERNOTOMY, CORONARY ARTERY BYPASS GRAFTING X2, UTILIZNG THE LEFT  INTERNAL MAMMARY ARTERY GRAFT, ENDOSCOPIC VEIN  HARVESTING CONVERTED TO OPEN OF THE RIGHT GREATER SAPHENOUS VEIN;  Surgeon: Wali Spicer MD;  Location: [facility];  Service: Cardiothoracic;  Laterality: N/A;  VO: 0807  VR: 0807      HYSTERECTOMY      with BSO    OOPHORECTOMY Bilateral          FAMILY HISTORY  Family History   Problem Relation Age of Onset    Heart disease Mother     Cerebral aneurysm Father     Heart disease Brother     Heart disease Maternal Grandmother     No Known Problems Half-Sister     No Known Problems Half-Sister     Kidney failure Half-Brother     Sudden death Half-Brother     Breast cancer Neg Hx     Ovarian cancer Neg Hx          SOCIAL HISTORY  Social History     Socioeconomic History    Marital status:     Number of children: 2   Tobacco Use    Smoking status: Former     Current packs/day: 0.00     Average packs/day: 1 pack/day for 15.0 years (15.0 ttl pk-yrs)     Types: Cigarettes     Start date:      Quit date:      Years since quittin.4    Smokeless tobacco: Never   Vaping Use    Vaping status: Never Used   Substance and Sexual Activity    Alcohol use: No    Drug use: No    Sexual activity: Defer         ALLERGIES  Adhesive tape, Codeine, Morphine and codeine, Cefaclor, Oxycodone-acetaminophen, Sulfa antibiotics, Doxycycline, Erythromycin, Levofloxacin, Oxytetracycline, and Penicillins        REVIEW OF SYSTEMS  Review of Systems       All systems reviewed and negative except for those discussed in HPI.       PHYSICAL EXAM    I have reviewed the triage vital signs and nursing notes.    ED Triage Vitals [24 1111]   Temp Heart Rate Resp BP SpO2   98.8 °F (37.1 °C) (!) 140 20 (!) 86/63 94 %      Temp src Heart Rate Source Patient Position BP Location FiO2 (%)   Oral Monitor Lying Right arm --       Physical Exam  GENERAL:   Appears acute on chronically ill, nasal cannula in place  HENT: Nares patent.  EYES: No scleral icterus.  CV: Irregular tachycardia  RESPIRATORY: Normal effort.  No audible  wheezes, rales or rhonchi.  ABDOMEN: Soft, nontender  MUSCULOSKELETAL: No deformities.   NEURO: Alert, moves all extremities, follows commands.  SKIN: Warm, dry, no rash visualized.      LAB RESULTS  Recent Results (from the past 24 hour(s))   Comprehensive Metabolic Panel    Collection Time: 06/19/24 11:32 AM    Specimen: Blood   Result Value Ref Range    Glucose 100 (H) 65 - 99 mg/dL    BUN 24 (H) 8 - 23 mg/dL    Creatinine 1.50 (H) 0.57 - 1.00 mg/dL    Sodium 141 136 - 145 mmol/L    Potassium 4.3 3.5 - 5.2 mmol/L    Chloride 106 98 - 107 mmol/L    CO2 25.0 22.0 - 29.0 mmol/L    Calcium 9.2 8.6 - 10.5 mg/dL    Total Protein 7.0 6.0 - 8.5 g/dL    Albumin 3.6 3.5 - 5.2 g/dL    ALT (SGPT) 15 1 - 33 U/L    AST (SGOT) 32 1 - 32 U/L    Alkaline Phosphatase 111 39 - 117 U/L    Total Bilirubin 0.3 0.0 - 1.2 mg/dL    Globulin 3.4 gm/dL    A/G Ratio 1.1 g/dL    BUN/Creatinine Ratio 16.0 7.0 - 25.0    Anion Gap 10.0 5.0 - 15.0 mmol/L    eGFR 34.9 (L) >60.0 mL/min/1.73   Single High Sensitivity Troponin T    Collection Time: 06/19/24 11:32 AM    Specimen: Blood   Result Value Ref Range    HS Troponin T 36 (H) <14 ng/L   Magnesium    Collection Time: 06/19/24 11:32 AM    Specimen: Blood   Result Value Ref Range    Magnesium 2.3 1.6 - 2.4 mg/dL   Green Top (Gel)    Collection Time: 06/19/24 11:32 AM   Result Value Ref Range    Extra Tube Hold for add-ons.    Lavender Top    Collection Time: 06/19/24 11:32 AM   Result Value Ref Range    Extra Tube hold for add-on    Gold Top - SST    Collection Time: 06/19/24 11:32 AM   Result Value Ref Range    Extra Tube Hold for add-ons.    Gray Top    Collection Time: 06/19/24 11:32 AM   Result Value Ref Range    Extra Tube Hold for add-ons.    Light Blue Top    Collection Time: 06/19/24 11:32 AM   Result Value Ref Range    Extra Tube Hold for add-ons.    CBC Auto Differential    Collection Time: 06/19/24 11:32 AM    Specimen: Blood   Result Value Ref Range    WBC 6.12 3.40 - 10.80 10*3/mm3     RBC 3.63 (L) 3.77 - 5.28 10*6/mm3    Hemoglobin 7.7 (L) 12.0 - 15.9 g/dL    Hematocrit 28.5 (L) 34.0 - 46.6 %    MCV 78.5 (L) 79.0 - 97.0 fL    MCH 21.2 (L) 26.6 - 33.0 pg    MCHC 27.0 (L) 31.5 - 35.7 g/dL    RDW 16.6 (H) 12.3 - 15.4 %    RDW-SD 47.0 37.0 - 54.0 fl    MPV 10.3 6.0 - 12.0 fL    Platelets 246 140 - 450 10*3/mm3    Neutrophil % 78.4 (H) 42.7 - 76.0 %    Lymphocyte % 9.0 (L) 19.6 - 45.3 %    Monocyte % 6.9 5.0 - 12.0 %    Eosinophil % 4.7 0.3 - 6.2 %    Basophil % 0.7 0.0 - 1.5 %    Immature Grans % 0.3 0.0 - 0.5 %    Neutrophils, Absolute 4.80 1.70 - 7.00 10*3/mm3    Lymphocytes, Absolute 0.55 (L) 0.70 - 3.10 10*3/mm3    Monocytes, Absolute 0.42 0.10 - 0.90 10*3/mm3    Eosinophils, Absolute 0.29 0.00 - 0.40 10*3/mm3    Basophils, Absolute 0.04 0.00 - 0.20 10*3/mm3    Immature Grans, Absolute 0.02 0.00 - 0.05 10*3/mm3    nRBC 0.0 0.0 - 0.2 /100 WBC   TSH    Collection Time: 06/19/24 11:32 AM    Specimen: Blood   Result Value Ref Range    TSH 0.390 0.270 - 4.200 uIU/mL   T4, Free    Collection Time: 06/19/24 11:32 AM    Specimen: Blood   Result Value Ref Range    Free T4 1.30 0.92 - 1.68 ng/dL   D-dimer, Quantitative    Collection Time: 06/19/24 11:32 AM    Specimen: Blood   Result Value Ref Range    D-Dimer, Quantitative 1.50 (H) 0.00 - 0.81 MCGFEU/mL   BNP    Collection Time: 06/19/24 11:32 AM    Specimen: Blood   Result Value Ref Range    proBNP 4,207.0 (H) 0.0 - 1,800.0 pg/mL   Scan Slide    Collection Time: 06/19/24 11:32 AM    Specimen: Blood   Result Value Ref Range    Anisocytosis Slight/1+ None Seen    Hypochromia Slight/1+ None Seen    Microcytes Slight/1+ None Seen    WBC Morphology Normal Normal    Platelet Morphology Normal Normal   ECG 12 Lead ED Triage Standing Order; Weak / Dizzy / AMS    Collection Time: 06/19/24 11:45 AM   Result Value Ref Range    QT Interval 294 ms    QTC Interval 461 ms   Blood Gas, Arterial With Co-Ox    Collection Time: 06/19/24 12:03 PM    Specimen: Arterial  Blood   Result Value Ref Range    Site Right Radial     Tre's Test N/A     pH, Arterial 7.390 7.350 - 7.450 pH units    pCO2, Arterial 42.7 35.0 - 45.0 mm Hg    pO2, Arterial 88.3 83.0 - 108.0 mm Hg    HCO3, Arterial 25.8 20.0 - 26.0 mmol/L    Base Excess, Arterial 0.7 0.0 - 2.0 mmol/L    Hemoglobin, Blood Gas 8.1 (L) 14 - 18 g/dL    Hematocrit, Blood Gas 24.9 (L) 38.0 - 51.0 %    Oxyhemoglobin 96.0 94 - 99 %    Methemoglobin 0.10 0.00 - 1.50 %    Carboxyhemoglobin 1.7 0 - 2 %    CO2 Content 27.1 22 - 33 mmol/L    Temperature 37.0     Barometric Pressure for Blood Gas      Modality Nasal Cannula     FIO2 28 %    Ventilator Mode      pH, Temp Corrected 7.390 pH Units    pCO2, Temperature Corrected 42.7 35 - 45 mm Hg    pO2, Temperature Corrected 88.3 83 - 108 mm Hg   Urinalysis With Microscopic If Indicated (No Culture) - Urine, Clean Catch    Collection Time: 06/19/24 12:12 PM    Specimen: Urine, Clean Catch   Result Value Ref Range    Color, UA Yellow Yellow, Straw    Appearance, UA Clear Clear    pH, UA 7.5 5.0 - 8.0    Specific Gravity, UA 1.009 1.001 - 1.030    Glucose, UA Negative Negative    Ketones, UA Negative Negative    Bilirubin, UA Negative Negative    Blood, UA Negative Negative    Protein, UA Negative Negative    Leuk Esterase, UA Negative Negative    Nitrite, UA Negative Negative    Urobilinogen, UA 1.0 E.U./dL 0.2 - 1.0 E.U./dL   Single High Sensitivity Troponin T    Collection Time: 06/19/24  1:52 PM    Specimen: Blood   Result Value Ref Range    HS Troponin T 80 (C) <14 ng/L   POC Occult Blood Stool    Collection Time: 06/19/24  1:56 PM    Specimen: Stool   Result Value Ref Range    Fecal Occult Blood Negative     Lot Number [id number]     Expiration Date 02*26     DEVELOPER LOT NUMBER [id number]     DEVELOPER EXPIRATION DATE 2,027-8     Positive Control Positive     Negative Control Negative    Type & Screen    Collection Time: 06/19/24  2:06 PM    Specimen: Blood   Result Value Ref Range    ABO Type  O     RH type Positive     Antibody Screen Negative     T&S Expiration Date 6/22/2024 11:59:59 PM    ECG 12 Lead Rhythm Change    Collection Time: 06/19/24  3:20 PM   Result Value Ref Range    QT Interval 510 ms    QTC Interval 441 ms   High Sensitivity Troponin T 2Hr    Collection Time: 06/19/24  3:58 PM    Specimen: Blood   Result Value Ref Range    HS Troponin T 229 (C) <14 ng/L    Troponin T Delta 149 (C) >=-4 - <+4 ng/L   Heparin Anti-Xa    Collection Time: 06/19/24  3:58 PM    Specimen: Blood   Result Value Ref Range    Heparin Anti-Xa (UFH) 0.10 (L) 0.30 - 0.70 IU/ml   Protime-INR    Collection Time: 06/19/24  3:58 PM    Specimen: Blood   Result Value Ref Range    Protime 16.0 (H) 12.2 - 14.5 Seconds    INR 1.26 (H) 0.89 - 1.12   aPTT    Collection Time: 06/19/24  3:58 PM    Specimen: Blood   Result Value Ref Range    PTT 39.3 (L) 60.0 - 90.0 seconds       If labs were ordered, I independently reviewed the results and considered them in treating the patient.        RADIOLOGY  CT Angiogram Chest Pulmonary Embolism    Result Date: 6/19/2024  CT ANGIOGRAM CHEST PULMONARY EMBOLISM Date of Exam: 6/19/2024 2:37 PM EDT Indication: chest pain, dyspnea, tachycardia, +dimer. Comparison: CT chest high resolution 3/23/2023 Technique: Axial CT images were obtained of the chest after the uneventful intravenous administration of 70 mL Isovue 370 utilizing pulmonary embolism protocol.  Reconstructed coronal and sagittal images were also obtained. Automated exposure control and  iterative construction methods were used. Findings: Visualized soft tissue of the lower neck are without acute abnormality. Postsurgical changes of prior sternotomy. Cardiomegaly. Negative for pulmonary embolus. Mitral annular calcifications noted. Negative for pericardial effusion. Trace bilateral pleural effusions. Trachea and mainstem bronchi are patent. Smooth interlobular septal thickening consistent with mild pulmonary edema. No pneumothorax.  Mild centrilobular groundglass opacities in the upper lobes right greater than left also suggesting pulmonary edema. No suspicious pulmonary nodule or mass. Mild subpleural reticulation similar to prior chest CT. The visualized portions of the liver, spleen, and adrenal glands are without acute abnormality. Reflux of contrast into hepatic veins may relate to component of right heart insufficiency. Pancreas without acute abnormality. Visualized portions of the kidneys are without hydronephrosis. Extensive upper abdominal aortic vascular calcifications. Wall thickening at the mid to distal esophagus which may relate to esophagitis. Remote fractures of the left anterior fourth and fifth ribs. Exaggerated thoracic kyphosis with multilevel thoracic disc narrowing.     Impression: 1. Negative for pulmonary embolus. 2. Cardiomegaly and pulmonary edema. 3. Wall thickening of the mid and distal esophagus, correlate for reflux or esophagitis. 4. Additional chronic findings above. Electronically Signed: Erik Neff MD  6/19/2024 2:57 PM EDT  Workstation ID: [id number]    XR Chest 1 View    Result Date: 6/19/2024  XR CHEST 1 VW Date of Exam: 6/19/2024 11:35 AM EDT Indication: Weak/Dizzy/AMS triage protocol Comparison: 6/14/2024 Findings: Patient is rotated to the left. Prior sternotomy and CABG. Mild cardiomegaly, exaggerated by technique. Negative for pleural effusion. No focal consolidation. Interstitial thickening related to chronic interstitial lung disease better assessed on prior chest CT.     Impression: Stable chronic findings without acute process. Electronically Signed: Erik Neff MD  6/19/2024 12:17 PM EDT  Workstation ID: [id number]     I ordered and independently reviewed the above noted radiographic studies.      I viewed images of CT scan of the chest pulmonary embolism protocol which showed no evidence of pulmonary embolism or pneumonia that I can appreciate, borderline cardiomegaly.  Per my independent  interpretation.    See radiologist's dictation for official interpretation.        PROCEDURES    Procedures    ECG 12 Lead Rhythm Change   Preliminary Result   Test Reason : Rhythm Change   Blood Pressure :   */*   mmHG   Vent. Rate :  45 BPM     Atrial Rate :  45 BPM      P-R Int : 160 ms          QRS Dur : 110 ms       QT Int : 510 ms       P-R-T Axes :   * -55 100 degrees      QTc Int : 441 ms      Sinus bradycardia   Left anterior fascicular block   Septal infarct (cited on or before 10-DEC-2020)   Abnormal ECG   When compared with ECG of 19-JUN-2024 11:45, (Unconfirmed)   Sinus rhythm has replaced Atrial fibrillation   Vent. rate has decreased  BPM      Referred By: EDMD           Confirmed By:       ECG 12 Lead ED Triage Standing Order; Weak / Dizzy / AMS   Preliminary Result   Test Reason : ED Triage Standing Order~   Blood Pressure :   */*   mmHG   Vent. Rate : 148 BPM     Atrial Rate : 144 BPM      P-R Int :   * ms          QRS Dur : 116 ms       QT Int : 294 ms       P-R-T Axes :   * -56 115 degrees      QTc Int : 461 ms      Atrial fibrillation with rapid ventricular response   Left axis deviation   Septal infarct (cited on or before 10-DEC-2020)   ST & T wave abnormality, consider lateral ischemia   Abnormal ECG   When compared with ECG of 26-JUN-2023 13:36,   Significant changes have occurred      Referred By: EDMD           Confirmed By:           MEDICATIONS GIVEN IN ER    Medications   sodium chloride 0.9 % flush 10 mL (has no administration in time range)   heparin 95758 units/250 mL (100 units/mL) in 0.45 % NaCl infusion (12 Units/kg/hr × 78.9 kg Intravenous Handoff 6/19/24 1655)   Pharmacy to Dose Heparin (has no administration in time range)   dilTIAZem (CARDIZEM) 125 mg in 125 mL D5W infusion ( Intravenous Not Given 6/19/24 1624)   sertraline (ZOLOFT) tablet 50 mg (has no administration in time range)   pantoprazole (PROTONIX) EC tablet 40 mg (has no administration in time range)    amLODIPine (NORVASC) tablet 10 mg ( Oral Dose Auto Held 6/27/24 0900)   aspirin chewable tablet 324 mg (has no administration in time range)   atorvastatin (LIPITOR) tablet 40 mg (has no administration in time range)   famotidine (PEPCID) tablet 20 mg (has no administration in time range)   gabapentin (NEURONTIN) capsule 300 mg (has no administration in time range)   hydrALAZINE (APRESOLINE) tablet 25 mg ( Oral Dose Auto Held 6/27/24 2100)   hydroCHLOROthiazide tablet 50 mg ( Oral Dose Auto Held 6/27/24 0900)   cetirizine (zyrTEC) tablet 10 mg (has no administration in time range)   levothyroxine (SYNTHROID, LEVOTHROID) tablet 100 mcg (has no administration in time range)   sodium chloride 0.9 % flush 10 mL (has no administration in time range)   sodium chloride 0.9 % flush 10 mL (has no administration in time range)   sodium chloride 0.9 % infusion 40 mL (has no administration in time range)   nitroglycerin (NITROSTAT) SL tablet 0.4 mg (has no administration in time range)   sennosides-docusate (PERICOLACE) 8.6-50 MG per tablet 2 tablet (has no administration in time range)     And   polyethylene glycol (MIRALAX) packet 17 g (has no administration in time range)     And   bisacodyl (DULCOLAX) EC tablet 5 mg (has no administration in time range)     And   bisacodyl (DULCOLAX) suppository 10 mg (has no administration in time range)   sodium chloride 0.9 % infusion (has no administration in time range)   sodium chloride 0.9 % bolus 500 mL (0 mL Intravenous Stopped 6/19/24 1305)   metoprolol tartrate (LOPRESSOR) injection 5 mg (5 mg Intravenous Given 6/19/24 1353)   iopamidol (ISOVUE-370) 76 % injection 100 mL (70 mL Intravenous Given 6/19/24 1446)   aspirin chewable tablet 263 mg (243 mg Oral Given 6/19/24 1623)         MEDICAL DECISION MAKING, PROGRESS, and CONSULTS    All labs, if obtained, have been independently reviewed by me.  All radiology studies, if obtained, have been reviewed by me and the radiologist  dictating the report.  All EKG's, if obtained, have been independently viewed and interpreted by me/my attending physician.      Discussion below represents my analysis of pertinent findings related to patient's condition, differential diagnosis, treatment plan and final disposition.                         Differential diagnosis:    Cardiac arrhythmia, myocardial infarction, pulmonary embolism, aortic dissection, pneumonia, pneumothorax, sepsis, anemia, electrolyte abnormality, infection      Additional sources:    - Discussed/ obtained information from independent historians: EMS and later family    - External (non-ED) record review:  Chart review of prior hospital admission for altered mental status shows history of:   CAD s/p CABG, HTN, CKD, history of atrial fibrillation  Recent pulmonology note for  Shortness of breath    Chronic cough    Non-seasonal allergic rhinitis, unspecified trigger    Other asthma    AMANDA (obstructive sleep apnea)    - Chronic or social conditions impacting care: CAD post CABG, paroxysmal atrial fibrillation, hypertension, chronic kidney disease, asthma and sleep apnea, BMI 29    - Shared decision making: Patient/patient representative in complete agreement with current plans for evaluation and management.      Orders placed during this visit:  Orders Placed This Encounter   Procedures    XR Chest 1 View    CT Angiogram Chest Pulmonary Embolism    [place] Draw    Comprehensive Metabolic Panel    Single High Sensitivity Troponin T    Magnesium    Urinalysis With Microscopic If Indicated (No Culture) - Urine, Clean Catch    CBC Auto Differential    TSH    T4, Free    D-dimer, Quantitative    BNP    Blood Gas, Arterial -With Co-Ox Panel: Yes    Scan Slide    Blood Gas, Arterial With Co-Ox    Single High Sensitivity Troponin T    High Sensitivity Troponin T 2Hr    Heparin Anti-Xa    Protime-INR    aPTT    Heparin Anti-Xa    High Sensitivity Troponin T    CBC Auto Differential     Comprehensive Metabolic Panel    Iron Profile    Ferritin    Folate    Vitamin B12    Diet: Regular/House, Cardiac; Healthy Heart (2-3 Na+); Fluid Consistency: Thin (IDDSI 0)    Undress & Gown    Vital Signs    Notify Provider Platelet Count Less Than 95686    Stop Infusion & Notify Provider if Bleeding Occurs    Vital Signs    Intake & Output    Weigh Patient    Oral Care    Place Sequential Compression Device    Maintain Sequential Compression Device    Maintain IV Access    Telemetry - Place Orders & Notify Provider of Results When Patient Experiences Acute Chest Pain, Dysrhythmia or Respiratory Distress    May Be Off Telemetry for Tests    Continuous Pulse Oximetry    Up With Assistance    Verify Informed Consent for Blood Product Administration    Code Status and Medical Interventions:    Inpatient Cardiology Consult    Oxygen Therapy- Nasal Cannula; Titrate 1-6 LPM Per SpO2; 90 - 95%    Patient May Use Home CPAP / BIPAP For Sleep or As Needed    POC Occult Blood Stool    ECG 12 Lead ED Triage Standing Order; Weak / Dizzy / AMS    ECG 12 Lead Rhythm Change    ECG 12 Lead Chest Pain    Type & Screen    Prepare RBC, 1 Units    Insert Peripheral IV    Insert Peripheral IV    Initiate Observation Status    ED Bed Request    Fall Precautions    CBC & Differential    Green Top (Gel)    Lavender Top    Gold Top - SST    Gray Top    Light Blue Top    CBC & Differential         Additional orders considered but not ordered:      ED Course:    Consultants: Cardiology (Jony), [facility] medicine    ED Course as of 06/19/24 1737 Wed Jun 19, 2024   1126 Chart review of prior hospital admission for altered mental status shows history of:   CAD s/p CABG, HTN, CKD, history of atrial fibrillation  Recent pulmonology note for  Shortness of breath    Chronic cough    Non-seasonal allergic rhinitis, unspecified trigger    Other asthma    AMANDA (obstructive sleep apnea)   [CC]   1140 In summary is an 79-year-old woman who presents  because of palpitations that resolved and generalized malaise since she woke this morning.  She says she has history of atrial fibrillation.  When she woke this morning she felt weak and she could feel her heart racing without actual chest pain.  Upon arrival here she is in atrial fibrillation with RVR but says she is not able to appreciate any palpitations at this time.  She follows with Dr. Borges and has known history of atrial fibrillation but does not know if she is on any rate control she does take blood thinners.  She contacted cardiology this morning they wanted her to come to the clinic but because of logistical reasons she says that was not possible so called EMS and was brought here for evaluation. [CC]   1733 She arrived awake and alert but ill-appearing in atrial fibrillation with RVR, right now she is not able to appreciate palpitations and is not anticoagulated would prefer to avoid cardioversion.  Treated with IV metoprolol with improvement but not resolution of her tachycardia heart rate remains around 115.  D-dimer elevated 1.5 so added on CTA of the chest which fortunately does not show pulmonary embolism.  proBNP mildly elevated 4200.  Metabolic panel has elevated creatinine of 1.5 slightly uptrending from prior values on comparison but does not meet criteria for acute kidney injury.  CBC concerning for profound anemia hemoglobin 7.71-year ago was normal at 12.5.  Patient says she has had low hemoglobin with her PCP has been working her up for this but no cause identified stat point-of-care occult blood stool was negative for any active bleeding.  Does have mild microcytosis. [CC]   1733 Initial troponin was elevated at 36 she denied chest pain upon reevaluation but unfortunately repeat 2-hour troponin significantly uptrending at 80.  I have consulted Dr. Borges with cardiology who recommends against electrical cardioversion he recommends aspirin and heparin and diltiazem as needed for rate  control.  Recommends hospital admission to the hospitalist team he will continue to follow.  Prior to hospital admission patient spontaneously converted into sinus bradycardia rate of around 50, current blood pressure is normal.  Diltiazem has been held.  Medicine team consulted for admission. [CC]      ED Course User Index  [CC] Antoni Jacques MD       50 minutes of critical care provided. This time excludes other billable procedures. Time does include preparation of documents, medical consultations, review of old records, and direct bedside care. Patient is at high risk for life-threatening deterioration due to NSTEMI, atrial fibrillation with RVR, symptomatic anemia.          Shared Decision Making:  After my consideration of clinical presentation and any laboratory/radiology studies obtained, I discussed the findings with the patient/patient representative who is in agreement with the treatment plan and the final disposition.   Risks and benefits of discharge and/or observation/admission were discussed.       AS OF 17:37 EDT VITALS:    BP - 107/67  HR - 52  TEMP - 97.8 °F (36.6 °C) (Oral)  O2 SATS - 92%                  DIAGNOSIS  Final diagnoses:   NSTEMI (non-ST elevated myocardial infarction)   Anemia, unspecified type   Paroxysmal atrial fibrillation   Atrial fibrillation with RVR   Coronary artery disease involving native heart with angina pectoris, unspecified vessel or lesion type         DISPOSITION  Admit      Please note that portions of this document were completed with voice recognition software.        Antoni Jacuqes MD  06/19/24 [id number]

## 2024-06-19 NOTE — H&P
[facility] Medicine Services  HISTORY AND PHYSICAL    Patient Name: Angélica De La Cruz  : 1945  MRN: [id number]  Primary Care Physician: Sussy Bloom MD  Date of admission: 2024    Subjective   Subjective     Chief Complaint:  Chest pain, SOB, and weakness    HPI:  Angélica De La Cruz is a 79 y.o. female with PMH of HTN, PAF, CAD prior CABG, RA, Fibromyalgia, AMANDA with cpap use, CKD, and RA who presented to ED with SOB, chest pain, and generalized weakness.  She was feeling poorly on Friday with SOB, was seen by pulmonary.  CXR was done with possible pulmonary edema and she was instructed to wear her O2 @ 2L around the clock.  She woke up this morning with chest pain, increasing SOB and generalized weakness.  Troponin increasing in the ED, cardiology consulted, heparin gtt started.  She is also anemic.  Per previous documentation her last HGB was 8.8 on 24, today is 7.7      Review of Systems   Constitutional:  Positive for chills. Negative for fever.   HENT:  Negative for congestion and nosebleeds.    Eyes:  Negative for visual disturbance.   Respiratory:  Positive for shortness of breath. Negative for cough.    Cardiovascular:  Positive for chest pain and palpitations. Negative for leg swelling.   Gastrointestinal:  Negative for abdominal pain, constipation, diarrhea, nausea and vomiting.   Genitourinary:  Negative for difficulty urinating and dysuria.   Neurological:  Positive for dizziness and weakness. Negative for syncope.   Psychiatric/Behavioral:  Negative for behavioral problems and confusion.         Personal History     Past Medical History:   Diagnosis Date    Arthritis     CKD (chronic kidney disease)     elevated creat caused by long term use of lisinopril     Coronary artery disease     Fibromyalgia     Hypertension     Lung nodule seen on imaging study     AMANDA treated with BiPAP     Peritonitis        Past Surgical History:   Procedure Laterality Date     APPENDECTOMY      CARDIAC CATHETERIZATION N/A 11/24/2020    Procedure: Left Heart Cath;  Surgeon: Sanya Borges MD;  [facility];  Service: Cardiovascular;  Laterality: N/A;    CATARACT EXTRACTION, BILATERAL Bilateral 2018    CHOLECYSTECTOMY      CORONARY ARTERY BYPASS GRAFT N/A 11/25/2020    Procedure: MEDIAN STERNOTOMY, CORONARY ARTERY BYPASS GRAFTING X2, UTILIZNG THE LEFT  INTERNAL MAMMARY ARTERY GRAFT, ENDOSCOPIC VEIN HARVESTING CONVERTED TO OPEN OF THE RIGHT GREATER SAPHENOUS VEIN;  Surgeon: Wali Spicer MD;  [facility];  Service: Cardiothoracic;  Laterality: N/A;  VO: 0807  VR: 0807      HYSTERECTOMY  1984    with BSO    OOPHORECTOMY Bilateral 1984       Family History:  family history includes Cerebral aneurysm in her father; Heart disease in her brother, maternal grandmother, and mother; Kidney failure in her half-brother; No Known Problems in her half-sister and half-sister; Sudden death in her half-brother.     Social History:  reports that she quit smoking about 51 years ago. Her smoking use included cigarettes. She started smoking about 66 years ago. She has a 15 pack-year smoking history. She has never used smokeless tobacco. She reports that she does not drink alcohol and does not use drugs.  Social History     Social History Narrative    Caffeine: half and half coffee daily , thomas tea daily.     Lives in [place].       Medications:  Azelastine HCl, Pediatric Multivitamins-Iron, albuterol, amLODIPine, aspirin, atorvastatin, clopidogrel, estradiol, famotidine, fluticasone, gabapentin, guaiFENesin, hydrALAZINE, hydroCHLOROthiazide, levocetirizine, levothyroxine, multivitamin, olmesartan, pantoprazole, potassium chloride ER, sertraline, and vitamin b complex    Allergies   Allergen Reactions    Adhesive Tape Itching and Rash    Codeine Delirium    Morphine And Codeine Delirium    Cefaclor Other (See Comments)    Oxycodone-Acetaminophen Itching     Sulfa Antibiotics     Doxycycline Rash    Erythromycin Rash    Levofloxacin Rash    Oxytetracycline Rash    Penicillins Rash     Tolerates cefuroxime       Objective   Objective     Vital Signs:   Temp:  [98.8 °F (37.1 °C)] 98.8 °F (37.1 °C)  Heart Rate:  [] 48  Resp:  [20] 20  BP: ()/(47-99) 101/47    Physical Exam   Constitutional: Awake, alert, daughter at bedside  Eyes: PERRLA, sclerae anicteric, no conjunctival injection  HENT: NCAT, mucous membranes moist  Neck: Supple, no thyromegaly, no lymphadenopathy, trachea midline  Respiratory: Clear to auscultation bilaterally, nonlabored respirations   Cardiovascular: RRR, sinus on tele, no murmurs, rubs, or gallops, palpable pedal pulses bilaterally  Gastrointestinal: Positive bowel sounds, soft, nontender, nondistended  Musculoskeletal: No bilateral ankle edema, no clubbing or cyanosis to extremities  Psychiatric: Appropriate affect, cooperative, pleasant  Neurologic: Oriented x 3, ALCALA, speech clear  Skin: No rashes noted      Result Review:  I have personally reviewed the results from the time of this admission to 6/19/2024 17:02 EDT and agree with these findings:  [x]  Laboratory list / accordion  []  Microbiology  [x]  Radiology  [x]  EKG/Telemetry   []  Cardiology/Vascular   []  Pathology  [x]  Old records  []  Other:  Most notable findings include: troponin 36-->80-->229    LAB RESULTS:      Lab 06/19/24  1558 06/19/24  1132   WBC  --  6.12   HEMOGLOBIN  --  7.7*   HEMATOCRIT  --  28.5*   PLATELETS  --  246   NEUTROS ABS  --  4.80   IMMATURE GRANS (ABS)  --  0.02   LYMPHS ABS  --  0.55*   MONOS ABS  --  0.42   EOS ABS  --  0.29   MCV  --  78.5*   PROTIME 16.0*  --    APTT 39.3*  --    HEPARIN ANTI-XA 0.10*  --    D DIMER QUANT  --  1.50*         Lab 06/19/24  1132   SODIUM 141   POTASSIUM 4.3   CHLORIDE 106   CO2 25.0   ANION GAP 10.0   BUN 24*   CREATININE 1.50*   EGFR 34.9*   GLUCOSE 100*   CALCIUM 9.2   MAGNESIUM 2.3   TSH 0.390         Lab  06/19/24  1132   TOTAL PROTEIN 7.0   ALBUMIN 3.6   GLOBULIN 3.4   ALT (SGPT) 15   AST (SGOT) 32   BILIRUBIN 0.3   ALK PHOS 111         Lab 06/19/24  1558 06/19/24  1352 06/19/24  1132   PROBNP  --   --  4,207.0*   HSTROP T 229* 80* 36*   PROTIME 16.0*  --   --    INR 1.26*  --   --              Lab 06/19/24  1406   ABO TYPING O   RH TYPING Positive   ANTIBODY SCREEN Negative         Lab 06/19/24  1203   PH, ARTERIAL 7.390   PCO2, ARTERIAL 42.7   PO2 ART 88.3   FIO2 28   HCO3 ART 25.8   BASE EXCESS ART 0.7   CARBOXYHEMOGLOBIN 1.7     Brief Urine Lab Results  (Last result in the past 365 days)        Color   Clarity   Blood   Leuk Est   Nitrite   Protein   CREAT   Urine HCG        06/19/24 1212 Yellow   Clear   Negative   Negative   Negative   Negative                 Microbiology Results (last 10 days)       ** No results found for the last 240 hours. **            CT Angiogram Chest Pulmonary Embolism    Result Date: 6/19/2024  CT ANGIOGRAM CHEST PULMONARY EMBOLISM Date of Exam: 6/19/2024 2:37 PM EDT Indication: chest pain, dyspnea, tachycardia, +dimer. Comparison: CT chest high resolution 3/23/2023 Technique: Axial CT images were obtained of the chest after the uneventful intravenous administration of 70 mL Isovue 370 utilizing pulmonary embolism protocol.  Reconstructed coronal and sagittal images were also obtained. Automated exposure control and  iterative construction methods were used. Findings: Visualized soft tissue of the lower neck are without acute abnormality. Postsurgical changes of prior sternotomy. Cardiomegaly. Negative for pulmonary embolus. Mitral annular calcifications noted. Negative for pericardial effusion. Trace bilateral pleural effusions. Trachea and mainstem bronchi are patent. Smooth interlobular septal thickening consistent with mild pulmonary edema. No pneumothorax. Mild centrilobular groundglass opacities in the upper lobes right greater than left also suggesting pulmonary edema. No  suspicious pulmonary nodule or mass. Mild subpleural reticulation similar to prior chest CT. The visualized portions of the liver, spleen, and adrenal glands are without acute abnormality. Reflux of contrast into hepatic veins may relate to component of right heart insufficiency. Pancreas without acute abnormality. Visualized portions of the kidneys are without hydronephrosis. Extensive upper abdominal aortic vascular calcifications. Wall thickening at the mid to distal esophagus which may relate to esophagitis. Remote fractures of the left anterior fourth and fifth ribs. Exaggerated thoracic kyphosis with multilevel thoracic disc narrowing.     Impression: Impression: 1. Negative for pulmonary embolus. 2. Cardiomegaly and pulmonary edema. 3. Wall thickening of the mid and distal esophagus, correlate for reflux or esophagitis. 4. Additional chronic findings above. Electronically Signed: Erik Neff MD  6/19/2024 2:57 PM EDT  Workstation ID: [id number]    XR Chest 1 View    Result Date: 6/19/2024  XR CHEST 1 VW Date of Exam: 6/19/2024 11:35 AM EDT Indication: Weak/Dizzy/AMS triage protocol Comparison: 6/14/2024 Findings: Patient is rotated to the left. Prior sternotomy and CABG. Mild cardiomegaly, exaggerated by technique. Negative for pleural effusion. No focal consolidation. Interstitial thickening related to chronic interstitial lung disease better assessed on prior chest CT.     Impression: Impression: Stable chronic findings without acute process. Electronically Signed: Erik Neff MD  6/19/2024 12:17 PM EDT  Workstation ID: [id number]         Assessment & Plan   Assessment & Plan       A-fib    Hypertension    CKD (chronic kidney disease)    AMANDA (obstructive sleep apnea)    Symptomatic anemia    NSTEMI (non-ST elevated myocardial infarction)    Hypothyroidism (acquired)    Rheumatoid arthritis      NSTEMI  History of prior CABG 2020  --continue heparin gtt  --cardiology aware of increasing troponin  --continue  asa  --plavix held due to anemia  -patient presents w/ acute chest pain, dyspnea, Afib RVR (converted in the ED on diltiazem); started on ACS protocol, trops trending up, may be demand but will trend, cardiology to evaluate in the AM; asking GI input re: opinion on endoscopy for new microcytic anemia in a patient potentially going to need LHC w/ intervention w/ subsequent obligate DAPT? - she did have EGD w/ dilation approx Feb 2024 w/ ?Dr. Reed    Afib RVR  --received diltiazem per EMS en route  --has converted to sinus rhythm    Hypotension with hx HTN  --hold home norvasc, HCTZ, hydralazine    Symptoms anemia  --no obvious bleeding  --iron studies pending  --states her PCP told her she was anemic 1 month ago and started her on MVI with iron. Last H/H 8.8/28.9 on 5/23/24  --upper endoscopy within last several months prior to being told she was anemia, detail deficit, done per Dr. Reed  --GI consult in am  --transfuse 1 unit PRBC    CKD3  --unclear baseline  --follows with nephrology    AMANDA  --continue cpap, uses at home with 2L primarily at night but was seen by pulmonary last week and has been wearing O2 all day since then    Hypothyroidism  --continue synthroid  --TSH 0.390    Total time spent: 65 minutes  Time spent includes time reviewing chart, face-to-face time, counseling patient/family/caregiver, ordering medications/tests/procedures, communicating with other health care professionals, documenting clinical information in the electronic health record, and coordination of care.      DVT prophylaxis:  Heparin gtt + mechanical    CODE STATUS:    Medical Intervention Limits: No intubation (DNI)  Code Status (Patient has no pulse and is not breathing): No CPR (Do Not Attempt to Resuscitate)  Medical Interventions (Patient has pulse or is breathing): Limited Support      Expected Discharge back home at discharge  Expected discharge date/ time has not been documented.      This note has been completed as part  of a split-shared workflow.     Signature: Electronically signed by MARCO Castañeda, 06/19/24, 5:11 PM EDT        Attending   Admission Attestation       I have performed an independent face-to-face diagnostic evaluation including performing an independent physical examination.  I approve of the documented plan of care above that was reviewed and developed with the advanced practice clinician (APC) and take responsibility for that plan along with its associated risks.  I have updated the HPI as appropriate.    Brief HPI    This is an 78 y/o female w/ CAD s/p CABG, pAF, HTN, fibromyalgia, RA?, AMANDA, CKD3, who presents w/ chest pain and SOB. She was recently seen by pulm and placed on continuous O2. This AM she developed palpitations, chest pressure, and severe SOB. In the ED she was in Afib w/ RVR, converted on diltiazem. HS troponin was elevated and has been increasing.     She reports EGD w/ esophageal dilation approx 2/2024.    Attending Physical Exam:  Temp:  [97.7 °F (36.5 °C)-98.8 °F (37.1 °C)] 97.7 °F (36.5 °C)  Heart Rate:  [] 52  Resp:  [18-21] 18  BP: ()/(47-99) 143/62  Flow (L/min):  [2-3] 2    Constitutional: Awake, alert, laying in bed in NAD  Respiratory: Clear to auscultation bilaterally, tachypneic with conversation  Cardiovascular: RRR, palpable radial pulse  Gastrointestinal: Positive bowel sounds, soft, nontender, nondistended  Psychiatric: Appropriate affect, cooperative  Neurologic: Speech clear and fluent    Result Review:  I have personally reviewed the results from the time of this admission to 6/19/2024 23:38 EDT and agree with these findings:  [x]  Laboratory list / accordion  []  Microbiology  []  Radiology  []  EKG/Telemetry   []  Cardiology/Vascular   []  Pathology  []  Old records  []  Other:  Most notable findings include: elevated troponins    Assessment and Plan:    See assessment and plan documented by APC above and updated/edited by me as appropriate.    Total time  spent: 43  Time spent includes time reviewing chart, face-to-face time, counseling patient/family/caregiver, ordering medications/tests/procedures, communicating with other health care professionals, documenting clinical information in the electronic health record, and coordination of care.     Riccardo Crenshaw, DO  06/19/24

## 2024-06-19 NOTE — CONSULTS
HEPARIN INFUSION  Angélica De La Cruz is a  79 y.o. female receiving heparin infusion.     Therapy for (VTE/Cardiac):   Cardiac  Patient Weight: 78.9 kg  Initial Bolus (Y/N):   N  Any Bolus (Y/N):   N        Signs or Symptoms of Bleeding: N/A - anemia     Cardiac or Other (Not VTE)   Initial Bolus: 60 units/kg (Max 4,000 units)  Initial rate: 12 units/kg/hr (Max 1,000 units/hr)   Anti Xa Rebolus Infusion Hold time Change infusion Dose (Units/kg/hr) Next Anti Xa or aPTT Level Due   < 0.11 50 Units/kg  (4000 Units Max) None Increase by  3 Units/kg/hr 6 hours   0.11- 0.19 25 Units/kg  (2000 Units Max) None Increase by  2 Units/kg/hr 6 hours   0.2 - 0.29 0 None Increase by  1 Units/kg/hr 6 hours   0.3 - 0.5 0 None No Change 6 hours (after 2 consecutive levels in range check qAM)   0.51 - 0.6 0 None Decrease by  1 Units/kg/hr 6 hours   0.61 - 0.8 0 30 Minutes Decrease by  2 Units/kg/hr 6 hours   0.81 - 1 0 60 Minutes Decrease by  3 Units/kg/hr 6 hours   >1 0 Hold  After Anti Xa less than 0.5 decrease previous rate by  4 Units/kg/hr  Every 2 hours until Anti Xa  less than 0.5 then when infusion restarts in 6 hours     Recommend Xa every 6 hours.     Results from last 7 days   Lab Units 06/19/24  1558 06/19/24  1132   INR  1.26*  --    HEMOGLOBIN g/dL  --  7.7*   HEMATOCRIT %  --  28.5*   PLATELETS 10*3/mm3  --  246          Date   Time   Anti-Xa Current Rate (Unit/kg/hr) Bolus   (Units) Rate Change   (Unit/kg/hr) New Rate (Unit/kg/hr) Next   Anti-Xa Comments  Pump Check Daily   6/19 1656 0.1 New --- +12 12 2300 Dw ED RN. Pump confirmed. Patient counseled     Sanya Chen IV, PharmD, BCPS  6/19/2024  16:57 EDT

## 2024-06-20 ENCOUNTER — APPOINTMENT (OUTPATIENT)
Dept: CT IMAGING | Facility: HOSPITAL | Age: 79
End: 2024-06-20
Payer: MEDICARE

## 2024-06-20 ENCOUNTER — APPOINTMENT (OUTPATIENT)
Dept: GENERAL RADIOLOGY | Facility: HOSPITAL | Age: 79
End: 2024-06-20
Payer: MEDICARE

## 2024-06-20 LAB
ALBUMIN SERPL-MCNC: 3.7 G/DL (ref 3.5–5.2)
ALBUMIN/GLOB SERPL: 0.9 G/DL
ALP SERPL-CCNC: 176 U/L (ref 39–117)
ALT SERPL W P-5'-P-CCNC: 41 U/L (ref 1–33)
ANION GAP SERPL CALCULATED.3IONS-SCNC: 12 MMOL/L (ref 5–15)
AST SERPL-CCNC: 118 U/L (ref 1–32)
BASOPHILS # BLD AUTO: 0.09 10*3/MM3 (ref 0–0.2)
BASOPHILS NFR BLD AUTO: 0.9 % (ref 0–1.5)
BILIRUB SERPL-MCNC: 0.8 MG/DL (ref 0–1.2)
BUN SERPL-MCNC: 26 MG/DL (ref 8–23)
BUN/CREAT SERPL: 16.8 (ref 7–25)
CALCIUM SPEC-SCNC: 8.7 MG/DL (ref 8.6–10.5)
CHLORIDE SERPL-SCNC: 106 MMOL/L (ref 98–107)
CO2 SERPL-SCNC: 23 MMOL/L (ref 22–29)
CREAT SERPL-MCNC: 1.55 MG/DL (ref 0.57–1)
DEPRECATED RDW RBC AUTO: 46.3 FL (ref 37–54)
EGFRCR SERPLBLD CKD-EPI 2021: 33.5 ML/MIN/1.73
EOSINOPHIL # BLD AUTO: 0.4 10*3/MM3 (ref 0–0.4)
EOSINOPHIL NFR BLD AUTO: 3.9 % (ref 0.3–6.2)
ERYTHROCYTE [DISTWIDTH] IN BLOOD BY AUTOMATED COUNT: 16.4 % (ref 12.3–15.4)
GLOBULIN UR ELPH-MCNC: 4 GM/DL
GLUCOSE SERPL-MCNC: 122 MG/DL (ref 65–99)
HCT VFR BLD AUTO: 37.2 % (ref 34–46.6)
HGB BLD-MCNC: 10.8 G/DL (ref 12–15.9)
IMM GRANULOCYTES # BLD AUTO: 0.03 10*3/MM3 (ref 0–0.05)
IMM GRANULOCYTES NFR BLD AUTO: 0.3 % (ref 0–0.5)
LYMPHOCYTES # BLD AUTO: 1.45 10*3/MM3 (ref 0.7–3.1)
LYMPHOCYTES NFR BLD AUTO: 14.3 % (ref 19.6–45.3)
MCH RBC QN AUTO: 22.6 PG (ref 26.6–33)
MCHC RBC AUTO-ENTMCNC: 29 G/DL (ref 31.5–35.7)
MCV RBC AUTO: 77.8 FL (ref 79–97)
MONOCYTES # BLD AUTO: 0.8 10*3/MM3 (ref 0.1–0.9)
MONOCYTES NFR BLD AUTO: 7.9 % (ref 5–12)
NEUTROPHILS NFR BLD AUTO: 7.37 10*3/MM3 (ref 1.7–7)
NEUTROPHILS NFR BLD AUTO: 72.7 % (ref 42.7–76)
NRBC BLD AUTO-RTO: 0 /100 WBC (ref 0–0.2)
PLATELET # BLD AUTO: 298 10*3/MM3 (ref 140–450)
PMV BLD AUTO: 10 FL (ref 6–12)
POTASSIUM SERPL-SCNC: 4.6 MMOL/L (ref 3.5–5.2)
PROT SERPL-MCNC: 7.7 G/DL (ref 6–8.5)
QT INTERVAL: 534 MS
QTC INTERVAL: 515 MS
RBC # BLD AUTO: 4.78 10*6/MM3 (ref 3.77–5.28)
SODIUM SERPL-SCNC: 141 MMOL/L (ref 136–145)
TROPONIN T SERPL HS-MCNC: 1198 NG/L
TROPONIN T SERPL HS-MCNC: 1538 NG/L
UFH PPP CHRO-ACNC: 0.11 IU/ML (ref 0.3–0.7)
UFH PPP CHRO-ACNC: 0.25 IU/ML (ref 0.3–0.7)
VIT B12 BLD-MCNC: 647 PG/ML (ref 211–946)
WBC NRBC COR # BLD AUTO: 10.14 10*3/MM3 (ref 3.4–10.8)

## 2024-06-20 PROCEDURE — 93005 ELECTROCARDIOGRAM TRACING: CPT | Performed by: NURSE PRACTITIONER

## 2024-06-20 PROCEDURE — 93005 ELECTROCARDIOGRAM TRACING: CPT | Performed by: INTERNAL MEDICINE

## 2024-06-20 PROCEDURE — 93010 ELECTROCARDIOGRAM REPORT: CPT | Performed by: INTERNAL MEDICINE

## 2024-06-20 PROCEDURE — 84484 ASSAY OF TROPONIN QUANT: CPT | Performed by: INTERNAL MEDICINE

## 2024-06-20 PROCEDURE — 74176 CT ABD & PELVIS W/O CONTRAST: CPT

## 2024-06-20 PROCEDURE — 97165 OT EVAL LOW COMPLEX 30 MIN: CPT

## 2024-06-20 PROCEDURE — 99232 SBSQ HOSP IP/OBS MODERATE 35: CPT | Performed by: STUDENT IN AN ORGANIZED HEALTH CARE EDUCATION/TRAINING PROGRAM

## 2024-06-20 PROCEDURE — P9016 RBC LEUKOCYTES REDUCED: HCPCS

## 2024-06-20 PROCEDURE — 85520 HEPARIN ASSAY: CPT

## 2024-06-20 PROCEDURE — 25010000002 FUROSEMIDE PER 20 MG: Performed by: PHYSICIAN ASSISTANT

## 2024-06-20 PROCEDURE — 84484 ASSAY OF TROPONIN QUANT: CPT | Performed by: NURSE PRACTITIONER

## 2024-06-20 PROCEDURE — 86900 BLOOD TYPING SEROLOGIC ABO: CPT

## 2024-06-20 PROCEDURE — 80053 COMPREHEN METABOLIC PANEL: CPT | Performed by: NURSE PRACTITIONER

## 2024-06-20 PROCEDURE — 71045 X-RAY EXAM CHEST 1 VIEW: CPT

## 2024-06-20 PROCEDURE — 36430 TRANSFUSION BLD/BLD COMPNT: CPT

## 2024-06-20 PROCEDURE — 99222 1ST HOSP IP/OBS MODERATE 55: CPT | Performed by: PHYSICIAN ASSISTANT

## 2024-06-20 PROCEDURE — 97161 PT EVAL LOW COMPLEX 20 MIN: CPT

## 2024-06-20 PROCEDURE — 85025 COMPLETE CBC W/AUTO DIFF WBC: CPT | Performed by: NURSE PRACTITIONER

## 2024-06-20 RX ORDER — FUROSEMIDE 10 MG/ML
40 INJECTION INTRAMUSCULAR; INTRAVENOUS ONCE
Status: COMPLETED | OUTPATIENT
Start: 2024-06-20 | End: 2024-06-20

## 2024-06-20 RX ORDER — PEG-3350, SODIUM SULFATE, SODIUM CHLORIDE, POTASSIUM CHLORIDE, SODIUM ASCORBATE AND ASCORBIC ACID 7.5-2.691G
1000 KIT ORAL
Status: COMPLETED | OUTPATIENT
Start: 2024-06-21 | End: 2024-06-21

## 2024-06-20 RX ORDER — ASPIRIN 81 MG/1
81 TABLET ORAL DAILY
Status: DISCONTINUED | OUTPATIENT
Start: 2024-06-20 | End: 2024-07-01 | Stop reason: HOSPADM

## 2024-06-20 RX ORDER — PEG-3350, SODIUM SULFATE, SODIUM CHLORIDE, POTASSIUM CHLORIDE, SODIUM ASCORBATE AND ASCORBIC ACID 7.5-2.691G
1000 KIT ORAL
Status: COMPLETED | OUTPATIENT
Start: 2024-06-22 | End: 2024-06-22

## 2024-06-20 RX ORDER — PANTOPRAZOLE SODIUM 40 MG/10ML
40 INJECTION, POWDER, LYOPHILIZED, FOR SOLUTION INTRAVENOUS
Status: DISCONTINUED | OUTPATIENT
Start: 2024-06-20 | End: 2024-06-24

## 2024-06-20 RX ORDER — DILTIAZEM HCL/D5W 125 MG/125
5-15 PLASTIC BAG, INJECTION (ML) INTRAVENOUS CONTINUOUS
Status: DISCONTINUED | OUTPATIENT
Start: 2024-06-21 | End: 2024-06-23

## 2024-06-20 RX ORDER — POLYETHYLENE GLYCOL 3350 17 G/17G
17 POWDER, FOR SOLUTION ORAL ONCE
Status: COMPLETED | OUTPATIENT
Start: 2024-06-20 | End: 2024-06-20

## 2024-06-20 RX ORDER — METOPROLOL TARTRATE 1 MG/ML
5 INJECTION, SOLUTION INTRAVENOUS ONCE
Status: COMPLETED | OUTPATIENT
Start: 2024-06-20 | End: 2024-06-20

## 2024-06-20 RX ADMIN — LEVOTHYROXINE SODIUM 100 MCG: 0.1 TABLET ORAL at 07:13

## 2024-06-20 RX ADMIN — GABAPENTIN 300 MG: 300 CAPSULE ORAL at 22:28

## 2024-06-20 RX ADMIN — POLYETHYLENE GLYCOL 3350 17 G: 17 POWDER, FOR SOLUTION ORAL at 13:49

## 2024-06-20 RX ADMIN — ATORVASTATIN CALCIUM 40 MG: 40 TABLET, FILM COATED ORAL at 10:03

## 2024-06-20 RX ADMIN — METOPROLOL TARTRATE 5 MG: 5 INJECTION INTRAVENOUS at 22:44

## 2024-06-20 RX ADMIN — GABAPENTIN 300 MG: 300 CAPSULE ORAL at 10:02

## 2024-06-20 RX ADMIN — CETIRIZINE HYDROCHLORIDE 10 MG: 10 TABLET, FILM COATED ORAL at 10:03

## 2024-06-20 RX ADMIN — Medication 10 ML: at 22:29

## 2024-06-20 RX ADMIN — PANTOPRAZOLE SODIUM 40 MG: 40 INJECTION, POWDER, FOR SOLUTION INTRAVENOUS at 08:25

## 2024-06-20 RX ADMIN — ASPIRIN 81 MG: 81 TABLET, COATED ORAL at 10:02

## 2024-06-20 RX ADMIN — FUROSEMIDE 40 MG: 10 INJECTION, SOLUTION INTRAMUSCULAR; INTRAVENOUS at 09:58

## 2024-06-20 RX ADMIN — SERTRALINE 50 MG: 50 TABLET, FILM COATED ORAL at 10:02

## 2024-06-20 NOTE — PLAN OF CARE
Goal Outcome Evaluation:  Plan of Care Reviewed With: patient        Progress: no change  Outcome Evaluation: Pt presents with decreased functional independence and decreased activity tolerance. Pt ambulated 75ft with CGA and RW for support. Recommend continued skilled IP PT interventions. Recommend D/C home with assist and HHPT.      Anticipated Discharge Disposition (PT): home with assist, home with home health

## 2024-06-20 NOTE — CASE MANAGEMENT/SOCIAL WORK
Discharge Planning Assessment  [facility]     Patient Name: Angélica De La Cruz  MRN: [id number]  Today's Date: 6/20/2024    Admit Date: 6/19/2024    Plan: Home   Discharge Needs Assessment       Row Name 06/20/24 1424       Living Environment    People in Home spouse    Name(s) of People in Home  Timothy but he is handicapped    Current Living Arrangements apartment    Potentially Unsafe Housing Conditions none    Primary Care Provided by self    Provides Primary Care For spouse    Family Caregiver if Needed none    Able to Return to Prior Arrangements yes    Living Arrangement Comments Plan is home       Resource/Environmental Concerns    Resource/Environmental Concerns none    Transportation Concerns none       Transition Planning    Patient/Family Anticipates Transition to home with family    Patient/Family Anticipated Services at Transition none    Transportation Anticipated family or friend will provide       Discharge Needs Assessment    Equipment Currently Used at Home oxygen;cpap    Concerns to be Addressed discharge planning    Equipment Needed After Discharge oxygen;cpap    Discharge Coordination/Progress Plan is home                   Discharge Plan       Row Name 06/20/24 1426       Plan    Plan Home    Patient/Family in Agreement with Plan yes    Plan Comments I spoke with patient at the  and she lives in a handicapped first floor apt in [place]. Patient's  is handicapped and she takes care of him. Independent, drives, no HH and has home 02 thru [employer] that bleeds into her CPAP. Last week she bumped up her 02 to 2L 24/7. Patient has a portable tank for her ride home.  Patient plans to go home and has transportation. CM following    Final Discharge Disposition Code 01 - home or self-care                  Continued Care and Services - Admitted Since 6/19/2024    No active coordination exists for this encounter.       Expected Discharge Date and Time       Expected Discharge Date  Expected Discharge Time    Jun 24, 2024            Demographic Summary       Row Name 06/20/24 1423       General Information    Admission Type inpatient    Referral Source admission list    Reason for Consult discharge planning    Preferred Language English       Contact Information    Permission Granted to Share Info With     Contact Information Obtained for     Contact Information Comments PCP: Sussy Bloom                   Functional Status       Row Name 06/20/24 1423       Functional Status    Usual Activity Tolerance moderate    Current Activity Tolerance fair       Functional Status, IADL    Medications independent    Meal Preparation independent    Housekeeping independent    Laundry independent    Shopping assistive person    IADL Comments Patient has coverage for meds       Mental Status    General Appearance WDL WDL       Mental Status Summary    Recent Changes in Mental Status/Cognitive Functioning ability to speak clearly;ability to understand       Employment/    Employment Status retired                   Psychosocial    No documentation.                  Abuse/Neglect    No documentation.                  Legal    No documentation.                  Substance Abuse    No documentation.                  Patient Forms    No documentation.                     Enid Arreola RN

## 2024-06-20 NOTE — PLAN OF CARE
"Goal Outcome Evaluation:      Pt is Aox4, on 2 L NC, and with external funes catheter. Pt states \"less short of breath\" after blood transfusion. Cardiology at bedside in morning. Complained of dull RLQ pain once during shift relieved after bowel movement - GI aware. NSR with occasional PACs. Demonstrated proper technique on incentive spirometer. Presently sitting in chair with family at bedside. Chair alarm on, call light within reach, chair locked, belongings within reach. SCDs currently on. No further requests.                                      "

## 2024-06-20 NOTE — PROGRESS NOTES
[facility] Medicine Services  PROGRESS NOTE    Patient Name: Angélica De La Cruz  : 1945  MRN: [id number]    Date of Admission: 2024  Primary Care Physician: Sussy Bloom MD    Subjective   Subjective     CC:  Chest pain, shortness of breath    HPI:  Patient denies any chest pain, admits to mild shortness of breath this morning as well as improvement in energy level/fatigue after blood product transfusion overnight.  She denies any nausea but does admit to generalized abdominal tenderness.      Objective   Objective     Vital Signs:   Temp:  [97.6 °F (36.4 °C)-98.3 °F (36.8 °C)] 97.6 °F (36.4 °C)  Heart Rate:  [] 57  Resp:  [16-21] 18  BP: ()/(47-96) 151/72  Flow (L/min):  [2-3] 2     Physical Exam:  General appearance: alert, awake, oriented, no acute distress   Cardiovascular: Bradycardic, no murmurs or rubs, radial pulse full 2/4 BL   Respiratory: CTAB, no crackles or wheezes, oxygenating well on 2 L nasal cannula  Abdomen: soft, mild epigastric tenderness to palpation, no organomegaly, bowel sounds normoactive    Neuro/CNS: alert and oriented x3, normal speech    Results Reviewed:  LAB RESULTS:      Lab 24  0727 24  0006 24  1558 24  1132   WBC 10.14  --   --  6.12   HEMOGLOBIN 10.8*  --   --  7.7*   HEMATOCRIT 37.2  --   --  28.5*   PLATELETS 298  --   --  246   NEUTROS ABS 7.37*  --   --  4.80   IMMATURE GRANS (ABS) 0.03  --   --  0.02   LYMPHS ABS 1.45  --   --  0.55*   MONOS ABS 0.80  --   --  0.42   EOS ABS 0.40  --   --  0.29   MCV 77.8*  --   --  78.5*   PROTIME  --   --  16.0*  --    APTT  --   --  39.3*  --    HEPARIN ANTI-XA 0.25* 0.11* 0.10*  --    D DIMER QUANT  --   --   --  1.50*         Lab 06/20/24  0727 24  1132   SODIUM 141 141   POTASSIUM 4.6 4.3   CHLORIDE 106 106   CO2 23.0 25.0   ANION GAP 12.0 10.0   BUN 26* 24*   CREATININE 1.55* 1.50*   EGFR 33.5* 34.9*   GLUCOSE 122* 100*   CALCIUM 8.7 9.2   MAGNESIUM  --   2.3   TSH  --  0.390         Lab 06/20/24  0727 06/19/24  1132   TOTAL PROTEIN 7.7 7.0   ALBUMIN 3.7 3.6   GLOBULIN 4.0 3.4   ALT (SGPT) 41* 15   AST (SGOT) 118* 32   BILIRUBIN 0.8 0.3   ALK PHOS 176* 111         Lab 06/20/24  0727 06/20/24  0006 06/19/24  1558 06/19/24  1352 06/19/24  1132   PROBNP  --   --   --   --  4,207.0*   HSTROP T 1,538* 1,198* 229* 80* 36*   PROTIME  --   --  16.0*  --   --    INR  --   --  1.26*  --   --              Lab 06/19/24  1558 06/19/24  1406 06/19/24  1132   IRON 14*  --   --    IRON SATURATION (TSAT) 4*  --   --    TIBC 399  --   --    TRANSFERRIN 268  --   --    FERRITIN 41.03  --   --    VITAMIN B 12  --   --  647   ABO TYPING  --  O  --    RH TYPING  --  Positive  --    ANTIBODY SCREEN  --  Negative  --          Lab 06/19/24  1203   PH, ARTERIAL 7.390   PCO2, ARTERIAL 42.7   PO2 ART 88.3   FIO2 28   HCO3 ART 25.8   BASE EXCESS ART 0.7   CARBOXYHEMOGLOBIN 1.7     Brief Urine Lab Results  (Last result in the past 365 days)        Color   Clarity   Blood   Leuk Est   Nitrite   Protein   CREAT   Urine HCG        06/19/24 1212 Yellow   Clear   Negative   Negative   Negative   Negative                   Microbiology Results Abnormal       None            XR Chest 1 View    Result Date: 6/20/2024  XR CHEST 1 VW Date of Exam: 6/20/2024 3:44 AM EDT Indication: SOB Comparison: 1 day prior Findings: Vascular congestion appears mildly increased with suspected small bilateral layering pleural effusions overall concerning for mildly worsened pulmonary edema. There is no distinct pneumothorax. Unchanged cardiac enlargement. No definite new focal lobar consolidation.     Impression: Impression: Vascular congestion appears mildly increased with suspected small bilateral layering pleural effusions overall concerning for mildly worsened pulmonary edema. There is no distinct pneumothorax. Unchanged cardiac enlargement. No definite new focal lobar consolidation. Electronically Signed: Rafy  MD Arik  6/20/2024 6:11 AM EDT  Workstation ID: [id number]    CT Angiogram Chest Pulmonary Embolism    Result Date: 6/19/2024  CT ANGIOGRAM CHEST PULMONARY EMBOLISM Date of Exam: 6/19/2024 2:37 PM EDT Indication: chest pain, dyspnea, tachycardia, +dimer. Comparison: CT chest high resolution 3/23/2023 Technique: Axial CT images were obtained of the chest after the uneventful intravenous administration of 70 mL Isovue 370 utilizing pulmonary embolism protocol.  Reconstructed coronal and sagittal images were also obtained. Automated exposure control and  iterative construction methods were used. Findings: Visualized soft tissue of the lower neck are without acute abnormality. Postsurgical changes of prior sternotomy. Cardiomegaly. Negative for pulmonary embolus. Mitral annular calcifications noted. Negative for pericardial effusion. Trace bilateral pleural effusions. Trachea and mainstem bronchi are patent. Smooth interlobular septal thickening consistent with mild pulmonary edema. No pneumothorax. Mild centrilobular groundglass opacities in the upper lobes right greater than left also suggesting pulmonary edema. No suspicious pulmonary nodule or mass. Mild subpleural reticulation similar to prior chest CT. The visualized portions of the liver, spleen, and adrenal glands are without acute abnormality. Reflux of contrast into hepatic veins may relate to component of right heart insufficiency. Pancreas without acute abnormality. Visualized portions of the kidneys are without hydronephrosis. Extensive upper abdominal aortic vascular calcifications. Wall thickening at the mid to distal esophagus which may relate to esophagitis. Remote fractures of the left anterior fourth and fifth ribs. Exaggerated thoracic kyphosis with multilevel thoracic disc narrowing.     Impression: Impression: 1. Negative for pulmonary embolus. 2. Cardiomegaly and pulmonary edema. 3. Wall thickening of the mid and distal esophagus, correlate for  reflux or esophagitis. 4. Additional chronic findings above. Electronically Signed: Erik Neff MD  6/19/2024 2:57 PM EDT  Workstation ID: [id number]    XR Chest 1 View    Result Date: 6/19/2024  XR CHEST 1 VW Date of Exam: 6/19/2024 11:35 AM EDT Indication: Weak/Dizzy/AMS triage protocol Comparison: 6/14/2024 Findings: Patient is rotated to the left. Prior sternotomy and CABG. Mild cardiomegaly, exaggerated by technique. Negative for pleural effusion. No focal consolidation. Interstitial thickening related to chronic interstitial lung disease better assessed on prior chest CT.     Impression: Impression: Stable chronic findings without acute process. Electronically Signed: Erik Neff MD  6/19/2024 12:17 PM EDT  Workstation ID: [id number]         Current medications:  Scheduled Meds:[Held by provider] amLODIPine, 10 mg, Oral, Q24H  aspirin, 81 mg, Oral, Daily  atorvastatin, 40 mg, Oral, Daily  cetirizine, 10 mg, Oral, Daily  gabapentin, 300 mg, Oral, Q12H  [Held by provider] hydrALAZINE, 25 mg, Oral, BID  [Held by provider] hydroCHLOROthiazide, 50 mg, Oral, Daily  levothyroxine, 100 mcg, Oral, Daily  pantoprazole, 40 mg, Intravenous, Q AM  [START ON 6/21/2024] PEG-KCl-NaCl-NaSulf-Na Asc-C, 1,000 mL, Oral, Once When Specified   Followed by  [START ON 6/22/2024] PEG-KCl-NaCl-NaSulf-Na Asc-C, 1,000 mL, Oral, Once When Specified  polyethylene glycol, 17 g, Oral, Once  sertraline, 50 mg, Oral, Daily  sodium chloride, 10 mL, Intravenous, Q12H      Continuous Infusions:     PRN Meds:.  senna-docusate sodium **AND** polyethylene glycol **AND** bisacodyl **AND** bisacodyl    nitroglycerin    sodium chloride    sodium chloride    sodium chloride    Assessment & Plan   Assessment & Plan     Active Hospital Problems    Diagnosis  POA    **A-fib [I48.91]  Yes    Symptomatic anemia [D64.9]  Yes    NSTEMI (non-ST elevated myocardial infarction) [I21.4]  Yes    Hypothyroidism (acquired) [E03.9]  Yes    Rheumatoid arthritis [M06.9]   Yes    AMANDA (obstructive sleep apnea) [G47.33]  Yes    CKD (chronic kidney disease) [N18.9]  Yes    Hypertension [I10]  Yes      Resolved Hospital Problems   No resolved problems to display.        Brief Hospital Course to date:  Angélica De La Cruz is a 79 y.o. female with past medical history significant for hypertension, paroxysmal atrial fibrillation, CAD s/p CABG, rheumatoid arthritis, fibromyalgia, AMANDA on CPAP and CKD, was admitted for an NSTEMI complicated by atrial fibrillation with RVR and anemia, after presenting with generalized weakness, chest pain and shortness of breath.  Cardiology was consulted and determined patient was suffering from demand ischemia rather than ACS secondary to anemia and arrhythmia.    NSTEMI  History of prior CABG 2020  Acute on chronic hypoxic respiratory failure secondary to pulmonary edema  Atrial fibrillation with RVR  Paroxysmal atrial fibrillation  -Cardiology consulted, patient presents more consistent with type II demand ischemia related to anemia rather than ACS  -DC heparin GTT  -Continue aspirin and statin  -TTE  -Diurese with Lasix; DC IVF   -Defer anticoagulation due to bleeding risk  -Converted to sinus rhythm after IV Cardizem en route   -Previously used supplemental oxygen only with CPAP at night but recently escalated to daily use  -Encourage incentive spirometry    Hypotension with hx HTN  --hold home norvasc, HCTZ, hydralazine    Symptoms anemia  Iron deficiency anemia  -no obvious bleeding  -iron studies w low Fe   --states her PCP told her she was anemic 1 month ago and started her on MVI with iron  -s/p 2 units pRBC   - GI consulted, will perform EGD and colonoscopy.  Will need clear liquid diet tomorrow with split dose bowel prep for anticipated endoscopies on 6/22  -CBC, BMP     CKD3  -unclear baseline  -follows with nephrology     AMANDA  --continue cpap, uses at home with 2L primarily at night but was seen by pulmonary last week and has been wearing O2 all day  since then     Hypothyroidism  --continue synthroid  --TSH 0.390    Expected Discharge Location and Transportation: TBD  Expected Discharge   Expected Discharge Date: 6/24/2024; Expected Discharge Time:      VTE Prophylaxis:  Mechanical VTE prophylaxis orders are present.         AM-PAC 6 Clicks Score (PT): 18 (06/19/24 2030)    CODE STATUS:   Code Status and Medical Interventions:   Ordered at: 06/19/24 1702     Medical Intervention Limits:    No intubation (DNI)     Code Status (Patient has no pulse and is not breathing):    No CPR (Do Not Attempt to Resuscitate)     Medical Interventions (Patient has pulse or is breathing):    Limited Support       Luis Hoffman, DO  06/20/24

## 2024-06-20 NOTE — PLAN OF CARE
Goal Outcome Evaluation:  Plan of Care Reviewed With: patient        Progress: no change         Pt a&ox4. VSS on 2L per NC. Continues to c/o shortness of breath, repeat CXR obtained. Infused 2 units PRBC. Heparin drip infusing per orders. Troponin's elevated and MD aware, no c/o CP.

## 2024-06-20 NOTE — THERAPY EVALUATION
Patient Name: Angélica De La Cruz  : 1945    MRN: [id number]                              Today's Date: 2024       Admit Date: 2024    Visit Dx:     ICD-10-CM ICD-9-CM   1. NSTEMI (non-ST elevated myocardial infarction)  I21.4 410.70   2. Anemia, unspecified type  D64.9 285.9   3. Paroxysmal atrial fibrillation  I48.0 427.31   4. Atrial fibrillation with RVR  I48.91 427.31   5. Coronary artery disease involving native heart with angina pectoris, unspecified vessel or lesion type  I25.119 414.01     413.9   6. Iron deficiency anemia due to chronic blood loss  D50.0 280.0     Patient Active Problem List   Diagnosis    Acute febrile illness    Abnormal stress test    CABG 20    Hypertension    CKD (chronic kidney disease)    Atrial fibrillation    Chronic cough    Non-seasonal allergic rhinitis    AMANDA (obstructive sleep apnea)    CAD (coronary artery disease) s/p CABG    Carotid stenosis, asymptomatic, right    A-fib    Symptomatic anemia    NSTEMI (non-ST elevated myocardial infarction)    Hypothyroidism (acquired)    Rheumatoid arthritis     Past Medical History:   Diagnosis Date    Arthritis     CKD (chronic kidney disease)     elevated creat caused by long term use of lisinopril     Coronary artery disease     Fibromyalgia     Hypertension     Lung nodule seen on imaging study     AMANDA treated with BiPAP     Peritonitis      Past Surgical History:   Procedure Laterality Date    APPENDECTOMY      CARDIAC CATHETERIZATION N/A 2020    Procedure: Left Heart Cath;  Surgeon: Sanya Borges MD;  Location: [facility] CATH INVASIVE LOCATION;  Service: Cardiovascular;  Laterality: N/A;    CATARACT EXTRACTION, BILATERAL Bilateral 2018    CHOLECYSTECTOMY      CORONARY ARTERY BYPASS GRAFT N/A 2020    Procedure: MEDIAN STERNOTOMY, CORONARY ARTERY BYPASS GRAFTING X2, UTILIZNG THE LEFT  INTERNAL MAMMARY ARTERY GRAFT, ENDOSCOPIC VEIN HARVESTING CONVERTED TO OPEN OF THE RIGHT GREATER SAPHENOUS VEIN;   Surgeon: Wali Spicer MD;  Location: [facility];  Service: Cardiothoracic;  Laterality: N/A;  VO: 0807  VR: 0807      HYSTERECTOMY  1984    with BSO    OOPHORECTOMY Bilateral 1984      General Information       Row Name 06/20/24 1356          OT Time and Intention    Document Type evaluation  -JOSEFINA     Mode of Treatment occupational therapy  -JOSEFINA       Row Name 06/20/24 1356          General Information    Patient Profile Reviewed yes  -JOSEFINA     Prior Level of Function independent:;ADL's;bed mobility;transfer;all household mobility;community mobility;home management;driving  Ambulates using SPC as needed; reports hx of recent falls  -JOSEFINA     Existing Precautions/Restrictions fall;oxygen therapy device and L/min;other (see comments)  hx RA, fibromyalgia  -JOSEFINA     Barriers to Rehab medically complex;previous functional deficit  -JOSEFINA       Row Name 06/20/24 1356          Occupational Profile    Environmental Supports and Barriers (Occupational Profile) Lives with spouse in single level apartment; ambulates using SPC as needed, 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; has recently increased supplemental O2 use to 24/7, CPAP only prior; has walk-in shower with built-in seat  -JOSEFINA       Row Name 06/20/24 1356          Living Environment    People in Home spouse  -JOSEFINA       Row Name 06/20/24 1356          Home Main Entrance    Number of Stairs, Main Entrance none;other (see comments)  Ramp  -JOSEFINA       Row Name 06/20/24 1356          Stairs Within Home, Primary    Number of Stairs, Within Home, Primary none  -JOSEFINA       Row Name 06/20/24 1356          Cognition    Orientation Status (Cognition) oriented x 4  -JOSEFINA       Row Name 06/20/24 1356          Safety Issues, Functional Mobility    Safety Issues Affecting Function (Mobility) awareness of need for assistance;insight into deficits/self-awareness;judgment;positioning of assistive device;problem-solving;safety precaution awareness;safety precautions follow-through/compliance;sequencing abilities   -JOSEFINA     Impairments Affecting Function (Mobility) balance;endurance/activity tolerance;postural/trunk control;shortness of breath;strength  -               User Key  (r) = Recorded By, (t) = Taken By, (c) = Cosigned By      Initials Name Provider Type    Juliana Ramsey OT Occupational Therapist                     Mobility/ADL's       Row Name 06/20/24 1403          Bed Mobility    Bed Mobility supine-sit  -JOSEFINA     Supine-Sit [place] (Bed Mobility) contact guard  -     Assistive Device (Bed Mobility) bed rails;head of bed elevated  -JOSEFINA     Comment, (Bed Mobility) completed with increased time and effort  -       Row Name 06/20/24 1403          Transfers    Transfers sit-stand transfer;toilet transfer  -JOSEFINA     Comment, (Transfers) Pt reported dizziness upon transition from supine to sit EOB  -       Row Name 06/20/24 1403          Sit-Stand Transfer    Sit-Stand [place] (Transfers) contact guard;verbal cues  -     Assistive Device (Sit-Stand Transfers) walker, front-wheeled  -       Row Name 06/20/24 1403          Toilet Transfer    Type (Toilet Transfer) stand-sit;sit-stand;stand pivot/stand step  -JOSEFINA     [place] Level (Toilet Transfer) contact guard;verbal cues  -     Assistive Device (Toilet Transfer) commode  -JOSEFINA     Comment, (Toilet Transfer) cues for grab bar use  -       Row Name 06/20/24 1403          Functional Mobility    Functional Mobility- Ind. Level contact guard assist  -JOSEFINA     Functional Mobility- Device walker, front-wheeled  -JOSEFINA     Functional Mobility-Distance (Feet) 12  -     Functional Mobility- Safety Issues supplemental O2;step length decreased  -     Functional Mobility- Comment Pt ambulated from bathroom to recliner chair using FWW with CGA  -       Row Name 06/20/24 1403          Activities of Daily Living    BADL Assessment/Intervention grooming;toileting  -       Row Name 06/20/24 1403          Grooming Assessment/Training    [place] Level  (Grooming) oral care regimen;wash face, hands;supervision  -JOSEFINA     Oral Care teeth brushed - regular toothbrush  -JOSEFINA     Position (Grooming) sink side;supported standing  -       Row Name 06/20/24 1403          Toileting Assessment/Training    [place] Level (Toileting) adjust/manage clothing;perform perineal hygiene;minimum assist (75% patient effort)  -     Assistive Devices (Toileting) commode;grab bar/safety frame  -JOSEFINA     Position (Toileting) unsupported sitting;supported standing  -     Comment, (Toileting) Lita for standing balance  -               User Key  (r) = Recorded By, (t) = Taken By, (c) = Cosigned By      Initials Name Provider Type    Juliana Ramsey OT Occupational Therapist                   Obj/Interventions       Row Name 06/20/24 1406          Sensory Assessment (Somatosensory)    Sensory Assessment (Somatosensory) UE sensation intact  -[facility] Name 06/20/24 1406          Vision Assessment/Intervention    Visual Impairment/Limitations WFL;corrective lenses for reading  -       Row Name 06/20/24 1406          Range of Motion Comprehensive    General Range of Motion bilateral upper extremity ROM WFL  -[facility] Name 06/20/24 1406          Strength Comprehensive (MMT)    Comment, General Manual Muscle Testing (MMT) Assessment BUE's grossly 4-/5  -[facility] Name 06/20/24 1406          Balance    Balance Assessment sitting static balance;sitting dynamic balance;standing static balance;standing dynamic balance  -JOSEFINA     Static Sitting Balance standby assist  -JOSEFINA     Dynamic Sitting Balance supervision  -JOSEFINA     Position, Sitting Balance unsupported;other (see comments)  sitting on commode  -JOSEFINA     Static Standing Balance contact guard  -JOSEFINA     Dynamic Standing Balance minimal assist  -JOSEFINA     Position/Device Used, Standing Balance unsupported  -JOSEFINA     Balance Interventions standing;dynamic;occupation based/functional task  -     Comment, Balance Lita for standing balance  during toileting tasks  -JOSEFINA               User Key  (r) = Recorded By, (t) = Taken By, (c) = Cosigned By      Initials Name Provider Type    Juliana Ramsey OT Occupational Therapist                   Goals/Plan       Row Name 06/20/24 1413          Transfer Goal 1 (OT)    Activity/Assistive Device (Transfer Goal 1, OT) sit-to-stand/stand-to-sit;toilet;walker, rolling  -JOSEFINA     [place] Level/Cues Needed (Transfer Goal 1, OT) supervision required  -JOSEFINA     Time Frame (Transfer Goal 1, OT) long term goal (LTG);10 days  -JOSEFINA     Progress/Outcome (Transfer Goal 1, OT) new goal  -JOSEFINA       Row Name 06/20/24 1413          Toileting Goal 1 (OT)    Activity/Device (Toileting Goal 1, OT) adjust/manage clothing;perform perineal hygiene;commode;grab bar/safety frame  -JOSEFINA     [place] Level/Cues Needed (Toileting Goal 1, OT) supervision required  -JOSEFINA     Time Frame (Toileting Goal 1, OT) short term goal (STG);1 week  -JOSEFINA     Progress/Outcome (Toileting Goal 1, OT) new goal  -JOSEFINA       Row Name 06/20/24 1413          Therapy Assessment/Plan (OT)    Planned Therapy Interventions (OT) activity tolerance training;BADL retraining;functional balance retraining;occupation/activity based interventions;patient/caregiver education/training;ROM/therapeutic exercise;strengthening exercise;transfer/mobility retraining  -JOSEFINA               User Key  (r) = Recorded By, (t) = Taken By, (c) = Cosigned By      Initials Name Provider Type    Juliana Ramsey OT Occupational Therapist                   Clinical Impression       Row Name 06/20/24 1409          Pain Assessment    Pretreatment Pain Rating 0/10 - no pain  -JOSEFINA     Posttreatment Pain Rating 0/10 - no pain  -       Row Name 06/20/24 1409          Plan of Care Review    Plan of Care Reviewed With patient  -JOSEFINA     Outcome Evaluation OT eval completed. Pt presents below baseline for ADL's, limited by generalized weakness, impaired balance, and SOA. Pt CG to Lita for functional  transfers, Lita for toileting tasks, CG to ambulate from bathroom using FWW. IP OT services warranted. Recommend home with assist and HHOT at discharge.  -       Row Name 06/20/24 140          Therapy Assessment/Plan (OT)    Patient/Family Therapy Goal Statement (OT) To go home and get back to normal  -JOSEFINA     Rehab Potential (OT) good, to achieve stated therapy goals  -JOSEFINA     Criteria for Skilled Therapeutic Interventions Met (OT) yes;meets criteria;skilled treatment is necessary  -     Therapy Frequency (OT) daily  -JOSEFINA     Predicted Duration of Therapy Intervention (OT) 10 days  -JOSEFINA       Row Name 06/20/24 1404          Therapy Plan Review/Discharge Plan (OT)    Anticipated Discharge Disposition (OT) home with assist;home with home health  -       Row Name 06/20/24 140          Vital Signs    Pre Systolic BP Rehab 151  -JOSEFINA     Pre Treatment Diastolic BP 72  -JOSEFINA     Pre SpO2 (%) 98  -JOSEFINA     O2 Delivery Pre Treatment nasal cannula  -JOSEFINA     O2 Delivery Intra Treatment nasal cannula  -JOSEFINA     Post SpO2 (%) 96  -JOSEFINA     O2 Delivery Post Treatment nasal cannula  -JOSEFINA     Pre Patient Position Supine  -JOSEFINA     Intra Patient Position Standing  -JOSEFINA     Post Patient Position Sitting  -JOSEFINA       Row Name 06/20/24 1406          Positioning and Restraints    Pre-Treatment Position in bed  -JOSEFINA     Post Treatment Position chair  -JOSEFINA     In Chair reclined;call light within reach;encouraged to call for assist;exit alarm on;waffle cushion;legs elevated;heels elevated;with nsg  -JOSEFINA               User Key  (r) = Recorded By, (t) = Taken By, (c) = Cosigned By      Initials Name Provider Type    Juliana Ramsey, OT Occupational Therapist                   Outcome Measures       Row Name 06/20/24 2246          How much help from another is currently needed...    Putting on and taking off regular lower body clothing? 3  -JOSEFINA     Bathing (including washing, rinsing, and drying) 3  -JOSEFINA     Toileting (which includes using toilet bed pan or  urinal) 3  -JOSEFINA     Putting on and taking off regular upper body clothing 3  -JOSEFINA     Taking care of personal grooming (such as brushing teeth) 3  -JOSEFINA     Eating meals 3  -JOSEFINA     AM-PAC 6 Clicks Score (OT) 18  -JOSEFINA       Row Name 06/20/24 1414          Functional Assessment    Outcome Measure Options AM-PAC 6 Clicks Daily Activity (OT)  -JOSEFINA               User Key  (r) = Recorded By, (t) = Taken By, (c) = Cosigned By      Initials Name Provider Type    Juliana Ramsey OT Occupational Therapist                    Occupational Therapy Education       Title: PT OT SLP Therapies (In Progress)       Topic: Occupational Therapy (In Progress)       Point: ADL training (Done)       Description:   Instruct learner(s) on proper safety adaptation and remediation techniques during self care or transfers.   Instruct in proper use of assistive devices.                  Learning Progress Summary             Patient Acceptance, E, VU by JOSEFINA at 6/20/2024 1414    Comment: OT POC; fall prevention                         Point: Home exercise program (Not Started)       Description:   Instruct learner(s) on appropriate technique for monitoring, assisting and/or progressing therapeutic exercises/activities.                  Learner Progress:  Not documented in this visit.              Point: Precautions (Done)       Description:   Instruct learner(s) on prescribed precautions during self-care and functional transfers.                  Learning Progress Summary             Patient Acceptance, E, VU by JOSEFINA at 6/20/2024 1414    Comment: OT POC; fall prevention                         Point: Body mechanics (Not Started)       Description:   Instruct learner(s) on proper positioning and spine alignment during self-care, functional mobility activities and/or exercises.                  Learner Progress:  Not documented in this visit.                              User Key       Initials Effective Dates Name Provider Type Discipline    JOSEFINA 06/16/21 -   Juliana Ruano OT Occupational Therapist OT                  OT Recommendation and Plan  Planned Therapy Interventions (OT): activity tolerance training, BADL retraining, functional balance retraining, occupation/activity based interventions, patient/caregiver education/training, ROM/therapeutic exercise, strengthening exercise, transfer/mobility retraining  Therapy Frequency (OT): daily  Plan of Care Review  Plan of Care Reviewed With: patient  Outcome Evaluation: OT eval completed. Pt presents below baseline for ADL's, limited by generalized weakness, impaired balance, and SOA. Pt CG to Lita for functional transfers, Lita for toileting tasks, CG to ambulate from bathroom using FWW. IP OT services warranted. Recommend home with assist and HHOT at discharge.     Time Calculation:   Evaluation Complexity (OT)  Review Occupational Profile/Medical/Therapy History Complexity: expanded/moderate complexity  Assessment, Occupational Performance/Identification of Deficit Complexity: 1-3 performance deficits  Clinical Decision Making Complexity (OT): problem focused assessment/low complexity  Overall Complexity of Evaluation (OT): low complexity     Time Calculation- OT       Row Name 06/20/24 1315             Time Calculation- OT    OT Start Time 1315  -JOSEFINA      OT Received On 06/20/24  -JOSEFINA      OT Goal Re-Cert Due Date 06/30/24  -JOSEFINA         Untimed Charges    OT Eval/Re-eval Minutes 51  -JOSEFINA         Total Minutes    Untimed Charges Total Minutes 51  -JOSEFINA       Total Minutes 51  -JOSEFINA                User Key  (r) = Recorded By, (t) = Taken By, (c) = Cosigned By      Initials Name Provider Type    Juliana Ramsey OT Occupational Therapist                  Therapy Charges for Today       Code Description Service Date Service Provider Modifiers Qty    [id number]  OT EVAL LOW COMPLEXITY 4 6/20/2024 Juliana Ruano OT GO 1                 Juliana Ruano OT  6/20/2024

## 2024-06-20 NOTE — CONSULTS
[facility]ology Consult    Referring Provider: Luis Hoffman DO     PCP: Sussy Bloom MD    Reason for Consultation: Iron deficiency anemia     Chief complaint: Shortness of breath     History of present illness:    Angélica De La Cruz is a very pleasant 79 y.o. female who is admitted with shortness of breath with findings of severe symptomatic anemia (Hgb 7.7), atrial fibrillation with RVR and NSTEMI.   She has history of CAD s/p remote CABG.  She has been evaluated by cardiology with likely demand ischemia from severe anemia.    GI is consulted for further work up.       She is followed outpatient with gastroenterology at [facility], recently seen on 6/3/2024 with MARCO Mullins  for follow up of GERD without esophagitis, gastritis and dysphagia s/p EGD with dilation in February 2024 with Dr. Bola Gaona.    She notes change in bowel habits in the last year with constipation.  She also notes lower abdominal pain, worse on the right.     Her stools are brown without melena nor bright red blood per rectum.   She thinks her last colonoscopy was 7-8 years ago.       She describes recurrent falls recently.       Allergies:  Adhesive tape, Codeine, Morphine and codeine, Cefaclor, Oxycodone-acetaminophen, Sulfa antibiotics, Doxycycline, Erythromycin, Levofloxacin, Oxytetracycline, and Penicillins    Scheduled Meds:  [Held by provider] amLODIPine, 10 mg, Oral, Q24H  aspirin, 81 mg, Oral, Daily  atorvastatin, 40 mg, Oral, Daily  cetirizine, 10 mg, Oral, Daily  gabapentin, 300 mg, Oral, Q12H  [Held by provider] hydrALAZINE, 25 mg, Oral, BID  [Held by provider] hydroCHLOROthiazide, 50 mg, Oral, Daily  levothyroxine, 100 mcg, Oral, Daily  pantoprazole, 40 mg, Intravenous, Q AM  sertraline, 50 mg, Oral, Daily  sodium chloride, 10 mL, Intravenous, Q12H         Infusions:  sodium chloride, 75 mL/hr, Last Rate: 75 mL/hr (06/20/24 0641)        PRN Meds:    senna-docusate sodium **AND** polyethylene glycol  **AND** bisacodyl **AND** bisacodyl    nitroglycerin    sodium chloride    sodium chloride    sodium chloride    Home Meds:  Medications Prior to Admission   Medication Sig Dispense Refill Last Dose    albuterol (PROVENTIL) (2.5 MG/3ML) 0.083% nebulizer solution Inhale by nebulization route for 8 days.       amLODIPine (NORVASC) 10 MG tablet Take 1 tablet by mouth Daily. 30 tablet 0     aspirin 81 MG chewable tablet Chew 1 tablet Daily. OTC       atorvastatin (LIPITOR) 40 MG tablet Take 1 tablet by mouth Daily.       Azelastine HCl 137 MCG/SPRAY solution        B Complex Vitamins (vitamin b complex) capsule capsule Take 1 capsule by mouth Daily. OTC       clopidogrel (PLAVIX) 75 MG tablet Take 1 tablet by mouth Daily.       estradiol (ESTRACE) 0.1 MG/GM vaginal cream Insert 1 gram vaginally 3 times per week for 90 days       famotidine (PEPCID) 40 MG tablet        fluticasone (FLONASE) 50 MCG/ACT nasal spray 2 sprays into the nostril(s) as directed by provider Daily.       gabapentin (NEURONTIN) 300 MG capsule Take 2 capsules by mouth 2 (Two) Times a Day.       guaiFENesin (MUCINEX) 600 MG 12 hr tablet Take 1 tablet by mouth Daily As Needed for Congestion or Cough. OTC       hydrALAZINE (APRESOLINE) 25 MG tablet Take 1 tablet by mouth 2 (Two) Times a Day.       hydroCHLOROthiazide (HYDRODIURIL) 50 MG tablet Take 1 tablet by mouth Daily.       levocetirizine (XYZAL) 5 MG tablet Take 1 tablet by mouth Daily.       levothyroxine (SYNTHROID, LEVOTHROID) 100 MCG tablet Take 1 tablet by mouth Daily.       Multiple Vitamins-Minerals (MULTIVITAMIN PO) Take 1 tablet by mouth Daily. OTC       olmesartan (BENICAR) 20 MG tablet Take 1 tablet by mouth Daily.       pantoprazole (PROTONIX) 40 MG EC tablet        Pediatric Multivitamins-Iron (CENTRUM JR/IRON PO) Take  by mouth.       potassium chloride ER (K-TAB) 20 MEQ tablet controlled-release ER tablet        sertraline (ZOLOFT) 50 MG tablet Take 1 tablet by mouth Daily.           ROS: Review of Systems   Constitutional:  Positive for fatigue.   HENT: Negative.     Eyes: Negative.    Respiratory:  Positive for shortness of breath.    Gastrointestinal:  Positive for constipation.   Endocrine: Negative.    Genitourinary: Negative.    Musculoskeletal: Negative.    Skin:  Positive for pallor.   Hematological: Negative.    Psychiatric/Behavioral: Negative.         PAST MED HX:  Past Medical History:   Diagnosis Date    Arthritis     CKD (chronic kidney disease)     elevated creat caused by long term use of lisinopril     Coronary artery disease     Fibromyalgia     Hypertension     Lung nodule seen on imaging study     AMANDA treated with BiPAP     Peritonitis        PAST SURG HX:  Past Surgical History:   Procedure Laterality Date    APPENDECTOMY      CARDIAC CATHETERIZATION N/A 11/24/2020    Procedure: Left Heart Cath;  Surgeon: Sanya Borges MD;  Location: [facility] CATH INVASIVE LOCATION;  Service: Cardiovascular;  Laterality: N/A;    CATARACT EXTRACTION, BILATERAL Bilateral 2018    CHOLECYSTECTOMY      CORONARY ARTERY BYPASS GRAFT N/A 11/25/2020    Procedure: MEDIAN STERNOTOMY, CORONARY ARTERY BYPASS GRAFTING X2, UTILIZNG THE LEFT  INTERNAL MAMMARY ARTERY GRAFT, ENDOSCOPIC VEIN HARVESTING CONVERTED TO OPEN OF THE RIGHT GREATER SAPHENOUS VEIN;  Surgeon: Wali Spicer MD;  Location: [facility] OR;  Service: Cardiothoracic;  Laterality: N/A;  VO: 0807  VR: 0807      HYSTERECTOMY  1984    with BSO    OOPHORECTOMY Bilateral 1984       FAM HX:  Family History   Problem Relation Age of Onset    Heart disease Mother     Cerebral aneurysm Father     Heart disease Brother     Heart disease Maternal Grandmother     No Known Problems Half-Sister     No Known Problems Half-Sister     Kidney failure Half-Brother     Sudden death Half-Brother     Breast cancer Neg Hx     Ovarian cancer Neg Hx        SOC HX:  Social History     Socioeconomic History    Marital status:     Number of children:  "2   Tobacco Use    Smoking status: Former     Current packs/day: 0.00     Average packs/day: 1 pack/day for 15.0 years (15.0 ttl pk-yrs)     Types: Cigarettes     Start date:      Quit date:      Years since quittin.5    Smokeless tobacco: Never   Vaping Use    Vaping status: Never Used   Substance and Sexual Activity    Alcohol use: No    Drug use: No    Sexual activity: Defer       PHYSICAL EXAM  /73 (BP Location: Right arm, Patient Position: Lying)   Pulse 57   Temp 97.7 °F (36.5 °C) (Oral)   Resp 18   Ht 162.6 cm (64\")   Wt 78.2 kg (172 lb 4.8 oz)   SpO2 94%   BMI 29.58 kg/m²   Wt Readings from Last 3 Encounters:   24 78.2 kg (172 lb 4.8 oz)   24 78.9 kg (174 lb)   24 80.3 kg (177 lb)   ,body mass index is 29.58 kg/m².  Physical Exam  Constitutional:       General: She is not in acute distress.     Comments: Very pleasant to speak with, no acute distress    Cardiovascular:      Rate and Rhythm: Normal rate and regular rhythm.   Pulmonary:      Effort: Pulmonary effort is normal. No respiratory distress.   Abdominal:      General: Bowel sounds are normal. There is no distension.      Palpations: Abdomen is soft.      Tenderness: There is no abdominal tenderness.   Skin:     General: Skin is warm and dry.      Coloration: Skin is pale.   Neurological:      Mental Status: She is alert and oriented to person, place, and time.   Psychiatric:         Behavior: Behavior normal.         Results Review:   I reviewed the patient's new clinical results.    Lab Results   Component Value Date    WBC 10.14 2024    HGB 10.8 (L) 2024    HGB 7.7 (L) 2024    HGB 12.5 2023    HCT 37.2 2024    MCV 77.8 (L) 2024     2024     Lab Results   Component Value Date    INR 1.26 (H) 2024    INR 1.2 2023    INR 1.19 (H) 2020     Lab Results   Component Value Date    GLUCOSE 122 (H) 2024    BUN 26 (H) 2024    CREATININE " 1.55 (H) 06/20/2024    EGFRIFNONA 41 (L) 12/10/2020    BCR 16.8 06/20/2024     06/20/2024    K 4.6 06/20/2024    CO2 23.0 06/20/2024    CALCIUM 8.7 06/20/2024    ALBUMIN 3.7 06/20/2024    ALKPHOS 176 (H) 06/20/2024    BILITOT 0.8 06/20/2024    ALT 41 (H) 06/20/2024     (H) 06/20/2024      Latest Reference Range & Units 06/19/24 15:58   Iron 37 - 145 mcg/dL 14 (L)   Ferritin 13.00 - 150.00 ng/mL 41.03   Iron Saturation (TSAT) 20 - 50 % 4 (L)   Transferrin 200 - 360 mg/dL 268   TIBC 298 - 536 mcg/dL 399      Latest Reference Range & Units 06/19/24 11:32   Vitamin B-12 211 - 946 pg/mL 647     ASSESSMENTS/PLANS    Iron deficiency anemia, concern for chronic blood loss   Esophageal dysphagia s/p esophageal dilation in February 2024 with Dr. Gaona with good response. Endoscopy report not available on [facility]where    Lower abdominal tenderness  Change in bowel habits, recent constipation  NSTEMI, suspected demand ischemia  CAD   S/p remote cholecystectomy and appendectomy   Dual antiplatelet use     >> Recommend colonoscopy, as well as EGD for iron deficiency anemia.   Will begin a clear liquid diet tomorrow and subsequent split dose Moviprep for anticipated bidirectional endoscopies on 6/22  >> Obtain noncontrast CT Abdomen for lower abdominal tenderness, more prominent in the right lower quadrant   >> TTE is ordered and pending     I discussed the patient's findings and my recommendations with patient    IDANIA Oliveros  06/20/24  11:01 EDT

## 2024-06-20 NOTE — PLAN OF CARE
Goal Outcome Evaluation:  Plan of Care Reviewed With: patient           Outcome Evaluation: OT eval completed. Pt presents below baseline for ADL's, limited by generalized weakness, impaired balance, and SOA. Pt CG to Lita for functional transfers, Lita for toileting tasks, CG to ambulate from bathroom using FWW. IP OT services warranted. Recommend home with assist and HHOT at discharge.      Anticipated Discharge Disposition (OT): home with assist, home with home health

## 2024-06-20 NOTE — THERAPY EVALUATION
Patient Name: Angélica De La Cruz  : 1945    MRN: [id number]                              Today's Date: 2024       Admit Date: 2024    Visit Dx:     ICD-10-CM ICD-9-CM   1. NSTEMI (non-ST elevated myocardial infarction)  I21.4 410.70   2. Anemia, unspecified type  D64.9 285.9   3. Paroxysmal atrial fibrillation  I48.0 427.31   4. Atrial fibrillation with RVR  I48.91 427.31   5. Coronary artery disease involving native heart with angina pectoris, unspecified vessel or lesion type  I25.119 414.01     413.9   6. Iron deficiency anemia due to chronic blood loss  D50.0 280.0     Patient Active Problem List   Diagnosis    Acute febrile illness    Abnormal stress test    CABG 20    Hypertension    CKD (chronic kidney disease)    Atrial fibrillation    Chronic cough    Non-seasonal allergic rhinitis    AMANDA (obstructive sleep apnea)    CAD (coronary artery disease) s/p CABG    Carotid stenosis, asymptomatic, right    A-fib    Symptomatic anemia    NSTEMI (non-ST elevated myocardial infarction)    Hypothyroidism (acquired)    Rheumatoid arthritis     Past Medical History:   Diagnosis Date    Arthritis     CKD (chronic kidney disease)     elevated creat caused by long term use of lisinopril     Coronary artery disease     Fibromyalgia     Hypertension     Lung nodule seen on imaging study     AMANDA treated with BiPAP     Peritonitis      Past Surgical History:   Procedure Laterality Date    APPENDECTOMY      CARDIAC CATHETERIZATION N/A 2020    Procedure: Left Heart Cath;  Surgeon: Sanya Borges MD;  Location: [facility] CATH INVASIVE LOCATION;  Service: Cardiovascular;  Laterality: N/A;    CATARACT EXTRACTION, BILATERAL Bilateral 2018    CHOLECYSTECTOMY      CORONARY ARTERY BYPASS GRAFT N/A 2020    Procedure: MEDIAN STERNOTOMY, CORONARY ARTERY BYPASS GRAFTING X2, UTILIZNG THE LEFT  INTERNAL MAMMARY ARTERY GRAFT, ENDOSCOPIC VEIN HARVESTING CONVERTED TO OPEN OF THE RIGHT GREATER SAPHENOUS VEIN;   Surgeon: Wali Spicer MD;  Location: [facility];  Service: Cardiothoracic;  Laterality: N/A;  VO: 0807  VR: 0807      HYSTERECTOMY  1984    with BSO    OOPHORECTOMY Bilateral 1984      General Information       Row Name 06/20/24 1412          Physical Therapy Time and Intention    Document Type evaluation  -AE     Mode of Treatment physical therapy  -AE       Row Name 06/20/24 1412          General Information    Patient Profile Reviewed yes  -AE     Prior Level of Function independent:;all household mobility;community mobility;gait;transfer  Ambulates with SPC occasionally; h/o falls  -AE     Existing Precautions/Restrictions fall;oxygen therapy device and L/min;other (see comments)  h/o RA, fibromyalgia  -AE     Barriers to Rehab medically complex;previous functional deficit  -AE       Row Name 06/20/24 1412          Living Environment    People in Home spouse  -AE       Row Name 06/20/24 1412          Home Main Entrance    Number of Stairs, Main Entrance none  ramp to enter  -AE     Stair Railings, Main Entrance none  -AE       Row Name 06/20/24 1412          Stairs Within Home, Primary    Number of Stairs, Within Home, Primary none  -AE     Stair Railings, Within Home, Primary none  -AE       Row Name 06/20/24 1412          Cognition    Orientation Status (Cognition) oriented x 4  -AE       Row Name 06/20/24 1412          Safety Issues, Functional Mobility    Safety Issues Affecting Function (Mobility) awareness of need for assistance;insight into deficits/self-awareness;safety precaution awareness;safety precautions follow-through/compliance;sequencing abilities  -AE     Impairments Affecting Function (Mobility) balance;endurance/activity tolerance;postural/trunk control;shortness of breath;strength  -AE               User Key  (r) = Recorded By, (t) = Taken By, (c) = Cosigned By      Initials Name Provider Type    AE Bradley Malone PT Physical Therapist                   Mobility       Row Name  06/20/24 1416          Bed Mobility    Bed Mobility supine-sit  -AE     Supine-Sit Hendry (Bed Mobility) contact guard  -AE     Assistive Device (Bed Mobility) bed rails;head of bed elevated  -AE     Comment, (Bed Mobility) VCs for hand placement and sequencing. Good use of bed rails.  -AE       Row Name 06/20/24 1416          Transfers    Comment, (Transfers) VCs for hand placement and sequencing. Pt reports mild dizziness with transitions.  -AE       Row Name 06/20/24 1416          Sit-Stand Transfer    Sit-Stand Hendry (Transfers) contact guard;verbal cues;1 person assist  -AE     Assistive Device (Sit-Stand Transfers) walker, front-wheeled  -AE       Row Name 06/20/24 1416          Gait/Stairs (Locomotion)    Hendry Level (Gait) contact guard;1 person assist;verbal cues  -AE     Assistive Device (Gait) walker, front-wheeled  -AE     Distance in Feet (Gait) 75  -AE     Deviations/Abnormal Patterns (Gait) bilateral deviations;manjinder decreased;gait speed decreased;stride length decreased  -AE     Bilateral Gait Deviations forward flexed posture;heel strike decreased  -AE     Comment, (Gait/Stairs) Pt demo step through gait pattern with slowed manjinder, decreased gait speed, and forward flexed posture. Pt required increased cues to improve upright posture and sequencing/positioning of AD while ambulating. Further distance limited by fatigue and dizziness.  -AE               User Key  (r) = Recorded By, (t) = Taken By, (c) = Cosigned By      Initials Name Provider Type    AE Bradley Malone PT Physical Therapist                   Obj/Interventions       Row Name 06/20/24 1422          Range of Motion Comprehensive    General Range of Motion bilateral lower extremity ROM WFL  -AE       Row Name 06/20/24 1422          Strength Comprehensive (MMT)    General Manual Muscle Testing (MMT) Assessment lower extremity strength deficits identified  -AE     Comment, General Manual Muscle Testing (MMT)  Assessment BLE grossly 4/5  -AE       Row Name 06/20/24 1422          Balance    Balance Assessment sitting static balance;sitting dynamic balance;sit to stand dynamic balance;standing static balance;standing dynamic balance  -AE     Static Sitting Balance contact guard  -AE     Dynamic Sitting Balance contact guard  -AE     Position, Sitting Balance unsupported;sitting edge of bed  -AE     Sit to Stand Dynamic Balance contact guard;1-person assist;verbal cues  -AE     Static Standing Balance contact guard  -AE     Dynamic Standing Balance contact guard  -AE     Position/Device Used, Standing Balance supported;walker, front-wheeled  -AE               User Key  (r) = Recorded By, (t) = Taken By, (c) = Cosigned By      Initials Name Provider Type    AE Bradley Malone, PT Physical Therapist                   Goals/Plan       Row Name 06/20/24 1426          Bed Mobility Goal 1 (PT)    Activity/Assistive Device (Bed Mobility Goal 1, PT) sit to supine/supine to sit  -AE     [place] Level/Cues Needed (Bed Mobility Goal 1, PT) independent  -AE     Time Frame (Bed Mobility Goal 1, PT) short term goal (STG);5 days  -AE     Progress/Outcomes (Bed Mobility Goal 1, PT) new goal  -AE       Row Name 06/20/24 1426          Transfer Goal 1 (PT)    Activity/Assistive Device (Transfer Goal 1, PT) sit-to-stand/stand-to-sit;bed-to-chair/chair-to-bed  -AE     [place] Level/Cues Needed (Transfer Goal 1, PT) modified independence  -AE     Time Frame (Transfer Goal 1, PT) long term goal (LTG);10 days  -AE     Progress/Outcome (Transfer Goal 1, PT) new goal  -AE       Row Name 06/20/24 1426          Gait Training Goal 1 (PT)    Activity/Assistive Device (Gait Training Goal 1, PT) gait (walking locomotion);assistive device use  -AE     [place] Level (Gait Training Goal 1, PT) modified independence  -AE     Distance (Gait Training Goal 1, PT) 300ft  -AE     Time Frame (Gait Training Goal 1, PT) long term goal (LTG);10 days   -AE     Progress/Outcome (Gait Training Goal 1, PT) new goal  -AE       Row Name 06/20/24 1426          Therapy Assessment/Plan (PT)    Planned Therapy Interventions (PT) balance training;bed mobility training;gait training;home exercise program;patient/family education;postural re-education;ROM (range of motion);strengthening;transfer training  -AE               User Key  (r) = Recorded By, (t) = Taken By, (c) = Cosigned By      Initials Name Provider Type    AE Bradley Malone, PT Physical Therapist                   Clinical Impression       Row Name 06/20/24 1423          Pain    Pretreatment Pain Rating 0/10 - no pain  -AE     Posttreatment Pain Rating 0/10 - no pain  -AE       Row Name 06/20/24 1423          Plan of Care Review    Plan of Care Reviewed With patient  -AE     Progress no change  -AE     Outcome Evaluation Pt presents with decreased functional independence and decreased activity tolerance. Pt ambulated 75ft with CGA and RW for support. Recommend continued skilled IP PT interventions. Recommend D/C home with assist and HHPT.  -AE       Row Name 06/20/24 1426          Therapy Assessment/Plan (PT)    Patient/Family Therapy Goals Statement (PT) Return home  -AE     Rehab Potential (PT) good, to achieve stated therapy goals  -AE     Criteria for Skilled Interventions Met (PT) yes  -AE     Therapy Frequency (PT) daily  -AE     Predicted Duration of Therapy Intervention (PT) 1 week  -AE       Row Name 06/20/24 1422          Vital Signs    Pre Systolic BP Rehab 151  -AE     Pre Treatment Diastolic BP 72  -AE     Pretreatment Heart Rate (beats/min) 56  -AE     Posttreatment Heart Rate (beats/min) 67  -AE     Pre SpO2 (%) 97  -AE     O2 Delivery Pre Treatment nasal cannula  -AE     O2 Delivery Intra Treatment nasal cannula  -AE     O2 Delivery Post Treatment nasal cannula  -AE     Pre Patient Position Supine  -AE     Intra Patient Position Standing  -AE     Post Patient Position Standing  -AE       Row  Name 06/20/24 1423          Positioning and Restraints    Pre-Treatment Position in bed  -AE     Post Treatment Position bathroom  -AE     Bathroom notified nsg;with OT  -AE               User Key  (r) = Recorded By, (t) = Taken By, (c) = Cosigned By      Initials Name Provider Type    AE Bradley Malone, ACE Physical Therapist                   Outcome Measures       Row Name 06/20/24 1431          How much help from another person do you currently need...    Turning from your back to your side while in flat bed without using bedrails? 3  -AE     Moving from lying on back to sitting on the side of a flat bed without bedrails? 3  -AE     Moving to and from a bed to a chair (including a wheelchair)? 3  -AE     Standing up from a chair using your arms (e.g., wheelchair, bedside chair)? 3  -AE     Climbing 3-5 steps with a railing? 3  -AE     To walk in hospital room? 3  -AE     AM-PAC 6 Clicks Score (PT) 18  -AE     Highest Level of Mobility Goal 6 --> Walk 10 steps or more  -AE       Row Name 06/20/24 1431 06/20/24 1414       Functional Assessment    Outcome Measure Options AM-PAC 6 Clicks Basic Mobility (PT)  -AE AM-PAC 6 Clicks Daily Activity (OT)  -JOSEFINA              User Key  (r) = Recorded By, (t) = Taken By, (c) = Cosigned By      Initials Name Provider Type    Juliana Ramsey, OT Occupational Therapist    Bradley aGlvez, PT Physical Therapist                                 Physical Therapy Education       Title: PT OT SLP Therapies (In Progress)       Topic: Physical Therapy (In Progress)       Point: Mobility training (In Progress)       Learning Progress Summary             Patient Acceptance, E, NR by AE at 6/20/2024 1328                         Point: Home exercise program (Not Started)       Learner Progress:  Not documented in this visit.              Point: Body mechanics (In Progress)       Learning Progress Summary             Patient Acceptance, E, NR by AE at 6/20/2024 1321                          Point: Precautions (In Progress)       Learning Progress Summary             Patient Acceptance, E, NR by AE at 6/20/2024 1328                                         User Key       Initials Effective Dates Name Provider Type Discipline    AE 09/21/21 -  Bradley Malone PT Physical Therapist PT                  PT Recommendation and Plan  Planned Therapy Interventions (PT): balance training, bed mobility training, gait training, home exercise program, patient/family education, postural re-education, ROM (range of motion), strengthening, transfer training  Plan of Care Reviewed With: patient  Progress: no change  Outcome Evaluation: Pt presents with decreased functional independence and decreased activity tolerance. Pt ambulated 75ft with CGA and RW for support. Recommend continued skilled IP PT interventions. Recommend D/C home with assist and HHPT.     Time Calculation:   PT Evaluation Complexity  History, PT Evaluation Complexity: 1-2 personal factors and/or comorbidities  Examination of Body Systems (PT Eval Complexity): 1-2 elements  Clinical Presentation (PT Evaluation Complexity): stable  Clinical Decision Making (PT Evaluation Complexity): low complexity  Overall Complexity (PT Evaluation Complexity): low complexity     PT Charges       Row Name 06/20/24 1431             Time Calculation    Start Time 1328  -AE      PT Received On 06/20/24  -AE      PT Goal Re-Cert Due Date 06/30/24  -AE         Untimed Charges    PT Eval/Re-eval Minutes 35  -AE         Total Minutes    Untimed Charges Total Minutes 35  -AE       Total Minutes 35  -AE                User Key  (r) = Recorded By, (t) = Taken By, (c) = Cosigned By      Initials Name Provider Type    AE Bradley Malone PT Physical Therapist                  Therapy Charges for Today       Code Description Service Date Service Provider Modifiers Qty    [id number]  PT EVAL LOW COMPLEXITY 3 6/20/2024 Bradley Malone, PT GP 1            PT  G-Codes  Outcome Measure Options: AM-PAC 6 Clicks Basic Mobility (PT)  AM-PAC 6 Clicks Score (PT): 18  AM-PAC 6 Clicks Score (OT): 18  PT Discharge Summary  Anticipated Discharge Disposition (PT): home with assist, home with home health    Bradley Malone, PT  6/20/2024

## 2024-06-20 NOTE — CONSULTS
Cardiology Consult - [facility]    Angélica De La Cruz     S529/1  1945  [address]  [address] [address]  [facility] [address]         Admission Date:  6/19/2024    Consultation Date:  06/20/24        PCP:  Sussy Bloom MD  Referring MD: Dr. Crenshaw  Consulting MD: Dr. Albarran  Primary Cardiologist: Dr. Borges        CC: Dyspnea, tachypalpitations    Reason for Consult: A-fib RVR, NSTEMI        Allergies:  is allergic to adhesive tape, codeine, morphine and codeine, cefaclor, oxycodone-acetaminophen, sulfa antibiotics, doxycycline, erythromycin, levofloxacin, oxytetracycline, and penicillins.    Medications Prior to Admission   Medication Sig Dispense Refill Last Dose    albuterol (PROVENTIL) (2.5 MG/3ML) 0.083% nebulizer solution Inhale by nebulization route for 8 days.       amLODIPine (NORVASC) 10 MG tablet Take 1 tablet by mouth Daily. 30 tablet 0     aspirin 81 MG chewable tablet Chew 1 tablet Daily. OTC       atorvastatin (LIPITOR) 40 MG tablet Take 1 tablet by mouth Daily.       Azelastine HCl 137 MCG/SPRAY solution        B Complex Vitamins (vitamin b complex) capsule capsule Take 1 capsule by mouth Daily. OTC       clopidogrel (PLAVIX) 75 MG tablet Take 1 tablet by mouth Daily.       estradiol (ESTRACE) 0.1 MG/GM vaginal cream Insert 1 gram vaginally 3 times per week for 90 days       famotidine (PEPCID) 40 MG tablet        fluticasone (FLONASE) 50 MCG/ACT nasal spray 2 sprays into the nostril(s) as directed by provider Daily.       gabapentin (NEURONTIN) 300 MG capsule Take 2 capsules by mouth 2 (Two) Times a Day.       guaiFENesin (MUCINEX) 600 MG 12 hr tablet Take 1 tablet by mouth Daily As Needed for Congestion or Cough. OTC       hydrALAZINE (APRESOLINE) 25 MG tablet Take 1 tablet by mouth 2 (Two) Times a Day.       hydroCHLOROthiazide (HYDRODIURIL) 50 MG tablet Take 1 tablet by mouth Daily.       levocetirizine (XYZAL) 5 MG tablet Take 1 tablet by mouth Daily.        levothyroxine (SYNTHROID, LEVOTHROID) 100 MCG tablet Take 1 tablet by mouth Daily.       Multiple Vitamins-Minerals (MULTIVITAMIN PO) Take 1 tablet by mouth Daily. OTC       olmesartan (BENICAR) 20 MG tablet Take 1 tablet by mouth Daily.       pantoprazole (PROTONIX) 40 MG EC tablet        Pediatric Multivitamins-Iron (CENTRUM JR/IRON PO) Take  by mouth.       potassium chloride ER (K-TAB) 20 MEQ tablet controlled-release ER tablet        sertraline (ZOLOFT) 50 MG tablet Take 1 tablet by mouth Daily.          heparin, 14 Units/kg/hr, Last Rate: 14 Units/kg/hr (06/20/24 0641)  Pharmacy to Dose Heparin,   sodium chloride, 75 mL/hr, Last Rate: 75 mL/hr (06/20/24 0641)              HPI:  Angélica De La Cruz is an 79-year-old  CF with PMHx HTN, HLP, morbid obesity, CAD/CABG 11/25/2020, RA, former tobacco abuse, chronic dyspnea/O2 use 2 LNC [facility], AMANDA/CPAP, anemia, fibromyalgia, CKD.  She was seen by pulmonology on Monday at which time she was reporting increased dyspnea, having to use daily oxygen more frequently.  They recommended follow-up with cardiology as well as blood work.  She notified our office and we offered her a walk-in appointment but stated she could not make it.  She called EMS and presented to [facility] ED.  Upon arrival, systolic BP was 85, heart rate 143 in A-fib.  H&H 7.7/28.5 respectively.  She was admitted to hospitalist services and transfused total 2 units PRBCs but did not receive diuresis after transfusion.  She is continued on CPAP.  Antihypertensives held secondary to hypotension.  She converted to NSR on diltiazem en route per EMS and has maintained.  Serial troponins are rising and for this reason she was started on heparin drip.  Cardiology has been asked to follow along with patient during hospital course.    At time of consultation, patient is sitting up in bed awake, alert and oriented x 3.  She denies chest pain or angina, denies progressive dyspnea. she  reports GI evaluation 2024 having undergone EGD with dilation, however no records are in epic.  She does indicate some change in bowel habits and discoloration over the last few months.  Current troponin 1538.  Hemoglobin now 10.8.  CR 1.5.  Historically, her atrial fibrillation was diagnosed postoperatively after CABG.  She has had no documented A-fib since that time without complaints of palpitations or skipped heartbeats.  She reports compliance with medications which include DAPT and statin at home.  No recent falls or injuries.          Social History     Socioeconomic History    Marital status:     Number of children: 2   Tobacco Use    Smoking status: Former     Current packs/day: 0.00     Average packs/day: 1 pack/day for 15.0 years (15.0 ttl pk-yrs)     Types: Cigarettes     Start date:      Quit date:      Years since quittin.5    Smokeless tobacco: Never   Vaping Use    Vaping status: Never Used   Substance and Sexual Activity    Alcohol use: No    Drug use: No    Sexual activity: Defer     Family History   Problem Relation Age of Onset    Heart disease Mother     Cerebral aneurysm Father     Heart disease Brother     Heart disease Maternal Grandmother     No Known Problems Half-Sister     No Known Problems Half-Sister     Kidney failure Half-Brother     Sudden death Half-Brother     Breast cancer Neg Hx     Ovarian cancer Neg Hx      Past Surgical History:   Procedure Laterality Date    APPENDECTOMY      CARDIAC CATHETERIZATION N/A 2020    Procedure: Left Heart Cath;  Surgeon: Sanya Borges MD;  Location: [facility] CATH INVASIVE LOCATION;  Service: Cardiovascular;  Laterality: N/A;    CATARACT EXTRACTION, BILATERAL Bilateral 2018    CHOLECYSTECTOMY      CORONARY ARTERY BYPASS GRAFT N/A 2020    Procedure: MEDIAN STERNOTOMY, CORONARY ARTERY BYPASS GRAFTING X2, UTILIZNG THE LEFT  INTERNAL MAMMARY ARTERY GRAFT, ENDOSCOPIC VEIN HARVESTING CONVERTED TO OPEN OF THE  "RIGHT GREATER SAPHENOUS VEIN;  Surgeon: Wali Spicer MD;  Location: [facility];  Service: Cardiothoracic;  Laterality: N/A;  VO: 0807  VR: 0807      HYSTERECTOMY  1984    with BSO    OOPHORECTOMY Bilateral 1984     ROS: Pertinent items are noted in HPI, all other systems reviewed and negative     Objective     height is 162.6 cm (64\") and weight is 78.2 kg (172 lb 4.8 oz). Her oral temperature is 97.7 °F (36.5 °C). Her blood pressure is 125/71 and her pulse is 58. Her respiration is 16 and oxygen saturation is 93%.    Intake/Output Summary (Last 24 hours) at 6/20/2024 0820  Last data filed at 6/20/2024 0641  Gross per 24 hour   Intake 1181.97 ml   Output --   Net 1181.97 ml     Intake/Output                   06/19/24 0701 - 06/20/24 0700     [id number] Total              Intake    I.V.  --  481 481    Blood  --  701 701    Volume (Transfuse RBC Infuse Each Unit Over: 3.5H) -- 351 351    Volume (Transfuse RBC Infuse Each Unit Over: 3.5H) -- 350 350    Total Intake -- 1182 1182       Output    Total Output -- -- --               06/19/24  1111 06/19/24  1712   Weight: 78.9 kg (174 lb) 78.2 kg (172 lb 4.8 oz)          Physical Exam:  General Appearance:    Alert, cooperative, in no acute distress   Head:    Normocephalic, without obvious abnormality, atraumatic   Eyes:            Lids and lashes normal, conjunctivae and sclerae normal, no   icterus, no pallor, corneas clear, PERRLA   Ears:    Ears appear intact with no abnormalities noted   Throat:   No oral lesions, no thrush, oral mucosa moist   Neck:   No adenopathy, supple, trachea midline, no thyromegaly, no carotid bruit, no JVD   Back:     No kyphosis present, no scoliosis present, no skin lesions,    erythema or scars, no tenderness to percussion or                   palpation, range of motion normal   Lungs:     Clear to auscultation,respirations regular, even and               unlabored    Heart:    Regular rhythm and normal rate, normal S1 " and S2, no         murmur, no gallop, no rub, no click   Abdomen:     Normal bowel sounds, no masses, no organomegaly, soft     nontender, nondistended, no guarding, no rebound   tenderness   Extremities:   Moves all extremities well,  no cyanosis, no redness, no edema   Pulses:   Pulses palpable and equal bilaterally   Skin:   No bleeding, bruising or rash   Lymph nodes:   No palpable adenopathy   Neurologic:   Cranial nerves 2 - 12 grossly intact, sensation intact, DTR     present and equal bilaterally          Results Review:  I personally reviewed the patient's clinical results.  Results from last 7 days   Lab Units 06/20/24  0727   WBC 10*3/mm3 10.14   HEMOGLOBIN g/dL 10.8*   HEMATOCRIT % 37.2   PLATELETS 10*3/mm3 298     Results from last 7 days   Lab Units 06/19/24  1132   SODIUM mmol/L 141   POTASSIUM mmol/L 4.3   CHLORIDE mmol/L 106   CO2 mmol/L 25.0   BUN mg/dL 24*   CREATININE mg/dL 1.50*   CALCIUM mg/dL 9.2   BILIRUBIN mg/dL 0.3   ALK PHOS U/L 111   ALT (SGPT) U/L 15   AST (SGOT) U/L 32   GLUCOSE mg/dL 100*       Results from last 7 days   Lab Units 06/19/24  1558   INR  1.26*     Results from last 7 days   Lab Units 06/19/24  1132   TSH uIU/mL 0.390   FREE T4 ng/dL 1.30         Results from last 7 days   Lab Units 06/19/24  1132   PROBNP pg/mL 4,207.0*             Radiology:  Imaging Results (Last 72 Hours)       Procedure Component Value Units Date/Time    XR Chest 1 View [[id number]] Collected: 06/20/24 0607     Updated: 06/20/24 0614    Narrative:      XR CHEST 1 VW    Date of Exam: 6/20/2024 3:44 AM EDT    Indication: SOB    Comparison: 1 day prior    Findings:  Vascular congestion appears mildly increased with suspected small bilateral layering pleural effusions overall concerning for mildly worsened pulmonary edema. There is no distinct pneumothorax. Unchanged cardiac enlargement. No definite new focal lobar   consolidation.      Impression:      Impression:  Vascular congestion appears mildly  increased with suspected small bilateral layering pleural effusions overall concerning for mildly worsened pulmonary edema. There is no distinct pneumothorax. Unchanged cardiac enlargement. No definite new focal lobar   consolidation.      Electronically Signed: Rafy Elise MD    6/20/2024 6:11 AM EDT    Workstation ID: [id number]    CT Angiogram Chest Pulmonary Embolism [[id number]] Collected: 06/19/24 1448     Updated: 06/19/24 1500    Narrative:      CT ANGIOGRAM CHEST PULMONARY EMBOLISM    Date of Exam: 6/19/2024 2:37 PM EDT    Indication: chest pain, dyspnea, tachycardia, +dimer.    Comparison: CT chest high resolution 3/23/2023    Technique: Axial CT images were obtained of the chest after the uneventful intravenous administration of 70 mL Isovue 370 utilizing pulmonary embolism protocol.  Reconstructed coronal and sagittal images were also obtained. Automated exposure control and   iterative construction methods were used.    Findings:  Visualized soft tissue of the lower neck are without acute abnormality. Postsurgical changes of prior sternotomy. Cardiomegaly. Negative for pulmonary embolus. Mitral annular calcifications noted. Negative for pericardial effusion. Trace bilateral   pleural effusions.    Trachea and mainstem bronchi are patent. Smooth interlobular septal thickening consistent with mild pulmonary edema. No pneumothorax. Mild centrilobular groundglass opacities in the upper lobes right greater than left also suggesting pulmonary edema. No   suspicious pulmonary nodule or mass. Mild subpleural reticulation similar to prior chest CT.    The visualized portions of the liver, spleen, and adrenal glands are without acute abnormality. Reflux of contrast into hepatic veins may relate to component of right heart insufficiency. Pancreas without acute abnormality. Visualized portions of the   kidneys are without hydronephrosis. Extensive upper abdominal aortic vascular calcifications. Wall thickening at  the mid to distal esophagus which may relate to esophagitis. Remote fractures of the left anterior fourth and fifth ribs. Exaggerated   thoracic kyphosis with multilevel thoracic disc narrowing.      Impression:      Impression:  1. Negative for pulmonary embolus.  2. Cardiomegaly and pulmonary edema.  3. Wall thickening of the mid and distal esophagus, correlate for reflux or esophagitis.  4. Additional chronic findings above.        Electronically Signed: Erik Neff MD    6/19/2024 2:57 PM EDT    Workstation ID: [id number]    XR Chest 1 View [[id number]] Collected: 06/19/24 1217     Updated: 06/19/24 1221    Narrative:      XR CHEST 1 VW    Date of Exam: 6/19/2024 11:35 AM EDT    Indication: Weak/Dizzy/AMS triage protocol    Comparison: 6/14/2024    Findings:  Patient is rotated to the left. Prior sternotomy and CABG. Mild cardiomegaly, exaggerated by technique. Negative for pleural effusion. No focal consolidation. Interstitial thickening related to chronic interstitial lung disease better assessed on prior   chest CT.      Impression:      Impression:  Stable chronic findings without acute process.      Electronically Signed: Erik Neff MD    6/19/2024 12:17 PM EDT    Workstation ID: [id number]              Tele: NSR    Assessment:  -79-year-old CF with PMHx CAD/CABG, HTN, HLP, hypothyroidism, AMANDA/CPAP and chronic dyspnea presents to [facility] ED with recent abnormal CXR as outpatient and progressive dyspnea, increased O2 demand at home.  -Anemia requiring 2 units PRBCs transfusion at admission, recent bowel habit changes  -A-fib with RVR in setting of profound anemia  -Elevated troponin, likely demand ischemia  -CKD  -RA  -Obesity        Plan:  -Admitted to hospitalist services.  Transfusion 2 units PRBCs.  No IV diuresis given after transfusion.  Will give 40 mg IV Lasix x 1 now.  -Echo ordered, will review  -Patient has no reports of chest pain or angina.  Closure of graft unlikely.  Elevated troponin appears to be  demand ischemia in setting of profound anemia and CHRISTIANE/CKD.  Will discontinue IV heparin.  Continue ASA (81 mg) and statin.  -Patient needs further evaluation by gastroenterology services.  She recently underwent EGD and dilation February 2024 but no colonoscopy performed.  Defer to primary service for ordering GI consultation.  -This is her second episode of A-fib since bypass surgery.  Again, in setting of profound anemia.  She converted immediately with IV Cardizem in ambulance en route to hospital.  Her risk of bleeding outweighs risk of stroke at this time and we do not recommend anticoagulation for this event.  -Cardiology will see again in a.m.        I discussed the patient's findings and my recommendations with the patient, any present family members, and the nursing staff.  Timothy Albarran MD saw and examined patient, verified hx and PE, read all radiographic studies, reviewed labs and micro data, and formulated dx, plan for treatment and all medical decision making.      Rosanna Nelson PA-C, working with Timothy Albarran MD  06/20/24 08:20 EDT

## 2024-06-21 ENCOUNTER — APPOINTMENT (OUTPATIENT)
Dept: CARDIOLOGY | Facility: HOSPITAL | Age: 79
End: 2024-06-21
Payer: MEDICARE

## 2024-06-21 PROBLEM — D50.0 IRON DEFICIENCY ANEMIA DUE TO CHRONIC BLOOD LOSS: Status: ACTIVE | Noted: 2024-06-19

## 2024-06-21 LAB
ANION GAP SERPL CALCULATED.3IONS-SCNC: 13 MMOL/L (ref 5–15)
BH BB BLOOD EXPIRATION DATE: NORMAL
BH BB BLOOD EXPIRATION DATE: NORMAL
BH BB BLOOD TYPE BARCODE: 5100
BH BB BLOOD TYPE BARCODE: 5100
BH BB DISPENSE STATUS: NORMAL
BH BB DISPENSE STATUS: NORMAL
BH BB PRODUCT CODE: NORMAL
BH BB PRODUCT CODE: NORMAL
BH BB UNIT NUMBER: NORMAL
BH BB UNIT NUMBER: NORMAL
BUN SERPL-MCNC: 23 MG/DL (ref 8–23)
BUN/CREAT SERPL: 17.7 (ref 7–25)
CALCIUM SPEC-SCNC: 8.6 MG/DL (ref 8.6–10.5)
CHLORIDE SERPL-SCNC: 103 MMOL/L (ref 98–107)
CO2 SERPL-SCNC: 25 MMOL/L (ref 22–29)
CREAT SERPL-MCNC: 1.3 MG/DL (ref 0.57–1)
CROSSMATCH INTERPRETATION: NORMAL
CROSSMATCH INTERPRETATION: NORMAL
DEPRECATED RDW RBC AUTO: 46.1 FL (ref 37–54)
EGFRCR SERPLBLD CKD-EPI 2021: 41.4 ML/MIN/1.73
ERYTHROCYTE [DISTWIDTH] IN BLOOD BY AUTOMATED COUNT: 16.6 % (ref 12.3–15.4)
GLUCOSE SERPL-MCNC: 98 MG/DL (ref 65–99)
HCT VFR BLD AUTO: 35 % (ref 34–46.6)
HGB BLD-MCNC: 10.2 G/DL (ref 12–15.9)
MAGNESIUM SERPL-MCNC: 1.9 MG/DL (ref 1.6–2.4)
MCH RBC QN AUTO: 22.5 PG (ref 26.6–33)
MCHC RBC AUTO-ENTMCNC: 29.1 G/DL (ref 31.5–35.7)
MCV RBC AUTO: 77.1 FL (ref 79–97)
PLATELET # BLD AUTO: 278 10*3/MM3 (ref 140–450)
PMV BLD AUTO: 9.7 FL (ref 6–12)
POTASSIUM SERPL-SCNC: 3.7 MMOL/L (ref 3.5–5.2)
RBC # BLD AUTO: 4.54 10*6/MM3 (ref 3.77–5.28)
SODIUM SERPL-SCNC: 141 MMOL/L (ref 136–145)
UNIT  ABO: NORMAL
UNIT  ABO: NORMAL
UNIT  RH: NORMAL
UNIT  RH: NORMAL
WBC NRBC COR # BLD AUTO: 7.71 10*3/MM3 (ref 3.4–10.8)

## 2024-06-21 PROCEDURE — 85027 COMPLETE CBC AUTOMATED: CPT | Performed by: STUDENT IN AN ORGANIZED HEALTH CARE EDUCATION/TRAINING PROGRAM

## 2024-06-21 PROCEDURE — 93010 ELECTROCARDIOGRAM REPORT: CPT | Performed by: INTERNAL MEDICINE

## 2024-06-21 PROCEDURE — 93306 TTE W/DOPPLER COMPLETE: CPT

## 2024-06-21 PROCEDURE — 25010000002 DIGOXIN PER 500 MCG: Performed by: PHYSICIAN ASSISTANT

## 2024-06-21 PROCEDURE — 25810000003 SODIUM CHLORIDE 0.9 % SOLUTION: Performed by: INTERNAL MEDICINE

## 2024-06-21 PROCEDURE — 93005 ELECTROCARDIOGRAM TRACING: CPT | Performed by: STUDENT IN AN ORGANIZED HEALTH CARE EDUCATION/TRAINING PROGRAM

## 2024-06-21 PROCEDURE — 80048 BASIC METABOLIC PNL TOTAL CA: CPT | Performed by: STUDENT IN AN ORGANIZED HEALTH CARE EDUCATION/TRAINING PROGRAM

## 2024-06-21 PROCEDURE — 99232 SBSQ HOSP IP/OBS MODERATE 35: CPT | Performed by: STUDENT IN AN ORGANIZED HEALTH CARE EDUCATION/TRAINING PROGRAM

## 2024-06-21 PROCEDURE — 83735 ASSAY OF MAGNESIUM: CPT | Performed by: STUDENT IN AN ORGANIZED HEALTH CARE EDUCATION/TRAINING PROGRAM

## 2024-06-21 RX ORDER — DIGOXIN 0.25 MG/ML
500 INJECTION INTRAMUSCULAR; INTRAVENOUS ONCE
Status: COMPLETED | OUTPATIENT
Start: 2024-06-21 | End: 2024-06-21

## 2024-06-21 RX ORDER — DIGOXIN 0.25 MG/ML
250 INJECTION INTRAMUSCULAR; INTRAVENOUS ONCE
Qty: 2 ML | Refills: 0 | Status: COMPLETED | OUTPATIENT
Start: 2024-06-21 | End: 2024-06-21

## 2024-06-21 RX ORDER — SODIUM CHLORIDE 9 MG/ML
75 INJECTION, SOLUTION INTRAVENOUS CONTINUOUS
Status: DISCONTINUED | OUTPATIENT
Start: 2024-06-21 | End: 2024-06-23

## 2024-06-21 RX ORDER — DIGOXIN 0.25 MG/ML
250 INJECTION INTRAMUSCULAR; INTRAVENOUS ONCE
Status: COMPLETED | OUTPATIENT
Start: 2024-06-21 | End: 2024-06-21

## 2024-06-21 RX ORDER — METOPROLOL SUCCINATE 25 MG/1
12.5 TABLET, EXTENDED RELEASE ORAL
Status: DISCONTINUED | OUTPATIENT
Start: 2024-06-21 | End: 2024-06-24

## 2024-06-21 RX ADMIN — PEG-3350, SODIUM SULFATE, SODIUM CHLORIDE, POTASSIUM CHLORIDE, SODIUM ASCORBATE AND ASCORBIC ACID 1000 ML: KIT at 18:19

## 2024-06-21 RX ADMIN — DIGOXIN 250 MCG: 0.25 INJECTION INTRAMUSCULAR; INTRAVENOUS at 14:03

## 2024-06-21 RX ADMIN — SODIUM CHLORIDE 75 ML/HR: 900 INJECTION, SOLUTION INTRAVENOUS at 00:38

## 2024-06-21 RX ADMIN — SERTRALINE 50 MG: 50 TABLET, FILM COATED ORAL at 08:58

## 2024-06-21 RX ADMIN — SODIUM CHLORIDE 75 ML/HR: 900 INJECTION, SOLUTION INTRAVENOUS at 14:03

## 2024-06-21 RX ADMIN — GABAPENTIN 300 MG: 300 CAPSULE ORAL at 23:05

## 2024-06-21 RX ADMIN — Medication 5 MG/HR: at 00:40

## 2024-06-21 RX ADMIN — GABAPENTIN 300 MG: 300 CAPSULE ORAL at 08:57

## 2024-06-21 RX ADMIN — METOPROLOL SUCCINATE 12.5 MG: 25 TABLET, EXTENDED RELEASE ORAL at 14:43

## 2024-06-21 RX ADMIN — CETIRIZINE HYDROCHLORIDE 10 MG: 10 TABLET, FILM COATED ORAL at 08:58

## 2024-06-21 RX ADMIN — DIGOXIN 250 MCG: 0.25 INJECTION INTRAMUSCULAR; INTRAVENOUS at 10:30

## 2024-06-21 RX ADMIN — LEVOTHYROXINE SODIUM 100 MCG: 0.1 TABLET ORAL at 05:43

## 2024-06-21 RX ADMIN — ATORVASTATIN CALCIUM 40 MG: 40 TABLET, FILM COATED ORAL at 08:58

## 2024-06-21 RX ADMIN — ASPIRIN 81 MG: 81 TABLET, COATED ORAL at 08:58

## 2024-06-21 RX ADMIN — PANTOPRAZOLE SODIUM 40 MG: 40 INJECTION, POWDER, FOR SOLUTION INTRAVENOUS at 05:43

## 2024-06-21 RX ADMIN — Medication 10 ML: at 08:58

## 2024-06-21 RX ADMIN — DIGOXIN 500 MCG: 0.25 INJECTION INTRAMUSCULAR; INTRAVENOUS at 09:23

## 2024-06-21 NOTE — PROGRESS NOTES
Malnutrition Severity Assessment    Patient Name:  Angélica De La Cruz  YOB: 1945  MRN: [id number]  Admit Date:  6/19/2024    Patient meets criteria for :  (Mild muscle wasting and mild fat loss)    Malnutrition Severity Assessment  Malnutrition Type: Acute Disease or Injury - Related Malnutrition  Malnutrition Type (Last 8 Hours)       Malnutrition Severity Assessment       Row Name 06/21/24 1539       Malnutrition Severity Assessment    Malnutrition Type Acute Disease or Injury - Related Malnutrition      Row Name 06/21/24 1539       Insufficient Energy Intake     Insufficient Energy Intake Findings None      Row Name 06/21/24 1539       Unintentional Weight Loss     Unintentional Weight Loss Findings None      Row Name 06/21/24 1539       Muscle Loss    Loss of Muscle Mass Findings Mild    [place] Region --  Mild    Clavicle Bone Region --  Mild    Acromion Bone Region --  Mild    Dorsal Hand Region --  Mild    Patellar Region --  Mild    Anterior Thigh Region --  Mild    Posterior Calf Region --  Mild      Row Name 06/21/24 1539       Fat Loss    Subcutaneous Fat Loss Findings Mild    Orbital Region  --  mild    Upper Arm Region None      Row Name 06/21/24 1539       Declining Functional Status    Declining Functional Status Findings N/A      Row Name 06/21/24 1539       Criteria Met (Must meet criteria for severity in at least 2 of these categories: M Wasting, Fat Loss, Fluid, Secondary Signs, Wt. Status, Intake)    Patient meets criteria for  --  Mild muscle wasting and mild fat loss                    Electronically signed by:  Vanessa Madrigal RD eligible  06/21/24 16:07 EDT

## 2024-06-21 NOTE — PROGRESS NOTES
" [facility] - Progress Note    Angélica De La Cruz  1945  S529/1    06/21/24, 08:53 EDT      Chief Complaint: Following for PAFib    Subjective:   Awake in bed.  Went back in to AFib with RVR overnight - patient not aware of fast rates or irregular heart beat.  IV Cardizem restarted.  No chest pain, breathing is stable.  No abdominal pain at this time.    Hospitalist at bedside at end of visit.  Plan is for clear liquids today, EGD/colonoscopy tomorrow.  IVF also ongoing.    Review of Systems:  Pertinent positives are listed above and in physical exam.  All others have been reviewed and are negative.    [Held by provider] amLODIPine, 10 mg, Oral, Q24H  aspirin, 81 mg, Oral, Daily  atorvastatin, 40 mg, Oral, Daily  cetirizine, 10 mg, Oral, Daily  gabapentin, 300 mg, Oral, Q12H  [Held by provider] hydrALAZINE, 25 mg, Oral, BID  [Held by provider] hydroCHLOROthiazide, 50 mg, Oral, Daily  levothyroxine, 100 mcg, Oral, Daily  pantoprazole, 40 mg, Intravenous, Q AM  PEG-KCl-NaCl-NaSulf-Na Asc-C, 1,000 mL, Oral, Once When Specified   Followed by  [START ON 6/22/2024] PEG-KCl-NaCl-NaSulf-Na Asc-C, 1,000 mL, Oral, Once When Specified  pharmacy consult - MTM, , Does not apply, Daily  sertraline, 50 mg, Oral, Daily  sodium chloride, 10 mL, Intravenous, Q12H        Objective:  Vitals:   height is 162.6 cm (64\") and weight is 78.2 kg (172 lb 4.8 oz). Her oral temperature is 97.7 °F (36.5 °C). Her blood pressure is 114/60 and her pulse is 114. Her respiration is 20 and oxygen saturation is 97%.     Intake/Output Summary (Last 24 hours) at 6/21/2024 0873  Last data filed at 6/21/2024 0728  Gross per 24 hour   Intake --   Output 2300 ml   Net -2300 ml       Physical Exam:  General:  WN, NAD, A and O x3.  CV:  Irregular, tachycardic. No murmur, rub, or gallop.  Resp:  CTA Mauricio, equal, nonlabored.  Abd:  Soft, + BS, no organomegaly. Nontender to palpation.  Extrem:  No edema BLE, 2+ pedal/PT pulses.          "   Results from last 7 days   Lab Units 06/21/24  0745   WBC 10*3/mm3 7.71   HEMOGLOBIN g/dL 10.2*   HEMATOCRIT % 35.0   PLATELETS 10*3/mm3 278     Results from last 7 days   Lab Units 06/21/24  0745 06/20/24  0727   SODIUM mmol/L 141 141   POTASSIUM mmol/L 3.7 4.6   CHLORIDE mmol/L 103 106   CO2 mmol/L 25.0 23.0   BUN mg/dL 23 26*   CREATININE mg/dL 1.30* 1.55*   CALCIUM mg/dL 8.6 8.7   BILIRUBIN mg/dL  --  0.8   ALK PHOS U/L  --  176*   ALT (SGPT) U/L  --  41*   AST (SGOT) U/L  --  118*   GLUCOSE mg/dL 98 122*     Results from last 7 days   Lab Units 06/19/24  1558   INR  1.26*     Results from last 7 days   Lab Units 06/19/24  1132   TSH uIU/mL 0.390   FREE T4 ng/dL 1.30         Results from last 7 days   Lab Units 06/19/24  1132   PROBNP pg/mL 4,207.0*       Tele:  AFib    Assessment:  -79-year-old CF with PMHx CAD/CABG, HTN, HLP, hypothyroidism, AMANDA/CPAP and chronic dyspnea presents to [facility] ED with recent abnormal CXR as outpatient and progressive dyspnea, increased O2 demand at home.  -Anemia requiring 2 units PRBCs transfusion at admission, recent bowel habit changes  -A-fib with RVR in setting of profound anemia  -Elevated troponin, likely demand ischemia  -CKD  -RA  -Obesity           Plan:  -Admitted to hospitalist services.  Transfusion total 2 units PRBCs.    -Echo ordered, will review  -Patient has no reports of chest pain or angina.  Closure of graft unlikely.  Elevated troponin appears to be demand ischemia in setting of profound anemia and CHRISTIANE/CKD. Discontinued IV heparin.  Continue ASA (81 mg) and statin.  -GI consulted/following.  Plan for repeat EGD and colonoscopy tomorrow.  -Continue IV Cardizem for now, monitor BP.  Will give IV digoxin x 3 doses.  Her risk of bleeding outweighs risk of stroke at this time and we do not recommend anticoagulation for this event.  -Cardiology will see again in a.m.    I discussed the patient's findings and my recommendations with the patient, any present family  members, and the nursing staff.     Rosanna Nelson PA-C  06/21/24, 08:53 EDT

## 2024-06-21 NOTE — PROGRESS NOTES
Malnutrition Severity Assessment    Patient Name:  Angélica De La Cruz  YOB: 1945  MRN: [id number]  Admit Date:  6/19/2024    Patient meets criteria for :  (Mild muscle wasting and mild fat loss)      Malnutrition Severity Assessment  Malnutrition Type: Chronic Disease - Related Malnutrition  Malnutrition Type (Last 8 Hours)       Malnutrition Severity Assessment       Row Name 06/21/24 1539       Malnutrition Severity Assessment    Malnutrition Type Chronic Disease - Related Malnutrition      Row Name 06/21/24 1539       Insufficient Energy Intake     Insufficient Energy Intake Findings None      Row Name 06/21/24 1539       Unintentional Weight Loss     Unintentional Weight Loss Findings None      Row Name 06/21/24 1539       Muscle Loss    Loss of Muscle Mass Findings Mild    [place]azid[place] Region --  Mild    Clavicle Bone Region --  Mild    Acromion Bone Region --  Mild    Dorsal Hand Region --  Mild    Patellar Region --  Mild    Anterior Thigh Region --  Mild    Posterior Calf Region --  Mild      Row Name 06/21/24 1539       Fat Loss    Subcutaneous Fat Loss Findings Mild    Orbital Region  --  mild    Upper Arm Region None      Row Name 06/21/24 1539       Declining Functional Status    Declining Functional Status Findings N/A      Row Name 06/21/24 1539       Criteria Met (Must meet criteria for severity in at least 2 of these categories: M Wasting, Fat Loss, Fluid, Secondary Signs, Wt. Status, Intake)    Patient meets criteria for  --  Mild muscle wasting and mild fat loss                    Electronically signed by:  Vanessa Madrigal RD  06/21/24 16:44 EDT

## 2024-06-21 NOTE — PLAN OF CARE
"Goal Outcome Evaluation:      Pt is aox4, on 2L NC, and presently in sinus john. Pt had two events of sinus pauses on the monitor during shift. Pt was laying in bed prior to both events. Cardiology paged immediately for both events. Pt stated that she \"felt like I was blacking out\" during the pause, but stated \"I feel fine\" after. Negative for loss of consciousness. Denies CP or increased SOA. EKG obtained for both events. Per cardiology, continue to monitor and hold diltiazem for now. Presently eating dinner, HOB elevated, and with friend at bedside. Bed locked and lowered, call bell and belongings nearby, and bed alarm on. No further requests.   RN will pass along updates to night shift RN.                                     "

## 2024-06-21 NOTE — PROGRESS NOTES
"          Clinical Nutrition Assessment     Patient Name: Angélica De La Cruz  YOB: 1945  MRN: [id number]  Date of Encounter: 06/21/24 15:40 EDT  Admission date: 6/19/2024  Reason for Visit: MST score 2+, Unintentional weight loss    Assessment   Nutrition Assessment   Admission Diagnosis:  A-fib [I48.91]    Problem List:    A-fib    Hypertension    CKD (chronic kidney disease)    AMANDA (obstructive sleep apnea)    Symptomatic anemia    NSTEMI (non-ST elevated myocardial infarction)    Hypothyroidism (acquired)    Rheumatoid arthritis      PMH:   She  has a past medical history of Arthritis, CKD (chronic kidney disease), Coronary artery disease, Fibromyalgia, Hypertension, Lung nodule seen on imaging study, AMANDA treated with BiPAP, and Peritonitis.    PSH:  She  has a past surgical history that includes Cholecystectomy; Appendectomy; Hysterectomy (1984); Cardiac catheterization (N/A, 11/24/2020); Cataract extraction, bilateral (Bilateral, 2018); Coronary artery bypass graft (N/A, 11/25/2020); and Oophorectomy (Bilateral, 1984).    Applicable Nutrition History:   6/19 Fe anemia (Hgb 10.2 - 6/21/24)    Anthropometrics     Height: Height: 162.6 cm (64.02\")  Last Filed Weight: Weight: 78.2 kg (172 lb 6.4 oz) (06/21/24 1501)  Method: Weight Method: Stated  BMI: BMI (Calculated): 29.6    UBW:  170# per patient  Weight change: weight loss of 9 lbs (5%) over the past 6 month(s)    Significant?  No    Nutrition Focused Physical Exam    Date:  6/21       Patient meets criteria for malnutrition diagnosis, see MSA note.     Subjective   Reported/Observed/Food/Nutrition Related History:     Patient reported mild decrease in appetite upon admission, but no prior or current decrease in appetite. Patient reports typically eating two meals and a snack a day if she is hungry. Patient noted she likes fruits and vegetables and eats little amounts of meat. Patient denies any chewing/swallowing difficulties. Patient reports mild " constipation while at home and typically takes a fiber supplement for regularity. Patient states she weighs herself daily since she is on lasix and reported weighing 170# Monday morning (6/17). Bed weight 180.4# at time of visit. Patient willing to try Boost Breeze supplement TID w/ meals since she is on a clear liquid diet.      Current Nutrition Prescription   PO: Diet: Liquid; Clear Liquid; Fluid Consistency: Thin (IDDSI 0)  Oral Nutrition Supplement: N/A   Intake: Insufficient data - No intakes documented    Assessment & Plan   Nutrition Diagnosis   Date:  6/21            Updated:    Problem Malnutrition chronic/moderate   Etiology Multiple chronic illnesses   Signs/Symptoms Mild muscle wasting and mild fat loss   Status: New        Goal:   Nutrition to support treatment and Advance diet as medically feasible/appropriate      Nutrition Intervention      Follow treatment progress, Care plan reviewed, Await begin PO, Supplement provided    Boost Breeze 3x/day w/ B/L/D, await for diet advancement    Monitoring/Evaluation:   Per protocol, PO intake, Supplement intake, Weight, GI status, Symptoms, POC/GOC    Vanessa Madrigal RD eligible  Time Spent: 30 min

## 2024-06-21 NOTE — CASE MANAGEMENT/SOCIAL WORK
Continued Stay Note   Jocelyn     Patient Name: Angélica De La Cruz  MRN: [id number]  Today's Date: 6/21/2024    Admit Date: 6/19/2024    Plan: Home HH   Discharge Plan       Row Name 06/21/24 1151       Plan    Plan Home HH    Patient/Family in Agreement with Plan yes    Plan Comments Spoke with patient at bedside about therapy recs for HH with patient agreeable. Spouse uses [facility] and she requests to use them also. Referral given. CM will cont to follow.    Final Discharge Disposition Code 06 - home with home health care                   Discharge Codes    No documentation.                 Expected Discharge Date and Time       Expected Discharge Date Expected Discharge Time    Jun 24, 2024               Haley Carlson RN

## 2024-06-21 NOTE — PLAN OF CARE
Problem: Adult Inpatient Plan of Care  Goal: Plan of Care Review  Outcome: Ongoing, Progressing  Goal: Patient-Specific Goal (Individualized)  Outcome: Ongoing, Progressing  Goal: Absence of Hospital-Acquired Illness or Injury  Outcome: Ongoing, Progressing  Intervention: Identify and Manage Fall Risk  Recent Flowsheet Documentation  Taken 6/21/2024 0600 by Reginald Tracy RN  Safety Promotion/Fall Prevention:   activity supervised   assistive device/personal items within reach   clutter free environment maintained   toileting scheduled   safety round/check completed   room organization consistent   nonskid shoes/slippers when out of bed  Taken 6/21/2024 0400 by Reginald Tracy RN  Safety Promotion/Fall Prevention:   activity supervised   assistive device/personal items within reach   clutter free environment maintained   toileting scheduled   safety round/check completed   room organization consistent   nonskid shoes/slippers when out of bed  Taken 6/21/2024 0200 by Reginald Tracy RN  Safety Promotion/Fall Prevention:   activity supervised   assistive device/personal items within reach   clutter free environment maintained   toileting scheduled   safety round/check completed   room organization consistent   nonskid shoes/slippers when out of bed  Taken 6/21/2024 0037 by Reginald Tracy RN  Safety Promotion/Fall Prevention:   activity supervised   assistive device/personal items within reach   clutter free environment maintained   toileting scheduled   safety round/check completed   room organization consistent   nonskid shoes/slippers when out of bed  Taken 6/20/2024 2228 by Reginald Tracy, RN  Safety Promotion/Fall Prevention:   activity supervised   assistive device/personal items within reach   clutter free environment maintained   toileting scheduled   safety round/check completed   room organization consistent   nonskid shoes/slippers when out of bed  Taken 6/20/2024 2000 by Rossana  NATALEE Montelongo  Safety Promotion/Fall Prevention:   activity supervised   assistive device/personal items within reach   clutter free environment maintained   toileting scheduled   safety round/check completed   room organization consistent   nonskid shoes/slippers when out of bed  Intervention: Prevent Skin Injury  Recent Flowsheet Documentation  Taken 6/21/2024 0600 by Reginald Tracy RN  Body Position: position changed independently  Skin Protection: adhesive use limited  Taken 6/21/2024 0400 by Reginald rTacy RN  Body Position: position changed independently  Skin Protection: adhesive use limited  Taken 6/21/2024 0200 by Reginald Tracy RN  Body Position: position changed independently  Skin Protection:   adhesive use limited   incontinence pads utilized   tubing/devices free from skin contact  Taken 6/21/2024 0037 by Reginald Tracy RN  Body Position: position changed independently  Skin Protection:   adhesive use limited   incontinence pads utilized   tubing/devices free from skin contact  Taken 6/20/2024 2228 by Reginald Tracy RN  Body Position: position changed independently  Skin Protection:   adhesive use limited   incontinence pads utilized   tubing/devices free from skin contact  Taken 6/20/2024 2000 by Reginald Tracy RN  Body Position: position changed independently  Skin Protection:   adhesive use limited   incontinence pads utilized   tubing/devices free from skin contact  Intervention: Prevent and Manage VTE (Venous Thromboembolism) Risk  Recent Flowsheet Documentation  Taken 6/21/2024 0600 by Reginald Tracy RN  Activity Management: activity encouraged  Taken 6/21/2024 0400 by Reginald Tracy RN  Activity Management: activity encouraged  Taken 6/21/2024 0200 by Reginald Tracy, RN  Activity Management: activity minimized  Taken 6/21/2024 0037 by Reginald Tracy, RN  Activity Management: activity minimized  Taken 6/20/2024 2228 by Reginald Tracy, RN  Activity  Management: activity encouraged  VTE Prevention/Management: sequential compression devices on  Range of Motion: active ROM (range of motion) encouraged  Taken 6/20/2024 2000 by Reginald Tracy RN  Activity Management: activity minimized  Intervention: Prevent Infection  Recent Flowsheet Documentation  Taken 6/21/2024 0600 by Reginald Tracy RN  Infection Prevention:   cohorting utilized   environmental surveillance performed   equipment surfaces disinfected   hand hygiene promoted   rest/sleep promoted   single patient room provided  Taken 6/21/2024 0400 by Reginald Tracy RN  Infection Prevention:   cohorting utilized   environmental surveillance performed   equipment surfaces disinfected   hand hygiene promoted   rest/sleep promoted   single patient room provided  Taken 6/21/2024 0200 by Reginald Tracy RN  Infection Prevention:   cohorting utilized   environmental surveillance performed   equipment surfaces disinfected   hand hygiene promoted   rest/sleep promoted   single patient room provided  Taken 6/21/2024 0037 by Reginald Tracy RN  Infection Prevention:   cohorting utilized   environmental surveillance performed   equipment surfaces disinfected   hand hygiene promoted   rest/sleep promoted   single patient room provided  Taken 6/20/2024 2228 by Reginald Tracy RN  Infection Prevention:   cohorting utilized   environmental surveillance performed   equipment surfaces disinfected   hand hygiene promoted   rest/sleep promoted   single patient room provided  Taken 6/20/2024 2000 by Reginald Tracy RN  Infection Prevention:   cohorting utilized   environmental surveillance performed   equipment surfaces disinfected   hand hygiene promoted   rest/sleep promoted   single patient room provided  Goal: Optimal Comfort and Wellbeing  Outcome: Ongoing, Progressing  Intervention: Provide Person-Centered Care  Recent Flowsheet Documentation  Taken 6/20/2024 2228 by Reginald Tracy RN  Trust  Relationship/Rapport:   care explained   choices provided   emotional support provided   empathic listening provided   questions answered   questions encouraged   reassurance provided   thoughts/feelings acknowledged  Goal: Readiness for Transition of Care  Outcome: Ongoing, Progressing     Problem: Dysrhythmia  Goal: Normalized Cardiac Rhythm  Outcome: Ongoing, Progressing  Intervention: Monitor and Manage Cardiac Rhythm Effect  Recent Flowsheet Documentation  Taken 6/20/2024 2228 by Reginald Tracy RN  VTE Prevention/Management: sequential compression devices on     Problem: Fall Injury Risk  Goal: Absence of Fall and Fall-Related Injury  Outcome: Ongoing, Progressing  Intervention: Identify and Manage Contributors  Recent Flowsheet Documentation  Taken 6/21/2024 0600 by Reginald Tracy RN  Medication Review/Management: medications reviewed  Taken 6/21/2024 0400 by Reginald Tracy RN  Medication Review/Management: medications reviewed  Taken 6/21/2024 0200 by Reginald Tracy RN  Medication Review/Management: medications reviewed  Taken 6/21/2024 0037 by Reginald Tracy RN  Medication Review/Management: medications reviewed  Taken 6/20/2024 2228 by Reginald Tracy RN  Medication Review/Management: medications reviewed  Taken 6/20/2024 2000 by Reginald Tracy RN  Medication Review/Management: medications reviewed  Intervention: Promote Injury-Free Environment  Recent Flowsheet Documentation  Taken 6/21/2024 0600 by Reginald Tracy RN  Safety Promotion/Fall Prevention:   activity supervised   assistive device/personal items within reach   clutter free environment maintained   toileting scheduled   safety round/check completed   room organization consistent   nonskid shoes/slippers when out of bed  Taken 6/21/2024 0400 by Reginald Tracy RN  Safety Promotion/Fall Prevention:   activity supervised   assistive device/personal items within reach   clutter free environment maintained   toileting  scheduled   safety round/check completed   room organization consistent   nonskid shoes/slippers when out of bed  Taken 6/21/2024 0200 by Reginald Tracy, RN  Safety Promotion/Fall Prevention:   activity supervised   assistive device/personal items within reach   clutter free environment maintained   toileting scheduled   safety round/check completed   room organization consistent   nonskid shoes/slippers when out of bed  Taken 6/21/2024 0037 by Reginald Tracy, RN  Safety Promotion/Fall Prevention:   activity supervised   assistive device/personal items within reach   clutter free environment maintained   toileting scheduled   safety round/check completed   room organization consistent   nonskid shoes/slippers when out of bed  Taken 6/20/2024 2228 by Reginald Tracy RN  Safety Promotion/Fall Prevention:   activity supervised   assistive device/personal items within reach   clutter free environment maintained   toileting scheduled   safety round/check completed   room organization consistent   nonskid shoes/slippers when out of bed  Taken 6/20/2024 2000 by Reginald Tracy RN  Safety Promotion/Fall Prevention:   activity supervised   assistive device/personal items within reach   clutter free environment maintained   toileting scheduled   safety round/check completed   room organization consistent   nonskid shoes/slippers when out of bed   Goal Outcome Evaluation:

## 2024-06-21 NOTE — PROGRESS NOTES
[facility] Medicine Services  PROGRESS NOTE    Patient Name: Angélica De La Cruz  : 1945  MRN: [id number]    Date of Admission: 2024  Primary Care Physician: Sussy Bloom MD    Subjective   Subjective     CC:  Chest pain, shortness of breath      HPI:  Patient developed recurrent atrial fibrillation with RVR overnight and into this morning despite Cardizem drip.  Patient denies any chest pain or shortness of breath currently.  States she was able to tolerate her diet yesterday without any abdominal discomfort or nausea.      Objective   Objective     Vital Signs:   Temp:  [97.7 °F (36.5 °C)-98 °F (36.7 °C)] 98 °F (36.7 °C)  Heart Rate:  [] 96  Resp:  [18-22] 18  BP: ()/() 101/80  Flow (L/min):  [2] 2     Physical Exam:  General appearance: alert, awake, oriented, no acute distress   Cardiovascular: Tachycardic, irregularly irregular, no murmurs or rubs, radial pulse full 2/4 BL   Respiratory: CTAB, no crackles or wheezes, oxygenating well on 2 L nasal cannula  Abdomen: soft, mild epigastric tenderness to palpation, no organomegaly, bowel sounds normoactive    Neuro/CNS: alert and oriented x3, normal speech    Results Reviewed:  LAB RESULTS:      Lab 24  0745 24  0727 24  0006 24  1558 24  1132   WBC 7.71 10.14  --   --  6.12   HEMOGLOBIN 10.2* 10.8*  --   --  7.7*   HEMATOCRIT 35.0 37.2  --   --  28.5*   PLATELETS 278 298  --   --  246   NEUTROS ABS  --  7.37*  --   --  4.80   IMMATURE GRANS (ABS)  --  0.03  --   --  0.02   LYMPHS ABS  --  1.45  --   --  0.55*   MONOS ABS  --  0.80  --   --  0.42   EOS ABS  --  0.40  --   --  0.29   MCV 77.1* 77.8*  --   --  78.5*   PROTIME  --   --   --  16.0*  --    APTT  --   --   --  39.3*  --    HEPARIN ANTI-XA  --  0.25* 0.11* 0.10*  --    D DIMER QUANT  --   --   --   --  1.50*         Lab 24  0745 24  0727 24  1132   SODIUM 141 141 141   POTASSIUM 3.7 4.6 4.3   CHLORIDE  103 106 106   CO2 25.0 23.0 25.0   ANION GAP 13.0 12.0 10.0   BUN 23 26* 24*   CREATININE 1.30* 1.55* 1.50*   EGFR 41.4* 33.5* 34.9*   GLUCOSE 98 122* 100*   CALCIUM 8.6 8.7 9.2   MAGNESIUM  --   --  2.3   TSH  --   --  0.390         Lab 06/20/24  0727 06/19/24  1132   TOTAL PROTEIN 7.7 7.0   ALBUMIN 3.7 3.6   GLOBULIN 4.0 3.4   ALT (SGPT) 41* 15   AST (SGOT) 118* 32   BILIRUBIN 0.8 0.3   ALK PHOS 176* 111         Lab 06/20/24  0727 06/20/24  0006 06/19/24  1558 06/19/24  1352 06/19/24  1132   PROBNP  --   --   --   --  4,207.0*   HSTROP T 1,538* 1,198* 229* 80* 36*   PROTIME  --   --  16.0*  --   --    INR  --   --  1.26*  --   --              Lab 06/19/24  1558 06/19/24  1406 06/19/24  1132   IRON 14*  --   --    IRON SATURATION (TSAT) 4*  --   --    TIBC 399  --   --    TRANSFERRIN 268  --   --    FERRITIN 41.03  --   --    VITAMIN B 12  --   --  647   ABO TYPING  --  O  --    RH TYPING  --  Positive  --    ANTIBODY SCREEN  --  Negative  --          Lab 06/19/24  1203   PH, ARTERIAL 7.390   PCO2, ARTERIAL 42.7   PO2 ART 88.3   FIO2 28   HCO3 ART 25.8   BASE EXCESS ART 0.7   CARBOXYHEMOGLOBIN 1.7     Brief Urine Lab Results  (Last result in the past 365 days)        Color   Clarity   Blood   Leuk Est   Nitrite   Protein   CREAT   Urine HCG        06/19/24 1212 Yellow   Clear   Negative   Negative   Negative   Negative                   Microbiology Results Abnormal       None            CT Abdomen Pelvis Without Contrast    Result Date: 6/20/2024  CT ABDOMEN PELVIS WO CONTRAST Date of Exam: 6/20/2024 12:43 PM EDT Indication: RLQ tenderness. Comparison: CT of the abdomen and pelvis dated 8/12/2016 Technique: Axial CT images were obtained of the abdomen and pelvis without the administration of contrast. Reconstructed coronal and sagittal images were also obtained. Automated exposure control and iterative construction methods were used. Findings: Liver: Limited evaluation of the liver due to lack of IV contrast  administration. Liver morphology is unremarkable. Multiple tiny granulomas within the liver. No biliary dilation is seen. Gallbladder: The gallbladder is not identified. Pancreas: Unremarkable. Spleen: Several tiny splenic granulomas. Adrenal glands: Unremarkable. Genitourinary tract: There is mild retained contrast within the renal cortex related to chest CTA from 1 day prior. Small bilateral renal hypodensities may represent excreted contrast. Kidneys are otherwise unremarkable. No hydronephrosis is seen. The visualized portions of the ureters and urinary bladder appear unremarkable. Status post hysterectomy. Gastrointestinal tract: Limited evaluation of the hollow viscera due to lack of IV contrast administration. Scattered colonic diverticulosis. No evidence of bowel obstruction. Appendix: The appendix is not definitely identified. Other findings: There is mild pelvic free fluid. No free air is identified. No pathologically enlarged lymph nodes are seen. Vascular calcifications are present. Bones and soft tissues: No acute or suspicious osseous or soft tissue lesion is identified. Lung bases: Small bilateral pleural effusions with bibasilar atelectasis. Probable pulmonary vascular congestion.     Impression: Impression: 1.No acute abnormality identified within the abdomen or pelvis. 2.The appendix is not definitely identified. As such, appendicitis cannot be entirely excluded. 3.Scattered colonic diverticulosis. 4.Mild pelvic free fluid. 5.Probable pulmonary vascular congestions. Small bilateral pleural effusions with bibasilar atelectasis. 6.Additional findings as detailed above. Electronically Signed: Michael Carlson MD  6/20/2024 1:58 PM EDT  Workstation ID: [id number]    XR Chest 1 View    Result Date: 6/20/2024  XR CHEST 1 VW Date of Exam: 6/20/2024 3:44 AM EDT Indication: SOB Comparison: 1 day prior Findings: Vascular congestion appears mildly increased with suspected small bilateral layering pleural  effusions overall concerning for mildly worsened pulmonary edema. There is no distinct pneumothorax. Unchanged cardiac enlargement. No definite new focal lobar consolidation.     Impression: Impression: Vascular congestion appears mildly increased with suspected small bilateral layering pleural effusions overall concerning for mildly worsened pulmonary edema. There is no distinct pneumothorax. Unchanged cardiac enlargement. No definite new focal lobar consolidation. Electronically Signed: Rafy Elise MD  6/20/2024 6:11 AM EDT  Workstation ID: [id number]    CT Angiogram Chest Pulmonary Embolism    Result Date: 6/19/2024  CT ANGIOGRAM CHEST PULMONARY EMBOLISM Date of Exam: 6/19/2024 2:37 PM EDT Indication: chest pain, dyspnea, tachycardia, +dimer. Comparison: CT chest high resolution 3/23/2023 Technique: Axial CT images were obtained of the chest after the uneventful intravenous administration of 70 mL Isovue 370 utilizing pulmonary embolism protocol.  Reconstructed coronal and sagittal images were also obtained. Automated exposure control and  iterative construction methods were used. Findings: Visualized soft tissue of the lower neck are without acute abnormality. Postsurgical changes of prior sternotomy. Cardiomegaly. Negative for pulmonary embolus. Mitral annular calcifications noted. Negative for pericardial effusion. Trace bilateral pleural effusions. Trachea and mainstem bronchi are patent. Smooth interlobular septal thickening consistent with mild pulmonary edema. No pneumothorax. Mild centrilobular groundglass opacities in the upper lobes right greater than left also suggesting pulmonary edema. No suspicious pulmonary nodule or mass. Mild subpleural reticulation similar to prior chest CT. The visualized portions of the liver, spleen, and adrenal glands are without acute abnormality. Reflux of contrast into hepatic veins may relate to component of right heart insufficiency. Pancreas without acute abnormality.  Visualized portions of the kidneys are without hydronephrosis. Extensive upper abdominal aortic vascular calcifications. Wall thickening at the mid to distal esophagus which may relate to esophagitis. Remote fractures of the left anterior fourth and fifth ribs. Exaggerated thoracic kyphosis with multilevel thoracic disc narrowing.     Impression: Impression: 1. Negative for pulmonary embolus. 2. Cardiomegaly and pulmonary edema. 3. Wall thickening of the mid and distal esophagus, correlate for reflux or esophagitis. 4. Additional chronic findings above. Electronically Signed: Erik Neff MD  6/19/2024 2:57 PM EDT  Workstation ID: [id number]         Current medications:  Scheduled Meds:[Held by provider] amLODIPine, 10 mg, Oral, Q24H  aspirin, 81 mg, Oral, Daily  atorvastatin, 40 mg, Oral, Daily  cetirizine, 10 mg, Oral, Daily  gabapentin, 300 mg, Oral, Q12H  [Held by provider] hydrALAZINE, 25 mg, Oral, BID  [Held by provider] hydroCHLOROthiazide, 50 mg, Oral, Daily  levothyroxine, 100 mcg, Oral, Daily  pantoprazole, 40 mg, Intravenous, Q AM  PEG-KCl-NaCl-NaSulf-Na Asc-C, 1,000 mL, Oral, Once When Specified   Followed by  [START ON 6/22/2024] PEG-KCl-NaCl-NaSulf-Na Asc-C, 1,000 mL, Oral, Once When Specified  pharmacy consult - MTM, , Does not apply, Daily  sertraline, 50 mg, Oral, Daily  sodium chloride, 10 mL, Intravenous, Q12H      Continuous Infusions:dilTIAZem, 5-15 mg/hr, Last Rate: 7.5 mg/hr (06/21/24 0712)  sodium chloride, 75 mL/hr, Last Rate: 75 mL/hr (06/21/24 0038)      PRN Meds:.  senna-docusate sodium **AND** polyethylene glycol **AND** bisacodyl **AND** bisacodyl    nitroglycerin    sodium chloride    sodium chloride    sodium chloride    Assessment & Plan   Assessment & Plan     Active Hospital Problems    Diagnosis  POA    **A-fib [I48.91]  Yes    Symptomatic anemia [D64.9]  Yes    NSTEMI (non-ST elevated myocardial infarction) [I21.4]  Yes    Hypothyroidism (acquired) [E03.9]  Yes    Rheumatoid  arthritis [M06.9]  Yes    AMANDA (obstructive sleep apnea) [G47.33]  Yes    CKD (chronic kidney disease) [N18.9]  Yes    Hypertension [I10]  Yes      Resolved Hospital Problems   No resolved problems to display.        Brief Hospital Course to date:  Angélica De La Cruz is a 79 y.o. female with past medical history significant for hypertension, paroxysmal atrial fibrillation, CAD s/p CABG, rheumatoid arthritis, fibromyalgia, AMANDA on CPAP and CKD, was admitted for an NSTEMI complicated by atrial fibrillation with RVR and anemia, after presenting with generalized weakness, chest pain and shortness of breath.  Cardiology was consulted and determined patient was suffering from demand ischemia rather than ACS secondary to anemia and arrhythmia.     NSTEMI  History of prior CABG 2020  Acute on chronic hypoxic respiratory failure secondary to pulmonary edema  Atrial fibrillation with RVR  Paroxysmal atrial fibrillation  -Cardiology consulted, patient presents more consistent with type II demand ischemia related to anemia rather than ACS  -DC heparin GTT  -Continue aspirin and statin  -TTE  -Diurese with Lasix; DC IVF   -Defer anticoagulation due to bleeding risk  -Converted to sinus rhythm after IV Cardizem en route   -A-fib RVR recurred overnight, started again on diltiazem but developed marginal blood pressures.  Cardiology administered digoxin x 3 with improvement  -Previously used supplemental oxygen only with CPAP at night but recently escalated to daily use  -Encourage incentive spirometry     Hypotension with hx HTN  --hold home norvasc, HCTZ, hydralazine     Symptoms anemia  Iron deficiency anemia  -no obvious bleeding  -iron studies w low Fe   --states her PCP told her she was anemic 1 month ago and started her on MVI with iron  -s/p 2 units pRBC   - GI consulted, will perform EGD and colonoscopy.  Liquid diet today with bowel prep and n.p.o. after midnight for upper and lower endoscopies tomorrow  -CBC, BMP      CKD3  -unclear baseline  -follows with nephrology     AMANDA  --continue cpap, uses at home with 2L primarily at night but was seen by pulmonary last week and has been wearing O2 all day since then     Hypothyroidism  --continue synthroid  --TSH 0.390    Expected Discharge Location and Transportation: TBD   Expected Discharge   Expected Discharge Date: 6/24/2024; Expected Discharge Time:      VTE Prophylaxis:  Mechanical VTE prophylaxis orders are present.         AM-PAC 6 Clicks Score (PT): 18 (06/21/24 0800)    CODE STATUS:   Code Status and Medical Interventions:   Ordered at: 06/19/24 1702     Medical Intervention Limits:    No intubation (DNI)     Code Status (Patient has no pulse and is not breathing):    No CPR (Do Not Attempt to Resuscitate)     Medical Interventions (Patient has pulse or is breathing):    Limited Support       Luis Hoffman, DO  06/21/24

## 2024-06-22 ENCOUNTER — ANESTHESIA EVENT (OUTPATIENT)
Dept: GASTROENTEROLOGY | Facility: HOSPITAL | Age: 79
End: 2024-06-22
Payer: MEDICARE

## 2024-06-22 ENCOUNTER — ANESTHESIA (OUTPATIENT)
Dept: GASTROENTEROLOGY | Facility: HOSPITAL | Age: 79
End: 2024-06-22
Payer: MEDICARE

## 2024-06-22 LAB
ANION GAP SERPL CALCULATED.3IONS-SCNC: 9 MMOL/L (ref 5–15)
ASCENDING AORTA: 3.1 CM
BH CV ECHO MEAS - AO MAX PG: 6.5 MMHG
BH CV ECHO MEAS - AO MEAN PG: 4 MMHG
BH CV ECHO MEAS - AO ROOT DIAM: 3.2 CM
BH CV ECHO MEAS - AO V2 MAX: 127 CM/SEC
BH CV ECHO MEAS - AO V2 VTI: 24.8 CM
BH CV ECHO MEAS - AVA(I,D): 2.36 CM2
BH CV ECHO MEAS - EDV(CUBED): 68.9 ML
BH CV ECHO MEAS - EDV(MOD-SP2): 102 ML
BH CV ECHO MEAS - EDV(MOD-SP4): 95.6 ML
BH CV ECHO MEAS - EF(MOD-BP): 47.9 %
BH CV ECHO MEAS - EF(MOD-SP2): 52.8 %
BH CV ECHO MEAS - EF(MOD-SP4): 43.9 %
BH CV ECHO MEAS - ESV(CUBED): 13.8 ML
BH CV ECHO MEAS - ESV(MOD-SP2): 48.1 ML
BH CV ECHO MEAS - ESV(MOD-SP4): 53.6 ML
BH CV ECHO MEAS - FS: 41.5 %
BH CV ECHO MEAS - IVS/LVPW: 1 CM
BH CV ECHO MEAS - IVSD: 1.1 CM
BH CV ECHO MEAS - LA DIMENSION: 4.5 CM
BH CV ECHO MEAS - LAT PEAK E' VEL: 10.7 CM/SEC
BH CV ECHO MEAS - LV MASS(C)D: 151.3 GRAMS
BH CV ECHO MEAS - LV MAX PG: 3.8 MMHG
BH CV ECHO MEAS - LV MEAN PG: 2.4 MMHG
BH CV ECHO MEAS - LV V1 MAX: 97.1 CM/SEC
BH CV ECHO MEAS - LV V1 VTI: 18.6 CM
BH CV ECHO MEAS - LVIDD: 4.1 CM
BH CV ECHO MEAS - LVIDS: 2.4 CM
BH CV ECHO MEAS - LVOT AREA: 3.1 CM2
BH CV ECHO MEAS - LVOT DIAM: 2 CM
BH CV ECHO MEAS - LVPWD: 1.1 CM
BH CV ECHO MEAS - MED PEAK E' VEL: 4.1 CM/SEC
BH CV ECHO MEAS - MV DEC SLOPE: 246 CM/SEC2
BH CV ECHO MEAS - MV E MAX VEL: 114.8 CM/SEC
BH CV ECHO MEAS - MV MAX PG: 6.2 MMHG
BH CV ECHO MEAS - MV MEAN PG: 2.6 MMHG
BH CV ECHO MEAS - MV P1/2T: 115.7 MSEC
BH CV ECHO MEAS - MV V2 VTI: 34.6 CM
BH CV ECHO MEAS - MVA(P1/2T): 1.9 CM2
BH CV ECHO MEAS - MVA(VTI): 1.69 CM2
BH CV ECHO MEAS - PA ACC TIME: 0.11 SEC
BH CV ECHO MEAS - RAP SYSTOLE: 8 MMHG
BH CV ECHO MEAS - RVSP: 40 MMHG
BH CV ECHO MEAS - SV(LVOT): 58.6 ML
BH CV ECHO MEAS - SV(MOD-SP2): 53.9 ML
BH CV ECHO MEAS - SV(MOD-SP4): 42 ML
BH CV ECHO MEAS - TAPSE (>1.6): 0.9 CM
BH CV ECHO MEAS - TR MAX PG: 32.3 MMHG
BH CV ECHO MEAS - TR MAX VEL: 249.2 CM/SEC
BH CV ECHO MEASUREMENTS AVERAGE E/E' RATIO: 15.51
BH CV XLRA - RV BASE: 3.2 CM
BH CV XLRA - RV LENGTH: 7.1 CM
BH CV XLRA - RV MID: 2.2 CM
BH CV XLRA - TDI S': 8.2 CM/SEC
BUN SERPL-MCNC: 17 MG/DL (ref 8–23)
BUN/CREAT SERPL: 14.7 (ref 7–25)
CALCIUM SPEC-SCNC: 8.4 MG/DL (ref 8.6–10.5)
CHLORIDE SERPL-SCNC: 110 MMOL/L (ref 98–107)
CO2 SERPL-SCNC: 26 MMOL/L (ref 22–29)
CREAT SERPL-MCNC: 1.16 MG/DL (ref 0.57–1)
DEPRECATED RDW RBC AUTO: 46.4 FL (ref 37–54)
EGFRCR SERPLBLD CKD-EPI 2021: 47.5 ML/MIN/1.73
ERYTHROCYTE [DISTWIDTH] IN BLOOD BY AUTOMATED COUNT: 16.5 % (ref 12.3–15.4)
GLUCOSE SERPL-MCNC: 84 MG/DL (ref 65–99)
HCT VFR BLD AUTO: 34.1 % (ref 34–46.6)
HGB BLD-MCNC: 9.6 G/DL (ref 12–15.9)
LEFT ATRIUM VOLUME INDEX: 52.6 ML/M2
MCH RBC QN AUTO: 21.9 PG (ref 26.6–33)
MCHC RBC AUTO-ENTMCNC: 28.2 G/DL (ref 31.5–35.7)
MCV RBC AUTO: 77.7 FL (ref 79–97)
PLATELET # BLD AUTO: 261 10*3/MM3 (ref 140–450)
PMV BLD AUTO: 9.7 FL (ref 6–12)
POTASSIUM SERPL-SCNC: 3.9 MMOL/L (ref 3.5–5.2)
QT INTERVAL: 476 MS
QTC INTERVAL: 566 MS
RBC # BLD AUTO: 4.39 10*6/MM3 (ref 3.77–5.28)
SODIUM SERPL-SCNC: 145 MMOL/L (ref 136–145)
WBC NRBC COR # BLD AUTO: 7.15 10*3/MM3 (ref 3.4–10.8)

## 2024-06-22 PROCEDURE — 25810000003 SODIUM CHLORIDE 0.9 % SOLUTION: Performed by: INTERNAL MEDICINE

## 2024-06-22 PROCEDURE — 88305 TISSUE EXAM BY PATHOLOGIST: CPT | Performed by: INTERNAL MEDICINE

## 2024-06-22 PROCEDURE — 99232 SBSQ HOSP IP/OBS MODERATE 35: CPT | Performed by: STUDENT IN AN ORGANIZED HEALTH CARE EDUCATION/TRAINING PROGRAM

## 2024-06-22 PROCEDURE — 80048 BASIC METABOLIC PNL TOTAL CA: CPT | Performed by: STUDENT IN AN ORGANIZED HEALTH CARE EDUCATION/TRAINING PROGRAM

## 2024-06-22 PROCEDURE — 43239 EGD BIOPSY SINGLE/MULTIPLE: CPT | Performed by: INTERNAL MEDICINE

## 2024-06-22 PROCEDURE — 25010000002 PROPOFOL 10 MG/ML EMULSION: Performed by: NURSE ANESTHETIST, CERTIFIED REGISTERED

## 2024-06-22 PROCEDURE — 85027 COMPLETE CBC AUTOMATED: CPT | Performed by: STUDENT IN AN ORGANIZED HEALTH CARE EDUCATION/TRAINING PROGRAM

## 2024-06-22 PROCEDURE — 0DB98ZX EXCISION OF DUODENUM, VIA NATURAL OR ARTIFICIAL OPENING ENDOSCOPIC, DIAGNOSTIC: ICD-10-PCS | Performed by: INTERNAL MEDICINE

## 2024-06-22 PROCEDURE — 99232 SBSQ HOSP IP/OBS MODERATE 35: CPT | Performed by: INTERNAL MEDICINE

## 2024-06-22 PROCEDURE — 45385 COLONOSCOPY W/LESION REMOVAL: CPT | Performed by: INTERNAL MEDICINE

## 2024-06-22 PROCEDURE — 25810000003 LACTATED RINGERS PER 1000 ML: Performed by: NURSE ANESTHETIST, CERTIFIED REGISTERED

## 2024-06-22 PROCEDURE — 0DBH8ZZ EXCISION OF CECUM, VIA NATURAL OR ARTIFICIAL OPENING ENDOSCOPIC: ICD-10-PCS | Performed by: INTERNAL MEDICINE

## 2024-06-22 DEVICE — DEV CLIP ENDO RESOLUTION360 CONTRL ROT 235CM: Type: IMPLANTABLE DEVICE | Site: COLON | Status: FUNCTIONAL

## 2024-06-22 RX ORDER — FAMOTIDINE 20 MG/1
20 TABLET, FILM COATED ORAL ONCE
Status: CANCELLED | OUTPATIENT
Start: 2024-06-22 | End: 2024-06-22

## 2024-06-22 RX ORDER — LIDOCAINE HYDROCHLORIDE 10 MG/ML
INJECTION, SOLUTION EPIDURAL; INFILTRATION; INTRACAUDAL; PERINEURAL AS NEEDED
Status: DISCONTINUED | OUTPATIENT
Start: 2024-06-22 | End: 2024-06-22 | Stop reason: SURG

## 2024-06-22 RX ORDER — SODIUM CHLORIDE 9 MG/ML
40 INJECTION, SOLUTION INTRAVENOUS AS NEEDED
Status: CANCELLED | OUTPATIENT
Start: 2024-06-22

## 2024-06-22 RX ORDER — AMLODIPINE BESYLATE 5 MG/1
5 TABLET ORAL
Status: DISCONTINUED | OUTPATIENT
Start: 2024-06-22 | End: 2024-06-23

## 2024-06-22 RX ORDER — HYDROMORPHONE HYDROCHLORIDE 1 MG/ML
0.5 INJECTION, SOLUTION INTRAMUSCULAR; INTRAVENOUS; SUBCUTANEOUS
Status: DISCONTINUED | OUTPATIENT
Start: 2024-06-22 | End: 2024-06-22 | Stop reason: HOSPADM

## 2024-06-22 RX ORDER — SODIUM CHLORIDE 0.9 % (FLUSH) 0.9 %
10 SYRINGE (ML) INJECTION EVERY 12 HOURS SCHEDULED
Status: CANCELLED | OUTPATIENT
Start: 2024-06-22

## 2024-06-22 RX ORDER — SODIUM CHLORIDE, SODIUM LACTATE, POTASSIUM CHLORIDE, CALCIUM CHLORIDE 600; 310; 30; 20 MG/100ML; MG/100ML; MG/100ML; MG/100ML
INJECTION, SOLUTION INTRAVENOUS CONTINUOUS PRN
Status: DISCONTINUED | OUTPATIENT
Start: 2024-06-22 | End: 2024-06-22 | Stop reason: SURG

## 2024-06-22 RX ORDER — DROPERIDOL 2.5 MG/ML
0.62 INJECTION, SOLUTION INTRAMUSCULAR; INTRAVENOUS ONCE AS NEEDED
Status: DISCONTINUED | OUTPATIENT
Start: 2024-06-22 | End: 2024-06-22

## 2024-06-22 RX ORDER — SODIUM CHLORIDE 0.9 % (FLUSH) 0.9 %
10 SYRINGE (ML) INJECTION AS NEEDED
Status: CANCELLED | OUTPATIENT
Start: 2024-06-22

## 2024-06-22 RX ORDER — PROPOFOL 10 MG/ML
VIAL (ML) INTRAVENOUS AS NEEDED
Status: DISCONTINUED | OUTPATIENT
Start: 2024-06-22 | End: 2024-06-22 | Stop reason: SURG

## 2024-06-22 RX ORDER — FENTANYL CITRATE 50 UG/ML
50 INJECTION, SOLUTION INTRAMUSCULAR; INTRAVENOUS
Status: DISCONTINUED | OUTPATIENT
Start: 2024-06-22 | End: 2024-06-22 | Stop reason: HOSPADM

## 2024-06-22 RX ORDER — SODIUM CHLORIDE, SODIUM LACTATE, POTASSIUM CHLORIDE, CALCIUM CHLORIDE 600; 310; 30; 20 MG/100ML; MG/100ML; MG/100ML; MG/100ML
9 INJECTION, SOLUTION INTRAVENOUS CONTINUOUS
Status: CANCELLED | OUTPATIENT
Start: 2024-06-22

## 2024-06-22 RX ORDER — LIDOCAINE HYDROCHLORIDE 10 MG/ML
0.5 INJECTION, SOLUTION EPIDURAL; INFILTRATION; INTRACAUDAL; PERINEURAL ONCE AS NEEDED
Status: CANCELLED | OUTPATIENT
Start: 2024-06-22

## 2024-06-22 RX ORDER — FAMOTIDINE 10 MG/ML
20 INJECTION, SOLUTION INTRAVENOUS ONCE
Status: CANCELLED | OUTPATIENT
Start: 2024-06-22 | End: 2024-06-22

## 2024-06-22 RX ADMIN — AMLODIPINE BESYLATE 5 MG: 5 TABLET ORAL at 11:52

## 2024-06-22 RX ADMIN — PANTOPRAZOLE SODIUM 40 MG: 40 INJECTION, POWDER, FOR SOLUTION INTRAVENOUS at 06:01

## 2024-06-22 RX ADMIN — PROPOFOL 140 MCG/KG/MIN: 10 INJECTION, EMULSION INTRAVENOUS at 12:48

## 2024-06-22 RX ADMIN — PEG-3350, SODIUM SULFATE, SODIUM CHLORIDE, POTASSIUM CHLORIDE, SODIUM ASCORBATE AND ASCORBIC ACID 1000 ML: KIT at 03:48

## 2024-06-22 RX ADMIN — SODIUM CHLORIDE, POTASSIUM CHLORIDE, SODIUM LACTATE AND CALCIUM CHLORIDE: 600; 310; 30; 20 INJECTION, SOLUTION INTRAVENOUS at 12:39

## 2024-06-22 RX ADMIN — LEVOTHYROXINE SODIUM 100 MCG: 0.1 TABLET ORAL at 06:02

## 2024-06-22 RX ADMIN — PROPOFOL 20 MG: 10 INJECTION, EMULSION INTRAVENOUS at 12:43

## 2024-06-22 RX ADMIN — ATORVASTATIN CALCIUM 40 MG: 40 TABLET, FILM COATED ORAL at 08:41

## 2024-06-22 RX ADMIN — GABAPENTIN 300 MG: 300 CAPSULE ORAL at 21:52

## 2024-06-22 RX ADMIN — SERTRALINE 50 MG: 50 TABLET, FILM COATED ORAL at 08:41

## 2024-06-22 RX ADMIN — CETIRIZINE HYDROCHLORIDE 10 MG: 10 TABLET, FILM COATED ORAL at 08:41

## 2024-06-22 RX ADMIN — PROPOFOL 50 MG: 10 INJECTION, EMULSION INTRAVENOUS at 12:40

## 2024-06-22 RX ADMIN — GABAPENTIN 300 MG: 300 CAPSULE ORAL at 08:41

## 2024-06-22 RX ADMIN — PROPOFOL 20 MG: 10 INJECTION, EMULSION INTRAVENOUS at 12:46

## 2024-06-22 RX ADMIN — SODIUM CHLORIDE 75 ML/HR: 900 INJECTION, SOLUTION INTRAVENOUS at 03:49

## 2024-06-22 RX ADMIN — LIDOCAINE HYDROCHLORIDE 50 MG: 10 INJECTION, SOLUTION EPIDURAL; INFILTRATION; INTRACAUDAL; PERINEURAL at 12:40

## 2024-06-22 NOTE — PROGRESS NOTES
Angélica De La Cruz  [id number]  1945   LOS: 2 days   Patient Care Team:  Sussy Bloom MD as PCP - General (Internal Medicine)  Maura Pimentel APRN as Nurse Practitioner (Pulmonary Disease)  Taylor Carrion APRN as Nurse Practitioner (Pulmonary Disease)    Chief Complaint:  Follow up on atrial fib, bradycardia, anemia    Subjective Patient had episode yesterday of sinus pauses but did not lose consciousness. She had multiple doses of dig, IV lopressor, and IV diltiazem over 24 hours prior. AVN blocking agents held. She will have EGD/colonoscopy today. Anticoagulation held. She is still undergoing bowel prep. She has mild SOB but no chest pain.     Objective     Vital Sign Min/Max for last 24 hours  Temp  Min: 97.6 °F (36.4 °C)  Max: 98.2 °F (36.8 °C)   BP  Min: 91/52  Max: 188/68   Pulse  Min: 24  Max: 131   Resp  Min: 17  Max: 20   SpO2  Min: 81 %  Max: 100 %   No data recorded   Weight  Min: 78.2 kg (172 lb 6.4 oz)  Max: 78.2 kg (172 lb 6.4 oz)         06/19/24  1712 06/21/24  1501   Weight: 78.2 kg (172 lb 4.8 oz) 78.2 kg (172 lb 6.4 oz)         Intake/Output Summary (Last 24 hours) at 6/22/2024 0749  Last data filed at 6/22/2024 0445  Gross per 24 hour   Intake --   Output 1650 ml   Net -1650 ml       Physical Exam:     General Appearance:    Alert, cooperative, in no acute distress   Lungs:     Clear to auscultation,respirations regular, even and                unlabored, 2 L O2 NC    Heart:    Sinus john, normal S1 and S2, no            murmur, no gallop, no rub, no click   Abdomen:  Extremities:   Soft, nontender, bowel sounds audible x4    No edema, normal range of motion   Pulses:   Pulses palpable and equal bilaterally      Results Review:   Results from last 7 days   Lab Units 06/22/24  0327 06/21/24  0745 06/20/24  0727   SODIUM mmol/L 145 141 141   POTASSIUM mmol/L 3.9 3.7 4.6   CHLORIDE mmol/L 110* 103 106   CO2 mmol/L 26.0 25.0 23.0   BUN mg/dL 17 23 26*   CREATININE mg/dL 1.16* 1.30*  1.55*   GLUCOSE mg/dL 84 98 122*   CALCIUM mg/dL 8.4* 8.6 8.7     Results from last 7 days   Lab Units 06/22/24  0327 06/21/24  0745 06/20/24  0727   WBC 10*3/mm3 7.15 7.71 10.14   HEMOGLOBIN g/dL 9.6* 10.2* 10.8*   HEMATOCRIT % 34.1 35.0 37.2   PLATELETS 10*3/mm3 261 278 298             Results from last 7 days   Lab Units 06/20/24  0727 06/20/24  0006 06/19/24  1558   HSTROP T ng/L 1,538* 1,198* 229*   ECG 6/21/2024:  Atrial fibrillation  Left anterior fascicular block  Septal infarct , age undetermined  ST & T wave abnormality, consider anterolateral ischemia  Prolonged QT  Abnormal ECG  When compared with ECG of 20-JUN-2024 22:04, (Unconfirmed)  Vent. rate has decreased BY  74 BPM  Right bundle branch block is no longer present  Septal infarct is now present    Echocardiogram 6/21/2024:  Left ventricular ejection fraction appears to be 46 - 50%.    Mildly reduced right ventricular systolic function noted.    The left atrial cavity is dilated.    Left atrial volume is severely increased.    Estimated right ventricular systolic pressure from tricuspid regurgitation is mildly elevated (35-45 mmHg). Calculated right ventricular systolic pressure from tricuspid regurgitation is 40 mmHg.    Medication Review: Reviewed    Assessment & Plan   Patient had episode yesterday of bradycardia/sinus pauses with the longest 6.2 seconds on spacelabs. She had multiple doses of dig, IV lopressor, and IV diltiazem over 24 hours prior.  Anticoagulation held in view of anemia currently until after EGD/colonoscopy results reviewed. She has had 2 PRBCs since admission. Defer QTc prolonging agents and allow washout of AVN blocking agents.         A-fib    Hypertension    CKD (chronic kidney disease)    AMANDA (obstructive sleep apnea)    Symptomatic anemia    NSTEMI (non-ST elevated myocardial infarction)    Hypothyroidism (acquired)    Rheumatoid arthritis    Iron deficiency anemia due to chronic blood loss    Electronically signed by  Susan Redmond, MARCO, 06/22/24, 8:15 AM EDT.     06/22/24  07:49 EDT

## 2024-06-22 NOTE — PROGRESS NOTES
[facility] Medicine Services  PROGRESS NOTE    Patient Name: Angélica De La Cruz  : 1945  MRN: [id number]    Date of Admission: 2024  Primary Care Physician: Sussy Bloom MD    Subjective   Subjective     CC:  Chest pain, shortness of breath      HPI:  Patient reports not getting much sleep last night but overall denies any chest pain or shortness of breath.  Denies any abdominal pain or nausea.  Has tolerated bowel prep.      Objective   Objective     Vital Signs:   Temp:  [97.6 °F (36.4 °C)-98.2 °F (36.8 °C)] 97.6 °F (36.4 °C)  Heart Rate:  [] 62  Resp:  [17-20] 20  BP: ()/(50-99) 167/80  Flow (L/min):  [2-3] 3     Physical Exam:  General appearance: alert, awake, oriented, no acute distress   Cardiovascular: Bradycardic, irregularly irregular, systolic murmur, radial pulse full 2/4 BL   Respiratory: CTAB, no crackles or wheezes, oxygenating well on 2 L nasal cannula  Abdomen: soft, mild epigastric tenderness to palpation, no organomegaly, bowel sounds normoactive    Neuro/CNS: alert and oriented x3, normal speech    Results Reviewed:  LAB RESULTS:      Lab 24  0327 24  0745 24  0727 24  0006 24  1558 24  1132   WBC 7.15 7.71 10.14  --   --  6.12   HEMOGLOBIN 9.6* 10.2* 10.8*  --   --  7.7*   HEMATOCRIT 34.1 35.0 37.2  --   --  28.5*   PLATELETS 261 278 298  --   --  246   NEUTROS ABS  --   --  7.37*  --   --  4.80   IMMATURE GRANS (ABS)  --   --  0.03  --   --  0.02   LYMPHS ABS  --   --  1.45  --   --  0.55*   MONOS ABS  --   --  0.80  --   --  0.42   EOS ABS  --   --  0.40  --   --  0.29   MCV 77.7* 77.1* 77.8*  --   --  78.5*   PROTIME  --   --   --   --  16.0*  --    APTT  --   --   --   --  39.3*  --    HEPARIN ANTI-XA  --   --  0.25* 0.11* 0.10*  --    D DIMER QUANT  --   --   --   --   --  1.50*         Lab 24  0327 24  0745 24  0727 24  1132   SODIUM 145 141 141 141   POTASSIUM 3.9 3.7 4.6 4.3    CHLORIDE 110* 103 106 106   CO2 26.0 25.0 23.0 25.0   ANION GAP 9.0 13.0 12.0 10.0   BUN 17 23 26* 24*   CREATININE 1.16* 1.30* 1.55* 1.50*   EGFR 47.5* 41.4* 33.5* 34.9*   GLUCOSE 84 98 122* 100*   CALCIUM 8.4* 8.6 8.7 9.2   MAGNESIUM  --  1.9  --  2.3   TSH  --   --   --  0.390         Lab 06/20/24  0727 06/19/24  1132   TOTAL PROTEIN 7.7 7.0   ALBUMIN 3.7 3.6   GLOBULIN 4.0 3.4   ALT (SGPT) 41* 15   AST (SGOT) 118* 32   BILIRUBIN 0.8 0.3   ALK PHOS 176* 111         Lab 06/20/24  0727 06/20/24  0006 06/19/24  1558 06/19/24  1352 06/19/24  1132   PROBNP  --   --   --   --  4,207.0*   HSTROP T 1,538* 1,198* 229* 80* 36*   PROTIME  --   --  16.0*  --   --    INR  --   --  1.26*  --   --              Lab 06/19/24  1558 06/19/24  1406 06/19/24  1132   IRON 14*  --   --    IRON SATURATION (TSAT) 4*  --   --    TIBC 399  --   --    TRANSFERRIN 268  --   --    FERRITIN 41.03  --   --    VITAMIN B 12  --   --  647   ABO TYPING  --  O  --    RH TYPING  --  Positive  --    ANTIBODY SCREEN  --  Negative  --          Lab 06/19/24  1203   PH, ARTERIAL 7.390   PCO2, ARTERIAL 42.7   PO2 ART 88.3   FIO2 28   HCO3 ART 25.8   BASE EXCESS ART 0.7   CARBOXYHEMOGLOBIN 1.7     Brief Urine Lab Results  (Last result in the past 365 days)        Color   Clarity   Blood   Leuk Est   Nitrite   Protein   CREAT   Urine HCG        06/19/24 1212 Yellow   Clear   Negative   Negative   Negative   Negative                   Microbiology Results Abnormal       None            Adult Transthoracic Echo Complete W/ Cont if Necessary Per Protocol    Result Date: 6/22/2024    Left ventricular ejection fraction appears to be 46 - 50%.   Mildly reduced right ventricular systolic function noted.   The left atrial cavity is dilated.   Left atrial volume is severely increased.   Estimated right ventricular systolic pressure from tricuspid regurgitation is mildly elevated (35-45 mmHg). Calculated right ventricular systolic pressure from tricuspid  regurgitation is 40 mmHg.     CT Abdomen Pelvis Without Contrast    Result Date: 6/20/2024  CT ABDOMEN PELVIS WO CONTRAST Date of Exam: 6/20/2024 12:43 PM EDT Indication: RLQ tenderness. Comparison: CT of the abdomen and pelvis dated 8/12/2016 Technique: Axial CT images were obtained of the abdomen and pelvis without the administration of contrast. Reconstructed coronal and sagittal images were also obtained. Automated exposure control and iterative construction methods were used. Findings: Liver: Limited evaluation of the liver due to lack of IV contrast administration. Liver morphology is unremarkable. Multiple tiny granulomas within the liver. No biliary dilation is seen. Gallbladder: The gallbladder is not identified. Pancreas: Unremarkable. Spleen: Several tiny splenic granulomas. Adrenal glands: Unremarkable. Genitourinary tract: There is mild retained contrast within the renal cortex related to chest CTA from 1 day prior. Small bilateral renal hypodensities may represent excreted contrast. Kidneys are otherwise unremarkable. No hydronephrosis is seen. The visualized portions of the ureters and urinary bladder appear unremarkable. Status post hysterectomy. Gastrointestinal tract: Limited evaluation of the hollow viscera due to lack of IV contrast administration. Scattered colonic diverticulosis. No evidence of bowel obstruction. Appendix: The appendix is not definitely identified. Other findings: There is mild pelvic free fluid. No free air is identified. No pathologically enlarged lymph nodes are seen. Vascular calcifications are present. Bones and soft tissues: No acute or suspicious osseous or soft tissue lesion is identified. Lung bases: Small bilateral pleural effusions with bibasilar atelectasis. Probable pulmonary vascular congestion.     Impression: Impression: 1.No acute abnormality identified within the abdomen or pelvis. 2.The appendix is not definitely identified. As such, appendicitis cannot be  entirely excluded. 3.Scattered colonic diverticulosis. 4.Mild pelvic free fluid. 5.Probable pulmonary vascular congestions. Small bilateral pleural effusions with bibasilar atelectasis. 6.Additional findings as detailed above. Electronically Signed: Michael Carlson MD  6/20/2024 1:58 PM EDT  Workstation ID: [id number]     Results for orders placed during the hospital encounter of 06/19/24    Adult Transthoracic Echo Complete W/ Cont if Necessary Per Protocol    Interpretation Summary    Left ventricular ejection fraction appears to be 46 - 50%.    Mildly reduced right ventricular systolic function noted.    The left atrial cavity is dilated.    Left atrial volume is severely increased.    Estimated right ventricular systolic pressure from tricuspid regurgitation is mildly elevated (35-45 mmHg). Calculated right ventricular systolic pressure from tricuspid regurgitation is 40 mmHg.      Current medications:  Scheduled Meds:aspirin, 81 mg, Oral, Daily  atorvastatin, 40 mg, Oral, Daily  cetirizine, 10 mg, Oral, Daily  gabapentin, 300 mg, Oral, Q12H  levothyroxine, 100 mcg, Oral, Daily  metoprolol succinate XL, 12.5 mg, Oral, Q24H  pantoprazole, 40 mg, Intravenous, Q AM  pharmacy consult - MTM, , Does not apply, Daily  sertraline, 50 mg, Oral, Daily  sodium chloride, 10 mL, Intravenous, Q12H      Continuous Infusions:[Held by provider] dilTIAZem, 5-15 mg/hr, Last Rate: Stopped (06/21/24 1602)  sodium chloride, 75 mL/hr, Last Rate: 75 mL/hr (06/22/24 0911)      PRN Meds:.  senna-docusate sodium **AND** polyethylene glycol **AND** bisacodyl **AND** bisacodyl    nitroglycerin    sodium chloride    sodium chloride    sodium chloride    Assessment & Plan   Assessment & Plan     Active Hospital Problems    Diagnosis  POA    **A-fib [I48.91]  Yes    Symptomatic anemia [D64.9]  Yes    NSTEMI (non-ST elevated myocardial infarction) [I21.4]  Yes    Hypothyroidism (acquired) [E03.9]  Yes    Rheumatoid arthritis [M06.9]  Yes    Iron  deficiency anemia due to chronic blood loss [D50.0]  Unknown    AMANDA (obstructive sleep apnea) [G47.33]  Yes    CKD (chronic kidney disease) [N18.9]  Yes    Hypertension [I10]  Yes      Resolved Hospital Problems   No resolved problems to display.        Brief Hospital Course to date:  Angélica De La Cruz is a 79 y.o. female with past medical history significant for hypertension, paroxysmal atrial fibrillation, CAD s/p CABG, rheumatoid arthritis, fibromyalgia, AMANDA on CPAP and CKD, was admitted for an NSTEMI complicated by atrial fibrillation with RVR and anemia, after presenting with generalized weakness, chest pain and shortness of breath.  Cardiology was consulted and determined patient was suffering from demand ischemia rather than ACS secondary to anemia and arrhythmia.  GI consulted and plan to perform upper and lower endoscopies to evaluate persistent anemia.     NSTEMI  History of prior CABG 2020  Acute on chronic hypoxic respiratory failure secondary to pulmonary edema  Atrial fibrillation with RVR  Paroxysmal atrial fibrillation  -Cardiology consulted, patient presents more consistent with type II demand ischemia related to anemia rather than ACS  -DC heparin GTT  -Continue aspirin and statin  -TTE revealed reduced systolic function  -Defer anticoagulation due to bleeding risk  -Converted to sinus rhythm after IV Cardizem en route but then recurred.  Restarted on diltiazem and then developed bradycardia  -Hold AV loco blocking agents, consider restarting metoprolol tomorrow 6/23  -Previously used supplemental oxygen only with CPAP at night but recently escalated to daily use  -Encourage incentive spirometry     Hypotension with hx HTN  --hold home HCTZ, hydralazine  --Start norvasc now w hypertensive BP  --Defer to cardiology regarding ACE/ARB/ARNI      Symptoms anemia  Iron deficiency anemia  -no obvious bleeding  -iron studies w low Fe   --states her PCP told her she was anemic 1 month ago and started her on  MVI with iron  -s/p 2 units pRBC   - GI consulted, will perform EGD and colonoscopy 6/23  -CBC, BMP     CKD3  -unclear baseline  -follows with nephrology, improving      AMANDA  --continue cpap, uses at home with 2L primarily at night but was seen by pulmonary last week and has been wearing O2 all day since then     Hypothyroidism  --continue synthroid  --TSH 0.390       Expected Discharge Location and Transportation: Home   Expected Discharge   Expected Discharge Date: 6/24/2024; Expected Discharge Time:      VTE Prophylaxis:  Mechanical VTE prophylaxis orders are present.         AM-PAC 6 Clicks Score (PT): 20 (06/22/24 0800)    CODE STATUS:   Code Status and Medical Interventions:   Ordered at: 06/19/24 1702     Medical Intervention Limits:    No intubation (DNI)     Code Status (Patient has no pulse and is not breathing):    No CPR (Do Not Attempt to Resuscitate)     Medical Interventions (Patient has pulse or is breathing):    Limited Support       Luis Hoffman, DO  06/22/24

## 2024-06-22 NOTE — BRIEF OP NOTE
COLONOSCOPY, ESOPHAGOGASTRODUODENOSCOPY  Progress Note    Angélica De La Cruz  6/22/2024    Colonoscopy reveals a 3 mm polyp in the cecum, removed with cold snare.  2 endoclips applied to polypectomy site to ensure hemostasis.    EGD is normal.  Biopsies taken in the second portion duodenum to assess for celiac disease/olmesartan-induced sprue-like enteropathy.    >> Iron supplement.  >> Await pathology results.  >> Based on age, future surveillance colonoscopy is probably not necessary.  >> Follow-up with her gastroenterologist, Dr. Bola Gaona MD, at [facility].    Please call with questions or concerns.    Mark I. Brunner, MD     Date: 6/22/2024  Time: 13:07 EDT

## 2024-06-22 NOTE — ANESTHESIA PREPROCEDURE EVALUATION
Anesthesia Evaluation     Patient summary reviewed and Nursing notes reviewed                Airway   Mallampati: II  TM distance: >3 FB  Neck ROM: full  No difficulty expected  Dental - normal exam     Pulmonary - normal exam   (+) a smoker Former,sleep apnea  Cardiovascular - normal exam    (+) hypertension, past MI , CAD, CABG, dysrhythmias Atrial Fib, angina,  carotid artery disease      Neuro/Psych- negative ROS  GI/Hepatic/Renal/Endo    (+) renal disease- CRI, thyroid problem hypothyroidism    Musculoskeletal     Abdominal  - normal exam    Bowel sounds: normal.   Substance History - negative use     OB/GYN negative ob/gyn ROS         Other   arthritis,                 Anesthesia Plan    ASA 3     general     (PROPOFOL)  intravenous induction     Anesthetic plan, risks, benefits, and alternatives have been provided, discussed and informed consent has been obtained with: patient.    Plan discussed with CRNA.    CODE STATUS:    Medical Intervention Limits: No intubation (DNI)  Code Status (Patient has no pulse and is not breathing): No CPR (Do Not Attempt to Resuscitate)  Medical Interventions (Patient has pulse or is breathing): Limited Support

## 2024-06-22 NOTE — ANESTHESIA POSTPROCEDURE EVALUATION
Patient: Angélica De La Cruz    Procedure Summary       Date: 06/22/24 Room / Location: [facility] ENDOSCOPY 3 /  [facility]    Anesthesia Start: 1238 Anesthesia Stop: 1313    Procedures:       COLONOSCOPY      ESOPHAGOGASTRODUODENOSCOPY Diagnosis:       Iron deficiency anemia due to chronic blood loss      (Iron deficiency anemia due to chronic blood loss [D50.0])    Surgeons: Brunner, Mark I, MD Provider: Rashad Walker MD    Anesthesia Type: general ASA Status: 3            Anesthesia Type: general    Vitals  Vitals Value Taken Time   /48 06/22/24 1312   Temp 97.6 °F (36.4 °C) 06/22/24 1312   Pulse 42 06/22/24 1315   Resp 16 06/22/24 1312   SpO2 95 % 06/22/24 1315   Vitals shown include unfiled device data.        Post Anesthesia Care and Evaluation    Patient location during evaluation: PACU  Patient participation: complete - patient participated  Level of consciousness: awake and alert  Pain management: adequate    Airway patency: patent  Anesthetic complications: No anesthetic complications  PONV Status: none  Cardiovascular status: hemodynamically stable and acceptable  Respiratory status: nonlabored ventilation, acceptable and nasal cannula  Hydration status: acceptable

## 2024-06-23 ENCOUNTER — APPOINTMENT (OUTPATIENT)
Dept: GENERAL RADIOLOGY | Facility: HOSPITAL | Age: 79
End: 2024-06-23
Payer: MEDICARE

## 2024-06-23 LAB
ARTERIAL PATENCY WRIST A: ABNORMAL
ATMOSPHERIC PRESS: ABNORMAL MM[HG]
BASE EXCESS BLDA CALC-SCNC: -2.1 MMOL/L (ref 0–2)
BDY SITE: ABNORMAL
BODY TEMPERATURE: 37
CO2 BLDA-SCNC: 24.8 MMOL/L (ref 22–33)
COHGB MFR BLD: 1.5 % (ref 0–2)
EPAP: 0
HCO3 BLDA-SCNC: 23.5 MMOL/L (ref 20–26)
HCT VFR BLD CALC: 32.1 % (ref 38–51)
HGB BLDA-MCNC: 10.5 G/DL (ref 14–18)
INHALED O2 CONCENTRATION: 36 %
IPAP: 0
METHGB BLD QL: -0.1 % (ref 0–1.5)
MODALITY: ABNORMAL
OXYHGB MFR BLDV: 97.4 % (ref 94–99)
PAW @ PEAK INSP FLOW SETTING VENT: 0 CMH2O
PCO2 BLDA: 42.8 MM HG (ref 35–45)
PCO2 TEMP ADJ BLD: 42.8 MM HG (ref 35–45)
PH BLDA: 7.35 PH UNITS (ref 7.35–7.45)
PH, TEMP CORRECTED: 7.35 PH UNITS
PO2 BLDA: 110 MM HG (ref 83–108)
PO2 TEMP ADJ BLD: 110 MM HG (ref 83–108)
QT INTERVAL: 510 MS
QTC INTERVAL: 441 MS
TOTAL RATE: 0 BREATHS/MINUTE

## 2024-06-23 PROCEDURE — 83050 HGB METHEMOGLOBIN QUAN: CPT

## 2024-06-23 PROCEDURE — 94640 AIRWAY INHALATION TREATMENT: CPT

## 2024-06-23 PROCEDURE — 25010000002 NA FERRIC GLUC CPLX PER 12.5 MG: Performed by: STUDENT IN AN ORGANIZED HEALTH CARE EDUCATION/TRAINING PROGRAM

## 2024-06-23 PROCEDURE — 94761 N-INVAS EAR/PLS OXIMETRY MLT: CPT

## 2024-06-23 PROCEDURE — 82375 ASSAY CARBOXYHB QUANT: CPT

## 2024-06-23 PROCEDURE — 25810000003 SODIUM CHLORIDE 0.9 % SOLUTION 250 ML FLEX CONT: Performed by: STUDENT IN AN ORGANIZED HEALTH CARE EDUCATION/TRAINING PROGRAM

## 2024-06-23 PROCEDURE — 82805 BLOOD GASES W/O2 SATURATION: CPT

## 2024-06-23 PROCEDURE — 99232 SBSQ HOSP IP/OBS MODERATE 35: CPT | Performed by: STUDENT IN AN ORGANIZED HEALTH CARE EDUCATION/TRAINING PROGRAM

## 2024-06-23 PROCEDURE — 94799 UNLISTED PULMONARY SVC/PX: CPT

## 2024-06-23 PROCEDURE — 36600 WITHDRAWAL OF ARTERIAL BLOOD: CPT

## 2024-06-23 PROCEDURE — 25810000003 SODIUM CHLORIDE 0.9 % SOLUTION: Performed by: INTERNAL MEDICINE

## 2024-06-23 PROCEDURE — 71045 X-RAY EXAM CHEST 1 VIEW: CPT

## 2024-06-23 PROCEDURE — 25010000002 FUROSEMIDE PER 20 MG: Performed by: STUDENT IN AN ORGANIZED HEALTH CARE EDUCATION/TRAINING PROGRAM

## 2024-06-23 RX ORDER — FUROSEMIDE 10 MG/ML
40 INJECTION INTRAMUSCULAR; INTRAVENOUS
Status: DISCONTINUED | OUTPATIENT
Start: 2024-06-23 | End: 2024-06-24

## 2024-06-23 RX ORDER — HYDROCHLOROTHIAZIDE 25 MG/1
50 TABLET ORAL DAILY
Status: DISCONTINUED | OUTPATIENT
Start: 2024-06-23 | End: 2024-06-24

## 2024-06-23 RX ORDER — IPRATROPIUM BROMIDE AND ALBUTEROL SULFATE 2.5; .5 MG/3ML; MG/3ML
3 SOLUTION RESPIRATORY (INHALATION) EVERY 6 HOURS PRN
Status: DISCONTINUED | OUTPATIENT
Start: 2024-06-23 | End: 2024-07-01 | Stop reason: HOSPADM

## 2024-06-23 RX ORDER — AMLODIPINE BESYLATE 5 MG/1
10 TABLET ORAL
Status: DISCONTINUED | OUTPATIENT
Start: 2024-06-24 | End: 2024-06-24

## 2024-06-23 RX ORDER — HYDRALAZINE HYDROCHLORIDE 25 MG/1
25 TABLET, FILM COATED ORAL 2 TIMES DAILY
Status: DISCONTINUED | OUTPATIENT
Start: 2024-06-23 | End: 2024-07-01 | Stop reason: HOSPADM

## 2024-06-23 RX ORDER — AMLODIPINE BESYLATE 5 MG/1
5 TABLET ORAL ONCE
Status: DISCONTINUED | OUTPATIENT
Start: 2024-06-23 | End: 2024-06-24

## 2024-06-23 RX ADMIN — METOPROLOL SUCCINATE 12.5 MG: 25 TABLET, EXTENDED RELEASE ORAL at 07:35

## 2024-06-23 RX ADMIN — ATORVASTATIN CALCIUM 40 MG: 40 TABLET, FILM COATED ORAL at 08:13

## 2024-06-23 RX ADMIN — IPRATROPIUM BROMIDE AND ALBUTEROL SULFATE 3 ML: 2.5; .5 SOLUTION RESPIRATORY (INHALATION) at 09:07

## 2024-06-23 RX ADMIN — Medication 10 ML: at 08:32

## 2024-06-23 RX ADMIN — SERTRALINE 50 MG: 50 TABLET, FILM COATED ORAL at 08:13

## 2024-06-23 RX ADMIN — HYDROCHLOROTHIAZIDE 50 MG: 25 TABLET ORAL at 08:14

## 2024-06-23 RX ADMIN — PANTOPRAZOLE SODIUM 40 MG: 40 INJECTION, POWDER, FOR SOLUTION INTRAVENOUS at 05:32

## 2024-06-23 RX ADMIN — Medication 10 ML: at 21:11

## 2024-06-23 RX ADMIN — HYDRALAZINE HYDROCHLORIDE 25 MG: 25 TABLET ORAL at 21:11

## 2024-06-23 RX ADMIN — GABAPENTIN 300 MG: 300 CAPSULE ORAL at 08:13

## 2024-06-23 RX ADMIN — SODIUM CHLORIDE 250 MG: 9 INJECTION, SOLUTION INTRAVENOUS at 12:43

## 2024-06-23 RX ADMIN — LEVOTHYROXINE SODIUM 100 MCG: 0.1 TABLET ORAL at 05:32

## 2024-06-23 RX ADMIN — ASPIRIN 81 MG: 81 TABLET, COATED ORAL at 08:14

## 2024-06-23 RX ADMIN — GABAPENTIN 300 MG: 300 CAPSULE ORAL at 21:11

## 2024-06-23 RX ADMIN — AMLODIPINE BESYLATE 5 MG: 5 TABLET ORAL at 07:35

## 2024-06-23 RX ADMIN — FUROSEMIDE 40 MG: 10 INJECTION, SOLUTION INTRAMUSCULAR; INTRAVENOUS at 12:43

## 2024-06-23 RX ADMIN — CETIRIZINE HYDROCHLORIDE 10 MG: 10 TABLET, FILM COATED ORAL at 08:13

## 2024-06-23 RX ADMIN — FUROSEMIDE 40 MG: 10 INJECTION, SOLUTION INTRAMUSCULAR; INTRAVENOUS at 18:53

## 2024-06-23 RX ADMIN — SODIUM CHLORIDE 75 ML/HR: 900 INJECTION, SOLUTION INTRAVENOUS at 03:19

## 2024-06-23 NOTE — PROGRESS NOTES
[facility] Medicine Services  PROGRESS NOTE    Patient Name: Angélica De La Cruz  : 1945  MRN: [id number]    Date of Admission: 2024  Primary Care Physician: Sussy Bloom MD    Subjective   Subjective     CC:  Chest pain, shortness of breath      HPI:  Patient reports developing shortness of breath acutely this morning with fatigue.  She denies chest pain.  Denies any abdominal discomfort or nausea.      Objective   Objective     Vital Signs:   Temp:  [97 °F (36.1 °C)-98.2 °F (36.8 °C)] 98 °F (36.7 °C)  Heart Rate:  [44-58] 55  Resp:  [16-20] 18  BP: (136-196)/(48-99) 190/99  Flow (L/min):  [2-3] 2     Physical Exam:  General appearance: alert, awake, oriented, no acute distress, lethargic  Cardiovascular: Bradycardic, irregularly irregular, systolic murmur, radial pulse full 2/4 BL   Respiratory: Bibasilar crackles, trace expiratory wheezing in upper lung fields, oxygenating well on 2 L nasal cannula, no respiratory distress  Abdomen: soft, mild epigastric tenderness to palpation, no organomegaly, bowel sounds normoactive    Neuro/CNS: alert and oriented x3, normal speech    Results Reviewed:  LAB RESULTS:      Lab 24  0327 24  0745 24  0727 24  0006 24  1558 24  1132   WBC 7.15 7.71 10.14  --   --  6.12   HEMOGLOBIN 9.6* 10.2* 10.8*  --   --  7.7*   HEMATOCRIT 34.1 35.0 37.2  --   --  28.5*   PLATELETS 261 278 298  --   --  246   NEUTROS ABS  --   --  7.37*  --   --  4.80   IMMATURE GRANS (ABS)  --   --  0.03  --   --  0.02   LYMPHS ABS  --   --  1.45  --   --  0.55*   MONOS ABS  --   --  0.80  --   --  0.42   EOS ABS  --   --  0.40  --   --  0.29   MCV 77.7* 77.1* 77.8*  --   --  78.5*   PROTIME  --   --   --   --  16.0*  --    APTT  --   --   --   --  39.3*  --    HEPARIN ANTI-XA  --   --  0.25* 0.11* 0.10*  --    D DIMER QUANT  --   --   --   --   --  1.50*         Lab 24  0327 24  0745 24  0727 24  1138    SODIUM 145 141 141 141   POTASSIUM 3.9 3.7 4.6 4.3   CHLORIDE 110* 103 106 106   CO2 26.0 25.0 23.0 25.0   ANION GAP 9.0 13.0 12.0 10.0   BUN 17 23 26* 24*   CREATININE 1.16* 1.30* 1.55* 1.50*   EGFR 47.5* 41.4* 33.5* 34.9*   GLUCOSE 84 98 122* 100*   CALCIUM 8.4* 8.6 8.7 9.2   MAGNESIUM  --  1.9  --  2.3   TSH  --   --   --  0.390         Lab 06/20/24  0727 06/19/24  1132   TOTAL PROTEIN 7.7 7.0   ALBUMIN 3.7 3.6   GLOBULIN 4.0 3.4   ALT (SGPT) 41* 15   AST (SGOT) 118* 32   BILIRUBIN 0.8 0.3   ALK PHOS 176* 111         Lab 06/20/24  0727 06/20/24  0006 06/19/24  1558 06/19/24  1352 06/19/24  1132   PROBNP  --   --   --   --  4,207.0*   HSTROP T 1,538* 1,198* 229* 80* 36*   PROTIME  --   --  16.0*  --   --    INR  --   --  1.26*  --   --              Lab 06/19/24  1558 06/19/24  1406 06/19/24  1132   IRON 14*  --   --    IRON SATURATION (TSAT) 4*  --   --    TIBC 399  --   --    TRANSFERRIN 268  --   --    FERRITIN 41.03  --   --    VITAMIN B 12  --   --  647   ABO TYPING  --  O  --    RH TYPING  --  Positive  --    ANTIBODY SCREEN  --  Negative  --          Lab 06/23/24  0913 06/19/24  1203   PH, ARTERIAL 7.348* 7.390   PCO2, ARTERIAL 42.8 42.7   PO2 .0* 88.3   FIO2 36 28   HCO3 ART 23.5 25.8   BASE EXCESS ART -2.1* 0.7   CARBOXYHEMOGLOBIN 1.5 1.7     Brief Urine Lab Results  (Last result in the past 365 days)        Color   Clarity   Blood   Leuk Est   Nitrite   Protein   CREAT   Urine HCG        06/19/24 1212 Yellow   Clear   Negative   Negative   Negative   Negative                   Microbiology Results Abnormal       None            XR Chest 1 View    Result Date: 6/23/2024  XR CHEST 1 VW Date of Exam: 6/23/2024 8:34 AM EDT Indication: Shortness of breath. Comparison: Chest radiograph 6/20/2024, chest CT 6/19/2024 Findings: Prior median sternotomy and CABG. Cardiomegaly stable from prior. Prominence of the central pulmonary vasculature similar to prior. Mild interstitial opacities stable from prior  which could reflect mild edema. No significant effusion. 110 costophrenic angles similar to prior, difficult to exclude trace pleural fluid particularly on the right. No pneumothorax. Degenerative related osseous changes. Aortic atherosclerotic disease.     Impression: Impression: Stable radiographic appearance of the chest since 6/20/2024 comparison. Electronically Signed: Tre Lyon MD  6/23/2024 8:50 AM EDT  Workstation ID: [id number]    Adult Transthoracic Echo Complete W/ Cont if Necessary Per Protocol    Result Date: 6/22/2024    Left ventricular ejection fraction appears to be 46 - 50%.   Mildly reduced right ventricular systolic function noted.   The left atrial cavity is dilated.   Left atrial volume is severely increased.   Estimated right ventricular systolic pressure from tricuspid regurgitation is mildly elevated (35-45 mmHg). Calculated right ventricular systolic pressure from tricuspid regurgitation is 40 mmHg.      Results for orders placed during the hospital encounter of 06/19/24    Adult Transthoracic Echo Complete W/ Cont if Necessary Per Protocol    Interpretation Summary    Left ventricular ejection fraction appears to be 46 - 50%.    Mildly reduced right ventricular systolic function noted.    The left atrial cavity is dilated.    Left atrial volume is severely increased.    Estimated right ventricular systolic pressure from tricuspid regurgitation is mildly elevated (35-45 mmHg). Calculated right ventricular systolic pressure from tricuspid regurgitation is 40 mmHg.      Current medications:  Scheduled Meds:[START ON 6/24/2024] amLODIPine, 10 mg, Oral, Q24H  amLODIPine, 5 mg, Oral, Once  aspirin, 81 mg, Oral, Daily  atorvastatin, 40 mg, Oral, Daily  cetirizine, 10 mg, Oral, Daily  gabapentin, 300 mg, Oral, Q12H  hydrALAZINE, 25 mg, Oral, BID  hydroCHLOROthiazide, 50 mg, Oral, Daily  levothyroxine, 100 mcg, Oral, Daily  metoprolol succinate XL, 12.5 mg, Oral, Q24H  pantoprazole, 40 mg,  Intravenous, Q AM  pharmacy consult - MTM, , Does not apply, Daily  sertraline, 50 mg, Oral, Daily  sodium chloride, 10 mL, Intravenous, Q12H      Continuous Infusions:[Held by provider] dilTIAZem, 5-15 mg/hr, Last Rate: Stopped (06/21/24 1602)  sodium chloride, 75 mL/hr, Last Rate: 75 mL/hr (06/23/24 0319)      PRN Meds:.  senna-docusate sodium **AND** polyethylene glycol **AND** bisacodyl **AND** bisacodyl    ipratropium-albuterol    nitroglycerin    sodium chloride    sodium chloride    sodium chloride    Assessment & Plan   Assessment & Plan     Active Hospital Problems    Diagnosis  POA    **A-fib [I48.91]  Yes    Symptomatic anemia [D64.9]  Yes    NSTEMI (non-ST elevated myocardial infarction) [I21.4]  Yes    Hypothyroidism (acquired) [E03.9]  Yes    Rheumatoid arthritis [M06.9]  Yes    Iron deficiency anemia due to chronic blood loss [D50.0]  Unknown    AMANDA (obstructive sleep apnea) [G47.33]  Yes    CKD (chronic kidney disease) [N18.9]  Yes    Hypertension [I10]  Yes      Resolved Hospital Problems   No resolved problems to display.        Brief Hospital Course to date:  Angélica De La Cruz is a 79 y.o. female with past medical history significant for hypertension, paroxysmal atrial fibrillation, CAD s/p CABG, rheumatoid arthritis, fibromyalgia, AMANDA on CPAP and CKD, was admitted for an NSTEMI complicated by atrial fibrillation with RVR and anemia, after presenting with generalized weakness, chest pain and shortness of breath.  Cardiology was consulted and determined patient was suffering from demand ischemia rather than ACS secondary to anemia and arrhythmia.  GI consulted and plan to perform upper and lower endoscopies to evaluate persistent anemia.     NSTEMI  History of prior CABG 2020  Acute on chronic hypoxic respiratory failure secondary to pulmonary edema  Atrial fibrillation with RVR  Paroxysmal atrial fibrillation  -Previously used supplemental oxygen only with CPAP at night but recently escalated to  daily use  -Cardiology consulted, patient presents more consistent with type II demand ischemia related to anemia rather than ACS  -DC heparin GTT  -Continue aspirin and statin  -TTE revealed reduced systolic function  -Defer anticoagulation due to bleeding risk  -Converted to sinus rhythm after IV Cardizem en route but then recurred.  Restarted on diltiazem and then developed bradycardia  -Held AV loco blocking agents but restarted morning of 6/23 and then shortly afterwards became lethargic with shortness of breath; will hold further beta-blocker dosing pending cardiology recommendations  -Stat CXR revealed persistent vascular congestion. Will administer IV lasix and Duonebs.  -Encourage incentive spirometry     Hypotension with hx HTN  --Hypotension resolved, now hypertensive   --Start norvasc now w hypertensive BP  --Defer to cardiology regarding ACE/ARB/ARNI      Symptoms anemia  Iron deficiency anemia  -no obvious bleeding  -iron studies w low Fe   --states her PCP told her she was anemic 1 month ago and started her on MVI with iron  -s/p 2 units pRBC   - GI consulted, s/p upper and lower endoscopy 6/22 with no significant EGD findings.  Colonoscopy revealed 3 mm polyp in cecum that was removed, pathology pending  -Administer Ferrlecit  -CBC, BMP     CKD3  -unclear baseline  -follows with nephrology, improving      AMANDA  --continue cpap, uses at home with 2L primarily at night but was seen by pulmonary last week and has been wearing O2 all day since then     Hypothyroidism  --continue synthroid  --TSH 0.390       Expected Discharge Location and Transportation: Home   Expected Discharge   Expected Discharge Date: 6/25/2024; Expected Discharge Time:      VTE Prophylaxis:  Mechanical VTE prophylaxis orders are present.         AM-PAC 6 Clicks Score (PT): 20 (06/22/24 2200)    CODE STATUS:   Code Status and Medical Interventions:   Ordered at: 06/19/24 1702     Medical Intervention Limits:    No intubation (DNI)      Code Status (Patient has no pulse and is not breathing):    No CPR (Do Not Attempt to Resuscitate)     Medical Interventions (Patient has pulse or is breathing):    Limited Support       Luis Hoffman, DO  06/23/24

## 2024-06-23 NOTE — PLAN OF CARE
Problem: Skin Injury Risk Increased  Goal: Skin Health and Integrity  Outcome: Ongoing, Progressing  Intervention: Optimize Skin Protection  Recent Flowsheet Documentation  Taken 6/23/2024 0600 by Reginald Tracy RN  Head of Bed ([facility]) Positioning: [facility] elevated  Skin Protection: adhesive use limited  Taken 6/23/2024 0400 by Reginald Tracy RN  Head of Bed ([facility]) Positioning: [facility] elevated  Skin Protection:   adhesive use limited   incontinence pads utilized   tubing/devices free from skin contact  Taken 6/23/2024 0200 by Reginald Tracy RN  Head of Bed ([facility]) Positioning: [facility] elevated  Skin Protection: adhesive use limited  Taken 6/23/2024 0000 by Reginald Tracy RN  Head of Bed ([facility]) Positioning: [facility] elevated  Skin Protection: adhesive use limited  Taken 6/22/2024 2200 by Reginald Tracy RN  Head of Bed ([facility]) Positioning: [facility] elevated  Skin Protection: adhesive use limited  Taken 6/22/2024 2000 by Reginald Tracy RN  Head of Bed ([facility]) Positioning: [facility] elevated  Skin Protection:   adhesive use limited   incontinence pads utilized   tubing/devices free from skin contact     Problem: Fall Injury Risk  Goal: Absence of Fall and Fall-Related Injury  Outcome: Ongoing, Progressing  Intervention: Identify and Manage Contributors  Recent Flowsheet Documentation  Taken 6/23/2024 0600 by Reginald Tracy RN  Medication Review/Management: medications reviewed  Taken 6/23/2024 0400 by Reginald Tracy RN  Medication Review/Management: medications reviewed  Taken 6/23/2024 0200 by Reginald Tracy RN  Medication Review/Management: medications reviewed  Taken 6/23/2024 0000 by Reginald Tracy RN  Medication Review/Management: medications reviewed  Taken 6/22/2024 2200 by Reginald Tracy RN  Medication Review/Management: medications reviewed  Taken 6/22/2024 2000 by Reginald Tracy RN  Medication Review/Management: medications reviewed  Intervention: Promote Injury-Free Environment  Recent Flowsheet  Documentation  Taken 6/23/2024 0600 by Reginald Tracy RN  Safety Promotion/Fall Prevention:   activity supervised   assistive device/personal items within reach   clutter free environment maintained   toileting scheduled   safety round/check completed   room organization consistent   nonskid shoes/slippers when out of bed  Taken 6/23/2024 0400 by Reginald Tracy RN  Safety Promotion/Fall Prevention:   activity supervised   assistive device/personal items within reach   clutter free environment maintained   toileting scheduled   safety round/check completed   room organization consistent   nonskid shoes/slippers when out of bed  Taken 6/23/2024 0200 by Reginald Tracy RN  Safety Promotion/Fall Prevention:   activity supervised   assistive device/personal items within reach   clutter free environment maintained   toileting scheduled   safety round/check completed   room organization consistent   nonskid shoes/slippers when out of bed  Taken 6/23/2024 0000 by Reginald Tracy RN  Safety Promotion/Fall Prevention:   activity supervised   assistive device/personal items within reach   clutter free environment maintained   toileting scheduled   safety round/check completed   room organization consistent   nonskid shoes/slippers when out of bed  Taken 6/22/2024 2200 by Reginald Tracy RN  Safety Promotion/Fall Prevention:   activity supervised   assistive device/personal items within reach   clutter free environment maintained   toileting scheduled   safety round/check completed   room organization consistent   nonskid shoes/slippers when out of bed  Taken 6/22/2024 2000 by Reginald Tracy RN  Safety Promotion/Fall Prevention:   activity supervised   assistive device/personal items within reach   clutter free environment maintained   toileting scheduled   safety round/check completed   room organization consistent   nonskid shoes/slippers when out of bed   Goal Outcome Evaluation:

## 2024-06-24 LAB
ALBUMIN SERPL-MCNC: 3 G/DL (ref 3.5–5.2)
ALBUMIN/GLOB SERPL: 0.9 G/DL
ALP SERPL-CCNC: 108 U/L (ref 39–117)
ALT SERPL W P-5'-P-CCNC: 18 U/L (ref 1–33)
ANION GAP SERPL CALCULATED.3IONS-SCNC: 11 MMOL/L (ref 5–15)
AST SERPL-CCNC: 21 U/L (ref 1–32)
BASOPHILS # BLD AUTO: 0.07 10*3/MM3 (ref 0–0.2)
BASOPHILS NFR BLD AUTO: 0.8 % (ref 0–1.5)
BILIRUB SERPL-MCNC: 0.4 MG/DL (ref 0–1.2)
BUN SERPL-MCNC: 11 MG/DL (ref 8–23)
BUN/CREAT SERPL: 10.7 (ref 7–25)
CALCIUM SPEC-SCNC: 8.5 MG/DL (ref 8.6–10.5)
CHLORIDE SERPL-SCNC: 104 MMOL/L (ref 98–107)
CO2 SERPL-SCNC: 28 MMOL/L (ref 22–29)
CREAT SERPL-MCNC: 1.03 MG/DL (ref 0.57–1)
DEPRECATED RDW RBC AUTO: 46.4 FL (ref 37–54)
EGFRCR SERPLBLD CKD-EPI 2021: 54.7 ML/MIN/1.73
EOSINOPHIL # BLD AUTO: 0.73 10*3/MM3 (ref 0–0.4)
EOSINOPHIL NFR BLD AUTO: 8.1 % (ref 0.3–6.2)
ERYTHROCYTE [DISTWIDTH] IN BLOOD BY AUTOMATED COUNT: 17.2 % (ref 12.3–15.4)
GLOBULIN UR ELPH-MCNC: 3.2 GM/DL
GLUCOSE SERPL-MCNC: 97 MG/DL (ref 65–99)
HCT VFR BLD AUTO: 35.7 % (ref 34–46.6)
HGB BLD-MCNC: 10 G/DL (ref 12–15.9)
IMM GRANULOCYTES # BLD AUTO: 0.03 10*3/MM3 (ref 0–0.05)
IMM GRANULOCYTES NFR BLD AUTO: 0.3 % (ref 0–0.5)
LYMPHOCYTES # BLD AUTO: 0.7 10*3/MM3 (ref 0.7–3.1)
LYMPHOCYTES NFR BLD AUTO: 7.7 % (ref 19.6–45.3)
MAGNESIUM SERPL-MCNC: 1.2 MG/DL (ref 1.6–2.4)
MCH RBC QN AUTO: 21.8 PG (ref 26.6–33)
MCHC RBC AUTO-ENTMCNC: 28 G/DL (ref 31.5–35.7)
MCV RBC AUTO: 77.9 FL (ref 79–97)
MONOCYTES # BLD AUTO: 0.83 10*3/MM3 (ref 0.1–0.9)
MONOCYTES NFR BLD AUTO: 9.2 % (ref 5–12)
NEUTROPHILS NFR BLD AUTO: 6.69 10*3/MM3 (ref 1.7–7)
NEUTROPHILS NFR BLD AUTO: 73.9 % (ref 42.7–76)
NRBC BLD AUTO-RTO: 0 /100 WBC (ref 0–0.2)
PLATELET # BLD AUTO: 278 10*3/MM3 (ref 140–450)
PMV BLD AUTO: 9.3 FL (ref 6–12)
POTASSIUM SERPL-SCNC: 3.2 MMOL/L (ref 3.5–5.2)
POTASSIUM SERPL-SCNC: 3.6 MMOL/L (ref 3.5–5.2)
PROT SERPL-MCNC: 6.2 G/DL (ref 6–8.5)
RBC # BLD AUTO: 4.58 10*6/MM3 (ref 3.77–5.28)
SODIUM SERPL-SCNC: 143 MMOL/L (ref 136–145)
WBC NRBC COR # BLD AUTO: 9.05 10*3/MM3 (ref 3.4–10.8)

## 2024-06-24 PROCEDURE — 25010000002 NA FERRIC GLUC CPLX PER 12.5 MG: Performed by: STUDENT IN AN ORGANIZED HEALTH CARE EDUCATION/TRAINING PROGRAM

## 2024-06-24 PROCEDURE — 93010 ELECTROCARDIOGRAM REPORT: CPT | Performed by: INTERNAL MEDICINE

## 2024-06-24 PROCEDURE — 83735 ASSAY OF MAGNESIUM: CPT | Performed by: INTERNAL MEDICINE

## 2024-06-24 PROCEDURE — 25010000002 MAGNESIUM SULFATE 2 GM/50ML SOLUTION: Performed by: INTERNAL MEDICINE

## 2024-06-24 PROCEDURE — 99232 SBSQ HOSP IP/OBS MODERATE 35: CPT | Performed by: INTERNAL MEDICINE

## 2024-06-24 PROCEDURE — 25810000003 SODIUM CHLORIDE 0.9 % SOLUTION 250 ML FLEX CONT: Performed by: INTERNAL MEDICINE

## 2024-06-24 PROCEDURE — 80053 COMPREHEN METABOLIC PANEL: CPT | Performed by: STUDENT IN AN ORGANIZED HEALTH CARE EDUCATION/TRAINING PROGRAM

## 2024-06-24 PROCEDURE — 93005 ELECTROCARDIOGRAM TRACING: CPT | Performed by: INTERNAL MEDICINE

## 2024-06-24 PROCEDURE — 25010000002 AMIODARONE IN DEXTROSE 5% 360-4.14 MG/200ML-% SOLUTION: Performed by: PHYSICIAN ASSISTANT

## 2024-06-24 PROCEDURE — 25010000002 FUROSEMIDE PER 20 MG: Performed by: STUDENT IN AN ORGANIZED HEALTH CARE EDUCATION/TRAINING PROGRAM

## 2024-06-24 PROCEDURE — 85025 COMPLETE CBC W/AUTO DIFF WBC: CPT | Performed by: STUDENT IN AN ORGANIZED HEALTH CARE EDUCATION/TRAINING PROGRAM

## 2024-06-24 PROCEDURE — 25010000002 FUROSEMIDE PER 20 MG: Performed by: PHYSICIAN ASSISTANT

## 2024-06-24 PROCEDURE — 25810000003 SODIUM CHLORIDE 0.9 % SOLUTION 250 ML FLEX CONT: Performed by: STUDENT IN AN ORGANIZED HEALTH CARE EDUCATION/TRAINING PROGRAM

## 2024-06-24 PROCEDURE — 84132 ASSAY OF SERUM POTASSIUM: CPT | Performed by: INTERNAL MEDICINE

## 2024-06-24 PROCEDURE — 25010000002 AMIODARONE IN DEXTROSE 5% 360-4.14 MG/200ML-% SOLUTION: Performed by: INTERNAL MEDICINE

## 2024-06-24 RX ORDER — METOPROLOL SUCCINATE 25 MG/1
12.5 TABLET, EXTENDED RELEASE ORAL
Status: DISCONTINUED | OUTPATIENT
Start: 2024-06-24 | End: 2024-06-25

## 2024-06-24 RX ORDER — FUROSEMIDE 10 MG/ML
20 INJECTION INTRAMUSCULAR; INTRAVENOUS
Status: DISCONTINUED | OUTPATIENT
Start: 2024-06-24 | End: 2024-06-24

## 2024-06-24 RX ORDER — PANTOPRAZOLE SODIUM 40 MG/1
40 TABLET, DELAYED RELEASE ORAL
Status: DISCONTINUED | OUTPATIENT
Start: 2024-06-25 | End: 2024-07-01 | Stop reason: HOSPADM

## 2024-06-24 RX ORDER — POTASSIUM CHLORIDE 20 MEQ/1
40 TABLET, EXTENDED RELEASE ORAL EVERY 4 HOURS
Status: COMPLETED | OUTPATIENT
Start: 2024-06-24 | End: 2024-06-24

## 2024-06-24 RX ORDER — MAGNESIUM SULFATE HEPTAHYDRATE 40 MG/ML
2 INJECTION, SOLUTION INTRAVENOUS
Status: COMPLETED | OUTPATIENT
Start: 2024-06-24 | End: 2024-06-25

## 2024-06-24 RX ORDER — FUROSEMIDE 10 MG/ML
20 INJECTION INTRAMUSCULAR; INTRAVENOUS
Status: DISCONTINUED | OUTPATIENT
Start: 2024-06-24 | End: 2024-06-26

## 2024-06-24 RX ADMIN — FUROSEMIDE 20 MG: 10 INJECTION, SOLUTION INTRAMUSCULAR; INTRAVENOUS at 10:08

## 2024-06-24 RX ADMIN — AMIODARONE HYDROCHLORIDE 1 MG/MIN: 1.8 INJECTION, SOLUTION INTRAVENOUS at 14:53

## 2024-06-24 RX ADMIN — APIXABAN 5 MG: 5 TABLET, FILM COATED ORAL at 10:08

## 2024-06-24 RX ADMIN — MAGNESIUM SULFATE HEPTAHYDRATE 2 G: 2 INJECTION, SOLUTION INTRAVENOUS at 23:26

## 2024-06-24 RX ADMIN — METOPROLOL SUCCINATE 12.5 MG: 25 TABLET, EXTENDED RELEASE ORAL at 13:14

## 2024-06-24 RX ADMIN — AMIODARONE HYDROCHLORIDE 0.5 MG/MIN: 1.8 INJECTION, SOLUTION INTRAVENOUS at 15:50

## 2024-06-24 RX ADMIN — PANTOPRAZOLE SODIUM 40 MG: 40 INJECTION, POWDER, FOR SOLUTION INTRAVENOUS at 06:25

## 2024-06-24 RX ADMIN — Medication 10 ML: at 20:58

## 2024-06-24 RX ADMIN — APIXABAN 5 MG: 5 TABLET, FILM COATED ORAL at 20:57

## 2024-06-24 RX ADMIN — ASPIRIN 81 MG: 81 TABLET, COATED ORAL at 09:23

## 2024-06-24 RX ADMIN — SODIUM CHLORIDE 40 ML: 9 INJECTION, SOLUTION INTRAVENOUS at 09:04

## 2024-06-24 RX ADMIN — POTASSIUM CHLORIDE 40 MEQ: 1500 TABLET, EXTENDED RELEASE ORAL at 13:15

## 2024-06-24 RX ADMIN — LEVOTHYROXINE SODIUM 100 MCG: 0.1 TABLET ORAL at 06:24

## 2024-06-24 RX ADMIN — AMIODARONE HYDROCHLORIDE 0.5 MG/MIN: 1.8 INJECTION, SOLUTION INTRAVENOUS at 19:35

## 2024-06-24 RX ADMIN — AMIODARONE HYDROCHLORIDE 1 MG/MIN: 1.8 INJECTION, SOLUTION INTRAVENOUS at 09:00

## 2024-06-24 RX ADMIN — MAGNESIUM SULFATE HEPTAHYDRATE 2 G: 2 INJECTION, SOLUTION INTRAVENOUS at 18:47

## 2024-06-24 RX ADMIN — FUROSEMIDE 20 MG: 10 INJECTION, SOLUTION INTRAMUSCULAR; INTRAVENOUS at 18:47

## 2024-06-24 RX ADMIN — POTASSIUM CHLORIDE 40 MEQ: 1500 TABLET, EXTENDED RELEASE ORAL at 09:23

## 2024-06-24 RX ADMIN — POTASSIUM CHLORIDE 40 MEQ: 1500 TABLET, EXTENDED RELEASE ORAL at 20:57

## 2024-06-24 RX ADMIN — Medication 10 ML: at 08:11

## 2024-06-24 RX ADMIN — GABAPENTIN 300 MG: 300 CAPSULE ORAL at 20:57

## 2024-06-24 RX ADMIN — SODIUM CHLORIDE 250 MG: 9 INJECTION, SOLUTION INTRAVENOUS at 09:04

## 2024-06-24 RX ADMIN — HYDRALAZINE HYDROCHLORIDE 25 MG: 25 TABLET ORAL at 20:57

## 2024-06-24 RX ADMIN — SERTRALINE 50 MG: 50 TABLET, FILM COATED ORAL at 08:08

## 2024-06-24 RX ADMIN — GABAPENTIN 300 MG: 300 CAPSULE ORAL at 08:08

## 2024-06-24 RX ADMIN — ATORVASTATIN CALCIUM 40 MG: 40 TABLET, FILM COATED ORAL at 08:08

## 2024-06-24 RX ADMIN — MAGNESIUM SULFATE HEPTAHYDRATE 2 G: 2 INJECTION, SOLUTION INTRAVENOUS at 16:38

## 2024-06-24 RX ADMIN — Medication 10 ML: at 20:45

## 2024-06-24 RX ADMIN — POTASSIUM CHLORIDE 40 MEQ: 1500 TABLET, EXTENDED RELEASE ORAL at 23:31

## 2024-06-24 RX ADMIN — CETIRIZINE HYDROCHLORIDE 10 MG: 10 TABLET, FILM COATED ORAL at 08:08

## 2024-06-24 NOTE — CASE MANAGEMENT/SOCIAL WORK
Continued Stay Note  [facility]     Patient Name: Angélica De La Cruz  MRN: [id number]  Today's Date: 6/24/2024    Admit Date: 6/19/2024    Plan: Home with [facility]   Discharge Plan       Row Name 06/24/24 0955       Plan    Plan Home with [facility]    Patient/Family in Agreement with Plan yes    Plan Comments  spoke with Payton/Linda , on the phone, to confirm pt was acceptd for SN/PT/OT HH. COLTON spoke with Mrs. De La Cruz, at the bedside. [facility] Pt is agreeable. She states that family will transport her home at discharge. [facility] will need to be notified on day of discharge.  will continue to follow.    Final Discharge Disposition Code 06 - home with home health care                   Discharge Codes    No documentation.                 Expected Discharge Date and Time       Expected Discharge Date Expected Discharge Time    Jun 25, 2024               Lisa Diallo RN

## 2024-06-24 NOTE — NURSING NOTE
Patient about an approximate 4 second pause and her EKG looked like sinus rhythm for about a minute, but then flipped back into afib. Cardiology (MD Avis) paged and relayed information. Instead of continuing at Amiodarone at 1mg/min as per previous verbal order, switch to 0.5mg/min and monitor. Also alerted Tara Jordan MD about the sinus pause. Both are aware.

## 2024-06-24 NOTE — PROGRESS NOTES
" [facility] - Progress Note    Angélica De La Cruz  1945  [id number]    06/24/24, 09:36 EDT      Chief Complaint: Following for PAF with RVR    Subjective:   Weekend notes reviewed.  Bradycardia and post conversion pauses noted.  Notified by RN that patient went back in A-fib with RVR earlier this morning with drop in BP entheses hypertensive prior to this event).    Currently resting in room, tired.  RN at bedside.  Rates ranging from 110-136, AFib.  SBP currently 117.    Review of Systems:  Pertinent positives are listed above and in physical exam.  All others have been reviewed and are negative.    aspirin, 81 mg, Oral, Daily  atorvastatin, 40 mg, Oral, Daily  cetirizine, 10 mg, Oral, Daily  ferric gluconate, 250 mg, Intravenous, Daily  furosemide, 20 mg, Intravenous, BID  gabapentin, 300 mg, Oral, Q12H  hydrALAZINE, 25 mg, Oral, BID  levothyroxine, 100 mcg, Oral, Daily  pantoprazole, 40 mg, Intravenous, Q AM  pharmacy consult - MTM, , Does not apply, Daily  potassium chloride ER, 40 mEq, Oral, Q4H  sertraline, 50 mg, Oral, Daily  sodium chloride, 10 mL, Intravenous, Q12H        Objective:  Vitals:   height is 162.6 cm (64.02\") and weight is 78.2 kg (172 lb 6.4 oz). Her oral temperature is 98 °F (36.7 °C). Her blood pressure is 128/99 and her pulse is 141 (abnormal). Her respiration is 20 and oxygen saturation is 94%.     Intake/Output Summary (Last 24 hours) at 6/24/2024 0936  Last data filed at 6/24/2024 0400  Gross per 24 hour   Intake 240 ml   Output 3700 ml   Net -3460 ml       Physical Exam:  General:  WN, NADCV: Irregular, tachycardic. No murmur, rub, or gallop.  Resp:  CTA Mauricio, equal, nonlabored.  Cardio:  Irregular Irregular.  No murmur, rub, gallop.  Abd:  Soft, + BS, no organomegaly. Nontender to palpation.  Extrem:  No edema BLE, 2+ pedal/PT pulses.            Results from last 7 days   Lab Units 06/24/24  0332   WBC 10*3/mm3 9.05   HEMOGLOBIN g/dL 10.0*   HEMATOCRIT % 35.7   PLATELETS " 10*3/mm3 278     Results from last 7 days   Lab Units 06/24/24  0332   SODIUM mmol/L 143   POTASSIUM mmol/L 3.2*   CHLORIDE mmol/L 104   CO2 mmol/L 28.0   BUN mg/dL 11   CREATININE mg/dL 1.03*   CALCIUM mg/dL 8.5*   BILIRUBIN mg/dL 0.4   ALK PHOS U/L 108   ALT (SGPT) U/L 18   AST (SGOT) U/L 21   GLUCOSE mg/dL 97     Results from last 7 days   Lab Units 06/19/24  1558   INR  1.26*     Results from last 7 days   Lab Units 06/19/24  1132   TSH uIU/mL 0.390   FREE T4 ng/dL 1.30         Results from last 7 days   Lab Units 06/19/24  1132   PROBNP pg/mL 4,207.0*       Tele: Atrial fibrillation with RVR    Assessment:  -79-year-old CF with PMHx CAD/CABG, HTN, HLP, hypothyroidism, AMANDA/CPAP and chronic dyspnea presents to [facility] ED with recent abnormal CXR as outpatient and progressive dyspnea, increased O2 demand at home.  -Anemia requiring 2 units PRBCs transfusion at admission, recent bowel habit changes  -A-fib with RVR in setting of profound anemia  -Elevated troponin, likely demand ischemia  -CKD  -RA  -Obesity           Plan:  -Admitted to hospitalist services, s/p Transfusion total 2 units PRBCs.    -GI following, s/p EGD and colonoscopy 6/22. Iron supplementation recommended.  -Echo EF 46 to 50% with LA dilation, RVSP 35 to 45 mmHg.  -Patient has no reports of exertional chest pain or angina.  Closure of graft unlikely.  Elevated troponin appears to be demand ischemia in setting of profound anemia and CHRISTIANE/CKD.  Continue ASA (81 mg) and statin.  -Will need to initiate NOAC with recurrent/paroxysmal atrial fibrillation.  Start Eliquis 5 mg twice daily  -Cardiology will continue to follow        I discussed the patient's findings and my recommendations with the patient, any present family members, and the nursing staff.  Timothy Albarran MD saw and examined patient, verified hx and PE, read all radiographic studies, reviewed labs and micro data, and formulated dx, plan for treatment and all medical decision making.       Rosanna Nelson PA-C  06/24/24, 09:36 EDT

## 2024-06-24 NOTE — PROGRESS NOTES
"          Clinical Nutrition Assessment     Patient Name: Angélica De La Cruz  YOB: 1945  MRN: [id number]  Date of Encounter: 06/24/24 12:28 EDT  Admission date: 6/19/2024  Reason for Visit: MST score 2+, Unintentional weight loss    Assessment   Nutrition Assessment   Admission Diagnosis:  A-fib [I48.91]    Problem List:    A-fib    Hypertension    CKD (chronic kidney disease)    AMANDA (obstructive sleep apnea)    Symptomatic anemia    NSTEMI (non-ST elevated myocardial infarction)    Hypothyroidism (acquired)    Rheumatoid arthritis    Iron deficiency anemia due to chronic blood loss      PMH:   She  has a past medical history of Arthritis, CKD (chronic kidney disease), Coronary artery disease, Fibromyalgia, Hypertension, Lung nodule seen on imaging study, AMANDA treated with BiPAP, and Peritonitis.    PSH:  She  has a past surgical history that includes Cholecystectomy; Appendectomy; Hysterectomy (1984); Cardiac catheterization (N/A, 11/24/2020); Cataract extraction, bilateral (Bilateral, 2018); Coronary artery bypass graft (N/A, 11/25/2020); Oophorectomy (Bilateral, 1984); Colonoscopy (N/A, 6/22/2024); and Esophagogastroduodenoscopy (N/A, 6/22/2024).    Applicable Nutrition History:   6/19 Fe anemia (Hgb 10.2 - 6/21/24)  6/22 Colonoscopy: One 3 mm polyp in the cecum, removed with a cold snare. Resected and retrieved. Clips were placed. - The examined portion of the ileum was normal.  6/22 EGD WNL     Anthropometrics     Height: Height: 162.6 cm (64.02\")  Last Filed Weight: Weight: 78.2 kg (172 lb 6.4 oz) (06/21/24 1501)  Method: Weight Method: Stated  BMI: BMI (Calculated): 29.6    UBW:  170# per patient  Weight change: weight loss of 9 lbs (5%) over the past 6 month(s)    Significant?  No    Nutrition Focused Physical Exam    Date:  6/21       Patient meets criteria for malnutrition diagnosis, see MSA note.    Non-severe/milk malnutrition      Subjective   Reported/Observed/Food/Nutrition Related History: "   6/24  Patient reports she is tolerating regular diet. Good appetite.  Likes Boost breeze, would like only one a day. Has menu in room, kitchen staff has been reviewing menu options with her.     6/21  Patient reported mild decrease in appetite upon admission, but no prior or current decrease in appetite. Patient reports typically eating two meals and a snack a day if she is hungry. Patient noted she likes fruits and vegetables and eats little amounts of meat. Patient denies any chewing/swallowing difficulties. Patient reports mild constipation while at home and typically takes a fiber supplement for regularity. Patient states she weighs herself daily since she is on lasix and reported weighing 170# Monday morning (6/17). Bed weight 180.4# at time of visit. Patient willing to try Boost Breeze supplement TID w/ meals since she is on a clear liquid diet.    Current Nutrition Prescription   PO: Diet: Cardiac; Healthy Heart (2-3 Na+); Fluid Consistency: Thin (IDDSI 0)  Oral Nutrition Supplement: Boost Breeze TID    Intake: Insufficient data - No intakes documented    Assessment & Plan   Nutrition Diagnosis   Date:  6/21            Updated:    Problem Malnutrition chronic/moderate   Etiology Multiple chronic illnesses   Signs/Symptoms Mild muscle wasting and mild fat loss   Status: New      Goal:   Nutrition to support treatment and Continue positive trend      Nutrition Intervention      Follow treatment progress, Care plan reviewed, Adjusted supplement, Education provided    Change Boost Breeze to daily   Educated on continue taking MVI daily (with iron) due to recent change in diet and leaning more toward a vegetarian diet.      Monitoring/Evaluation:   Per protocol, I&O, PO intake, Supplement intake    Luisa Gilman RD   Time Spent: 20 min

## 2024-06-24 NOTE — SIGNIFICANT NOTE
"Paged Dr. Albarran twice to confirm amiodarone dose since he verbally told me 0.5mg/min but there has been a clinical change in rhythm (converting to sinus john) two more pauses have occurred and IDANIA Irizarry put in an order for 1mg/min after speaking to Dr. Albarran so I was calling to confirm the correct rate for the drip. Dr. Albarran stated to do \"what he said earlier\" despite a later order, does not appear to be willing to change the order in the system for RN staff to use, and hung up before any other conversation could be had. Amiodarone drip will run at 0.5mg/min.  "

## 2024-06-24 NOTE — PROGRESS NOTES
[facility] Medicine Services  PROGRESS NOTE    Patient Name: Angélica De La Cruz  : 1945  MRN: [id number]    Date of Admission: 2024  Primary Care Physician: Sussy Bloom MD    Subjective   Subjective     CC:  Palpitations f/u    HPI:  Patient reports to me chest pain/pressure prompting admission here but no longer present (I communicate this to cardiology)  Some palpitations now but not significant, in Afib in low 100's throughout morning      Objective   Objective     Vital Signs:   Temp:  [98 °F (36.7 °C)-98.3 °F (36.8 °C)] 98.3 °F (36.8 °C)  Heart Rate:  [] 116  Resp:  [18-22] 18  BP: (100-173)/(62-99) 119/91  Flow (L/min):  [2-3] 3     Physical Exam:  Constitutional: No acute distress, awake, alert female sitting up in chair  HENT: NCAT, mucous membranes moist  Respiratory: Clear to auscultation bilaterally, respiratory effort normal   Cardiovascular: IRIR elevated rate, no murmurs, rubs, or gallops  Gastrointestinal: Soft, nontender, nondistended  Musculoskeletal: Muscle tone within normal limits, no joint effusions appreciated  Psychiatric: Appropriate affect, cooperative  Neurologic: Alert and oriented, facial movements symmetric and spontaneous movement of all 4 extremities grossly equal bilaterally, speech clear  Skin: No rashes    Results Reviewed:  LAB RESULTS:      Lab 24  0332 24  0327 24  0745 24  0727 24  0006 24  1558 24  1132   WBC 9.05 7.15 7.71 10.14  --   --  6.12   HEMOGLOBIN 10.0* 9.6* 10.2* 10.8*  --   --  7.7*   HEMATOCRIT 35.7 34.1 35.0 37.2  --   --  28.5*   PLATELETS 278 261 278 298  --   --  246   NEUTROS ABS 6.69  --   --  7.37*  --   --  4.80   IMMATURE GRANS (ABS) 0.03  --   --  0.03  --   --  0.02   LYMPHS ABS 0.70  --   --  1.45  --   --  0.55*   MONOS ABS 0.83  --   --  0.80  --   --  0.42   EOS ABS 0.73*  --   --  0.40  --   --  0.29   MCV 77.9* 77.7* 77.1* 77.8*  --   --  78.5*   PROTIME  --    --   --   --   --  16.0*  --    APTT  --   --   --   --   --  39.3*  --    HEPARIN ANTI-XA  --   --   --  0.25* 0.11* 0.10*  --    D DIMER QUANT  --   --   --   --   --   --  1.50*         Lab 06/24/24  1418 06/24/24  0332 06/22/24  0327 06/21/24  0745 06/20/24  0727 06/19/24  1132   SODIUM  --  143 145 141 141 141   POTASSIUM 3.6 3.2* 3.9 3.7 4.6 4.3   CHLORIDE  --  104 110* 103 106 106   CO2  --  28.0 26.0 25.0 23.0 25.0   ANION GAP  --  11.0 9.0 13.0 12.0 10.0   BUN  --  11 17 23 26* 24*   CREATININE  --  1.03* 1.16* 1.30* 1.55* 1.50*   EGFR  --  54.7* 47.5* 41.4* 33.5* 34.9*   GLUCOSE  --  97 84 98 122* 100*   CALCIUM  --  8.5* 8.4* 8.6 8.7 9.2   MAGNESIUM 1.2*  --   --  1.9  --  2.3   TSH  --   --   --   --   --  0.390         Lab 06/24/24  0332 06/20/24  0727 06/19/24  1132   TOTAL PROTEIN 6.2 7.7 7.0   ALBUMIN 3.0* 3.7 3.6   GLOBULIN 3.2 4.0 3.4   ALT (SGPT) 18 41* 15   AST (SGOT) 21 118* 32   BILIRUBIN 0.4 0.8 0.3   ALK PHOS 108 176* 111         Lab 06/20/24  0727 06/20/24  0006 06/19/24  1558 06/19/24  1352 06/19/24  1132   PROBNP  --   --   --   --  4,207.0*   HSTROP T 1,538* 1,198* 229* 80* 36*   PROTIME  --   --  16.0*  --   --    INR  --   --  1.26*  --   --              Lab 06/19/24  1558 06/19/24  1406 06/19/24  1132   IRON 14*  --   --    IRON SATURATION (TSAT) 4*  --   --    TIBC 399  --   --    TRANSFERRIN 268  --   --    FERRITIN 41.03  --   --    VITAMIN B 12  --   --  647   ABO TYPING  --  O  --    RH TYPING  --  Positive  --    ANTIBODY SCREEN  --  Negative  --          Lab 06/23/24  0913 06/19/24  1203   PH, ARTERIAL 7.348* 7.390   PCO2, ARTERIAL 42.8 42.7   PO2 .0* 88.3   FIO2 36 28   HCO3 ART 23.5 25.8   BASE EXCESS ART -2.1* 0.7   CARBOXYHEMOGLOBIN 1.5 1.7     Brief Urine Lab Results  (Last result in the past 365 days)        Color   Clarity   Blood   Leuk Est   Nitrite   Protein   CREAT   Urine HCG        06/19/24 1212 Yellow   Clear   Negative   Negative   Negative   Negative                    Microbiology Results Abnormal       None            XR Chest 1 View    Result Date: 6/23/2024  XR CHEST 1 VW Date of Exam: 6/23/2024 8:34 AM EDT Indication: Shortness of breath. Comparison: Chest radiograph 6/20/2024, chest CT 6/19/2024 Findings: Prior median sternotomy and CABG. Cardiomegaly stable from prior. Prominence of the central pulmonary vasculature similar to prior. Mild interstitial opacities stable from prior which could reflect mild edema. No significant effusion. 110 costophrenic angles similar to prior, difficult to exclude trace pleural fluid particularly on the right. No pneumothorax. Degenerative related osseous changes. Aortic atherosclerotic disease.     Impression: Impression: Stable radiographic appearance of the chest since 6/20/2024 comparison. Electronically Signed: Tre Lyon MD  6/23/2024 8:50 AM EDT  Workstation ID: [id number]     Results for orders placed during the hospital encounter of 06/19/24    Adult Transthoracic Echo Complete W/ Cont if Necessary Per Protocol    Interpretation Summary    Left ventricular ejection fraction appears to be 46 - 50%.    Mildly reduced right ventricular systolic function noted.    The left atrial cavity is dilated.    Left atrial volume is severely increased.    Estimated right ventricular systolic pressure from tricuspid regurgitation is mildly elevated (35-45 mmHg). Calculated right ventricular systolic pressure from tricuspid regurgitation is 40 mmHg.      Current medications:  Scheduled Meds:apixaban, 5 mg, Oral, Q12H  aspirin, 81 mg, Oral, Daily  atorvastatin, 40 mg, Oral, Daily  cetirizine, 10 mg, Oral, Daily  ferric gluconate, 250 mg, Intravenous, Daily  furosemide, 20 mg, Intravenous, BID  gabapentin, 300 mg, Oral, Q12H  hydrALAZINE, 25 mg, Oral, BID  levothyroxine, 100 mcg, Oral, Daily  metoprolol succinate XL, 12.5 mg, Oral, Q24H  [START ON 6/25/2024] pantoprazole, 40 mg, Oral, Q AM  pharmacy consult - MTM, , Does not apply,  Daily  sertraline, 50 mg, Oral, Daily  sodium chloride, 10 mL, Intravenous, Q12H      Continuous Infusions:amiodarone, 0.5 mg/min      PRN Meds:.  senna-docusate sodium **AND** polyethylene glycol **AND** bisacodyl **AND** bisacodyl    Calcium Replacement - Follow Nurse / BPA Driven Protocol    ipratropium-albuterol    Magnesium Standard Dose Replacement - Follow Nurse / BPA Driven Protocol    nitroglycerin    Phosphorus Replacement - Follow Nurse / BPA Driven Protocol    Potassium Replacement - Follow Nurse / BPA Driven Protocol    sodium chloride    sodium chloride    sodium chloride    Assessment & Plan   Assessment & Plan     Active Hospital Problems    Diagnosis  POA    **A-fib [I48.91]  Yes    Symptomatic anemia [D64.9]  Yes    NSTEMI (non-ST elevated myocardial infarction) [I21.4]  Yes    Hypothyroidism (acquired) [E03.9]  Yes    Rheumatoid arthritis [M06.9]  Yes    Iron deficiency anemia due to chronic blood loss [D50.0]  Unknown    AMANDA (obstructive sleep apnea) [G47.33]  Yes    CKD (chronic kidney disease) [N18.9]  Yes    Hypertension [I10]  Yes      Resolved Hospital Problems   No resolved problems to display.        Brief Hospital Course to date:  Angélica De La Cruz is a 79 y.o. female w CAD s/p CABG c/b post-op Afib who presented w chest pain on 6/19. Rising troponin on admission - cardiology consulted who attributed this to NSTEMI II. Stay c/b persistent paroxysmal A-fib then sinus bradycardia 2/2 meds given for this    Paroxysmal A-fib c/b sinus pauses/bradycardia after medication trx  -start amiodarone, eliquis per cardiology. Both new medications for patient as afib previously only post-op  -careful monitoring given prior sinus pauses    NSTEMI, h/o CABG 2020  -patient troponin 36 --> 1500 on admission in setting of chest pain. Feel significant to attribute to patient Hbg 7.7 grant given chronicity before admission (Hbg ~8.8 on 5/23/24 outpatient). Maybe 2/2 paroxysmal Afib  -ECHO EF 50, down from ~60%  prior on review  -cardiology deferring ischemic evaluation currently, mgmt of above    Iron deficiency anemia, acute on chronic   -s/p 2 units transfusion w significant response and stabilized thereafter  -IV iron while here  -EGD wnl 6/22, colonoscopy w cecal polyp but no bleeding    Hypomagnesemia/hypokalemia - replace per protocol  CKDIIIb - at baseline    Expected Discharge Location and Transportation: home   Expected Discharge 6/27 (Discharge date is tentative pending patient's medical condition and is subject to change)  Expected Discharge Date: 6/27/2024; Expected Discharge Time:      VTE Prophylaxis:  Pharmacologic & mechanical VTE prophylaxis orders are present.         AM-PAC 6 Clicks Score (PT): 20 (06/24/24 0800)    CODE STATUS:   Code Status and Medical Interventions:   Ordered at: 06/19/24 1702     Medical Intervention Limits:    No intubation (DNI)     Code Status (Patient has no pulse and is not breathing):    No CPR (Do Not Attempt to Resuscitate)     Medical Interventions (Patient has pulse or is breathing):    Limited Support       Tara Jordan MD  06/24/24

## 2024-06-25 LAB
ANION GAP SERPL CALCULATED.3IONS-SCNC: 11 MMOL/L (ref 5–15)
BUN SERPL-MCNC: 16 MG/DL (ref 8–23)
BUN/CREAT SERPL: 13.9 (ref 7–25)
CALCIUM SPEC-SCNC: 8.5 MG/DL (ref 8.6–10.5)
CHLORIDE SERPL-SCNC: 100 MMOL/L (ref 98–107)
CO2 SERPL-SCNC: 27 MMOL/L (ref 22–29)
CREAT SERPL-MCNC: 1.15 MG/DL (ref 0.57–1)
CYTO UR: NORMAL
EGFRCR SERPLBLD CKD-EPI 2021: 48 ML/MIN/1.73
GLUCOSE SERPL-MCNC: 103 MG/DL (ref 65–99)
LAB AP CASE REPORT: NORMAL
LAB AP CLINICAL INFORMATION: NORMAL
MAGNESIUM SERPL-MCNC: 2.9 MG/DL (ref 1.6–2.4)
PATH REPORT.FINAL DX SPEC: NORMAL
PATH REPORT.GROSS SPEC: NORMAL
POTASSIUM SERPL-SCNC: 4.4 MMOL/L (ref 3.5–5.2)
POTASSIUM SERPL-SCNC: 4.4 MMOL/L (ref 3.5–5.2)
QT INTERVAL: 328 MS
QT INTERVAL: 570 MS
QTC INTERVAL: 519 MS
QTC INTERVAL: 533 MS
SODIUM SERPL-SCNC: 138 MMOL/L (ref 136–145)

## 2024-06-25 PROCEDURE — 94799 UNLISTED PULMONARY SVC/PX: CPT

## 2024-06-25 PROCEDURE — 25010000002 NA FERRIC GLUC CPLX PER 12.5 MG: Performed by: STUDENT IN AN ORGANIZED HEALTH CARE EDUCATION/TRAINING PROGRAM

## 2024-06-25 PROCEDURE — 84132 ASSAY OF SERUM POTASSIUM: CPT | Performed by: INTERNAL MEDICINE

## 2024-06-25 PROCEDURE — 83735 ASSAY OF MAGNESIUM: CPT | Performed by: PHYSICIAN ASSISTANT

## 2024-06-25 PROCEDURE — 25810000003 SODIUM CHLORIDE 0.9 % SOLUTION 250 ML FLEX CONT: Performed by: STUDENT IN AN ORGANIZED HEALTH CARE EDUCATION/TRAINING PROGRAM

## 2024-06-25 PROCEDURE — 97530 THERAPEUTIC ACTIVITIES: CPT

## 2024-06-25 PROCEDURE — 25010000002 FUROSEMIDE PER 20 MG: Performed by: PHYSICIAN ASSISTANT

## 2024-06-25 PROCEDURE — 93005 ELECTROCARDIOGRAM TRACING: CPT | Performed by: PHYSICIAN ASSISTANT

## 2024-06-25 PROCEDURE — 97535 SELF CARE MNGMENT TRAINING: CPT

## 2024-06-25 PROCEDURE — 94761 N-INVAS EAR/PLS OXIMETRY MLT: CPT

## 2024-06-25 PROCEDURE — 80048 BASIC METABOLIC PNL TOTAL CA: CPT | Performed by: PHYSICIAN ASSISTANT

## 2024-06-25 PROCEDURE — 94664 DEMO&/EVAL PT USE INHALER: CPT

## 2024-06-25 PROCEDURE — 99232 SBSQ HOSP IP/OBS MODERATE 35: CPT | Performed by: INTERNAL MEDICINE

## 2024-06-25 RX ORDER — AMIODARONE HYDROCHLORIDE 200 MG/1
200 TABLET ORAL 2 TIMES DAILY WITH MEALS
Status: DISCONTINUED | OUTPATIENT
Start: 2024-06-25 | End: 2024-06-28

## 2024-06-25 RX ORDER — ECHINACEA PURPUREA EXTRACT 125 MG
1 TABLET ORAL AS NEEDED
Status: DISCONTINUED | OUTPATIENT
Start: 2024-06-25 | End: 2024-07-01 | Stop reason: HOSPADM

## 2024-06-25 RX ADMIN — Medication 10 ML: at 08:52

## 2024-06-25 RX ADMIN — APIXABAN 5 MG: 5 TABLET, FILM COATED ORAL at 21:07

## 2024-06-25 RX ADMIN — Medication 10 ML: at 21:08

## 2024-06-25 RX ADMIN — SERTRALINE 50 MG: 50 TABLET, FILM COATED ORAL at 08:49

## 2024-06-25 RX ADMIN — APIXABAN 5 MG: 5 TABLET, FILM COATED ORAL at 08:50

## 2024-06-25 RX ADMIN — CETIRIZINE HYDROCHLORIDE 10 MG: 10 TABLET, FILM COATED ORAL at 08:50

## 2024-06-25 RX ADMIN — IPRATROPIUM BROMIDE AND ALBUTEROL SULFATE 3 ML: 2.5; .5 SOLUTION RESPIRATORY (INHALATION) at 08:25

## 2024-06-25 RX ADMIN — AMIODARONE HYDROCHLORIDE 200 MG: 200 TABLET ORAL at 18:07

## 2024-06-25 RX ADMIN — FUROSEMIDE 20 MG: 10 INJECTION, SOLUTION INTRAMUSCULAR; INTRAVENOUS at 08:49

## 2024-06-25 RX ADMIN — AMIODARONE HYDROCHLORIDE 200 MG: 200 TABLET ORAL at 10:04

## 2024-06-25 RX ADMIN — GABAPENTIN 300 MG: 300 CAPSULE ORAL at 08:50

## 2024-06-25 RX ADMIN — ATORVASTATIN CALCIUM 40 MG: 40 TABLET, FILM COATED ORAL at 08:49

## 2024-06-25 RX ADMIN — SODIUM CHLORIDE 250 MG: 9 INJECTION, SOLUTION INTRAVENOUS at 08:49

## 2024-06-25 RX ADMIN — LEVOTHYROXINE SODIUM 100 MCG: 0.1 TABLET ORAL at 05:35

## 2024-06-25 RX ADMIN — ASPIRIN 81 MG: 81 TABLET, COATED ORAL at 08:50

## 2024-06-25 RX ADMIN — GABAPENTIN 300 MG: 300 CAPSULE ORAL at 21:07

## 2024-06-25 RX ADMIN — PANTOPRAZOLE SODIUM 40 MG: 40 TABLET, DELAYED RELEASE ORAL at 05:35

## 2024-06-25 RX ADMIN — FUROSEMIDE 20 MG: 10 INJECTION, SOLUTION INTRAMUSCULAR; INTRAVENOUS at 18:07

## 2024-06-25 RX ADMIN — SENNOSIDES AND DOCUSATE SODIUM 2 TABLET: 50; 8.6 TABLET ORAL at 21:07

## 2024-06-25 NOTE — PLAN OF CARE
Goal Outcome Evaluation:  Plan of Care Reviewed With: patient        Progress: no change  Outcome Evaluation: Pt continues to present with decreased functional mobility, decreased balance, and generalized weakness. Pt ambulated 15ft + 75ft with min A and RW for support. Pt required prolonged seated rest break between gait trials d/t fatigue. Continue to progress per pt tolerance.      Anticipated Discharge Disposition (PT): inpatient rehabilitation facility

## 2024-06-25 NOTE — PROGRESS NOTES
[facility] Medicine Services  PROGRESS NOTE    Patient Name: Angélica De La Cruz  : 1945  MRN: [id number]    Date of Admission: 2024  Primary Care Physician: Sussy Bloom MD    Subjective   Subjective     CC:  Palpitations f/u    HPI:  Feeling much better this morning now back in sinus rhythm  Issues overnight with lower heart rates but patient asx from these    Objective   Objective     Vital Signs:   Temp:  [98.1 °F (36.7 °C)-98.6 °F (37 °C)] 98.2 °F (36.8 °C)  Heart Rate:  [] 58  Resp:  [16-18] 18  BP: (108-142)/(43-91) 130/52  Flow (L/min):  [2-3] 2     Physical Exam:  Constitutional: No acute distress, awake, alert female sitting up in bed  HENT: NCAT, mucous membranes moist  Respiratory: Clear to auscultation bilaterally, respiratory effort normal   Cardiovascular: RRR borderline bradycardic, no murmurs, rubs, or gallops  Gastrointestinal: Soft, nontender, nondistended  Musculoskeletal: Muscle tone within normal limits, no joint effusions appreciated  Psychiatric: Appropriate affect, cooperative  Neurologic: Alert and oriented, facial movements symmetric and spontaneous movement of all 4 extremities grossly equal bilaterally, speech clear  Skin: No rashes    Results Reviewed:  LAB RESULTS:      Lab 24  0332 24  0327 24  0745 24  0727 24  0006 24  1558 24  1132   WBC 9.05 7.15 7.71 10.14  --   --  6.12   HEMOGLOBIN 10.0* 9.6* 10.2* 10.8*  --   --  7.7*   HEMATOCRIT 35.7 34.1 35.0 37.2  --   --  28.5*   PLATELETS 278 261 278 298  --   --  246   NEUTROS ABS 6.69  --   --  7.37*  --   --  4.80   IMMATURE GRANS (ABS) 0.03  --   --  0.03  --   --  0.02   LYMPHS ABS 0.70  --   --  1.45  --   --  0.55*   MONOS ABS 0.83  --   --  0.80  --   --  0.42   EOS ABS 0.73*  --   --  0.40  --   --  0.29   MCV 77.9* 77.7* 77.1* 77.8*  --   --  78.5*   PROTIME  --   --   --   --   --  16.0*  --    APTT  --   --   --   --   --  39.3*  --     HEPARIN ANTI-XA  --   --   --  0.25* 0.11* 0.10*  --    D DIMER QUANT  --   --   --   --   --   --  1.50*         Lab 06/25/24  0318 06/24/24  1418 06/24/24  0332 06/22/24  0327 06/21/24  0745 06/20/24  0727 06/19/24  1132   SODIUM 138  --  143 145 141 141 141   POTASSIUM 4.4  4.4 3.6 3.2* 3.9 3.7 4.6 4.3   CHLORIDE 100  --  104 110* 103 106 106   CO2 27.0  --  28.0 26.0 25.0 23.0 25.0   ANION GAP 11.0  --  11.0 9.0 13.0 12.0 10.0   BUN 16  --  11 17 23 26* 24*   CREATININE 1.15*  --  1.03* 1.16* 1.30* 1.55* 1.50*   EGFR 48.0*  --  54.7* 47.5* 41.4* 33.5* 34.9*   GLUCOSE 103*  --  97 84 98 122* 100*   CALCIUM 8.5*  --  8.5* 8.4* 8.6 8.7 9.2   MAGNESIUM 2.9* 1.2*  --   --  1.9  --  2.3   TSH  --   --   --   --   --   --  0.390         Lab 06/24/24  0332 06/20/24  0727 06/19/24  1132   TOTAL PROTEIN 6.2 7.7 7.0   ALBUMIN 3.0* 3.7 3.6   GLOBULIN 3.2 4.0 3.4   ALT (SGPT) 18 41* 15   AST (SGOT) 21 118* 32   BILIRUBIN 0.4 0.8 0.3   ALK PHOS 108 176* 111         Lab 06/20/24  0727 06/20/24  0006 06/19/24  1558 06/19/24  1352 06/19/24  1132   PROBNP  --   --   --   --  4,207.0*   HSTROP T 1,538* 1,198* 229* 80* 36*   PROTIME  --   --  16.0*  --   --    INR  --   --  1.26*  --   --              Lab 06/19/24  1558 06/19/24  1406 06/19/24  1132   IRON 14*  --   --    IRON SATURATION (TSAT) 4*  --   --    TIBC 399  --   --    TRANSFERRIN 268  --   --    FERRITIN 41.03  --   --    VITAMIN B 12  --   --  647   ABO TYPING  --  O  --    RH TYPING  --  Positive  --    ANTIBODY SCREEN  --  Negative  --          Lab 06/23/24  0913 06/19/24  1203   PH, ARTERIAL 7.348* 7.390   PCO2, ARTERIAL 42.8 42.7   PO2 .0* 88.3   FIO2 36 28   HCO3 ART 23.5 25.8   BASE EXCESS ART -2.1* 0.7   CARBOXYHEMOGLOBIN 1.5 1.7     Brief Urine Lab Results  (Last result in the past 365 days)        Color   Clarity   Blood   Leuk Est   Nitrite   Protein   CREAT   Urine HCG        06/19/24 1212 Yellow   Clear   Negative   Negative   Negative    Negative                   Microbiology Results Abnormal       None            No radiology results from the last 24 hrs    Results for orders placed during the hospital encounter of 06/19/24    Adult Transthoracic Echo Complete W/ Cont if Necessary Per Protocol    Interpretation Summary    Left ventricular ejection fraction appears to be 46 - 50%.    Mildly reduced right ventricular systolic function noted.    The left atrial cavity is dilated.    Left atrial volume is severely increased.    Estimated right ventricular systolic pressure from tricuspid regurgitation is mildly elevated (35-45 mmHg). Calculated right ventricular systolic pressure from tricuspid regurgitation is 40 mmHg.      Current medications:  Scheduled Meds:amiodarone, 200 mg, Oral, BID With Meals  apixaban, 5 mg, Oral, Q12H  aspirin, 81 mg, Oral, Daily  atorvastatin, 40 mg, Oral, Daily  cetirizine, 10 mg, Oral, Daily  furosemide, 20 mg, Intravenous, BID  gabapentin, 300 mg, Oral, Q12H  hydrALAZINE, 25 mg, Oral, BID  levothyroxine, 100 mcg, Oral, Daily  pantoprazole, 40 mg, Oral, Q AM  pharmacy consult - MTM, , Does not apply, Daily  sertraline, 50 mg, Oral, Daily  sodium chloride, 10 mL, Intravenous, Q12H      Continuous Infusions:     PRN Meds:.  senna-docusate sodium **AND** polyethylene glycol **AND** bisacodyl **AND** bisacodyl    Calcium Replacement - Follow Nurse / BPA Driven Protocol    ipratropium-albuterol    Magnesium Standard Dose Replacement - Follow Nurse / BPA Driven Protocol    nitroglycerin    Phosphorus Replacement - Follow Nurse / BPA Driven Protocol    Potassium Replacement - Follow Nurse / BPA Driven Protocol    sodium chloride    sodium chloride    sodium chloride    sodium chloride    Assessment & Plan   Assessment & Plan     Active Hospital Problems    Diagnosis  POA    **A-fib [I48.91]  Yes    Symptomatic anemia [D64.9]  Yes    NSTEMI (non-ST elevated myocardial infarction) [I21.4]  Yes    Hypothyroidism (acquired) [E03.9]   Yes    Rheumatoid arthritis [M06.9]  Yes    Iron deficiency anemia due to chronic blood loss [D50.0]  Unknown    AMANDA (obstructive sleep apnea) [G47.33]  Yes    CKD (chronic kidney disease) [N18.9]  Yes    Hypertension [I10]  Yes      Resolved Hospital Problems   No resolved problems to display.        Brief Hospital Course to date:  Angélica De La Cruz is a 79 y.o. female w CAD s/p CABG c/b post-op Afib who presented w chest pain on 6/19. Rising troponin on admission - cardiology consulted who attributed this to NSTEMI II. Stay c/b persistent paroxysmal A-fib then sinus bradycardia 2/2 meds given for this    Paroxysmal A-fib c/b sinus pauses/bradycardia after medication trx  -amiodarone per cardiology, d/c BB given bradycardia  -eliquis (new medications, previously only post-op A-fib)  -careful monitoring given prior sinus pauses    NSTEMI, h/o CABG 2020  -patient troponin 36 --> 1500 on admission in setting of chest pain. Feel significant to attribute to patient Hbg 7.7 grant given chronicity before admission (Hbg ~8.8 on 5/23/24 outpatient). Maybe 2/2 paroxysmal Afib  -ECHO EF 45-50%, down from ~60% prior on review  -cardiology deferring ischemic evaluation currently, mgmt of above    Iron deficiency anemia, acute on chronic   -s/p 2 units transfusion w significant response and stabilized thereafter  -IV iron while here  -EGD wnl 6/22, colonoscopy w cecal polyp but no bleeding    Hypomagnesemia/hypokalemia - replace per protocol  CKDIIIb - at baseline  AMANDA - wears cpap at home, cannot tolerate ours, nocturnal o2    Expected Discharge Location and Transportation: home   Expected Discharge 6/27 (Discharge date is tentative pending patient's medical condition and is subject to change)  Expected Discharge Date: 6/27/2024; Expected Discharge Time:      VTE Prophylaxis:  Pharmacologic & mechanical VTE prophylaxis orders are present.         AM-PAC 6 Clicks Score (PT): 19 (06/24/24 2000)    CODE STATUS:   Code Status and  Medical Interventions:   Ordered at: 06/19/24 1702     Medical Intervention Limits:    No intubation (DNI)     Code Status (Patient has no pulse and is not breathing):    No CPR (Do Not Attempt to Resuscitate)     Medical Interventions (Patient has pulse or is breathing):    Limited Support       Tara Jordan MD  06/25/24

## 2024-06-25 NOTE — THERAPY TREATMENT NOTE
Patient Name: Angélica De La Cruz  : 1945    MRN: [id number]                              Today's Date: 2024       Admit Date: 2024    Visit Dx:     ICD-10-CM ICD-9-CM   1. NSTEMI (non-ST elevated myocardial infarction)  I21.4 410.70   2. Anemia, unspecified type  D64.9 285.9   3. Paroxysmal atrial fibrillation  I48.0 427.31   4. Atrial fibrillation with RVR  I48.91 427.31   5. Coronary artery disease involving native heart with angina pectoris, unspecified vessel or lesion type  I25.119 414.01     413.9   6. Iron deficiency anemia due to chronic blood loss  D50.0 280.0     Patient Active Problem List   Diagnosis    Acute febrile illness    Abnormal stress test    CABG 20    Hypertension    CKD (chronic kidney disease)    Atrial fibrillation    Chronic cough    Non-seasonal allergic rhinitis    AMANDA (obstructive sleep apnea)    CAD (coronary artery disease) s/p CABG    Carotid stenosis, asymptomatic, right    A-fib    Symptomatic anemia    NSTEMI (non-ST elevated myocardial infarction)    Hypothyroidism (acquired)    Rheumatoid arthritis    Iron deficiency anemia due to chronic blood loss     Past Medical History:   Diagnosis Date    Arthritis     CKD (chronic kidney disease)     elevated creat caused by long term use of lisinopril     Coronary artery disease     Fibromyalgia     Hypertension     Lung nodule seen on imaging study     AMANDA treated with BiPAP     Peritonitis      Past Surgical History:   Procedure Laterality Date    APPENDECTOMY      CARDIAC CATHETERIZATION N/A 2020    Procedure: Left Heart Cath;  Surgeon: Sanya Borges MD;  Location:  [facility] CATH INVASIVE LOCATION;  Service: Cardiovascular;  Laterality: N/A;    CATARACT EXTRACTION, BILATERAL Bilateral 2018    CHOLECYSTECTOMY      COLONOSCOPY N/A 2024    Procedure: COLONOSCOPY;  Surgeon: Brunner, Mark I, MD;  Location:  [facility] ENDOSCOPY;  Service: Gastroenterology;  Laterality: N/A;    CORONARY ARTERY BYPASS GRAFT  N/A 11/25/2020    Procedure: MEDIAN STERNOTOMY, CORONARY ARTERY BYPASS GRAFTING X2, UTILIZNG THE LEFT  INTERNAL MAMMARY ARTERY GRAFT, ENDOSCOPIC VEIN HARVESTING CONVERTED TO OPEN OF THE RIGHT GREATER SAPHENOUS VEIN;  Surgeon: Wali Spicer MD;  Location: [facility] OR;  Service: Cardiothoracic;  Laterality: N/A;  VO: 0807  VR: 0807      ENDOSCOPY N/A 6/22/2024    Procedure: ESOPHAGOGASTRODUODENOSCOPY;  Surgeon: Brunner, Mark I, MD;  Location: [facility] ENDOSCOPY;  Service: Gastroenterology;  Laterality: N/A;    HYSTERECTOMY  1984    with BSO    OOPHORECTOMY Bilateral 1984      General Information       Row Name 06/25/24 1524          OT Time and Intention    Document Type therapy note (daily note)  -     Mode of Treatment occupational therapy  -       Row Name 06/25/24 1524          General Information    Patient Profile Reviewed yes  -     Prior Level of Function --  See IE  -     Existing Precautions/Restrictions fall;oxygen therapy device and L/min;other (see comments)  h/o RA, fibromyalgia  -     Barriers to Rehab medically complex;previous functional deficit  -       Row Name 06/25/24 1524          Cognition    Orientation Status (Cognition) oriented x 4  -       Row Name 06/25/24 1524          Safety Issues, Functional Mobility    Safety Issues Affecting Function (Mobility) awareness of need for assistance;insight into deficits/self-awareness;safety precautions follow-through/compliance;safety precaution awareness;sequencing abilities  -     Impairments Affecting Function (Mobility) balance;endurance/activity tolerance;postural/trunk control;shortness of breath;strength  -               User Key  (r) = Recorded By, (t) = Taken By, (c) = Cosigned By      Initials Name Provider Type     Polina Walker OT Occupational Therapist                     Mobility/ADL's       Row Name 06/25/24 1528          Bed Mobility    Comment, (Bed Mobility) UI on arrival  -       Row Name 06/25/24 1528           Transfers    Transfers sit-stand transfer;toilet transfer  -     Comment, (Transfers) 's for hand placement and sequencing. denies dizziness  -       Row Name 06/25/24 1528          Sit-Stand Transfer    Sit-Stand [place] (Transfers) contact guard  -     Assistive Device (Sit-Stand Transfers) other (see comments)  UE support  -       Row Name 06/25/24 1528          Toilet Transfer    Type (Toilet Transfer) sit-stand;stand-sit  -     [place] Level (Toilet Transfer) contact guard;verbal cues  -     Assistive Device (Toilet Transfer) commode;grab bars/safety frame;other (see comments)  UE support  -       Row Name 06/25/24 1528          Functional Mobility    Functional Mobility- Ind. Level minimum assist (75% patient effort)  -     Functional Mobility- Device other (see comments)  UE support  -     Functional Mobility-Distance (Feet) 20  -     Functional Mobility- Comment Ambulated ot bathroom with Min A to steady and UE support.  -[facility] Name 06/25/24 1528          Activities of Daily Living    BADL Assessment/Intervention grooming;toileting  -[facility] Name 06/25/24 1528          Grooming Assessment/Training    [place] Level (Grooming) oral care regimen;wash face, hands;supervision  -     Oral Care teeth brushed - regular toothbrush  -     Position (Grooming) sink side;supported standing  -[facility] Name 06/25/24 1528          Toileting Assessment/Training    [place] Level (Toileting) contact guard assist  -     Assistive Devices (Toileting) commode;grab bar/safety frame  -     Position (Toileting) unsupported sitting;supported standing  -               User Key  (r) = Recorded By, (t) = Taken By, (c) = Cosigned By      Initials Name Provider Type     Polina Walker OT Occupational Therapist                   Obj/Interventions       Row Name 06/25/24 1530          Balance    Balance Assessment sitting static balance;sitting dynamic  balance;standing static balance;standing dynamic balance  -     Static Sitting Balance standby assist  -     Dynamic Sitting Balance contact guard  -     Position, Sitting Balance unsupported;sitting edge of bed  -     Static Standing Balance contact guard;verbal cues  -     Dynamic Standing Balance verbal cues;minimal assist  -     Position/Device Used, Standing Balance supported;other (see comments)  UE support  -     Balance Interventions sitting;standing;sit to stand;supported;occupation based/functional task;weight shifting activity  -     Comment, Balance CGA - Min A to steady with UE support  -               User Key  (r) = Recorded By, (t) = Taken By, (c) = Cosigned By      Initials Name Provider Type     Polina Walker OT Occupational Therapist                   Goals/Plan    No documentation.                  Clinical Impression       Row Name 06/25/24 1535          Pain Assessment    Pretreatment Pain Rating 0/10 - no pain  -     Posttreatment Pain Rating 0/10 - no pain  -     Additional Documentation Pain Scale: Word Pre/Post-Treatment (Group)  -       Row Name 06/25/24 1536          Plan of Care Review    Plan of Care Reviewed With patient  -     Progress no change  -     Outcome Evaluation Pt. progressing towards baseline with ADLs and functional mobility. Completes T/Fs with CGA and ambulated 20' with Min A and UE support. Demonstates toileting with CGA. Will continue to progress as able. Recommend IPR at discharge for best functional outcomes.  -       Row Name 06/25/24 1533          Therapy Assessment/Plan (OT)    Therapy Frequency (OT) daily  -       Row Name 06/25/24 1533          Therapy Plan Review/Discharge Plan (OT)    Anticipated Discharge Disposition (OT) inpatient rehabilitation facility  -       Row Name 06/25/24 1539          Vital Signs    Pre SpO2 (%) 98  -     O2 Delivery Pre Treatment nasal cannula  -     Post SpO2 (%) 96  -     O2 Delivery  Post Treatment nasal cannula  -LC     Pre Patient Position Sitting  -LC     Post Patient Position Sitting  -LC       Row Name 06/25/24 1535          Positioning and Restraints    Pre-Treatment Position sitting in chair/recliner  -LC     Post Treatment Position chair  -LC     In Chair notified nsg;reclined;call light within reach;encouraged to call for assist;exit alarm on;waffle cushion;legs elevated  -               User Key  (r) = Recorded By, (t) = Taken By, (c) = Cosigned By      Initials Name Provider Type    Polina Pulido OT Occupational Therapist                   Outcome Measures       Row Name 06/25/24 1537          How much help from another is currently needed...    Putting on and taking off regular lower body clothing? 2  -LC     Bathing (including washing, rinsing, and drying) 3  -LC     Toileting (which includes using toilet bed pan or urinal) 3  -LC     Putting on and taking off regular upper body clothing 3  -LC     Taking care of personal grooming (such as brushing teeth) 3  -LC     Eating meals 4  -LC     AM-PAC 6 Clicks Score (OT) 18  -LC       Row Name 06/25/24 0800          How much help from another person do you currently need...    Turning from your back to your side while in flat bed without using bedrails? 4  -SW     Moving from lying on back to sitting on the side of a flat bed without bedrails? 4  -SW     Moving to and from a bed to a chair (including a wheelchair)? 3  -SW     Standing up from a chair using your arms (e.g., wheelchair, bedside chair)? 3  -SW     Climbing 3-5 steps with a railing? 2  -SW     To walk in hospital room? 2  -SW     AM-PAC 6 Clicks Score (PT) 18  -SW     Highest Level of Mobility Goal 6 --> Walk 10 steps or more  -               User Key  (r) = Recorded By, (t) = Taken By, (c) = Cosigned By      Initials Name Provider Type    Polina Pulido OT Occupational Therapist    Madhavi Mckeon RN Registered Nurse                    Occupational Therapy  Education       Title: PT OT SLP Therapies (In Progress)       Topic: Occupational Therapy (In Progress)       Point: ADL training (In Progress)       Description:   Instruct learner(s) on proper safety adaptation and remediation techniques during self care or transfers.   Instruct in proper use of assistive devices.                  Learning Progress Summary             Patient Acceptance, E, NR by  at 6/25/2024 1445    Acceptance, E, VU by JOSEFINA at 6/20/2024 1414    Comment: OT POC; fall prevention                         Point: Home exercise program (Not Started)       Description:   Instruct learner(s) on appropriate technique for monitoring, assisting and/or progressing therapeutic exercises/activities.                  Learner Progress:  Not documented in this visit.              Point: Precautions (In Progress)       Description:   Instruct learner(s) on prescribed precautions during self-care and functional transfers.                  Learning Progress Summary             Patient Acceptance, E, NR by  at 6/25/2024 1445    Acceptance, E, VU by JOSEFINA at 6/20/2024 1414    Comment: OT POC; fall prevention                         Point: Body mechanics (In Progress)       Description:   Instruct learner(s) on proper positioning and spine alignment during self-care, functional mobility activities and/or exercises.                  Learning Progress Summary             Patient Acceptance, E, NR by  at 6/25/2024 1445                                         User Key       Initials Effective Dates Name Provider Type Discipline     06/16/21 -  Polina Walker, OT Occupational Therapist OT    JOSEFINA 06/16/21 -  Juliana Ruano OT Occupational Therapist OT                  OT Recommendation and Plan  Therapy Frequency (OT): daily  Plan of Care Review  Plan of Care Reviewed With: patient  Progress: no change  Outcome Evaluation: Pt. progressing towards baseline with ADLs and functional mobility. Completes T/Fs with CGA and  ambulated 20' with Min A and UE support. Demonstates toileting with CGA. Will continue to progress as able. Recommend IPR at discharge for best functional outcomes.     Time Calculation:         Time Calculation- OT       Row Name 06/25/24 1538             Time Calculation- OT    OT Start Time 1445  -LC      OT Received On 06/25/24  -      OT Goal Re-Cert Due Date 06/30/24  -         Timed Charges    08898 - OT Self Care/Mgmt Minutes 23  -LC         Total Minutes    Timed Charges Total Minutes 23  -LC       Total Minutes 23  -LC                User Key  (r) = Recorded By, (t) = Taken By, (c) = Cosigned By      Initials Name Provider Type    LC Polina Walker OT Occupational Therapist                  Therapy Charges for Today       Code Description Service Date Service Provider Modifiers Qty    [id number] HC OT SELF CARE/MGMT/TRAIN EA 15 MIN 6/25/2024 Polina Walker OT GO 2                 Polina Walker OT  6/25/2024

## 2024-06-25 NOTE — PLAN OF CARE
Goal Outcome Evaluation:  Plan of Care Reviewed With: patient           Outcome Evaluation: Patient alert and oriented, 3LNC, was in amio drip until midnight and was sinus john most of the night. MD was notified about the drip been stopped and no further orders given. Potassium and magnesium was replaced.

## 2024-06-25 NOTE — NURSING NOTE
Patient remained free from falls, had no signs of skin breakdown, and did not develop any symptoms of any hospital-required illnesses today. Patient progressed towards their goals today by remaining in sinus bradycardia all day with no flips into a-fib. Patient was started on oral amiodarone and had no chest pain today. Patient also got up and walked with PT and OT and got washed up today and is feeling overall very good.

## 2024-06-25 NOTE — PLAN OF CARE
Goal Outcome Evaluation:  Plan of Care Reviewed With: patient        Progress: no change  Outcome Evaluation: Pt. progressing towards baseline with ADLs and functional mobility. Completes T/Fs with CGA and ambulated 20' with Min A and UE support. Demonstates toileting with CGA. Will continue to progress as able. Recommend IPR at discharge for best functional outcomes.      Anticipated Discharge Disposition (OT): inpatient rehabilitation facility

## 2024-06-25 NOTE — PROGRESS NOTES
" [facility] - Progress Note    Angélica De La Cruz  1945  [id number]    06/25/24, 07:58 EDT      Chief Complaint: Following for PAF with RVR    Subjective:   Did well overnight.  Converted to NSR, IV Amio turned off.  Meds held this a.m. for hypotension/bradycardia.  Resting in room, RN at bedside.      Review of Systems:  Pertinent positives are listed above and in physical exam.  All others have been reviewed and are negative.    apixaban, 5 mg, Oral, Q12H  aspirin, 81 mg, Oral, Daily  atorvastatin, 40 mg, Oral, Daily  cetirizine, 10 mg, Oral, Daily  ferric gluconate, 250 mg, Intravenous, Daily  furosemide, 20 mg, Intravenous, BID  gabapentin, 300 mg, Oral, Q12H  hydrALAZINE, 25 mg, Oral, BID  levothyroxine, 100 mcg, Oral, Daily  metoprolol succinate XL, 12.5 mg, Oral, Q24H  pantoprazole, 40 mg, Oral, Q AM  pharmacy consult - MTM, , Does not apply, Daily  sertraline, 50 mg, Oral, Daily  sodium chloride, 10 mL, Intravenous, Q12H        Objective:  Vitals:   height is 162.6 cm (64.02\") and weight is 78.2 kg (172 lb 6.4 oz). Her oral temperature is 98.2 °F (36.8 °C). Her blood pressure is 142/57 and her pulse is 49 (abnormal). Her respiration is 18 and oxygen saturation is 98%.     Intake/Output Summary (Last 24 hours) at 6/25/2024 0758  Last data filed at 6/25/2024 0433  Gross per 24 hour   Intake 480 ml   Output 2650 ml   Net -2170 ml       Physical Exam:  General:  WN, NAD  CV: Normal S1, S2. No murmur, rub, or gallop.  Resp:  CTA Mauricio, equal, nonlabored.  Abd:  Soft, + BS, no organomegaly. Nontender to palpation.  Extrem:  No edema BLE, 2+ pedal/PT pulses.            Results from last 7 days   Lab Units 06/24/24  0332   WBC 10*3/mm3 9.05   HEMOGLOBIN g/dL 10.0*   HEMATOCRIT % 35.7   PLATELETS 10*3/mm3 278     Results from last 7 days   Lab Units 06/25/24  0318 06/24/24  1418 06/24/24  0332   SODIUM mmol/L 138  --  143   POTASSIUM mmol/L 4.4  4.4   < > 3.2*   CHLORIDE mmol/L 100  --  104   CO2 mmol/L " 27.0  --  28.0   BUN mg/dL 16  --  11   CREATININE mg/dL 1.15*  --  1.03*   CALCIUM mg/dL 8.5*  --  8.5*   BILIRUBIN mg/dL  --   --  0.4   ALK PHOS U/L  --   --  108   ALT (SGPT) U/L  --   --  18   AST (SGOT) U/L  --   --  21   GLUCOSE mg/dL 103*  --  97    < > = values in this interval not displayed.     Results from last 7 days   Lab Units 06/19/24  1558   INR  1.26*     Results from last 7 days   Lab Units 06/19/24  1132   TSH uIU/mL 0.390   FREE T4 ng/dL 1.30         Results from last 7 days   Lab Units 06/19/24  1132   PROBNP pg/mL 4,207.0*       Tele: Sinus bradycardia    Assessment:  -79-year-old CF with PMHx CAD/CABG, HTN, HLP, hypothyroidism, AMANDA/CPAP and chronic dyspnea presents to [facility] ED with recent abnormal CXR as outpatient and progressive dyspnea, increased O2 demand at home.  -Anemia requiring 2 units PRBCs transfusion at admission, recent bowel habit changes  -A-fib with RVR in setting of profound anemia  -Elevated troponin, likely demand ischemia  -CKD  -RA  -Obesity           Plan:  -Admitted to hospitalist services, s/p Transfusion total 2 units PRBCs.    -GI following, s/p EGD and colonoscopy 6/22. Iron supplementation recommended.  -Echo EF 46 to 50% with LA dilation, RVSP 35 to 45 mmHg.  -Will DC IV Amio, initiate oral Amiodarone 200mg BID.  DC Toprol.   -DC Hydralazine  -Continue Eliquis 5 mg twice daily  -Cardiology will continue to follow        I discussed the patient's findings and my recommendations with the patient, any present family members, and the nursing staff.  Timothy Albarran MD saw and examined patient, verified hx and PE, read all radiographic studies, reviewed labs and micro data, and formulated dx, plan for treatment and all medical decision making.      Rosanna Nelson PA-C  06/25/24, 09:36 EDT

## 2024-06-25 NOTE — THERAPY TREATMENT NOTE
Patient Name: Angélica De La Cruz  : 1945    MRN: [id number]                              Today's Date: 2024       Admit Date: 2024    Visit Dx:     ICD-10-CM ICD-9-CM   1. NSTEMI (non-ST elevated myocardial infarction)  I21.4 410.70   2. Anemia, unspecified type  D64.9 285.9   3. Paroxysmal atrial fibrillation  I48.0 427.31   4. Atrial fibrillation with RVR  I48.91 427.31   5. Coronary artery disease involving native heart with angina pectoris, unspecified vessel or lesion type  I25.119 414.01     413.9   6. Iron deficiency anemia due to chronic blood loss  D50.0 280.0     Patient Active Problem List   Diagnosis    Acute febrile illness    Abnormal stress test    CABG 20    Hypertension    CKD (chronic kidney disease)    Atrial fibrillation    Chronic cough    Non-seasonal allergic rhinitis    AMANDA (obstructive sleep apnea)    CAD (coronary artery disease) s/p CABG    Carotid stenosis, asymptomatic, right    A-fib    Symptomatic anemia    NSTEMI (non-ST elevated myocardial infarction)    Hypothyroidism (acquired)    Rheumatoid arthritis    Iron deficiency anemia due to chronic blood loss     Past Medical History:   Diagnosis Date    Arthritis     CKD (chronic kidney disease)     elevated creat caused by long term use of lisinopril     Coronary artery disease     Fibromyalgia     Hypertension     Lung nodule seen on imaging study     AMANDA treated with BiPAP     Peritonitis      Past Surgical History:   Procedure Laterality Date    APPENDECTOMY      CARDIAC CATHETERIZATION N/A 2020    Procedure: Left Heart Cath;  Surgeon: Sanya Borges MD;  Location:  [facility] CATH INVASIVE LOCATION;  Service: Cardiovascular;  Laterality: N/A;    CATARACT EXTRACTION, BILATERAL Bilateral 2018    CHOLECYSTECTOMY      COLONOSCOPY N/A 2024    Procedure: COLONOSCOPY;  Surgeon: Brunner, Mark I, MD;  Location:  [facility] ENDOSCOPY;  Service: Gastroenterology;  Laterality: N/A;    CORONARY ARTERY BYPASS GRAFT  N/A 11/25/2020    Procedure: MEDIAN STERNOTOMY, CORONARY ARTERY BYPASS GRAFTING X2, UTILIZNG THE LEFT  INTERNAL MAMMARY ARTERY GRAFT, ENDOSCOPIC VEIN HARVESTING CONVERTED TO OPEN OF THE RIGHT GREATER SAPHENOUS VEIN;  Surgeon: Wali Spicer MD;  Location: [facility] OR;  Service: Cardiothoracic;  Laterality: N/A;  VO: 0807  VR: 0807      ENDOSCOPY N/A 6/22/2024    Procedure: ESOPHAGOGASTRODUODENOSCOPY;  Surgeon: Brunner, Mark I, MD;  Location: [facility] ENDOSCOPY;  Service: Gastroenterology;  Laterality: N/A;    HYSTERECTOMY  1984    with BSO    OOPHORECTOMY Bilateral 1984      General Information       Row Name 06/25/24 1551          Physical Therapy Time and Intention    Document Type therapy note (daily note)  -AE     Mode of Treatment physical therapy  -AE       Row Name 06/25/24 1551          General Information    Patient Profile Reviewed yes  -AE     Existing Precautions/Restrictions fall;oxygen therapy device and L/min;other (see comments)  h/o RA, fibromyalgia  -AE     Barriers to Rehab medically complex;previous functional deficit  -AE       Row Name 06/25/24 1551          Cognition    Orientation Status (Cognition) oriented x 4  -AE       Row Name 06/25/24 1551          Safety Issues, Functional Mobility    Safety Issues Affecting Function (Mobility) awareness of need for assistance;insight into deficits/self-awareness;safety precaution awareness;safety precautions follow-through/compliance;sequencing abilities  -AE     Impairments Affecting Function (Mobility) balance;endurance/activity tolerance;postural/trunk control;shortness of breath;strength;pain  -AE               User Key  (r) = Recorded By, (t) = Taken By, (c) = Cosigned By      Initials Name Provider Type    AE Bradley Malone PT Physical Therapist                   Mobility       Row Name 06/25/24 1558          Bed Mobility    Bed Mobility supine-sit  -AE     Supine-Sit [place] (Bed Mobility) minimum assist (75% patient effort);1 person  assist;verbal cues  -AE     Assistive Device (Bed Mobility) bed rails;head of bed elevated  -AE     Comment, (Bed Mobility) VCs for hand placement and sequencing. Increased assist needed for trunk control to come to full upright sitting.  -AE       Row Name 06/25/24 1552          Transfers    Comment, (Transfers) VCs for hand placement and sequencing. Pt required increased cues and time for sequencing with use of AD.  -AE       Row Name 06/25/24 1552          Sit-Stand Transfer    Sit-Stand [place] (Transfers) minimum assist (75% patient effort);1 person assist  -AE     Assistive Device (Sit-Stand Transfers) walker, front-wheeled  -AE       Row Name 06/25/24 1552          Gait/Stairs (Locomotion)    [place] Level (Gait) minimum assist (75% patient effort);1 person assist  -AE     Assistive Device (Gait) walker, front-wheeled  -AE     Distance in Feet (Gait) 75  -AE     Deviations/Abnormal Patterns (Gait) bilateral deviations;manjinder decreased;gait speed decreased;stride length decreased  -AE     Bilateral Gait Deviations forward flexed posture;heel strike decreased  -AE     Comment, (Gait/Stairs) Pt ambulated 15ft + 75ft with prolonged seated rest break between ambulation trials. Pt demo step to gait pattern initially but able to improve to step through with increased time. Pt initially reports L foot pain but improved with increased weight bearing onto LLE while ambulating. Further distance limited by fatigue.  -AE               User Key  (r) = Recorded By, (t) = Taken By, (c) = Cosigned By      Initials Name Provider Type    AE Bradley Malone PT Physical Therapist                   Obj/Interventions       Row Name 06/25/24 1601          Balance    Balance Assessment sitting static balance;sitting dynamic balance;sit to stand dynamic balance;standing static balance;standing dynamic balance  -AE     Static Sitting Balance standby assist  -AE     Dynamic Sitting Balance contact guard  -AE     Position,  Sitting Balance unsupported;sitting edge of bed  -AE     Sit to Stand Dynamic Balance minimal assist;1-person assist;verbal cues  -AE     Static Standing Balance contact guard  -AE     Dynamic Standing Balance minimal assist;1-person assist;verbal cues  -AE     Position/Device Used, Standing Balance supported;walker, front-wheeled  -AE               User Key  (r) = Recorded By, (t) = Taken By, (c) = Cosigned By      Initials Name Provider Type    AE Bradley Malone, PT Physical Therapist                   Goals/Plan    No documentation.                  Clinical Impression       Row Name 06/25/24 1602          Pain    Pretreatment Pain Rating 0/10 - no pain  -AE     Posttreatment Pain Rating 0/10 - no pain  -AE     Pre/Posttreatment Pain Comment pain in L foot with weight bearing, improved with increased weightbearing while ambulating  -AE     Pain Intervention(s) Repositioned;Ambulation/increased activity  -AE       Row Name 06/25/24 1602          Plan of Care Review    Plan of Care Reviewed With patient  -AE     Progress no change  -AE     Outcome Evaluation Pt continues to present with decreased functional mobility, decreased balance, and generalized weakness. Pt ambulated 15ft + 75ft with min A and RW for support. Pt required prolonged seated rest break between gait trials d/t fatigue. Continue to progress per pt tolerance.  -AE       Row Name 06/25/24 1602          Vital Signs    Pre Systolic BP Rehab 130  -AE     Pre Treatment Diastolic BP 52  -AE     Pretreatment Heart Rate (beats/min) 56  -AE     Posttreatment Heart Rate (beats/min) 62  -AE     Pre SpO2 (%) 97  -AE     O2 Delivery Pre Treatment nasal cannula  -AE     O2 Delivery Intra Treatment nasal cannula  -AE     Post SpO2 (%) 97  -AE     O2 Delivery Post Treatment nasal cannula  -AE     Pre Patient Position Supine  -AE     Intra Patient Position Standing  -AE     Post Patient Position Sitting  -AE       Row Name 06/25/24 1602          Positioning and  Restraints    Pre-Treatment Position in bed  -AE     Post Treatment Position chair  -AE     In Chair notified nsg;reclined;call light within reach;encouraged to call for assist;exit alarm on;waffle cushion;legs elevated  -AE               User Key  (r) = Recorded By, (t) = Taken By, (c) = Cosigned By      Initials Name Provider Type    AE Bradley Malone, PT Physical Therapist                   Outcome Measures       Row Name 06/25/24 1605 06/25/24 0800       How much help from another person do you currently need...    Turning from your back to your side while in flat bed without using bedrails? 3  -AE 4  -SW    Moving from lying on back to sitting on the side of a flat bed without bedrails? 3  -AE 4  -SW    Moving to and from a bed to a chair (including a wheelchair)? 3  -AE 3  -SW    Standing up from a chair using your arms (e.g., wheelchair, bedside chair)? 3  -AE 3  -SW    Climbing 3-5 steps with a railing? 2  -AE 2  -SW    To walk in hospital room? 3  -AE 2  -SW    AM-PAC 6 Clicks Score (PT) 17  -AE 18  -SW    Highest Level of Mobility Goal 5 --> Static standing  -AE 6 --> Walk 10 steps or more  -SW      Row Name 06/25/24 1605          Functional Assessment    Outcome Measure Options AM-PAC 6 Clicks Basic Mobility (PT)  -AE               User Key  (r) = Recorded By, (t) = Taken By, (c) = Cosigned By      Initials Name Provider Type    AE Bradley Malone, PT Physical Therapist    Madhavi Mckeon RN Registered Nurse                                 Physical Therapy Education       Title: PT OT SLP Therapies (In Progress)       Topic: Physical Therapy (In Progress)       Point: Mobility training (In Progress)       Learning Progress Summary             Patient Acceptance, E, NR by AE at 6/25/2024 1359    Acceptance, E, NR by AE at 6/20/2024 1328                         Point: Home exercise program (Not Started)       Learner Progress:  Not documented in this visit.              Point: Body mechanics (In  Progress)       Learning Progress Summary             Patient Acceptance, E, NR by AE at 6/25/2024 1359    Acceptance, E, NR by AE at 6/20/2024 1328                         Point: Precautions (In Progress)       Learning Progress Summary             Patient Acceptance, E, NR by AE at 6/25/2024 1359    Acceptance, E, NR by AE at 6/20/2024 1328                                         User Key       Initials Effective Dates Name Provider Type Discipline    AE 09/21/21 -  Bradley Malone, PT Physical Therapist PT                  PT Recommendation and Plan  Planned Therapy Interventions (PT): balance training, bed mobility training, gait training, home exercise program, patient/family education, postural re-education, ROM (range of motion), strengthening, transfer training  Plan of Care Reviewed With: patient  Progress: no change  Outcome Evaluation: Pt continues to present with decreased functional mobility, decreased balance, and generalized weakness. Pt ambulated 15ft + 75ft with min A and RW for support. Pt required prolonged seated rest break between gait trials d/t fatigue. Continue to progress per pt tolerance.     Time Calculation:   PT Evaluation Complexity  History, PT Evaluation Complexity: 1-2 personal factors and/or comorbidities  Examination of Body Systems (PT Eval Complexity): 1-2 elements  Clinical Presentation (PT Evaluation Complexity): stable  Clinical Decision Making (PT Evaluation Complexity): low complexity  Overall Complexity (PT Evaluation Complexity): low complexity     PT Charges       Row Name 06/25/24 1605             Time Calculation    Start Time 1359  -AE      PT Received On 06/25/24  -AE      PT Goal Re-Cert Due Date 06/30/24  -AE         Timed Charges    46281 - PT Therapeutic Activity Minutes 47  -AE         Total Minutes    Timed Charges Total Minutes 47  -AE       Total Minutes 47  -AE                User Key  (r) = Recorded By, (t) = Taken By, (c) = Cosigned By      Initials Name  Provider Type    AE Bradley Malone, PT Physical Therapist                  Therapy Charges for Today       Code Description Service Date Service Provider Modifiers Qty    63274303650 HC PT THERAPEUTIC ACT EA 15 MIN 6/25/2024 Bradley Malone, ACE GP 3            PT G-Codes  Outcome Measure Options: AM-PAC 6 Clicks Basic Mobility (PT)  AM-PAC 6 Clicks Score (PT): 17  AM-PAC 6 Clicks Score (OT): 18  PT Discharge Summary  Anticipated Discharge Disposition (PT): inpatient rehabilitation facility    Bradley Malone PT  6/25/2024

## 2024-06-25 NOTE — PROGRESS NOTES
Patient has been initiated on Apixaban (Eliquis) during admission. Education provided on 6/25/2024 verbally and in writing. Information given includes effects of medication, drug-drug and drug-food interactions, and signs/symptoms of bleeding and clotting. Patient/family verbalized understanding through teach back. All pertinent questions were answered by pharmacist.    Rosanna Pendleton PharmD  6/25/2024   12:13 EDT

## 2024-06-26 LAB
ANION GAP SERPL CALCULATED.3IONS-SCNC: 12 MMOL/L (ref 5–15)
BUN SERPL-MCNC: 22 MG/DL (ref 8–23)
BUN/CREAT SERPL: 17.7 (ref 7–25)
CALCIUM SPEC-SCNC: 8.7 MG/DL (ref 8.6–10.5)
CHLORIDE SERPL-SCNC: 102 MMOL/L (ref 98–107)
CO2 SERPL-SCNC: 26 MMOL/L (ref 22–29)
CREAT SERPL-MCNC: 1.24 MG/DL (ref 0.57–1)
EGFRCR SERPLBLD CKD-EPI 2021: 43.8 ML/MIN/1.73
GLUCOSE SERPL-MCNC: 101 MG/DL (ref 65–99)
POTASSIUM SERPL-SCNC: 3.7 MMOL/L (ref 3.5–5.2)
SODIUM SERPL-SCNC: 140 MMOL/L (ref 136–145)

## 2024-06-26 PROCEDURE — 99232 SBSQ HOSP IP/OBS MODERATE 35: CPT | Performed by: INTERNAL MEDICINE

## 2024-06-26 PROCEDURE — 80048 BASIC METABOLIC PNL TOTAL CA: CPT | Performed by: INTERNAL MEDICINE

## 2024-06-26 RX ORDER — FUROSEMIDE 40 MG/1
40 TABLET ORAL DAILY
Qty: 30 TABLET | Refills: 6 | Status: CANCELLED | OUTPATIENT
Start: 2024-06-26

## 2024-06-26 RX ORDER — FUROSEMIDE 40 MG/1
40 TABLET ORAL DAILY
Status: DISCONTINUED | OUTPATIENT
Start: 2024-06-26 | End: 2024-07-01 | Stop reason: HOSPADM

## 2024-06-26 RX ORDER — AMIODARONE HYDROCHLORIDE 200 MG/1
200 TABLET ORAL 2 TIMES DAILY WITH MEALS
Qty: 60 TABLET | Refills: 6 | Status: CANCELLED | OUTPATIENT
Start: 2024-06-26

## 2024-06-26 RX ORDER — FUROSEMIDE 20 MG/1
20 TABLET ORAL DAILY
Status: DISCONTINUED | OUTPATIENT
Start: 2024-06-26 | End: 2024-06-26

## 2024-06-26 RX ADMIN — AMIODARONE HYDROCHLORIDE 200 MG: 200 TABLET ORAL at 17:09

## 2024-06-26 RX ADMIN — GABAPENTIN 300 MG: 300 CAPSULE ORAL at 20:39

## 2024-06-26 RX ADMIN — ATORVASTATIN CALCIUM 40 MG: 40 TABLET, FILM COATED ORAL at 09:10

## 2024-06-26 RX ADMIN — Medication 10 ML: at 09:11

## 2024-06-26 RX ADMIN — PANTOPRAZOLE SODIUM 40 MG: 40 TABLET, DELAYED RELEASE ORAL at 05:03

## 2024-06-26 RX ADMIN — AMIODARONE HYDROCHLORIDE 200 MG: 200 TABLET ORAL at 09:09

## 2024-06-26 RX ADMIN — ASPIRIN 81 MG: 81 TABLET, COATED ORAL at 09:07

## 2024-06-26 RX ADMIN — SERTRALINE 50 MG: 50 TABLET, FILM COATED ORAL at 09:10

## 2024-06-26 RX ADMIN — CETIRIZINE HYDROCHLORIDE 10 MG: 10 TABLET, FILM COATED ORAL at 09:07

## 2024-06-26 RX ADMIN — APIXABAN 5 MG: 5 TABLET, FILM COATED ORAL at 09:09

## 2024-06-26 RX ADMIN — LEVOTHYROXINE SODIUM 100 MCG: 0.1 TABLET ORAL at 05:03

## 2024-06-26 RX ADMIN — Medication 10 ML: at 20:41

## 2024-06-26 RX ADMIN — FUROSEMIDE 40 MG: 40 TABLET ORAL at 09:08

## 2024-06-26 RX ADMIN — HYDRALAZINE HYDROCHLORIDE 25 MG: 25 TABLET ORAL at 09:16

## 2024-06-26 RX ADMIN — GABAPENTIN 300 MG: 300 CAPSULE ORAL at 09:09

## 2024-06-26 NOTE — PROGRESS NOTES
[facility] Medicine Services  PROGRESS NOTE    Patient Name: Angélica De La Cruz  : 1945  MRN: [id number]    Date of Admission: 2024  Primary Care Physician: Sussy Bloom MD    Subjective   Subjective     CC:  Palpitations f/u    HPI:  Feels fine in morning  Reverts to A-fib in late morning with some symptoms of shortness of breath    Objective   Objective     Vital Signs:   Temp:  [97.6 °F (36.4 °C)-98.6 °F (37 °C)] 97.6 °F (36.4 °C)  Heart Rate:  [] 124  Resp:  [12-20] 20  BP: ()/(44-78) 106/78  Flow (L/min):  [2] 2     Physical Exam:  Constitutional: No acute distress, awake, alert female sitting up in bed  HENT: NCAT, mucous membranes moist  Respiratory: Clear to auscultation bilaterally, respiratory effort normal   Cardiovascular: RRR (on morning exam), borderline bradycardic, no murmurs, rubs, or gallops  Gastrointestinal: Soft, nontender, nondistended  Musculoskeletal: Muscle tone within normal limits, no joint effusions appreciated  Psychiatric: Appropriate affect, cooperative  Neurologic: Alert and oriented, facial movements symmetric and spontaneous movement of all 4 extremities grossly equal bilaterally, speech clear  Skin: No rashes    Results Reviewed:  LAB RESULTS:      Lab 24  0332 24  0327 24  0745 24  0727 24  0006 24  1558   WBC 9.05 7.15 7.71 10.14  --   --    HEMOGLOBIN 10.0* 9.6* 10.2* 10.8*  --   --    HEMATOCRIT 35.7 34.1 35.0 37.2  --   --    PLATELETS 278 261 278 298  --   --    NEUTROS ABS 6.69  --   --  7.37*  --   --    IMMATURE GRANS (ABS) 0.03  --   --  0.03  --   --    LYMPHS ABS 0.70  --   --  1.45  --   --    MONOS ABS 0.83  --   --  0.80  --   --    EOS ABS 0.73*  --   --  0.40  --   --    MCV 77.9* 77.7* 77.1* 77.8*  --   --    PROTIME  --   --   --   --   --  16.0*   APTT  --   --   --   --   --  39.3*   HEPARIN ANTI-XA  --   --   --  0.25* 0.11* 0.10*         Lab 24  0348 24  0318  06/24/24  1418 06/24/24  0332 06/22/24  0327 06/21/24  0745   SODIUM 140 138  --  143 145 141   POTASSIUM 3.7 4.4  4.4 3.6 3.2* 3.9 3.7   CHLORIDE 102 100  --  104 110* 103   CO2 26.0 27.0  --  28.0 26.0 25.0   ANION GAP 12.0 11.0  --  11.0 9.0 13.0   BUN 22 16  --  11 17 23   CREATININE 1.24* 1.15*  --  1.03* 1.16* 1.30*   EGFR 43.8* 48.0*  --  54.7* 47.5* 41.4*   GLUCOSE 101* 103*  --  97 84 98   CALCIUM 8.7 8.5*  --  8.5* 8.4* 8.6   MAGNESIUM  --  2.9* 1.2*  --   --  1.9         Lab 06/24/24  0332 06/20/24  0727   TOTAL PROTEIN 6.2 7.7   ALBUMIN 3.0* 3.7   GLOBULIN 3.2 4.0   ALT (SGPT) 18 41*   AST (SGOT) 21 118*   BILIRUBIN 0.4 0.8   ALK PHOS 108 176*         Lab 06/20/24  0727 06/20/24  0006 06/19/24  1558 06/19/24  1352   HSTROP T 1,538* 1,198* 229* 80*   PROTIME  --   --  16.0*  --    INR  --   --  1.26*  --              Lab 06/19/24  1558 06/19/24  1406   IRON 14*  --    IRON SATURATION (TSAT) 4*  --    TIBC 399  --    TRANSFERRIN 268  --    FERRITIN 41.03  --    ABO TYPING  --  O   RH TYPING  --  Positive   ANTIBODY SCREEN  --  Negative         Lab 06/23/24  0913   PH, ARTERIAL 7.348*   PCO2, ARTERIAL 42.8   PO2 .0*   FIO2 36   HCO3 ART 23.5   BASE EXCESS ART -2.1*   CARBOXYHEMOGLOBIN 1.5     Brief Urine Lab Results  (Last result in the past 365 days)        Color   Clarity   Blood   Leuk Est   Nitrite   Protein   CREAT   Urine HCG        06/19/24 1212 Yellow   Clear   Negative   Negative   Negative   Negative                   Microbiology Results Abnormal       None            No radiology results from the last 24 hrs    Results for orders placed during the hospital encounter of 06/19/24    Adult Transthoracic Echo Complete W/ Cont if Necessary Per Protocol    Interpretation Summary    Left ventricular ejection fraction appears to be 46 - 50%.    Mildly reduced right ventricular systolic function noted.    The left atrial cavity is dilated.    Left atrial volume is severely increased.    Estimated  right ventricular systolic pressure from tricuspid regurgitation is mildly elevated (35-45 mmHg). Calculated right ventricular systolic pressure from tricuspid regurgitation is 40 mmHg.      Current medications:  Scheduled Meds:amiodarone, 200 mg, Oral, BID With Meals  apixaban, 5 mg, Oral, Q12H  aspirin, 81 mg, Oral, Daily  atorvastatin, 40 mg, Oral, Daily  cetirizine, 10 mg, Oral, Daily  furosemide, 40 mg, Oral, Daily  gabapentin, 300 mg, Oral, Q12H  hydrALAZINE, 25 mg, Oral, BID  levothyroxine, 100 mcg, Oral, Daily  pantoprazole, 40 mg, Oral, Q AM  pharmacy consult - MTM, , Does not apply, Daily  sertraline, 50 mg, Oral, Daily  sodium chloride, 10 mL, Intravenous, Q12H      Continuous Infusions:     PRN Meds:.  senna-docusate sodium **AND** polyethylene glycol **AND** bisacodyl **AND** bisacodyl    Calcium Replacement - Follow Nurse / BPA Driven Protocol    ipratropium-albuterol    Magnesium Standard Dose Replacement - Follow Nurse / BPA Driven Protocol    nitroglycerin    Phosphorus Replacement - Follow Nurse / BPA Driven Protocol    Potassium Replacement - Follow Nurse / BPA Driven Protocol    sodium chloride    sodium chloride    sodium chloride    sodium chloride    Assessment & Plan   Assessment & Plan     Active Hospital Problems    Diagnosis  POA    **A-fib [I48.91]  Yes    Symptomatic anemia [D64.9]  Yes    NSTEMI (non-ST elevated myocardial infarction) [I21.4]  Yes    Hypothyroidism (acquired) [E03.9]  Yes    Rheumatoid arthritis [M06.9]  Yes    Iron deficiency anemia due to chronic blood loss [D50.0]  Unknown    AMANDA (obstructive sleep apnea) [G47.33]  Yes    CKD (chronic kidney disease) [N18.9]  Yes    Hypertension [I10]  Yes      Resolved Hospital Problems   No resolved problems to display.        Brief Hospital Course to date:  Angélica De La Cruz is a 79 y.o. female w CAD s/p CABG c/b post-op Afib who presented w chest pain on 6/19. Rising troponin on admission - cardiology consulted who attributed this  to NSTEMI II. Stay c/b persistent paroxysmal A-fib then sinus bradycardia 2/2 meds given for this. Back to A-fib 6/26 AM after amiodarone load    Paroxysmal A-fib c/b sinus pauses/bradycardia after medication trx  -amiodarone per cardiology, d/c BB given bradycardia  -eliquis (new medications, previously only post-op A-fib)  -careful monitoring given prior sinus pauses  -appreciate cardiology support given continued A-fib    NSTEMI, h/o CABG 2020  -patient troponin 36 --> 1500 on admission in setting of chest pain. Feel significant to attribute to patient Hbg 7.7 grant given chronicity before admission (Hbg ~8.8 on 5/23/24 outpatient). Maybe 2/2 paroxysmal Afib but does seem out of proportion  -ECHO EF 45-50%, down from ~60% prior on review  -ASA, statin  -cardiology deferring ischemic evaluation currently, mgmt of above. Will need to f/u OP for further discussion    Iron deficiency anemia, acute on chronic   -s/p 2 units transfusion w significant response and stabilized thereafter  -s/p IV iron while here  -EGD wnl 6/22, colonoscopy w cecal polyp but no bleeding    Hypomagnesemia/hypokalemia - replace per protocol  CKDIIIb - at baseline  AMANDA - wears cpap at home, cannot tolerate ours, nocturnal o2    Expected Discharge Location and Transportation: [facility]  Expected Discharge 7/1 (Discharge date is tentative pending patient's medical condition and is subject to change)  Expected Discharge Date: 7/1/2024; Expected Discharge Time:      VTE Prophylaxis:  Pharmacologic & mechanical VTE prophylaxis orders are present.         AM-PAC 6 Clicks Score (PT): 19 (06/25/24 2030)    CODE STATUS:   Code Status and Medical Interventions:   Ordered at: 06/19/24 1702     Medical Intervention Limits:    No intubation (DNI)     Code Status (Patient has no pulse and is not breathing):    No CPR (Do Not Attempt to Resuscitate)     Medical Interventions (Patient has pulse or is breathing):    Limited Support       Tara Jordan,  MD  06/26/24

## 2024-06-26 NOTE — CASE MANAGEMENT/SOCIAL WORK
Continued Stay Note  [facility]     Patient Name: Angélica De La Cruz  MRN: [id number]  Today's Date: 6/26/2024    Admit Date: 6/19/2024    Plan: Rehab   Discharge Plan       Row Name 06/26/24 1256       Plan    Plan Comments Sonam at [facility] return my call. She feels patient will be appropriate for their services. She will follow. Per MD in [facility] today, patient not likely to be medically ready for discharge until Monday or Tuesday next week. Sonam updated. CM following.    Final Discharge Disposition Code 62 - inpatient rehab facility      Row Name 06/26/24 1031       Plan    Plan Rehab    Patient/Family in Agreement with Plan yes    Provided Post Acute Provider List? Yes    Post Acute Provider List Inpatient Rehab    Provided Post Acute Provider Quality & Resource List? Yes    Post Acute Provider Quality and Resource List Inpatient Rehab    Delivered To Patient    Method of Delivery In person    Plan Comments I spoke with the patient at the bedside. She now wants to go to rehab at discharge instead of home with home health. Patient given the Patient Choice list. Patient has selected [facility] as her first choice and [facility] as her second. I have left a referral for Sonam on her secure voicemail. If [facility] is unable to accept the patient, I will make a referral to [facility] [place]. CM following.    Final Discharge Disposition Code 62 - inpatient rehab facility                   Discharge Codes    No documentation.                 Expected Discharge Date and Time       Expected Discharge Date Expected Discharge Time    Jul 1, 2024               Marisol Larsen RN

## 2024-06-26 NOTE — PLAN OF CARE
Goal Outcome Evaluation:   Patient converted to a-fib with complaints of shortness of air. Cardiology and Hospitalist aware. Advised to monitor condition, no new orders. Patient remains on room air. Patient had no complaints of pain. Sitting up comfortably in chair. Call light within reach.

## 2024-06-26 NOTE — CASE MANAGEMENT/SOCIAL WORK
Continued Stay Note  [facility]     Patient Name: Angélica De La Cruz  MRN: [id number]  Today's Date: 6/26/2024    Admit Date: 6/19/2024    Plan: Rehab   Discharge Plan       Row Name 06/26/24 1031       Plan    Plan Rehab    Patient/Family in Agreement with Plan yes    Provided Post Acute Provider List? Yes    Post Acute Provider List Inpatient Rehab    Provided Post Acute Provider Quality & Resource List? Yes    Post Acute Provider Quality and Resource List Inpatient Rehab    Delivered To Patient    Method of Delivery In person    Plan Comments I spoke with the patient at the bedside. She now wants to go to rehab at discharge instead of home with home health. Patient given the Patient Choice list. Patient has selected [facility] as her first choice and [facility] her [facility]. I have left a referral for Sonam on her secure voicemail. If [facility] is unable to accept the patient, I will make a referral to Alanna at [facility] [place]. CM following.    Final Discharge Disposition Code 62 - inpatient rehab facility                   Discharge Codes    No documentation.                 Expected Discharge Date and Time       Expected Discharge Date Expected Discharge Time    Jun 27, 2024               Marisol Larsen RN

## 2024-06-26 NOTE — PROGRESS NOTES
" [facility] - Progress Note    Angélica De La Cruz  1945  S529/1    06/26/24, 08:02 EDT      Chief Complaint: Following for PAF with RVR    Subjective:   Did well overnight.  Maintaining NSR/SB.  Awake in bed, eating bfast.  Staff at bedside.  Denies chest pain or dyspnea.  States she would benefit from rehab.  Denies abdominal pain.      Review of Systems:  Pertinent positives are listed above and in physical exam.  All others have been reviewed and are negative.    amiodarone, 200 mg, Oral, BID With Meals  apixaban, 5 mg, Oral, Q12H  aspirin, 81 mg, Oral, Daily  atorvastatin, 40 mg, Oral, Daily  cetirizine, 10 mg, Oral, Daily  furosemide, 20 mg, Intravenous, BID  gabapentin, 300 mg, Oral, Q12H  hydrALAZINE, 25 mg, Oral, BID  levothyroxine, 100 mcg, Oral, Daily  pantoprazole, 40 mg, Oral, Q AM  pharmacy consult - MTM, , Does not apply, Daily  sertraline, 50 mg, Oral, Daily  sodium chloride, 10 mL, Intravenous, Q12H        Objective:  Vitals:   height is 162.6 cm (64.02\") and weight is 78.2 kg (172 lb 6.4 oz). Her oral temperature is 98 °F (36.7 °C). Her blood pressure is 144/50 and her pulse is 58. Her respiration is 12 and oxygen saturation is 96%.     Intake/Output Summary (Last 24 hours) at 6/26/2024 0802  Last data filed at 6/25/2024 2324  Gross per 24 hour   Intake --   Output 900 ml   Net -900 ml       Physical Exam:  General:  WN, NAD  CV: Normal S1, S2. No murmur, rub, or gallop.  Resp:  CTA Mauricio, equal, nonlabored.  Abd:  Soft, + BS, no organomegaly. Nontender to palpation.  Extrem:  No edema BLE, 2+ pedal/PT pulses.            Results from last 7 days   Lab Units 06/24/24  0332   WBC 10*3/mm3 9.05   HEMOGLOBIN g/dL 10.0*   HEMATOCRIT % 35.7   PLATELETS 10*3/mm3 278     Results from last 7 days   Lab Units 06/26/24  0348 06/24/24  1418 06/24/24  0332   SODIUM mmol/L 140   < > 143   POTASSIUM mmol/L 3.7   < > 3.2*   CHLORIDE mmol/L 102   < > 104   CO2 mmol/L 26.0   < > 28.0   BUN mg/dL 22   " < > 11   CREATININE mg/dL 1.24*   < > 1.03*   CALCIUM mg/dL 8.7   < > 8.5*   BILIRUBIN mg/dL  --   --  0.4   ALK PHOS U/L  --   --  108   ALT (SGPT) U/L  --   --  18   AST (SGOT) U/L  --   --  21   GLUCOSE mg/dL 101*   < > 97    < > = values in this interval not displayed.     Results from last 7 days   Lab Units 06/19/24  1558   INR  1.26*     Results from last 7 days   Lab Units 06/19/24  1132   TSH uIU/mL 0.390   FREE T4 ng/dL 1.30         Results from last 7 days   Lab Units 06/19/24  1132   PROBNP pg/mL 4,207.0*       Tele: Sinus bradycardia    Assessment:  -79-year-old CF with PMHx CAD/CABG, HTN, HLP, hypothyroidism, AMANDA/CPAP and chronic dyspnea presents to [facility] ED with recent abnormal CXR as outpatient and progressive dyspnea, increased O2 demand at home.  -Anemia requiring 2 units PRBCs transfusion at admission, recent bowel habit changes  -A-fib with RVR in setting of profound anemia  -Elevated troponin, likely demand ischemia  -CKD  -RA  -Obesity           Plan:  -Admitted to hospitalist services, s/p Transfusion total 2 units PRBCs.    -GI following, s/p EGD and colonoscopy 6/22. Iron supplementation recommended.  -Echo EF 46 to 50% with LA dilation, RVSP 35 to 45 mmHg.  -Continue oral Amiodarone 200mg BID.  NO beta blocker with SB/pauses.  -Continue Eliquis 5 mg twice daily  -Agree with rehab placement.  Cardiology will sign off.   We recommend DC on following:  ASA 81mg QD, lipitor 40m QHS, Lasix 40mg PO daily, Amiodarone 200mg BID, Eliquis 5mg BID, Hydralazine 25mg BID.  BMP in 1 week.  EKG in 1 week.  We will have patient follow up with us in office in 4 weeks with EKG.        I discussed the patient's findings and my recommendations with the patient, any present family members, and the nursing staff.  Timothy Albarran MD saw and examined patient, verified hx and PE, read all radiographic studies, reviewed labs and micro data, and formulated dx, plan for treatment and all medical decision making.       Rosanna Nelson PA-C  06/26/24, 08:07 EDT

## 2024-06-26 NOTE — PLAN OF CARE
Goal Outcome Evaluation:   Patient has been in bradycardia throughout shift with heart rate between 60 and 51. No complaints during shift. Bed in low position and bed alarm is ON. Patient ambulated during shift with RN from chair to bed.

## 2024-06-27 PROCEDURE — C1785 PMKR, DUAL, RATE-RESP: HCPCS | Performed by: INTERNAL MEDICINE

## 2024-06-27 PROCEDURE — 99152 MOD SED SAME PHYS/QHP 5/>YRS: CPT | Performed by: INTERNAL MEDICINE

## 2024-06-27 PROCEDURE — 33208 INSRT HEART PM ATRIAL & VENT: CPT | Performed by: INTERNAL MEDICINE

## 2024-06-27 PROCEDURE — C1898 LEAD, PMKR, OTHER THAN TRANS: HCPCS | Performed by: INTERNAL MEDICINE

## 2024-06-27 PROCEDURE — 25010000002 FENTANYL CITRATE (PF) 50 MCG/ML SOLUTION: Performed by: INTERNAL MEDICINE

## 2024-06-27 PROCEDURE — 25010000002 LIDOCAINE 1 % SOLUTION: Performed by: INTERNAL MEDICINE

## 2024-06-27 PROCEDURE — 0JH606Z INSERTION OF PACEMAKER, DUAL CHAMBER INTO CHEST SUBCUTANEOUS TISSUE AND FASCIA, OPEN APPROACH: ICD-10-PCS | Performed by: INTERNAL MEDICINE

## 2024-06-27 PROCEDURE — 25810000003 SODIUM CHLORIDE 0.9 % SOLUTION: Performed by: INTERNAL MEDICINE

## 2024-06-27 PROCEDURE — 25810000003 SODIUM CHLORIDE 0.9 % SOLUTION 250 ML FLEX CONT

## 2024-06-27 PROCEDURE — 25010000002 VANCOMYCIN HCL 1.25 G RECONSTITUTED SOLUTION 1 EACH VIAL

## 2024-06-27 PROCEDURE — C1892 INTRO/SHEATH,FIXED,PEEL-AWAY: HCPCS | Performed by: INTERNAL MEDICINE

## 2024-06-27 PROCEDURE — 25010000002 MIDAZOLAM PER 1 MG: Performed by: INTERNAL MEDICINE

## 2024-06-27 PROCEDURE — 25010000002 BUPIVACAINE 0.5 % SOLUTION: Performed by: INTERNAL MEDICINE

## 2024-06-27 PROCEDURE — 94799 UNLISTED PULMONARY SVC/PX: CPT

## 2024-06-27 PROCEDURE — 25010000002 ONDANSETRON PER 1 MG: Performed by: INTERNAL MEDICINE

## 2024-06-27 PROCEDURE — 25510000001 IOPAMIDOL PER 1 ML: Performed by: INTERNAL MEDICINE

## 2024-06-27 PROCEDURE — 02HK3JZ INSERTION OF PACEMAKER LEAD INTO RIGHT VENTRICLE, PERCUTANEOUS APPROACH: ICD-10-PCS | Performed by: INTERNAL MEDICINE

## 2024-06-27 PROCEDURE — 02H63JZ INSERTION OF PACEMAKER LEAD INTO RIGHT ATRIUM, PERCUTANEOUS APPROACH: ICD-10-PCS | Performed by: INTERNAL MEDICINE

## 2024-06-27 PROCEDURE — 94660 CPAP INITIATION&MGMT: CPT

## 2024-06-27 PROCEDURE — 99232 SBSQ HOSP IP/OBS MODERATE 35: CPT | Performed by: INTERNAL MEDICINE

## 2024-06-27 PROCEDURE — 99222 1ST HOSP IP/OBS MODERATE 55: CPT

## 2024-06-27 PROCEDURE — 99153 MOD SED SAME PHYS/QHP EA: CPT | Performed by: INTERNAL MEDICINE

## 2024-06-27 DEVICE — LD PM TENDRIL STS 6F46CM 2088TC46: Type: IMPLANTABLE DEVICE | Status: FUNCTIONAL

## 2024-06-27 DEVICE — GEN PM ASSURITY MRI DR RF PM2272: Type: IMPLANTABLE DEVICE | Status: FUNCTIONAL

## 2024-06-27 DEVICE — LD PM TENDRIL STS 6F52CM 2088TC52: Type: IMPLANTABLE DEVICE | Status: FUNCTIONAL

## 2024-06-27 RX ORDER — SODIUM CHLORIDE 9 MG/ML
40 INJECTION, SOLUTION INTRAVENOUS AS NEEDED
Status: DISCONTINUED | OUTPATIENT
Start: 2024-06-27 | End: 2024-07-01 | Stop reason: HOSPADM

## 2024-06-27 RX ORDER — FENTANYL CITRATE 50 UG/ML
INJECTION, SOLUTION INTRAMUSCULAR; INTRAVENOUS
Status: DISCONTINUED | OUTPATIENT
Start: 2024-06-27 | End: 2024-06-27 | Stop reason: HOSPADM

## 2024-06-27 RX ORDER — SODIUM CHLORIDE 0.9 % (FLUSH) 0.9 %
10 SYRINGE (ML) INJECTION EVERY 12 HOURS SCHEDULED
Status: DISCONTINUED | OUTPATIENT
Start: 2024-06-27 | End: 2024-07-01 | Stop reason: HOSPADM

## 2024-06-27 RX ORDER — SODIUM CHLORIDE 9 MG/ML
INJECTION, SOLUTION INTRAVENOUS
Status: DISCONTINUED | OUTPATIENT
Start: 2024-06-27 | End: 2024-06-27 | Stop reason: HOSPADM

## 2024-06-27 RX ORDER — MIDAZOLAM HYDROCHLORIDE 1 MG/ML
INJECTION INTRAMUSCULAR; INTRAVENOUS
Status: DISCONTINUED | OUTPATIENT
Start: 2024-06-27 | End: 2024-06-27 | Stop reason: HOSPADM

## 2024-06-27 RX ORDER — SODIUM CHLORIDE 0.9 % (FLUSH) 0.9 %
10 SYRINGE (ML) INJECTION AS NEEDED
Status: DISCONTINUED | OUTPATIENT
Start: 2024-06-27 | End: 2024-07-01 | Stop reason: HOSPADM

## 2024-06-27 RX ORDER — LIDOCAINE HYDROCHLORIDE 10 MG/ML
INJECTION, SOLUTION INFILTRATION; PERINEURAL
Status: DISCONTINUED | OUTPATIENT
Start: 2024-06-27 | End: 2024-06-27 | Stop reason: HOSPADM

## 2024-06-27 RX ORDER — BUPIVACAINE HYDROCHLORIDE 5 MG/ML
INJECTION, SOLUTION PERINEURAL
Status: DISCONTINUED | OUTPATIENT
Start: 2024-06-27 | End: 2024-06-27 | Stop reason: HOSPADM

## 2024-06-27 RX ORDER — ONDANSETRON 2 MG/ML
INJECTION INTRAMUSCULAR; INTRAVENOUS
Status: DISCONTINUED | OUTPATIENT
Start: 2024-06-27 | End: 2024-06-27 | Stop reason: HOSPADM

## 2024-06-27 RX ADMIN — AMIODARONE HYDROCHLORIDE 200 MG: 200 TABLET ORAL at 09:02

## 2024-06-27 RX ADMIN — Medication 10 ML: at 09:09

## 2024-06-27 RX ADMIN — VANCOMYCIN HYDROCHLORIDE 1250 MG: 1.25 INJECTION, POWDER, LYOPHILIZED, FOR SOLUTION INTRAVENOUS at 16:19

## 2024-06-27 RX ADMIN — GABAPENTIN 300 MG: 300 CAPSULE ORAL at 09:02

## 2024-06-27 RX ADMIN — GABAPENTIN 300 MG: 300 CAPSULE ORAL at 20:37

## 2024-06-27 RX ADMIN — SERTRALINE 50 MG: 50 TABLET, FILM COATED ORAL at 09:02

## 2024-06-27 RX ADMIN — HYDRALAZINE HYDROCHLORIDE 25 MG: 25 TABLET ORAL at 09:02

## 2024-06-27 RX ADMIN — CETIRIZINE HYDROCHLORIDE 10 MG: 10 TABLET, FILM COATED ORAL at 09:02

## 2024-06-27 RX ADMIN — LEVOTHYROXINE SODIUM 100 MCG: 0.1 TABLET ORAL at 06:36

## 2024-06-27 RX ADMIN — HYDRALAZINE HYDROCHLORIDE 25 MG: 25 TABLET ORAL at 21:28

## 2024-06-27 RX ADMIN — FUROSEMIDE 40 MG: 40 TABLET ORAL at 09:02

## 2024-06-27 RX ADMIN — PANTOPRAZOLE SODIUM 40 MG: 40 TABLET, DELAYED RELEASE ORAL at 06:36

## 2024-06-27 RX ADMIN — Medication 10 ML: at 21:31

## 2024-06-27 RX ADMIN — SALINE NASAL SPRAY 1 SPRAY: 1.5 SOLUTION NASAL at 12:35

## 2024-06-27 RX ADMIN — ASPIRIN 81 MG: 81 TABLET, COATED ORAL at 09:02

## 2024-06-27 RX ADMIN — ATORVASTATIN CALCIUM 40 MG: 40 TABLET, FILM COATED ORAL at 09:02

## 2024-06-27 RX ADMIN — Medication 10 ML: at 20:39

## 2024-06-27 RX ADMIN — AMIODARONE HYDROCHLORIDE 200 MG: 200 TABLET ORAL at 18:51

## 2024-06-27 NOTE — PLAN OF CARE
Goal Outcome Evaluation:  Plan of Care Reviewed With: patient           Outcome Evaluation: Pt has been bradycardic throughout the shift. She was on room air except when sleeping d/t a decrease in O2 saturation to 85-88. After applying oxygen saturation increased back to %. Pt was having spells of coughing with the production of yellow mucus. At times she stated that the mucus was causing her to gag but not throw up. She later complained about having heartburn, she described it as feeling as though something was in her chest and felt like it needed to come up. She had no complaints of chest pain or dyspnea. Provider was notified and recommended pt attempt to use the saline nasal spray when it arrives from pharmacy first. Pt. received nasal spray and later no longer complained of the heartburn. Patient has slept off and on throughout the shift. She is currently off of the floor having a pacemaker placed.

## 2024-06-27 NOTE — DISCHARGE INSTRUCTIONS
DR. CORDOVA DEVICE IMPLANTATION  Pressure Dressing from device site will be removed the morning after procedure or prior to   discharge if the patient goes home the same day. Patient will have an Aquacel dressing   underneath. Typically, no steri-strips or glue is used. The Aquacel Dressing will be removed at   the wound check appointment in 7-10 days.   Please do not lift more than 10 pounds or raise the affected arm above the shoulder for 6 weeks   after the device was implanted (this does not apply to subcutaneous ICDs).  Avoid activities that involve heavy lifting or rough contact that could result in blows to your   implant site. This allows the incision time to heal.  You may shower the day after your procedure.   Do not apply creams, lotions or powders to the incision.   Please avoid allowing a bra strap or suspenders to lay over the incision until it is completely   healed.   Wear your sling at bedtime for 4 weeks.  No baths, hot tubs or swimming for 4 weeks.   No driving for preferably 4 weeks, but at a minimum 2 weeks.   Call your doctor if you have any swelling, redness or discharge around your incision, notice   anything unusual or unexpected or you develop a fever that does not go away in two to three   days.   Call your doctor if you hear any beeping sounds/vibratory alerts from your device as this   indicates your device needs to be checked immediately.  You will be scheduled for a 7-10 day wound check appointment and a 3 month follow up to   check your device.   Carry your medical device ID card with you at all times.   Please call our office at [phone] with any questions about the device or incision.      BETWEEN JULY 5TH - JULY 8TH PLEASE CALL CHAIM JENNINGS RN FOR WOUND CHECK INSTRUCTIONS. [phone] OPT[employer] 4.

## 2024-06-27 NOTE — CASE MANAGEMENT/SOCIAL WORK
Continued Stay Note  [facility]     Patient Name: Angélica De La Cruz  MRN: [id number]  Today's Date: 6/27/2024    Admit Date: 6/19/2024    Plan: [facility] Acute rehab   Discharge Plan       Row Name 06/27/24 0923       Plan    Plan [facility] Acute rehab    Patient/Family in Agreement with Plan other (see comments)    Plan Comments  spoke with Sonam/[facility]. She states she spoke with pt yesterday and this morning. She confirmed with pt that pt wants to go to [facility] for rehab. They can accept pt once she is medically ready. Per Cardiology note, today, pt is receiving a permanent pacemaker today. CM updated Sonam/[facility]. D/C plan is [facility] Acute rehab.  will continue to follow.    Final Discharge Disposition Code 62 - inpatient rehab facility                   Discharge Codes    No documentation.                 Expected Discharge Date and Time       Expected Discharge Date Expected Discharge Time    Jul 1, 2024               Lisa Diallo RN

## 2024-06-27 NOTE — PROGRESS NOTES
" [facility] - Progress Note    Angélica De La Cruz  1945  S529/1    06/27/24, 08:03 EDT      Chief Complaint: Following for PAF with RVR    Subjective:   Converted to A-fib yesterday morning, notified by RN.  Overall, rates normalized on her own.  Has since converted to NSR/SB.  BP stable.    Still with cough.  Findings consistent with tachybradycardia syndrome.  Plan is for DC to [facility] when  medically ready.    Review of Systems:  Pertinent positives are listed above and in physical exam.  All others have been reviewed and are negative.    amiodarone, 200 mg, Oral, BID With Meals  [Held by provider] apixaban, 5 mg, Oral, Q12H  aspirin, 81 mg, Oral, Daily  atorvastatin, 40 mg, Oral, Daily  cetirizine, 10 mg, Oral, Daily  furosemide, 40 mg, Oral, Daily  gabapentin, 300 mg, Oral, Q12H  hydrALAZINE, 25 mg, Oral, BID  levothyroxine, 100 mcg, Oral, Daily  pantoprazole, 40 mg, Oral, Q AM  sertraline, 50 mg, Oral, Daily  sodium chloride, 10 mL, Intravenous, Q12H        Objective:  Vitals:   height is 162.6 cm (64.02\") and weight is 78.2 kg (172 lb 6.4 oz). Her oral temperature is 97.9 °F (36.6 °C). Her blood pressure is 143/57 and her pulse is 51. Her respiration is 18 and oxygen saturation is 97%.     Intake/Output Summary (Last 24 hours) at 6/27/2024 0803  Last data filed at 6/26/2024 1600  Gross per 24 hour   Intake --   Output 1350 ml   Net -1350 ml       Physical Exam:  General:  WN, NAD  CV: Normal S1, S2. No murmur, rub, or gallop.  Resp:  CTA Mauricio, equal, nonlabored.  Abd:  Soft, + BS, no organomegaly. Nontender to palpation.  Extrem:  No edema BLE, 2+ pedal/PT pulses.            Results from last 7 days   Lab Units 06/24/24  0332   WBC 10*3/mm3 9.05   HEMOGLOBIN g/dL 10.0*   HEMATOCRIT % 35.7   PLATELETS 10*3/mm3 278     Results from last 7 days   Lab Units 06/26/24  0348 06/24/24  1418 06/24/24  0332   SODIUM mmol/L 140   < > 143   POTASSIUM mmol/L 3.7   < > 3.2*   CHLORIDE mmol/L 102   < > 104 "   CO2 mmol/L 26.0   < > 28.0   BUN mg/dL 22   < > 11   CREATININE mg/dL 1.24*   < > 1.03*   CALCIUM mg/dL 8.7   < > 8.5*   BILIRUBIN mg/dL  --   --  0.4   ALK PHOS U/L  --   --  108   ALT (SGPT) U/L  --   --  18   AST (SGOT) U/L  --   --  21   GLUCOSE mg/dL 101*   < > 97    < > = values in this interval not displayed.                             Tele: Sinus bradycardia    Assessment:  -79-year-old CF with PMHx CAD/CABG, HTN, HLP, hypothyroidism, AMANDA/CPAP and chronic dyspnea presents to [facility] ED with recent abnormal CXR as outpatient and progressive dyspnea, increased O2 demand at home.  -Anemia requiring 2 units PRBCs transfusion at admission, recent bowel habit changes  -A-fib with RVR in setting of profound anemia  -Elevated troponin, likely demand ischemia  -CKD  -RA  -Obesity           Plan:  -Admitted to hospitalist services  -GI signed off, s/p EGD and colonoscopy 6/22. Iron supplementation recommended.  -Echo EF 46 to 50% with LA dilation, RVSP 35 to 45 mmHg.  -Continue oral Amiodarone 200mg BID.  NO beta blocker with SB/pauses.  Findings consistent with tacky bradycardia.  Will plan for permanent pacemaker implantation today.  Dr. Leif Martini to perform.  Risks and benefits have been reviewed with the patient and she is willing to proceed.  -Holding Eliquis 5 mg twice daily for pacemaker implantation  -Agree with rehab placement.        I discussed the patient's findings and my recommendations with the patient, any present family members, and the nursing staff.  Timothy Albarran MD saw and examined patient, verified hx and PE, read all radiographic studies, reviewed labs and micro data, and formulated dx, plan for treatment and all medical decision making.      Rosanna Nelson PA-C  06/27/24, 08:07 EDT

## 2024-06-27 NOTE — CONSULTS
[facility] at [facility]  Cardiovascular Consultation/h&p Note      Angélica De La Cruz  [id number]  1945  S529/1    Referring Provider: No ref. provider found   PCP: Sussy Bloom MD    DATE OF ADMISSION: 6/19/2024    Reason for Consultation: TBS    History of Present Illness  79-year-old woman with past medical history of hypertension hyperlipidemia obesity CAD status post CABG in 2020, AMANDA on CPAP, fibromyalgia, CKD presenting to the hospital due to progressively worsening shortness of breath on exertion.  On admission found to have symptomatic anemia-during hospitalization found to have A-fib with RVR and subsequent bradycardia afterwards. EP consulted for symptomatic tachybrady syndrome    Past Medical History:   Diagnosis Date    Arthritis     CKD (chronic kidney disease)     elevated creat caused by long term use of lisinopril     Coronary artery disease     Fibromyalgia     Hypertension     Lung nodule seen on imaging study     AMANDA treated with BiPAP     Peritonitis        Past Surgical History:   Procedure Laterality Date    APPENDECTOMY      CARDIAC CATHETERIZATION N/A 11/24/2020    Procedure: Left Heart Cath;  Surgeon: Sanya Borges MD;  Location: [facility] CATH INVASIVE LOCATION;  Service: Cardiovascular;  Laterality: N/A;    CATARACT EXTRACTION, BILATERAL Bilateral 2018    CHOLECYSTECTOMY      COLONOSCOPY N/A 6/22/2024    Procedure: COLONOSCOPY;  Surgeon: Brunner, Mark I, MD;  Location: [facility] ENDOSCOPY;  Service: Gastroenterology;  Laterality: N/A;    CORONARY ARTERY BYPASS GRAFT N/A 11/25/2020    Procedure: MEDIAN STERNOTOMY, CORONARY ARTERY BYPASS GRAFTING X2, UTILIZNG THE LEFT  INTERNAL MAMMARY ARTERY GRAFT, ENDOSCOPIC VEIN HARVESTING CONVERTED TO OPEN OF THE RIGHT GREATER SAPHENOUS VEIN;  Surgeon: Wali Spicer MD;  Location: [facility] OR;  Service: Cardiothoracic;  Laterality: N/A;  VO: 0807  VR: 0807      ENDOSCOPY N/A 6/22/2024    Procedure:  ESOPHAGOGASTRODUODENOSCOPY;  Surgeon: Brunner, Mark I, MD;  Location: [facility] ENDOSCOPY;  Service: Gastroenterology;  Laterality: N/A;    HYSTERECTOMY  1984    with BSO    OOPHORECTOMY Bilateral 1984       No current facility-administered medications on file prior to encounter.     Current Outpatient Medications on File Prior to Encounter   Medication Sig Dispense Refill    albuterol (PROVENTIL) (2.5 MG/3ML) 0.083% nebulizer solution Take 2.5 mg by nebulization Every 6 (Six) Hours As Needed for Wheezing or Shortness of Air.      aspirin 81 MG chewable tablet Chew 1 tablet Daily. OTC      atorvastatin (LIPITOR) 40 MG tablet Take 1 tablet by mouth Daily.      Azelastine HCl 137 MCG/SPRAY solution 1 spray by Each Nare route 2 (Two) Times a Day As Needed (Allergies).      clopidogrel (PLAVIX) 75 MG tablet Take 1 tablet by mouth Daily.      estradiol (ESTRACE) 0.1 MG/GM vaginal cream Insert 1 gram vaginally 3 times per week. (Uses Monday, Wednesday and Friday)      famotidine (PEPCID) 40 MG tablet Take 1 tablet by mouth Every Night.      fluticasone (FLONASE) 50 MCG/ACT nasal spray 2 sprays into the nostril(s) as directed by provider Daily As Needed for Allergies or Rhinitis.      gabapentin (NEURONTIN) 300 MG capsule Take 2 capsules by mouth 2 (Two) Times a Day.      guaiFENesin (MUCINEX) 600 MG 12 hr tablet Take 1 tablet by mouth Daily. OTC      hydrALAZINE (APRESOLINE) 25 MG tablet Take 1 tablet by mouth 2 (Two) Times a Day.      hydroCHLOROthiazide (HYDRODIURIL) 50 MG tablet Take 1 tablet by mouth Daily.      levocetirizine (XYZAL) 5 MG tablet Take 1 tablet by mouth Daily.      levothyroxine (SYNTHROID, LEVOTHROID) 100 MCG tablet Take 1 tablet by mouth Daily.      olmesartan (BENICAR) 20 MG tablet Take 1 tablet by mouth Daily.      pantoprazole (PROTONIX) 40 MG EC tablet Take 1 tablet by mouth Daily.      Pediatric Multivitamins-Iron (CENTRUM JR/IRON PO) Take 1 tablet by mouth Daily.      potassium chloride ER (K-TAB)  20 MEQ tablet controlled-release ER tablet Take 1 tablet by mouth Daily.      sertraline (ZOLOFT) 50 MG tablet Take 1 tablet by mouth Daily.         Social History     Socioeconomic History    Marital status:     Number of children: 2   Tobacco Use    Smoking status: Former     Current packs/day: 0.00     Average packs/day: 1 pack/day for 15.0 years (15.0 ttl pk-yrs)     Types: Cigarettes     Start date:      Quit date:      Years since quittin.5    Smokeless tobacco: Never   Vaping Use    Vaping status: Never Used   Substance and Sexual Activity    Alcohol use: No    Drug use: No    Sexual activity: Defer       Family History   Problem Relation Age of Onset    Heart disease Mother     Cerebral aneurysm Father     Heart disease Brother     Heart disease Maternal Grandmother     No Known Problems Half-Sister     No Known Problems Half-Sister     Kidney failure Half-Brother     Sudden death Half-Brother     Breast cancer Neg Hx     Ovarian cancer Neg Hx            Physical Exam  Vitals:    24 2321 24 0439 24 0700 24 0902   BP: 139/77 130/46 143/57 150/77   BP Location: Left arm Right arm Right arm    Patient Position: Sitting Lying Lying    Pulse: 63 51  56   Resp: 18 18 18    Temp: 98 °F (36.7 °C) 98 °F (36.7 °C) 97.9 °F (36.6 °C)    TempSrc: Oral Oral Oral    SpO2: 93% 97%     Weight:       Height:         GENERAL: Well-developed, well-nourished patient in no acute distress.  HEENT: NC, AC, PERRLA. MMM  NECK: No JVD. No carotid bruits auscultated.  LUNGS: Clear to auscultation bilaterally.  CARDIOVASCULAR: RRR No murmurs, gallops or rubs noted.   ABDOMEN: Soft, nontender. Positive bowel sounds.  MUSCULOSKELETAL: No gross deformities. No clubbing, cyanosis  EXT: pulses intact, No edema  SKIN: Pink, warm  Neuro: Nonfocal exam. Gait intact    Lab Review:            Results from last 7 days   Lab Units 24  0348   SODIUM mmol/L 140   POTASSIUM mmol/L 3.7   CHLORIDE mmol/L  102   CO2 mmol/L 26.0   BUN mg/dL 22   CREATININE mg/dL 1.24*   GLUCOSE mg/dL 101*   CALCIUM mg/dL 8.7         Results from last 7 days   Lab Units 24  0332   WBC 10*3/mm3 9.05   HEMOGLOBIN g/dL 10.0*   HEMATOCRIT % 35.7   PLATELETS 10*3/mm3 278             Results from last 7 days   Lab Units 24  0318   MAGNESIUM mg/dL 2.9*           [id number]  SB LAFB  Qrs 110  qtC 519    ECHO: 2024    Left ventricular ejection fraction appears to be 46 - 50%.    Mildly reduced right ventricular systolic function noted.    The left atrial cavity is dilated.    Left atrial volume is severely increased.    Estimated right ventricular systolic pressure from tricuspid regurgitation is mildly elevated (35-45 mmHg). Calculated right ventricular systolic pressure from tricuspid regurgitation is 40 mmHg.    I personally viewed and interpreted the patient's EKG/telemetry/lab/imaging data    Assessment & Plan:    Symptomatic tachybrady syndrome  Symptomatic bradycardia  Paroxysmal AF with RVR  - will need amiodarone for AF suppression but limited by symptomatic bradycardia  - Also having tachybrady syndrome with conversion pause which is also symptomatic  - plan for DC PPM implantation      Thank you for allowing me to participate in the care of Angélica De La Cruz. Feel free to contact me directly with any further questions or concerns.    Electronically signed by MARCO Almazan, 24, 9:54 AM EDT.      24  09:48 EDT.

## 2024-06-27 NOTE — PROGRESS NOTES
[facility]  PROGRESS NOTE    Patient Name: Angélica De La Cruz  : 1945  MRN: [id number]    Date of Admission: 2024  Primary Care Physician: Sussy Bloom MD    Subjective   Subjective     CC:  Palpitations f/u    HPI:  Feeling better today - symptomatic yesterday with A-fib  Open to pacemaker placement - familiar w this from her     Objective   Objective     Vital Signs:   Temp:  [97.9 °F (36.6 °C)-98.1 °F (36.7 °C)] 98.1 °F (36.7 °C)  Heart Rate:  [] 56  Resp:  [18] 18  BP: (121-164)/(46-98) 164/57  Flow (L/min):  [2] 2     Physical Exam:  Constitutional: No acute distress, awake, alert female sitting up in bed  HENT: NCAT, mucous membranes moist  Respiratory: Clear to auscultation bilaterally, respiratory effort normal   Cardiovascular: RRR, borderline bradycardic (rate 50's by tele), no murmurs, rubs, or gallops  Gastrointestinal: Soft, nontender, nondistended  Musculoskeletal: Muscle tone within normal limits, no joint effusions appreciated  Psychiatric: Appropriate affect, cooperative  Neurologic: Alert and oriented, facial movements symmetric and spontaneous movement of all 4 extremities grossly equal bilaterally, speech clear  Skin: No rashes    Results Reviewed:  LAB RESULTS:      Lab 24  0332 24  0327 24  0745   WBC 9.05 7.15 7.71   HEMOGLOBIN 10.0* 9.6* 10.2*   HEMATOCRIT 35.7 34.1 35.0   PLATELETS 278 261 278   NEUTROS ABS 6.69  --   --    IMMATURE GRANS (ABS) 0.03  --   --    LYMPHS ABS 0.70  --   --    MONOS ABS 0.83  --   --    EOS ABS 0.73*  --   --    MCV 77.9* 77.7* 77.1*         Lab 24  0348 24  0318 24  1418 24  0332 24  0327 24  0745   SODIUM 140 138  --  143 145 141   POTASSIUM 3.7 4.4  4.4 3.6 3.2* 3.9 3.7   CHLORIDE 102 100  --  104 110* 103   CO2 26.0 27.0  --  28.0 26.0 25.0   ANION GAP 12.0 11.0  --  11.0 9.0 13.0   BUN 22 16  --  11 17 23   CREATININE 1.24* 1.15*   --  1.03* 1.16* 1.30*   EGFR 43.8* 48.0*  --  54.7* 47.5* 41.4*   GLUCOSE 101* 103*  --  97 84 98   CALCIUM 8.7 8.5*  --  8.5* 8.4* 8.6   MAGNESIUM  --  2.9* 1.2*  --   --  1.9         Lab 06/24/24  0332   TOTAL PROTEIN 6.2   ALBUMIN 3.0*   GLOBULIN 3.2   ALT (SGPT) 18   AST (SGOT) 21   BILIRUBIN 0.4   ALK PHOS 108                         Lab 06/23/24  0913   PH, ARTERIAL 7.348*   PCO2, ARTERIAL 42.8   PO2 .0*   FIO2 36   HCO3 ART 23.5   BASE EXCESS ART -2.1*   CARBOXYHEMOGLOBIN 1.5     Brief Urine Lab Results  (Last result in the past 365 days)        Color   Clarity   Blood   Leuk Est   Nitrite   Protein   CREAT   Urine HCG        06/19/24 1212 Yellow   Clear   Negative   Negative   Negative   Negative                   Microbiology Results Abnormal       None            No radiology results from the last 24 hrs    Results for orders placed during the hospital encounter of 06/19/24    Adult Transthoracic Echo Complete W/ Cont if Necessary Per Protocol    Interpretation Summary    Left ventricular ejection fraction appears to be 46 - 50%.    Mildly reduced right ventricular systolic function noted.    The left atrial cavity is dilated.    Left atrial volume is severely increased.    Estimated right ventricular systolic pressure from tricuspid regurgitation is mildly elevated (35-45 mmHg). Calculated right ventricular systolic pressure from tricuspid regurgitation is 40 mmHg.      Current medications:  Scheduled Meds:amiodarone, 200 mg, Oral, BID With Meals  [Held by provider] apixaban, 5 mg, Oral, Q12H  aspirin, 81 mg, Oral, Daily  atorvastatin, 40 mg, Oral, Daily  cetirizine, 10 mg, Oral, Daily  furosemide, 40 mg, Oral, Daily  gabapentin, 300 mg, Oral, Q12H  hydrALAZINE, 25 mg, Oral, BID  levothyroxine, 100 mcg, Oral, Daily  pantoprazole, 40 mg, Oral, Q AM  sertraline, 50 mg, Oral, Daily  sodium chloride, 10 mL, Intravenous, Q12H  vancomycin, 15 mg/kg, Intravenous, Once      Continuous Infusions:     PRN  Meds:.  senna-docusate sodium **AND** polyethylene glycol **AND** bisacodyl **AND** bisacodyl    Calcium Replacement - Follow Nurse / BPA Driven Protocol    ipratropium-albuterol    Magnesium Standard Dose Replacement - Follow Nurse / BPA Driven Protocol    nitroglycerin    Phosphorus Replacement - Follow Nurse / BPA Driven Protocol    Potassium Replacement - Follow Nurse / BPA Driven Protocol    sodium chloride    sodium chloride    sodium chloride    sodium chloride    Assessment & Plan   Assessment & Plan     Active Hospital Problems    Diagnosis  POA    **A-fib [I48.91]  Yes    Symptomatic anemia [D64.9]  Yes    NSTEMI (non-ST elevated myocardial infarction) [I21.4]  Yes    Hypothyroidism (acquired) [E03.9]  Yes    Rheumatoid arthritis [M06.9]  Yes    Iron deficiency anemia due to chronic blood loss [D50.0]  Unknown    AMANDA (obstructive sleep apnea) [G47.33]  Yes    CKD (chronic kidney disease) [N18.9]  Yes    Hypertension [I10]  Yes      Resolved Hospital Problems   No resolved problems to display.        Brief Hospital Course to date:  Angélica De La Cruz is a 79 y.o. female w CAD s/p CABG c/b post-op Afib who presented w chest pain on 6/19. Rising troponin on admission - cardiology consulted who attributed this to NSTEMI II.   Stay c/b persistent paroxysmal A-fib then sinus bradycardia 2/2 meds given for this. Back to A-fib 6/26 AM after amiodarone load    Symptomatic tachy-john, paroxysmal Afib  -PPM implantation today per cardiac EP  -cardiology for med mgmt thereafter    NSTEMI, h/o CABG 2020  -patient troponin 36 --> 1500 on admission in setting of chest pain. Feel significant to attribute to patient Hbg 7.7 grant given chronicity before admission (Hbg ~8.8 on 5/23/24 outpatient). Maybe 2/2 paroxysmal Afib but does seem out of proportion  -ECHO EF 45-50%, down from ~60% prior on review  -ASA, statin  -cardiology deferring ischemic evaluation currently, mgmt of above. Will need to f/u OP for further  discussion    Iron deficiency anemia, acute on chronic   -s/p 2 units transfusion w significant response and stabilized thereafter  -s/p IV iron while here  -EGD wnl 6/22, colonoscopy w cecal polyp but no bleeding    Hypomagnesemia/hypokalemia - replace per protocol  CKDIIIb - at baseline  AMANDA - wears cpap at home, cannot tolerate ours, nocturnal o2    Expected Discharge Location and Transportation: [facility]  Expected Discharge 7/1 (Discharge date is tentative pending patient's medical condition and is subject to change)  Expected Discharge Date: 7/1/2024; Expected Discharge Time:      VTE Prophylaxis:  Pharmacologic & mechanical VTE prophylaxis orders are present.         AM-PAC 6 Clicks Score (PT): 19 (06/27/24 0800)    CODE STATUS:   Code Status and Medical Interventions:   Ordered at: 06/19/24 1702     Medical Intervention Limits:    No intubation (DNI)     Code Status (Patient has no pulse and is not breathing):    No CPR (Do Not Attempt to Resuscitate)     Medical Interventions (Patient has pulse or is breathing):    Limited Support       Tara Jordan MD  06/27/24

## 2024-06-27 NOTE — PLAN OF CARE
Problem: Adult Inpatient Plan of Care  Goal: Absence of Hospital-Acquired Illness or Injury  Intervention: Identify and Manage Fall Risk  Recent Flowsheet Documentation  Taken 6/27/2024 0639 by Kerry Mcarthur, RN  Safety Promotion/Fall Prevention:   activity supervised   assistive device/personal items within reach   clutter free environment maintained   fall prevention program maintained   lighting adjusted   nonskid shoes/slippers when out of bed   room organization consistent   safety round/check completed  Taken 6/27/2024 0439 by Kerry Mcarthur, RN  Safety Promotion/Fall Prevention:   activity supervised   assistive device/personal items within reach   clutter free environment maintained   fall prevention program maintained   lighting adjusted   nonskid shoes/slippers when out of bed   room organization consistent   safety round/check completed  Taken 6/27/2024 0239 by Kerry Mcarthur, RN  Safety Promotion/Fall Prevention:   activity supervised   assistive device/personal items within reach   clutter free environment maintained   fall prevention program maintained   lighting adjusted   nonskid shoes/slippers when out of bed   room organization consistent   safety round/check completed  Taken 6/27/2024 0039 by Kerry Mcarthur, RN  Safety Promotion/Fall Prevention:   activity supervised   assistive device/personal items within reach   clutter free environment maintained   fall prevention program maintained   lighting adjusted   nonskid shoes/slippers when out of bed   room organization consistent   safety round/check completed  Taken 6/26/2024 2239 by Kerry Mcarthur, RN  Safety Promotion/Fall Prevention:   activity supervised   assistive device/personal items within reach   clutter free environment maintained   fall prevention program maintained   lighting adjusted   nonskid shoes/slippers when out of bed   room organization consistent   safety round/check completed  Taken 6/26/2024 2039 by Blue  NATALEE Payne  Safety Promotion/Fall Prevention:   activity supervised   assistive device/personal items within reach   clutter free environment maintained   fall prevention program maintained   lighting adjusted   nonskid shoes/slippers when out of bed   room organization consistent   safety round/check completed  Intervention: Prevent Skin Injury  Recent Flowsheet Documentation  Taken 6/27/2024 0639 by Kerry Mcarthur RN  Body Position: position changed independently  Taken 6/27/2024 0439 by Kerry Mcarthur RN  Body Position: position changed independently  Taken 6/27/2024 0239 by Kerry Mcarthur RN  Body Position: position changed independently  Taken 6/27/2024 0039 by Kerry Mcarthur RN  Body Position: position changed independently  Taken 6/26/2024 2239 by Kerry Mcarthur RN  Body Position: position changed independently  Taken 6/26/2024 2039 by Kerry Mcarthur RN  Body Position: position changed independently  Intervention: Prevent and Manage VTE (Venous Thromboembolism) Risk  Recent Flowsheet Documentation  Taken 6/26/2024 2039 by Kerry Mcarthur RN  Activity Management: up in chair  Intervention: Prevent Infection  Recent Flowsheet Documentation  Taken 6/27/2024 0639 by Kerry Mcarthur RN  Infection Prevention:   environmental surveillance performed   hand hygiene promoted   rest/sleep promoted   single patient room provided  Taken 6/27/2024 0439 by Kerry Mcarthur RN  Infection Prevention:   environmental surveillance performed   hand hygiene promoted   rest/sleep promoted   single patient room provided  Taken 6/27/2024 0239 by Kerry Mcarthur RN  Infection Prevention:   environmental surveillance performed   hand hygiene promoted   rest/sleep promoted   single patient room provided  Taken 6/27/2024 0039 by Kerry Mcarthur RN  Infection Prevention:   environmental surveillance performed   hand hygiene promoted   rest/sleep promoted   single patient room provided  Taken 6/26/2024 2239 by  Kerry Mcarthur, RN  Infection Prevention:   environmental surveillance performed   hand hygiene promoted   rest/sleep promoted   single patient room provided  Taken 6/26/2024 2039 by Kerry Mcarthur RN  Infection Prevention:   environmental surveillance performed   hand hygiene promoted   rest/sleep promoted   single patient room provided  Goal: Optimal Comfort and Wellbeing  Intervention: Provide Person-Centered Care  Recent Flowsheet Documentation  Taken 6/26/2024 2039 by Kerry Mcarthur, RN  Trust Relationship/Rapport:   care explained   choices provided   questions answered   questions encouraged   thoughts/feelings acknowledged     Problem: Fall Injury Risk  Goal: Absence of Fall and Fall-Related Injury  Intervention: Promote Injury-Free Environment  Recent Flowsheet Documentation  Taken 6/27/2024 0639 by Kerry Mcarthur, RN  Safety Promotion/Fall Prevention:   activity supervised   assistive device/personal items within reach   clutter free environment maintained   fall prevention program maintained   lighting adjusted   nonskid shoes/slippers when out of bed   room organization consistent   safety round/check completed  Taken 6/27/2024 0439 by Kerry Mcarthur, RN  Safety Promotion/Fall Prevention:   activity supervised   assistive device/personal items within reach   clutter free environment maintained   fall prevention program maintained   lighting adjusted   nonskid shoes/slippers when out of bed   room organization consistent   safety round/check completed  Taken 6/27/2024 0239 by Kerry Mcarthur, RN  Safety Promotion/Fall Prevention:   activity supervised   assistive device/personal items within reach   clutter free environment maintained   fall prevention program maintained   lighting adjusted   nonskid shoes/slippers when out of bed   room organization consistent   safety round/check completed  Taken 6/27/2024 0039 by Kerry Mcarthur, RN  Safety Promotion/Fall Prevention:   activity  supervised   assistive device/personal items within reach   clutter free environment maintained   fall prevention program maintained   lighting adjusted   nonskid shoes/slippers when out of bed   room organization consistent   safety round/check completed  Taken 6/26/2024 2239 by Kerry Mcarthur RN  Safety Promotion/Fall Prevention:   activity supervised   assistive device/personal items within reach   clutter free environment maintained   fall prevention program maintained   lighting adjusted   nonskid shoes/slippers when out of bed   room organization consistent   safety round/check completed  Taken 6/26/2024 2039 by Kerry Mcarthur RN  Safety Promotion/Fall Prevention:   activity supervised   assistive device/personal items within reach   clutter free environment maintained   fall prevention program maintained   lighting adjusted   nonskid shoes/slippers when out of bed   room organization consistent   safety round/check completed     Problem: Skin Injury Risk Increased  Goal: Skin Health and Integrity  Intervention: Optimize Skin Protection  Recent Flowsheet Documentation  Taken 6/27/2024 0639 by Kerry Mcarthur RN  Head of Bed (HOB) Positioning: HOB elevated  Taken 6/27/2024 0439 by Kerry Mcarthur RN  Head of Bed (HOB) Positioning: HOB elevated  Taken 6/27/2024 0239 by Kerry Mcarthur RN  Head of Bed (HOB) Positioning: HOB elevated  Taken 6/27/2024 0039 by Kerry Mcarthur RN  Head of Bed (HOB) Positioning: HOB elevated  Taken 6/26/2024 2239 by Kerry Mcarthur RN  Head of Bed (HOB) Positioning: HOB elevated  Taken 6/26/2024 2039 by Kerry Mcarthur RN  Head of Bed (HOB) Positioning: HOB elevated     Problem: Skin Injury Risk Increased  Goal: Skin Health and Integrity  Intervention: Optimize Skin Protection  Recent Flowsheet Documentation  Taken 6/27/2024 0639 by Kerry Mcarthur RN  Head of Bed (HOB) Positioning: HOB elevated  Taken 6/27/2024 0439 by Kerry Mcarthur RN  Head of Bed (HOB)  Positioning: HOB elevated  Taken 6/27/2024 0239 by Kerry Mcarthur RN  Head of Bed (HOB) Positioning: HOB elevated  Taken 6/27/2024 0039 by Kerry Mcarthur RN  Head of Bed (HOB) Positioning: HOB elevated  Taken 6/26/2024 2239 by Kerry Mcarthur RN  Head of Bed (HOB) Positioning: HOB elevated  Taken 6/26/2024 2039 by Kerry Mcarthur RN  Head of Bed (HOB) Positioning: HOB elevated     Problem: Fall Injury Risk  Goal: Absence of Fall and Fall-Related Injury  Intervention: Promote Injury-Free Environment  Recent Flowsheet Documentation  Taken 6/27/2024 0639 by Kerry Mcarthur, RN  Safety Promotion/Fall Prevention:   activity supervised   assistive device/personal items within reach   clutter free environment maintained   fall prevention program maintained   lighting adjusted   nonskid shoes/slippers when out of bed   room organization consistent   safety round/check completed  Taken 6/27/2024 0439 by Kerry Mcarthur RN  Safety Promotion/Fall Prevention:   activity supervised   assistive device/personal items within reach   clutter free environment maintained   fall prevention program maintained   lighting adjusted   nonskid shoes/slippers when out of bed   room organization consistent   safety round/check completed  Taken 6/27/2024 0239 by Kerry Mcarthur RN  Safety Promotion/Fall Prevention:   activity supervised   assistive device/personal items within reach   clutter free environment maintained   fall prevention program maintained   lighting adjusted   nonskid shoes/slippers when out of bed   room organization consistent   safety round/check completed  Taken 6/27/2024 0039 by Kerry Mcarthur, RN  Safety Promotion/Fall Prevention:   activity supervised   assistive device/personal items within reach   clutter free environment maintained   fall prevention program maintained   lighting adjusted   nonskid shoes/slippers when out of bed   room organization consistent   safety round/check completed  Taken  6/26/2024 2239 by Kerry Mcarthur, RN  Safety Promotion/Fall Prevention:   activity supervised   assistive device/personal items within reach   clutter free environment maintained   fall prevention program maintained   lighting adjusted   nonskid shoes/slippers when out of bed   room organization consistent   safety round/check completed  Taken 6/26/2024 2039 by Kerry Mcarthur, RN  Safety Promotion/Fall Prevention:   activity supervised   assistive device/personal items within reach   clutter free environment maintained   fall prevention program maintained   lighting adjusted   nonskid shoes/slippers when out of bed   room organization consistent   safety round/check completed     Problem: Infection Progression (Sepsis/Septic Shock)  Goal: Absence of Infection Signs and Symptoms  Intervention: Initiate Sepsis Management  Recent Flowsheet Documentation  Taken 6/27/2024 0639 by Kerry Mcarthur RN  Infection Prevention:   environmental surveillance performed   hand hygiene promoted   rest/sleep promoted   single patient room provided  Taken 6/27/2024 0439 by Kerry Mcarthur RN  Infection Prevention:   environmental surveillance performed   hand hygiene promoted   rest/sleep promoted   single patient room provided  Taken 6/27/2024 0239 by Kerry Mcarthur RN  Infection Prevention:   environmental surveillance performed   hand hygiene promoted   rest/sleep promoted   single patient room provided  Taken 6/27/2024 0039 by Kerry Mcarthur, RN  Infection Prevention:   environmental surveillance performed   hand hygiene promoted   rest/sleep promoted   single patient room provided  Taken 6/26/2024 2239 by Kerry Mcarthur RN  Infection Prevention:   environmental surveillance performed   hand hygiene promoted   rest/sleep promoted   single patient room provided  Taken 6/26/2024 2039 by Kerry Mcarthur, RN  Infection Prevention:   environmental surveillance performed   hand hygiene promoted   rest/sleep promoted    single patient room provided  Intervention: Promote Recovery  Recent Flowsheet Documentation  Taken 6/26/2024 2039 by Kerry Mcarthur, RN  Activity Management: up in chair     Problem: Adult Inpatient Plan of Care  Goal: Absence of Hospital-Acquired Illness or Injury  Intervention: Identify and Manage Fall Risk  Recent Flowsheet Documentation  Taken 6/27/2024 0639 by Kerry Mcarthur, RN  Safety Promotion/Fall Prevention:   activity supervised   assistive device/personal items within reach   clutter free environment maintained   fall prevention program maintained   lighting adjusted   nonskid shoes/slippers when out of bed   room organization consistent   safety round/check completed  Taken 6/27/2024 0439 by Kerry Mcarthur, RN  Safety Promotion/Fall Prevention:   activity supervised   assistive device/personal items within reach   clutter free environment maintained   fall prevention program maintained   lighting adjusted   nonskid shoes/slippers when out of bed   room organization consistent   safety round/check completed  Taken 6/27/2024 0239 by Kerry Mcarthur, RN  Safety Promotion/Fall Prevention:   activity supervised   assistive device/personal items within reach   clutter free environment maintained   fall prevention program maintained   lighting adjusted   nonskid shoes/slippers when out of bed   room organization consistent   safety round/check completed  Taken 6/27/2024 0039 by Kerry Mcarthur, RN  Safety Promotion/Fall Prevention:   activity supervised   assistive device/personal items within reach   clutter free environment maintained   fall prevention program maintained   lighting adjusted   nonskid shoes/slippers when out of bed   room organization consistent   safety round/check completed  Taken 6/26/2024 2239 by Kerry Mcarthur, RN  Safety Promotion/Fall Prevention:   activity supervised   assistive device/personal items within reach   clutter free environment maintained   fall prevention  program maintained   lighting adjusted   nonskid shoes/slippers when out of bed   room organization consistent   safety round/check completed  Taken 6/26/2024 2039 by Kerry Mcarthur RN  Safety Promotion/Fall Prevention:   activity supervised   assistive device/personal items within reach   clutter free environment maintained   fall prevention program maintained   lighting adjusted   nonskid shoes/slippers when out of bed   room organization consistent   safety round/check completed  Intervention: Prevent Skin Injury  Recent Flowsheet Documentation  Taken 6/27/2024 0639 by Kerry Mcarthur RN  Body Position: position changed independently  Taken 6/27/2024 0439 by Kerry Mcarthur RN  Body Position: position changed independently  Taken 6/27/2024 0239 by Kerry Mcarthur RN  Body Position: position changed independently  Taken 6/27/2024 0039 by Kerry Mcarthur RN  Body Position: position changed independently  Taken 6/26/2024 2239 by Kerry Mcarthur RN  Body Position: position changed independently  Taken 6/26/2024 2039 by Kerry Mcarthur RN  Body Position: position changed independently  Intervention: Prevent and Manage VTE (Venous Thromboembolism) Risk  Recent Flowsheet Documentation  Taken 6/26/2024 2039 by Kerry Mcarthur RN  Activity Management: up in chair  Intervention: Prevent Infection  Recent Flowsheet Documentation  Taken 6/27/2024 0639 by Kerry Mcarthur RN  Infection Prevention:   environmental surveillance performed   hand hygiene promoted   rest/sleep promoted   single patient room provided  Taken 6/27/2024 0439 by Kerry Mcarthur RN  Infection Prevention:   environmental surveillance performed   hand hygiene promoted   rest/sleep promoted   single patient room provided  Taken 6/27/2024 0239 by Kerry Mcarthur RN  Infection Prevention:   environmental surveillance performed   hand hygiene promoted   rest/sleep promoted   single patient room provided  Taken 6/27/2024 0039 by Blue  Kerry, RN  Infection Prevention:   environmental surveillance performed   hand hygiene promoted   rest/sleep promoted   single patient room provided  Taken 6/26/2024 2239 by Kerry Mcarthur, RN  Infection Prevention:   environmental surveillance performed   hand hygiene promoted   rest/sleep promoted   single patient room provided  Taken 6/26/2024 2039 by Kerry Mcarthur, RN  Infection Prevention:   environmental surveillance performed   hand hygiene promoted   rest/sleep promoted   single patient room provided  Goal: Optimal Comfort and Wellbeing  Intervention: Provide Person-Centered Care  Recent Flowsheet Documentation  Taken 6/26/2024 2039 by Kerry Mcarthur, RN  Trust Relationship/Rapport:   care explained   choices provided   questions answered   questions encouraged   thoughts/feelings acknowledged   Goal Outcome Evaluation:

## 2024-06-28 ENCOUNTER — APPOINTMENT (OUTPATIENT)
Dept: GENERAL RADIOLOGY | Facility: HOSPITAL | Age: 79
End: 2024-06-28
Payer: MEDICARE

## 2024-06-28 LAB
ANION GAP SERPL CALCULATED.3IONS-SCNC: 13 MMOL/L (ref 5–15)
BUN SERPL-MCNC: 25 MG/DL (ref 8–23)
BUN/CREAT SERPL: 20.7 (ref 7–25)
CALCIUM SPEC-SCNC: 9.1 MG/DL (ref 8.6–10.5)
CHLORIDE SERPL-SCNC: 106 MMOL/L (ref 98–107)
CO2 SERPL-SCNC: 24 MMOL/L (ref 22–29)
CREAT SERPL-MCNC: 1.21 MG/DL (ref 0.57–1)
DEPRECATED RDW RBC AUTO: 50.4 FL (ref 37–54)
EGFRCR SERPLBLD CKD-EPI 2021: 45.1 ML/MIN/1.73
ERYTHROCYTE [DISTWIDTH] IN BLOOD BY AUTOMATED COUNT: 19.5 % (ref 12.3–15.4)
GLUCOSE SERPL-MCNC: 89 MG/DL (ref 65–99)
HCT VFR BLD AUTO: 41 % (ref 34–46.6)
HGB BLD-MCNC: 11.4 G/DL (ref 12–15.9)
MCH RBC QN AUTO: 22.3 PG (ref 26.6–33)
MCHC RBC AUTO-ENTMCNC: 27.8 G/DL (ref 31.5–35.7)
MCV RBC AUTO: 80.2 FL (ref 79–97)
NT-PROBNP SERPL-MCNC: 4809 PG/ML (ref 0–1800)
PLATELET # BLD AUTO: 306 10*3/MM3 (ref 140–450)
PMV BLD AUTO: 9.3 FL (ref 6–12)
POTASSIUM SERPL-SCNC: 3.6 MMOL/L (ref 3.5–5.2)
QT INTERVAL: 332 MS
QT INTERVAL: 418 MS
QT INTERVAL: 442 MS
QTC INTERVAL: 442 MS
QTC INTERVAL: 473 MS
QTC INTERVAL: 619 MS
RBC # BLD AUTO: 5.11 10*6/MM3 (ref 3.77–5.28)
SODIUM SERPL-SCNC: 143 MMOL/L (ref 136–145)
WBC NRBC COR # BLD AUTO: 8.22 10*3/MM3 (ref 3.4–10.8)

## 2024-06-28 PROCEDURE — 93010 ELECTROCARDIOGRAM REPORT: CPT | Performed by: INTERNAL MEDICINE

## 2024-06-28 PROCEDURE — 99232 SBSQ HOSP IP/OBS MODERATE 35: CPT | Performed by: INTERNAL MEDICINE

## 2024-06-28 PROCEDURE — 93005 ELECTROCARDIOGRAM TRACING: CPT | Performed by: INTERNAL MEDICINE

## 2024-06-28 PROCEDURE — 99024 POSTOP FOLLOW-UP VISIT: CPT

## 2024-06-28 PROCEDURE — 97530 THERAPEUTIC ACTIVITIES: CPT

## 2024-06-28 PROCEDURE — 85027 COMPLETE CBC AUTOMATED: CPT | Performed by: PHYSICIAN ASSISTANT

## 2024-06-28 PROCEDURE — 83880 ASSAY OF NATRIURETIC PEPTIDE: CPT | Performed by: PHYSICIAN ASSISTANT

## 2024-06-28 PROCEDURE — 80048 BASIC METABOLIC PNL TOTAL CA: CPT | Performed by: PHYSICIAN ASSISTANT

## 2024-06-28 PROCEDURE — 71046 X-RAY EXAM CHEST 2 VIEWS: CPT

## 2024-06-28 RX ORDER — POTASSIUM CHLORIDE 20 MEQ/1
40 TABLET, EXTENDED RELEASE ORAL EVERY 4 HOURS
Status: COMPLETED | OUTPATIENT
Start: 2024-06-28 | End: 2024-06-28

## 2024-06-28 RX ORDER — METOPROLOL SUCCINATE 50 MG/1
50 TABLET, EXTENDED RELEASE ORAL
Status: DISCONTINUED | OUTPATIENT
Start: 2024-06-28 | End: 2024-07-01 | Stop reason: HOSPADM

## 2024-06-28 RX ADMIN — GABAPENTIN 300 MG: 300 CAPSULE ORAL at 21:59

## 2024-06-28 RX ADMIN — HYDRALAZINE HYDROCHLORIDE 25 MG: 25 TABLET ORAL at 08:50

## 2024-06-28 RX ADMIN — Medication 10 ML: at 08:52

## 2024-06-28 RX ADMIN — GABAPENTIN 300 MG: 300 CAPSULE ORAL at 08:50

## 2024-06-28 RX ADMIN — LEVOTHYROXINE SODIUM 100 MCG: 0.1 TABLET ORAL at 05:28

## 2024-06-28 RX ADMIN — POTASSIUM CHLORIDE 40 MEQ: 1500 TABLET, EXTENDED RELEASE ORAL at 16:51

## 2024-06-28 RX ADMIN — SERTRALINE 50 MG: 50 TABLET, FILM COATED ORAL at 08:50

## 2024-06-28 RX ADMIN — Medication 10 ML: at 22:37

## 2024-06-28 RX ADMIN — METOPROLOL SUCCINATE 50 MG: 50 TABLET, EXTENDED RELEASE ORAL at 08:50

## 2024-06-28 RX ADMIN — POTASSIUM CHLORIDE 40 MEQ: 1500 TABLET, EXTENDED RELEASE ORAL at 14:07

## 2024-06-28 RX ADMIN — ASPIRIN 81 MG: 81 TABLET, COATED ORAL at 08:50

## 2024-06-28 RX ADMIN — PANTOPRAZOLE SODIUM 40 MG: 40 TABLET, DELAYED RELEASE ORAL at 05:28

## 2024-06-28 RX ADMIN — ATORVASTATIN CALCIUM 40 MG: 40 TABLET, FILM COATED ORAL at 08:50

## 2024-06-28 RX ADMIN — FUROSEMIDE 40 MG: 40 TABLET ORAL at 08:50

## 2024-06-28 RX ADMIN — CETIRIZINE HYDROCHLORIDE 10 MG: 10 TABLET, FILM COATED ORAL at 08:50

## 2024-06-28 NOTE — PLAN OF CARE
Problem: Adult Inpatient Plan of Care  Goal: Absence of Hospital-Acquired Illness or Injury  Intervention: Identify and Manage Fall Risk  Recent Flowsheet Documentation  Taken 6/28/2024 1816 by Sofy Machuca RN  Safety Promotion/Fall Prevention:   activity supervised   safety round/check completed   nonskid shoes/slippers when out of bed   lighting adjusted  Taken 6/28/2024 1610 by Sofy Machuca RN  Safety Promotion/Fall Prevention:   activity supervised   safety round/check completed   nonskid shoes/slippers when out of bed   lighting adjusted  Taken 6/28/2024 1400 by Sofy Machuca RN  Safety Promotion/Fall Prevention:   activity supervised   safety round/check completed   nonskid shoes/slippers when out of bed   lighting adjusted  Taken 6/28/2024 1200 by Sofy Machuca RN  Safety Promotion/Fall Prevention:   activity supervised   safety round/check completed   nonskid shoes/slippers when out of bed   lighting adjusted  Taken 6/28/2024 1020 by Sofy Machuca RN  Safety Promotion/Fall Prevention:   activity supervised   safety round/check completed   lighting adjusted  Taken 6/28/2024 0853 by Sofy Machuca RN  Safety Promotion/Fall Prevention: activity supervised  Intervention: Prevent Skin Injury  Recent Flowsheet Documentation  Taken 6/28/2024 1816 by Sofy Machuca RN  Body Position: position changed independently  Skin Protection:   adhesive use limited   transparent dressing maintained   tubing/devices free from skin contact  Taken 6/28/2024 1610 by Sofy Machuca RN  Body Position: position changed independently  Taken 6/28/2024 1400 by Sofy Machuca RN  Body Position: position changed independently  Skin Protection:   adhesive use limited   transparent dressing maintained   tubing/devices free from skin contact  Taken 6/28/2024 1200 by Sofy Machuca RN  Body Position: position changed independently  Skin Protection:   adhesive use limited   transparent dressing maintained    tubing/devices free from skin contact  Taken 6/28/2024 1020 by Sofy Machuca RN  Body Position: position changed independently  Skin Protection:   adhesive use limited   transparent dressing maintained   tubing/devices free from skin contact  Taken 6/28/2024 0853 by Sofy Machuca RN  Body Position: position changed independently  Skin Protection:   adhesive use limited   transparent dressing maintained   tubing/devices free from skin contact  Intervention: Prevent and Manage VTE (Venous Thromboembolism) Risk  Recent Flowsheet Documentation  Taken 6/28/2024 1610 by Sofy Machuca RN  Activity Management: activity encouraged  Taken 6/28/2024 1400 by Sofy Machuca RN  Activity Management: activity encouraged  Taken 6/28/2024 1200 by Sofy Machuca RN  Activity Management: activity encouraged  Taken 6/28/2024 1020 by Sofy Machuca RN  Activity Management: activity encouraged  Taken 6/28/2024 0853 by Sofy Machuca RN  Activity Management: activity encouraged  VTE Prevention/Management:   bilateral   sequential compression devices on  Range of Motion: active ROM (range of motion) encouraged  Intervention: Prevent Infection  Recent Flowsheet Documentation  Taken 6/28/2024 1816 by Sofy Machuca RN  Infection Prevention:   hand hygiene promoted   single patient room provided  Taken 6/28/2024 1610 by Sofy Machuca RN  Infection Prevention:   hand hygiene promoted   single patient room provided  Taken 6/28/2024 1400 by Sofy Machuca RN  Infection Prevention:   hand hygiene promoted   single patient room provided  Taken 6/28/2024 1200 by Sofy Machuca RN  Infection Prevention:   hand hygiene promoted   single patient room provided  Taken 6/28/2024 1020 by Sofy Machuca RN  Infection Prevention:   hand hygiene promoted   single patient room provided  Taken 6/28/2024 0853 by Sofy Machuca RN  Infection Prevention:   hand hygiene promoted   single patient room provided  Goal: Optimal Comfort  and Wellbeing  Intervention: Provide Person-Centered Care  Recent Flowsheet Documentation  Taken 6/28/2024 1816 by Sofy Machuca RN  Trust Relationship/Rapport:   care explained   questions answered   questions encouraged  Taken 6/28/2024 1610 by Sofy Machuca RN  Trust Relationship/Rapport:   care explained   questions answered   questions encouraged  Taken 6/28/2024 1400 by Sofy Machuca RN  Trust Relationship/Rapport:   care explained   questions answered   questions encouraged  Taken 6/28/2024 1200 by Sofy Machuca RN  Trust Relationship/Rapport:   care explained   questions answered   questions encouraged  Taken 6/28/2024 1020 by Sofy Machuca RN  Trust Relationship/Rapport:   care explained   questions answered   questions encouraged  Taken 6/28/2024 0853 by Sofy Machuca RN  Trust Relationship/Rapport:   care explained   questions answered   questions encouraged   Goal Outcome Evaluation:

## 2024-06-28 NOTE — CASE MANAGEMENT/SOCIAL WORK
Continued Stay Note  [facility]     Patient Name: Angélica De La Cruz  MRN: [id number]  Today's Date: 6/28/2024    Admit Date: 6/19/2024    Plan: [facility] rehab   Discharge Plan       Row Name 06/28/24 0817       Plan    Plan [facility] rehab    Patient/Family in Agreement with Plan yes    Plan Comments  spoke with Mrs. De La Cruz, at the bedside. CM confirmed with pt that D/C plan is [facility]. CLIFTON/Sonam- [phone]. Per Cardiology pt will likely be ready for rehab early next week. Pt is checking with family about transportation. Pacemaker placed on 6/27/24.  will continue to follow.    1145-Dr. Jordan stated in Medical Rounds that pt will be medical ready for [facility] on Monday.  left Sonam/CLIFTON a voicemail with this update.     Final Discharge Disposition Code 62 - inpatient rehab facility                   Discharge Codes    No documentation.                 Expected Discharge Date and Time       Expected Discharge Date Expected Discharge Time    Jul 1, 2024               Lisa Diallo RN

## 2024-06-28 NOTE — PROGRESS NOTES
" [facility] - Progress Note    Angélica De La Cruz  1945  [id number]    06/28/24, 08:41 EDT      Chief Complaint: Following for PAF with RVR    Subjective:   Patient awake in bed.  S/P permanent pacemaker yesterday.    Pressure dressing removed, incision dressing C/D/I.  Patient reports no chest pain, no dyspnea.  Cough minimal this morning.        Review of Systems:  Pertinent positives are listed above and in physical exam.  All others have been reviewed and are negative.    [Held by provider] apixaban, 5 mg, Oral, Q12H  aspirin, 81 mg, Oral, Daily  atorvastatin, 40 mg, Oral, Daily  cetirizine, 10 mg, Oral, Daily  furosemide, 40 mg, Oral, Daily  gabapentin, 300 mg, Oral, Q12H  hydrALAZINE, 25 mg, Oral, BID  levothyroxine, 100 mcg, Oral, Daily  metoprolol succinate XL, 50 mg, Oral, Q24H  pantoprazole, 40 mg, Oral, Q AM  sertraline, 50 mg, Oral, Daily  sodium chloride, 10 mL, Intravenous, Q12H  sodium chloride, 10 mL, Intravenous, Q12H        Objective:  Vitals:   height is 162.6 cm (64.02\") and weight is 78.2 kg (172 lb 6.4 oz). Her oral temperature is 98.2 °F (36.8 °C). Her blood pressure is 161/82 and her pulse is 61. Her respiration is 16 and oxygen saturation is 98%.     Intake/Output Summary (Last 24 hours) at 6/28/2024 0841  Last data filed at 6/27/2024 2100  Gross per 24 hour   Intake 1710 ml   Output --   Net 1710 ml       Physical Exam:  General:  WN, NAD  CV: Normal S1, S2. No murmur, rub, or gallop.  Resp:  CTA Mauricio, equal, nonlabored.  Abd:  Soft, + BS, no organomegaly. Nontender to palpation.  Extrem:  No edema BLE, 2+ pedal/PT pulses.            Results from last 7 days   Lab Units 06/28/24  0508   WBC 10*3/mm3 8.22   HEMOGLOBIN g/dL 11.4*   HEMATOCRIT % 41.0   PLATELETS 10*3/mm3 306     Results from last 7 days   Lab Units 06/28/24  0508 06/24/24  1418 06/24/24  0332   SODIUM mmol/L 143   < > 143   POTASSIUM mmol/L 3.6   < > 3.2*   CHLORIDE mmol/L 106   < > 104   CO2 mmol/L 24.0   < > " 28.0   BUN mg/dL 25*   < > 11   CREATININE mg/dL 1.21*   < > 1.03*   CALCIUM mg/dL 9.1   < > 8.5*   BILIRUBIN mg/dL  --   --  0.4   ALK PHOS U/L  --   --  108   ALT (SGPT) U/L  --   --  18   AST (SGOT) U/L  --   --  21   GLUCOSE mg/dL 89   < > 97    < > = values in this interval not displayed.                     Results from last 7 days   Lab Units 06/28/24  0508   PROBNP pg/mL 4,809.0*         Tele: Paced    Assessment:  -79-year-old CF with PMHx CAD/CABG, HTN, HLP, hypothyroidism, AMANDA/CPAP and chronic dyspnea presents to [facility] ED with recent abnormal CXR as outpatient and progressive dyspnea, increased O2 demand at home.  -Anemia requiring 2 units PRBCs transfusion at admission, recent bowel habit changes  -A-fib with RVR in setting of profound anemia  -Elevated troponin, likely demand ischemia  -CKD  -RA  -Obesity           Plan:  -Admitted to hospitalist services  -GI signed off, s/p EGD and colonoscopy 6/22. Iron supplementation recommended.  -Echo EF 46 to 50% with LA dilation, RVSP 35 to 45 mmHg.  -Findings consistent with tachybradycardia syndrome.  Status post permanent pacemaker 6/27/2024, Dr. Martini.  Continue to hold Eliquis until Monday, July 1.  DC oral amiodarone.  Initiate Toprol-XL 50 mg p.o. daily.  -Agree with rehab placement.    -Cardiology will see again on Monday morning.  Please call on-call service if needed for cardiac issues over the weekend.      I discussed the patient's findings and my recommendations with the patient, any present family members, and the nursing staff.  Timothy Albarran MD saw and examined patient, verified hx and PE, read all radiographic studies, reviewed labs and micro data, and formulated dx, plan for treatment and all medical decision making.      Rosanna Nelson PA-C  06/28/24, 08:43 EDT

## 2024-06-28 NOTE — THERAPY TREATMENT NOTE
Patient Name: Angélica De La Cruz  : 1945    MRN: [id number]                              Today's Date: 2024       Admit Date: 2024    Visit Dx:     ICD-10-CM ICD-9-CM   1. NSTEMI (non-ST elevated myocardial infarction)  I21.4 410.70   2. Anemia, unspecified type  D64.9 285.9   3. Paroxysmal atrial fibrillation  I48.0 427.31   4. Atrial fibrillation with RVR  I48.91 427.31   5. Coronary artery disease involving native heart with angina pectoris, unspecified vessel or lesion type  I25.119 414.01     413.9   6. Iron deficiency anemia due to chronic blood loss  D50.0 280.0     Patient Active Problem List   Diagnosis    Acute febrile illness    Abnormal stress test    CABG 20    Hypertension    CKD (chronic kidney disease)    Atrial fibrillation    Chronic cough    Non-seasonal allergic rhinitis    AMANDA (obstructive sleep apnea)    CAD (coronary artery disease) s/p CABG    Carotid stenosis, asymptomatic, right    A-fib    Symptomatic anemia    NSTEMI (non-ST elevated myocardial infarction)    Hypothyroidism (acquired)    Rheumatoid arthritis    Iron deficiency anemia due to chronic blood loss     Past Medical History:   Diagnosis Date    Arthritis     CKD (chronic kidney disease)     elevated creat caused by long term use of lisinopril     Coronary artery disease     Fibromyalgia     Hypertension     Lung nodule seen on imaging study     AMANDA treated with BiPAP     Peritonitis      Past Surgical History:   Procedure Laterality Date    APPENDECTOMY      CARDIAC CATHETERIZATION N/A 2020    Procedure: Left Heart Cath;  Surgeon: Sanya Borges MD;  Location:  [facility] CATH INVASIVE LOCATION;  Service: Cardiovascular;  Laterality: N/A;    CATARACT EXTRACTION, BILATERAL Bilateral 2018    CHOLECYSTECTOMY      COLONOSCOPY N/A 2024    Procedure: COLONOSCOPY;  Surgeon: Brunner, Mark I, MD;  Location:  [facility] ENDOSCOPY;  Service: Gastroenterology;  Laterality: N/A;    CORONARY ARTERY BYPASS GRAFT  N/A 11/25/2020    Procedure: MEDIAN STERNOTOMY, CORONARY ARTERY BYPASS GRAFTING X2, UTILIZNG THE LEFT  INTERNAL MAMMARY ARTERY GRAFT, ENDOSCOPIC VEIN HARVESTING CONVERTED TO OPEN OF THE RIGHT GREATER SAPHENOUS VEIN;  Surgeon: Wali Spicer MD;  Location: [facility] OR;  Service: Cardiothoracic;  Laterality: N/A;  VO: 0807  VR: 0807      ENDOSCOPY N/A 6/22/2024    Procedure: ESOPHAGOGASTRODUODENOSCOPY;  Surgeon: Brunner, Mark I, MD;  Location: [facility] ENDOSCOPY;  Service: Gastroenterology;  Laterality: N/A;    HYSTERECTOMY  1984    with BSO    OOPHORECTOMY Bilateral 1984    PACEMAKER IMPLANTATION Left 6/27/2024    Procedure: Implant PPM DC;  Surgeon: Leif Martini MD;  Location: [facility] EP INVASIVE LOCATION;  Service: Cardiovascular;  Laterality: Left;      General Information       Row Name 06/28/24 1452          Physical Therapy Time and Intention    Document Type therapy note (daily note)  -LO     Mode of Treatment individual therapy;physical therapy  -LO       Row Name 06/28/24 1452          General Information    Patient Profile Reviewed yes  -LO     Existing Precautions/Restrictions fall;pacemaker  pacemaker placed 6/27  -LO       Row Name 06/28/24 1452          Cognition    Orientation Status (Cognition) oriented x 4  -LO       Row Name 06/28/24 1452          Safety Issues, Functional Mobility    Safety Issues Affecting Function (Mobility) awareness of need for assistance;insight into deficits/self-awareness  -LO     Impairments Affecting Function (Mobility) balance;endurance/activity tolerance;postural/trunk control;shortness of breath;strength;pain  -LO               User Key  (r) = Recorded By, (t) = Taken By, (c) = Cosigned By      Initials Name Provider Type    LO Daniela Jonas, PT Physical Therapist                   Mobility       Row Name 06/28/24 1453          Bed Mobility    Bed Mobility supine-sit  -LO     Supine-Sit [place] (Bed Mobility) minimum assist (75% patient effort);1 person  assist;verbal cues  -LO     Assistive Device (Bed Mobility) bed rails;head of bed elevated  -LO     Comment, (Bed Mobility) vc for HP and sequencing as well as for maintaining pacemaker precautions  -LO       Row Name 06/28/24 1453          Transfers    Comment, (Transfers) EOB>FWW>recliner; vc for HP no physical assistance required  -LO       Row Name 06/28/24 1453          Bed-Chair Transfer    Bed-Chair Bell (Transfers) contact guard  -LO     Assistive Device (Bed-Chair Transfers) walker, front-wheeled  -LO       Row Name 06/28/24 1453          Sit-Stand Transfer    Sit-Stand Bell (Transfers) contact guard  -LO     Assistive Device (Sit-Stand Transfers) walker, front-wheeled  -LO       Row Name 06/28/24 1453          Gait/Stairs (Locomotion)    Bell Level (Gait) contact guard  -LO     Assistive Device (Gait) walker, front-wheeled  -LO     Deviations/Abnormal Patterns (Gait) bilateral deviations;manjinder decreased;gait speed decreased;stride length decreased  -LO     Bilateral Gait Deviations forward flexed posture;heel strike decreased  -LO     Comment, (Gait/Stairs) Ambulates with FWW CGA with good pace and improvement in step length and height . No foot pain reported this date  -               User Key  (r) = Recorded By, (t) = Taken By, (c) = Cosigned By      Initials Name Provider Type    Daniela Corrales PT Physical Therapist                   Obj/Interventions       Row Name 06/28/24 1456          Balance    Balance Assessment sitting static balance;sitting dynamic balance;standing static balance;standing dynamic balance  -LO     Static Sitting Balance standby assist  -LO     Dynamic Sitting Balance standby assist  -LO     Position, Sitting Balance unsupported;sitting edge of bed  -LO     Static Standing Balance contact guard  -LO     Dynamic Standing Balance contact guard  -LO     Position/Device Used, Standing Balance supported;walker, rolling  -LO     Comment, Balance FWW CGA for  safety in standing  -LO               User Key  (r) = Recorded By, (t) = Taken By, (c) = Cosigned By      Initials Name Provider Type    Daniela Corrales, PT Physical Therapist                   Goals/Plan    No documentation.                  Clinical Impression       Row Name 06/28/24 1456          Pain    Pretreatment Pain Rating 0/10 - no pain  -LO     Posttreatment Pain Rating 0/10 - no pain  -LO       Row Name 06/28/24 1456          Plan of Care Review    Plan of Care Reviewed With patient  -LO     Progress improving  -LO     Outcome Evaluation Patient requiring less physical assistance for bed mobility, transfers, and gait this date. Patient will continue to benefit from skilled IP PT services to address impairments for return to PLOF. Cont IP PT POC.  -LO       Row Name 06/28/24 1456          Therapy Assessment/Plan (PT)    Rehab Potential (PT) good, to achieve stated therapy goals  -LO     Criteria for Skilled Interventions Met (PT) yes  -LO     Therapy Frequency (PT) daily  -LO       Row Name 06/28/24 1456          Vital Signs    Pre Systolic BP Rehab 126  -LO     Pre Treatment Diastolic BP 52  -LO     Pre SpO2 (%) 92  -LO     O2 Delivery Pre Treatment room air  -LO     O2 Delivery Intra Treatment room air  -LO     O2 Delivery Post Treatment room air  -LO     Pre Patient Position Supine  -LO     Intra Patient Position Standing  -LO     Post Patient Position Sitting  -LO       Row Name 06/28/24 1456          Positioning and Restraints    Pre-Treatment Position in bed  -LO     Post Treatment Position chair  -LO     In Chair notified nsg;reclined;call light within reach;encouraged to call for assist;exit alarm on;with family/caregiver;waffle cushion  -LO               User Key  (r) = Recorded By, (t) = Taken By, (c) = Cosigned By      Initials Name Provider Type    Daniela Corrales, ACE Physical Therapist                   Outcome Measures       Row Name 06/28/24 1458 06/28/24 0853       How much help from  another person do you currently need...    Turning from your back to your side while in flat bed without using bedrails? 4  -LO 4  -TC    Moving from lying on back to sitting on the side of a flat bed without bedrails? 3  -LO 3  -TC    Moving to and from a bed to a chair (including a wheelchair)? 3  -LO 3  -TC    Standing up from a chair using your arms (e.g., wheelchair, bedside chair)? 3  -LO 3  -TC    Climbing 3-5 steps with a railing? 3  -LO 2  -TC    To walk in hospital room? 3  -LO 3  -TC    AM-PAC 6 Clicks Score (PT) 19  -LO 18  -TC    Highest Level of Mobility Goal 6 --> Walk 10 steps or more  -LO 6 --> Walk 10 steps or more  -TC      Row Name 06/28/24 1458          Functional Assessment    Outcome Measure Options AM-PAC 6 Clicks Basic Mobility (PT)  -LO               User Key  (r) = Recorded By, (t) = Taken By, (c) = Cosigned By      Initials Name Provider Type    LO Daniela Jonas, PT Physical Therapist    TC Sofy Machuca, RN Registered Nurse                                 Physical Therapy Education       Title: PT OT SLP Therapies (In Progress)       Topic: Physical Therapy (Done)       Point: Mobility training (Done)       Learning Progress Summary             Patient Acceptance, E, VU,NR by LO at 6/28/2024 1436    Comment: PT POC, pacemaker precautions    Acceptance, E, NR by AE at 6/25/2024 1359    Acceptance, E, NR by AE at 6/20/2024 1328   Family Acceptance, E, VU,NR by LO at 6/28/2024 1436    Comment: PT POC, pacemaker precautions                         Point: Home exercise program (Done)       Learning Progress Summary             Patient Acceptance, E, VU,NR by LO at 6/28/2024 1436    Comment: PT POC, pacemaker precautions   Family Acceptance, E, VU,NR by LO at 6/28/2024 1436    Comment: PT POC, pacemaker precautions                         Point: Body mechanics (Done)       Learning Progress Summary             Patient Acceptance, E, VU,NR by LO at 6/28/2024 1436    Comment: PT POC, pacemaker  precautions    Acceptance, E, NR by AE at 6/25/2024 1359    Acceptance, E, NR by AE at 6/20/2024 1328   Family Acceptance, E, VU,NR by LO at 6/28/2024 1436    Comment: PT POC, pacemaker precautions                         Point: Precautions (Done)       Learning Progress Summary             Patient Acceptance, E, VU,NR by LO at 6/28/2024 1436    Comment: PT POC, pacemaker precautions    Acceptance, E, NR by AE at 6/25/2024 1359    Acceptance, E, NR by AE at 6/20/2024 1328   Family Acceptance, E, VU,NR by LO at 6/28/2024 1436    Comment: PT POC, pacemaker precautions                                         User Key       Initials Effective Dates Name Provider Type Discipline     06/16/21 -  Daniela Jonas, PT Physical Therapist PT    AE 09/21/21 -  Bradley Malone, PT Physical Therapist PT                  PT Recommendation and Plan     Plan of Care Reviewed With: patient  Progress: improving  Outcome Evaluation: Patient requiring less physical assistance for bed mobility, transfers, and gait this date. Patient will continue to benefit from skilled IP PT services to address impairments for return to PLOF. Cont IP PT POC.     Time Calculation:         PT Charges       Row Name 06/28/24 1436             Time Calculation    Start Time 1436  -LO      PT Received On 06/28/24  -LO      PT Goal Re-Cert Due Date 06/30/24  -LO         Timed Charges    64347 - Gait Training Minutes  6  -LO      25285 - PT Therapeutic Activity Minutes 12  -LO         Total Minutes    Timed Charges Total Minutes 18  -LO       Total Minutes 18  -LO                User Key  (r) = Recorded By, (t) = Taken By, (c) = Cosigned By      Initials Name Provider Type     Daniela Jonas, PT Physical Therapist                  Therapy Charges for Today       Code Description Service Date Service Provider Modifiers Qty    34399357454 HC PT THERAPEUTIC ACT EA 15 MIN 6/28/2024 Daniela Jonas, PT GP 1            PT G-Codes  Outcome Measure Options: AM-PAC 6  Clicks Basic Mobility (PT)  AM-PAC 6 Clicks Score (PT): 19  AM-PAC 6 Clicks Score (OT): 18  PT Discharge Summary  Anticipated Discharge Disposition (PT): inpatient rehabilitation facility    Daniela Jonas, ACE  6/28/2024

## 2024-06-28 NOTE — PLAN OF CARE
Goal Outcome Evaluation:  Plan of Care Reviewed With: patient        Progress: improving  Outcome Evaluation: Patient requiring less physical assistance for bed mobility, transfers, and gait this date. Patient will continue to benefit from skilled IP PT services to address impairments for return to PLOF. Cont IP PT POC.      Anticipated Discharge Disposition (PT): inpatient rehabilitation facility

## 2024-06-28 NOTE — PLAN OF CARE
Problem: Skin Injury Risk Increased  Goal: Skin Health and Integrity  Outcome: Ongoing, Progressing  Intervention: Optimize Skin Protection  Recent Flowsheet Documentation  Taken 6/28/2024 0400 by Lisette Nelson RN  Head of Bed ([facility] [place]) Positioning: [facility] [place] elevated  Skin Protection:   adhesive use limited   tubing/devices free from skin contact   transparent dressing maintained  Taken 6/28/2024 0200 by Lisette Nelson RN  Head of Bed ([facility] [place]) Positioning: [facility] [place] elevated  Skin Protection:   adhesive use limited   tubing/devices free from skin contact   transparent dressing maintained  Taken 6/28/2024 0000 by Lisette Nelson RN  Head of Bed ([facility] [place]) Positioning: [facility] [place] elevated  Skin Protection:   adhesive use limited   tubing/devices free from skin contact   transparent dressing maintained  Taken 6/27/2024 2200 by Lisette Nelson RN  Head of Bed ([facility] [place]) Positioning: [facility] [place] elevated  Skin Protection:   adhesive use limited   tubing/devices free from skin contact   transparent dressing maintained  Taken 6/27/2024 2000 by Lisette Nelson RN  Head of Bed ([facility] [place]) Positioning: [facility] [place] elevated  Skin Protection:   adhesive use limited   tubing/devices free from skin contact   transparent dressing maintained     Problem: Fall Injury Risk  Goal: Absence of Fall and Fall-Related Injury  Outcome: Ongoing, Progressing  Intervention: Identify and Manage Contributors  Recent Flowsheet Documentation  Taken 6/28/2024 0400 by Lisette Nelson RN  Medication Review/Management: medications reviewed  Taken 6/28/2024 0200 by Lisette Nelson RN  Medication Review/Management: medications reviewed  Taken 6/28/2024 0000 by Lisette Nelson RN  Medication Review/Management: medications reviewed  Taken 6/27/2024 2200 by Lisette Nelson RN  Medication Review/Management: medications reviewed  Taken 6/27/2024 2000 by Lisette Nelson RN  Medication Review/Management: medications reviewed  Intervention: Promote Injury-Free Environment  Recent Flowsheet Documentation  Taken  6/28/2024 0400 by Lisette Nelson RN  Safety Promotion/Fall Prevention:   activity supervised   assistive device/personal items within reach   clutter free environment maintained   safety round/check completed   room organization consistent   nonskid shoes/slippers when out of bed  Taken 6/28/2024 0200 by Lisette Nelson RN  Safety Promotion/Fall Prevention:   activity supervised   assistive device/personal items within reach   clutter free environment maintained   safety round/check completed   room organization consistent   nonskid shoes/slippers when out of bed  Taken 6/28/2024 0000 by Lisette Nelson RN  Safety Promotion/Fall Prevention:   activity supervised   assistive device/personal items within reach   clutter free environment maintained   safety round/check completed   room organization consistent   nonskid shoes/slippers when out of bed  Taken 6/27/2024 2200 by Lisette Nelson RN  Safety Promotion/Fall Prevention:   activity supervised   assistive device/personal items within reach   clutter free environment maintained   safety round/check completed   room organization consistent   nonskid shoes/slippers when out of bed  Taken 6/27/2024 2000 by Lisette Nelson RN  Safety Promotion/Fall Prevention:   activity supervised   assistive device/personal items within reach   clutter free environment maintained   safety round/check completed   room organization consistent   nonskid shoes/slippers when out of bed     Problem: Adjustment to Illness (Sepsis/Septic Shock)  Goal: Optimal Coping  Outcome: Ongoing, Progressing     Problem: Bleeding (Sepsis/Septic Shock)  Goal: Absence of Bleeding  Outcome: Ongoing, Progressing     Problem: Glycemic Control Impaired (Sepsis/Septic Shock)  Goal: Blood Glucose Level Within Desired Range  Outcome: Ongoing, Progressing     Problem: Infection Progression (Sepsis/Septic Shock)  Goal: Absence of Infection Signs and Symptoms  Outcome: Ongoing, Progressing  Intervention: Initiate  Sepsis Management  Recent Flowsheet Documentation  Taken 6/28/2024 0400 by Lisette Nelson RN  Infection Prevention:   rest/sleep promoted   single patient room provided  Taken 6/28/2024 0200 by Lisette Nelson RN  Infection Prevention:   rest/sleep promoted   single patient room provided  Taken 6/28/2024 0000 by Lisette Nelson RN  Infection Prevention:   rest/sleep promoted   single patient room provided  Intervention: Promote Recovery  Recent Flowsheet Documentation  Taken 6/28/2024 0400 by Lisette Nelson RN  Activity Management: activity minimized  Taken 6/28/2024 0200 by Lisette Nelson RN  Activity Management: activity minimized  Taken 6/28/2024 0000 by Lisette Nelson RN  Activity Management: activity minimized  Taken 6/27/2024 2200 by Lisette Nelson RN  Activity Management: activity minimized  Taken 6/27/2024 2000 by Lisette Nelson RN  Activity Management: activity encouraged     Problem: Nutrition Impaired (Sepsis/Septic Shock)  Goal: Optimal Nutrition Intake  Outcome: Ongoing, Progressing     Problem: Adult Inpatient Plan of Care  Goal: Plan of Care Review  Outcome: Ongoing, Progressing  Goal: Patient-Specific Goal (Individualized)  Outcome: Ongoing, Progressing  Goal: Absence of Hospital-Acquired Illness or Injury  Outcome: Ongoing, Progressing  Intervention: Identify and Manage Fall Risk  Recent Flowsheet Documentation  Taken 6/28/2024 0400 by Lisette Nelson RN  Safety Promotion/Fall Prevention:   activity supervised   assistive device/personal items within reach   clutter free environment maintained   safety round/check completed   room organization consistent   nonskid shoes/slippers when out of bed  Taken 6/28/2024 0200 by Lisette Nelson RN  Safety Promotion/Fall Prevention:   activity supervised   assistive device/personal items within reach   clutter free environment maintained   safety round/check completed   room organization consistent   nonskid shoes/slippers when out of bed  Taken  6/28/2024 0000 by Lisette Nelson RN  Safety Promotion/Fall Prevention:   activity supervised   assistive device/personal items within reach   clutter free environment maintained   safety round/check completed   room organization consistent   nonskid shoes/slippers when out of bed  Taken 6/27/2024 2200 by Lisette Nelson RN  Safety Promotion/Fall Prevention:   activity supervised   assistive device/personal items within reach   clutter free environment maintained   safety round/check completed   room organization consistent   nonskid shoes/slippers when out of bed  Taken 6/27/2024 2000 by Lisette Nelson RN  Safety Promotion/Fall Prevention:   activity supervised   assistive device/personal items within reach   clutter free environment maintained   safety round/check completed   room organization consistent   nonskid shoes/slippers when out of bed  Intervention: Prevent Skin Injury  Recent Flowsheet Documentation  Taken 6/28/2024 0400 by Lisette Nelson RN  Body Position: position changed independently  Skin Protection:   adhesive use limited   tubing/devices free from skin contact   transparent dressing maintained  Taken 6/28/2024 0200 by Lisette Nelson RN  Body Position: position changed independently  Skin Protection:   adhesive use limited   tubing/devices free from skin contact   transparent dressing maintained  Taken 6/28/2024 0000 by Lisette Nelson RN  Body Position: position changed independently  Skin Protection:   adhesive use limited   tubing/devices free from skin contact   transparent dressing maintained  Taken 6/27/2024 2200 by Lisette Nelson RN  Body Position: position changed independently  Skin Protection:   adhesive use limited   tubing/devices free from skin contact   transparent dressing maintained  Taken 6/27/2024 2000 by Lisette Nelson RN  Body Position: position changed independently  Skin Protection:   adhesive use limited   tubing/devices free from skin contact   transparent dressing  maintained  Intervention: Prevent and Manage VTE (Venous Thromboembolism) Risk  Recent Flowsheet Documentation  Taken 6/28/2024 0400 by Lisette Nelson RN  Activity Management: activity minimized  Taken 6/28/2024 0200 by Lisette Nelson RN  Activity Management: activity minimized  Taken 6/28/2024 0000 by Lisette Nelson RN  Activity Management: activity minimized  Taken 6/27/2024 2200 by Lisette Nelson RN  Activity Management: activity minimized  Taken 6/27/2024 2000 by Lisette Nelson RN  Activity Management: activity encouraged  Range of Motion: active ROM (range of motion) encouraged  Intervention: Prevent Infection  Recent Flowsheet Documentation  Taken 6/28/2024 0400 by Lisette Nelson RN  Infection Prevention:   rest/sleep promoted   single patient room provided  Taken 6/28/2024 0200 by Lisette Nelson RN  Infection Prevention:   rest/sleep promoted   single patient room provided  Taken 6/28/2024 0000 by Lisette Nelson RN  Infection Prevention:   rest/sleep promoted   single patient room provided  Goal: Optimal Comfort and Wellbeing  Outcome: Ongoing, Progressing  Goal: Readiness for Transition of Care  Outcome: Ongoing, Progressing   Goal Outcome Evaluation:

## 2024-06-28 NOTE — PROGRESS NOTES
[facility]  PROGRESS NOTE    Angélica De La Cruz   [id number]   1945    LOS: 8 days .  Date of Admission: 6/19/2024  Date of Service: 06/28/24    Primary Care Physician: Sussy Bloom MD    Chief Complaint: TBS      Subjective      Patient doing well this morning. Aquacel dressing in place. Mild incisional pain. Plans for patient to go to  rehab early next week.     ROS  All systems have been reviewed and are negative with the exception of those mentioned in the HPI and problem list above.     Objective   Vital Sign Min/Max for last 24 hours  Temp  Min: 97.5 °F (36.4 °C)  Max: 98.2 °F (36.8 °C)   BP  Min: 131/58  Max: 177/71   Pulse  Min: 49  Max: 76   Resp  Min: 12  Max: 18   SpO2  Min: 87 %  Max: 99 %   No data recorded   No data recorded     Physical Exam:  GENERAL: Alert, cooperative, in no acute distress.   HEENT: Normocephalic, no jugular venous distention  HEART: Regular rhythm, normal rate, and no murmurs, gallops, or rubs.   LUNGS: Clear to auscultation bilaterally. No wheezing, rales or rhonchi. RA  NEUROLOGIC: No focal abnormalities involving strength or sensation are noted.   EXTREMITIES: No clubbing, cyanosis, or edema noted.     Results:  Results from last 7 days   Lab Units 06/28/24  0508 06/24/24  0332 06/22/24  0327   WBC 10*3/mm3 8.22 9.05 7.15   HEMOGLOBIN g/dL 11.4* 10.0* 9.6*   HEMATOCRIT % 41.0 35.7 34.1   PLATELETS 10*3/mm3 306 278 261     Results from last 7 days   Lab Units 06/28/24  0508 06/26/24  0348 06/25/24  0318   SODIUM mmol/L 143 140 138   POTASSIUM mmol/L 3.6 3.7 4.4  4.4   CHLORIDE mmol/L 106 102 100   CO2 mmol/L 24.0 26.0 27.0   BUN mg/dL 25* 22 16   CREATININE mg/dL 1.21* 1.24* 1.15*   GLUCOSE mg/dL 89 101* 103*      Lab Results   Component Value Date    CHOL 181 11/24/2020    TRIG 146 11/24/2020    HDL 50 11/24/2020     (H) 11/24/2020    AST 21 06/24/2024    ALT 18 06/24/2024                             Results from last 7  days   Lab Units 06/28/24  0508   PROBNP pg/mL 4,809.0*       Intake/Output Summary (Last 24 hours) at 6/28/2024 0809  Last data filed at 6/27/2024 2100  Gross per 24 hour   Intake 1710 ml   Output --   Net 1710 ml       EKG/TELE: AP    Radiology Data:   XR Chest PA & Lateral    Result Date: 6/28/2024  Impression: 1. Interval placement of left subclavian transvenous pacemaker. No evidence pneumothorax. 2. Stable small bilateral pleural effusions. 3. Prominent proximal pulmonary artery segments compatible changes of pulmonary arterial hypertension. Electronically Signed: Julian Bennett MD  6/28/2024 7:29 AM EDT  Workstation ID: [id number]    Results for orders placed during the hospital encounter of 06/19/24    Adult Transthoracic Echo Complete W/ Cont if Necessary Per Protocol    Interpretation Summary    Left ventricular ejection fraction appears to be 46 - 50%.    Mildly reduced right ventricular systolic function noted.    The left atrial cavity is dilated.    Left atrial volume is severely increased.    Estimated right ventricular systolic pressure from tricuspid regurgitation is mildly elevated (35-45 mmHg). Calculated right ventricular systolic pressure from tricuspid regurgitation is 40 mmHg.     Current Medications:  [Held by provider] apixaban, 5 mg, Oral, Q12H  aspirin, 81 mg, Oral, Daily  atorvastatin, 40 mg, Oral, Daily  cetirizine, 10 mg, Oral, Daily  furosemide, 40 mg, Oral, Daily  gabapentin, 300 mg, Oral, Q12H  hydrALAZINE, 25 mg, Oral, BID  levothyroxine, 100 mcg, Oral, Daily  metoprolol succinate XL, 50 mg, Oral, Q24H  pantoprazole, 40 mg, Oral, Q AM  sertraline, 50 mg, Oral, Daily  sodium chloride, 10 mL, Intravenous, Q12H  sodium chloride, 10 mL, Intravenous, Q12H         Assessment and Plan:   Symptomatic tachybrady syndrome  Symptomatic bradycardia  Paroxysmal AF with RVR  - will need amiodarone for AF suppression but limited by symptomatic bradycardia  - Also having tachybrady syndrome with  conversion pause which is also symptomatic  - s/p SJM DC PPM yesterday with Dr. Martini  - Chest xray WNL  - Device functioning appropriately  - Wound check 7-10 days. Okay to be completed at [facility]. Please call Elsie Miles RN for instructions. [phone] Option 4.   - Follow up with Dr. Albarran in 3 months with SJM device check  - We will see on an as needed basis. Please call for questions or concerns.     Electronically signed by MARCO Almazan, 06/28/24, 8:09 AM EDT.     Please note that portions of this note were dictated utilizing Dragon dictation.

## 2024-06-28 NOTE — PROGRESS NOTES
[facility]  PROGRESS NOTE    Patient Name: Angélica De La Cruz  : 1945  MRN: [id number]    Date of Admission: 2024  Primary Care Physician: Sussy Bloom MD    Subjective   Subjective     CC:  Palpitations f/u    HPI:  S/p pacemaker and feels well. Hoping this will give her more energy  Up and walking this morning to bathroom     Objective   Objective     Vital Signs:   Temp:  [97.5 °F (36.4 °C)-98.4 °F (36.9 °C)] 98.4 °F (36.9 °C)  Heart Rate:  [49-76] 60  Resp:  [12-18] 16  BP: (131-177)/(55-82) 135/62  Flow (L/min):  [0-2.5] 1     Physical Exam:  Constitutional: No acute distress, awake, alert female sitting up in bed  Respiratory: Clear to auscultation bilaterally, respiratory effort normal   Cardiovascular: RRR, normal rate, no murmurs, rubs, or gallops  Gastrointestinal: Soft, nontender, nondistended  Musculoskeletal: Muscle tone within normal limits, no joint effusions appreciated  Psychiatric: Appropriate affect, cooperative  Neurologic: Alert and oriented, facial movements symmetric and spontaneous movement of all 4 extremities grossly equal bilaterally, speech clear  Skin: No rashes    Results Reviewed:  LAB RESULTS:      Lab 24  0508 24  0332 24  0327   WBC 8.22 9.05 7.15   HEMOGLOBIN 11.4* 10.0* 9.6*   HEMATOCRIT 41.0 35.7 34.1   PLATELETS 306 278 261   NEUTROS ABS  --  6.69  --    IMMATURE GRANS (ABS)  --  0.03  --    LYMPHS ABS  --  0.70  --    MONOS ABS  --  0.83  --    EOS ABS  --  0.73*  --    MCV 80.2 77.9* 77.7*         Lab 24  0508 24  0348 24  0318 24  1418 24  0332 24  0327   SODIUM 143 140 138  --  143 145   POTASSIUM 3.6 3.7 4.4  4.4 3.6 3.2* 3.9   CHLORIDE 106 102 100  --  104 110*   CO2 24.0 26.0 27.0  --  28.0 26.0   ANION GAP 13.0 12.0 11.0  --  11.0 9.0   BUN 25* 22 16  --  11 17   CREATININE 1.21* 1.24* 1.15*  --  1.03* 1.16*   EGFR 45.1* 43.8* 48.0*  --  54.7* 47.5*    GLUCOSE 89 101* 103*  --  97 84   CALCIUM 9.1 8.7 8.5*  --  8.5* 8.4*   MAGNESIUM  --   --  2.9* 1.2*  --   --          Lab 06/24/24  0332   TOTAL PROTEIN 6.2   ALBUMIN 3.0*   GLOBULIN 3.2   ALT (SGPT) 18   AST (SGOT) 21   BILIRUBIN 0.4   ALK PHOS 108         Lab 06/28/24  0508   PROBNP 4,809.0*                     Lab 06/23/24  0913   PH, ARTERIAL 7.348*   PCO2, ARTERIAL 42.8   PO2 .0*   FIO2 36   HCO3 ART 23.5   BASE EXCESS ART -2.1*   CARBOXYHEMOGLOBIN 1.5     Brief Urine Lab Results  (Last result in the past 365 days)        Color   Clarity   Blood   Leuk Est   Nitrite   Protein   CREAT   Urine HCG        06/19/24 1212 Yellow   Clear   Negative   Negative   Negative   Negative                   Microbiology Results Abnormal       None            XR Chest PA & Lateral    Result Date: 6/28/2024  XR CHEST PA AND LATERAL Date of Exam: 6/28/2024 4:19 AM EDT Indication: Post ICD / Pacer Implant Comparison: 6/23/2024 Findings: There is been interval placement of a left subclavian transvenous pacemaker. No evidence of pneumothorax. Patient is again noted be status post median sternotomy. Heart size is within normal limits. Proximal pulmonary artery segments appear enlarged suggesting changes of pulmonary arterial hypertension. There is blunting of both costophrenic angles compatible small bilateral pleural effusions. No evidence pneumothorax. No focal airspace consolidation.     Impression: Impression: 1. Interval placement of left subclavian transvenous pacemaker. No evidence pneumothorax. 2. Stable small bilateral pleural effusions. 3. Prominent proximal pulmonary artery segments compatible changes of pulmonary arterial hypertension. Electronically Signed: Julian Bennett MD  6/28/2024 7:29 AM EDT  Workstation ID: [id number]    EP/CRM Study    Result Date: 6/27/2024  FINAL IMPRESSIONS: 1.   Successful implantation of a dual-chamber permanent pacemaker. RECOMMENDATION(S): The patient will be monitored on  telemetry. If stable, the patient can be discharged to home tomorrow Follow up per protocol; Follow up with Dr. Albarran in 3 months Leif Martini MD Cardiac Electrophysiologist [facility] 06/27/24 17:16 EDT FOR THE PATIENT - Pressure dressing from device site will be removed the morning after procedure or prior to discharge if patient goes home same day.   Patient will have an Aquacel dressing underneath.  Typically, no steri-strips or glue is used. The Aquacel dressing will be removed at wound check appointment (7-10 days). - Please do not lift more than 10 pounds or abduct the shoulder joint / or raise the arm above the shoulder for 6 weeks after device was implanted (this does not apply to subcutaneous ICDs). - Avoid activities that involve heavy lifting or rough contact that could result in blows to your implant site and to allow your incision time to heal. - Wear sling at bedtime for 4 weeks. - May shower day after procedure. - No creams, lotions, or powders to incision. - No bath tubs, hot tubs or swimming for 4 weeks. - No Driving preferably for 4 weeks, at minimum 2 weeks. - 7-10 day wound check. - 3 month hospital follow up with Device check. - Call your doctor if you have any swelling, redness or discharge around your incision, notice anything unusual or unexpected, or you develop a fever that does not go away in two or three days. - Call your doctor if you hear any beeping sounds / vibratory alerts from your device as this indicates your device needs to be checked immediately. - Carry your Medical Device ID Card with you at all times. - Please call our office () with any questions about the device or the wounds.      Results for orders placed during the hospital encounter of 06/19/24    Adult Transthoracic Echo Complete W/ Cont if Necessary Per Protocol    Interpretation Summary    Left ventricular ejection fraction appears to be 46 - 50%.    Mildly  reduced right ventricular systolic function noted.    The left atrial cavity is dilated.    Left atrial volume is severely increased.    Estimated right ventricular systolic pressure from tricuspid regurgitation is mildly elevated (35-45 mmHg). Calculated right ventricular systolic pressure from tricuspid regurgitation is 40 mmHg.      Current medications:  Scheduled Meds:[Held by provider] apixaban, 5 mg, Oral, Q12H  aspirin, 81 mg, Oral, Daily  atorvastatin, 40 mg, Oral, Daily  cetirizine, 10 mg, Oral, Daily  furosemide, 40 mg, Oral, Daily  gabapentin, 300 mg, Oral, Q12H  hydrALAZINE, 25 mg, Oral, BID  levothyroxine, 100 mcg, Oral, Daily  metoprolol succinate XL, 50 mg, Oral, Q24H  pantoprazole, 40 mg, Oral, Q AM  potassium chloride ER, 40 mEq, Oral, Q4H  sertraline, 50 mg, Oral, Daily  sodium chloride, 10 mL, Intravenous, Q12H  sodium chloride, 10 mL, Intravenous, Q12H      Continuous Infusions:     PRN Meds:.  senna-docusate sodium **AND** polyethylene glycol **AND** bisacodyl **AND** bisacodyl    Calcium Replacement - Follow Nurse / BPA Driven Protocol    ipratropium-albuterol    Magnesium Standard Dose Replacement - Follow Nurse / BPA Driven Protocol    nitroglycerin    Phosphorus Replacement - Follow Nurse / BPA Driven Protocol    Potassium Replacement - Follow Nurse / BPA Driven Protocol    sodium chloride    sodium chloride    sodium chloride    sodium chloride    sodium chloride    sodium chloride    Assessment & Plan   Assessment & Plan     Active Hospital Problems    Diagnosis  POA    **A-fib [I48.91]  Yes    Symptomatic anemia [D64.9]  Yes    NSTEMI (non-ST elevated myocardial infarction) [I21.4]  Yes    Hypothyroidism (acquired) [E03.9]  Yes    Rheumatoid arthritis [M06.9]  Yes    Iron deficiency anemia due to chronic blood loss [D50.0]  Unknown    AMANDA (obstructive sleep apnea) [G47.33]  Yes    CKD (chronic kidney disease) [N18.9]  Yes    Hypertension [I10]  Yes      Resolved Hospital Problems   No  resolved problems to display.        Brief Hospital Course to date:  Angélica De La Cruz is a 79 y.o. female w CAD s/p CABG c/b post-op Afib who presented w chest pain on 6/19. Rising troponin on admission - cardiology consulted who attributed this to NSTEMI II.   Stay c/b persistent paroxysmal A-fib then sinus bradycardia 2/2 meds given for this. 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 Will monitor over weekend w plan for [facility] likely Monday if no recurrent Afib w RVR    Symptomatic tachy-john, now s/p PPM  Paroxysmal Afib  -PPM implantation 6/27, will need wound check ~7/4 which can be completed at [facility] - call Elsie Miles [phone] #[phone] at time of dc  -d/c amiodarone, start metoprolol  -restart eliquis 7/1  -cards will follow PRN over weekend, will see Monday which, if no complications, will be day of discharge to [facility]    NSTEMI, h/o CABG 2020  -patient troponin 36 --> 1500 on admission in setting of chest pain. Feel significant to attribute to patient Hbg 7.7 grant given chronicity before admission (Hbg ~8.8 on 5/23/24 outpatient). Maybe 2/2 paroxysmal Afib but does seem out of proportion  -ECHO EF 45-50%, down from ~60% prior on review  -ASA, statin  -cardiology deferring ischemic evaluation currently, mgmt of above. Will need to f/u OP for further discussion    Iron deficiency anemia, acute on chronic   -s/p 2 units transfusion w significant response and stabilized thereafter  -s/p IV iron here, PO iron at dc  -EGD wnl 6/22, colonoscopy w cecal polyp but no bleeding    Hypomagnesemia/hypokalemia - replace per protocol  CKDIIIb - at baseline  AMANDA - wears cpap at home, cannot tolerate ours, nocturnal o2    Ok for lab holiday 6/29 given stability    Expected Discharge Location and Transportation: [facility]  Expected Discharge 7/1 (Discharge date is tentative pending patient's medical condition and is subject to change)  Expected Discharge Date: 7/1/2024; Expected Discharge Time:       VTE Prophylaxis:  Pharmacologic & mechanical VTE prophylaxis orders are present.         AM-PAC 6 Clicks Score (PT): 18 (06/28/24 0837)    CODE STATUS:   Code Status and Medical Interventions:   Ordered at: 06/19/24 1702     Medical Intervention Limits:    No intubation (DNI)     Code Status (Patient has no pulse and is not breathing):    No CPR (Do Not Attempt to Resuscitate)     Medical Interventions (Patient has pulse or is breathing):    Limited Support       Tara Jordan MD  06/28/24

## 2024-06-29 LAB
QT INTERVAL: 392 MS
QTC INTERVAL: 392 MS

## 2024-06-29 PROCEDURE — 99232 SBSQ HOSP IP/OBS MODERATE 35: CPT | Performed by: STUDENT IN AN ORGANIZED HEALTH CARE EDUCATION/TRAINING PROGRAM

## 2024-06-29 RX ADMIN — HYDRALAZINE HYDROCHLORIDE 25 MG: 25 TABLET ORAL at 09:38

## 2024-06-29 RX ADMIN — Medication 10 ML: at 21:46

## 2024-06-29 RX ADMIN — Medication 10 ML: at 09:39

## 2024-06-29 RX ADMIN — LEVOTHYROXINE SODIUM 100 MCG: 0.1 TABLET ORAL at 06:14

## 2024-06-29 RX ADMIN — ASPIRIN 81 MG: 81 TABLET, COATED ORAL at 09:38

## 2024-06-29 RX ADMIN — FUROSEMIDE 40 MG: 40 TABLET ORAL at 09:38

## 2024-06-29 RX ADMIN — SALINE NASAL SPRAY 1 SPRAY: 1.5 SOLUTION NASAL at 09:40

## 2024-06-29 RX ADMIN — SERTRALINE 50 MG: 50 TABLET, FILM COATED ORAL at 09:38

## 2024-06-29 RX ADMIN — HYDRALAZINE HYDROCHLORIDE 25 MG: 25 TABLET ORAL at 21:42

## 2024-06-29 RX ADMIN — CETIRIZINE HYDROCHLORIDE 10 MG: 10 TABLET, FILM COATED ORAL at 09:38

## 2024-06-29 RX ADMIN — GABAPENTIN 300 MG: 300 CAPSULE ORAL at 09:38

## 2024-06-29 RX ADMIN — METOPROLOL SUCCINATE 50 MG: 50 TABLET, EXTENDED RELEASE ORAL at 09:38

## 2024-06-29 RX ADMIN — PANTOPRAZOLE SODIUM 40 MG: 40 TABLET, DELAYED RELEASE ORAL at 06:14

## 2024-06-29 RX ADMIN — ATORVASTATIN CALCIUM 40 MG: 40 TABLET, FILM COATED ORAL at 09:38

## 2024-06-29 RX ADMIN — GABAPENTIN 300 MG: 300 CAPSULE ORAL at 21:42

## 2024-06-29 RX ADMIN — SENNOSIDES AND DOCUSATE SODIUM 2 TABLET: 50; 8.6 TABLET ORAL at 21:45

## 2024-06-29 NOTE — PLAN OF CARE
Goal Outcome Evaluation:           Progress: improving       Problem: Adult Inpatient Plan of Care  Goal: Plan of Care Review  Recent Flowsheet Documentation  Taken 6/29/2024 0408 by Edwina Lewis RN  Progress: improving  Goal: Absence of Hospital-Acquired Illness or Injury  Intervention: Identify and Manage Fall Risk  Recent Flowsheet Documentation  Taken 6/29/2024 0200 by Edwina Lewis RN  Safety Promotion/Fall Prevention:   activity supervised   assistive device/personal items within reach   clutter free environment maintained   fall prevention program maintained   lighting adjusted   nonskid shoes/slippers when out of bed   room organization consistent   safety round/check completed   toileting scheduled  Taken 6/29/2024 0000 by Edwina Lewis RN  Safety Promotion/Fall Prevention:   activity supervised   assistive device/personal items within reach   clutter free environment maintained   fall prevention program maintained   lighting adjusted   nonskid shoes/slippers when out of bed   room organization consistent   safety round/check completed   toileting scheduled  Taken 6/28/2024 2200 by Edwina Lewis RN  Safety Promotion/Fall Prevention:   activity supervised   assistive device/personal items within reach   clutter free environment maintained   fall prevention program maintained   lighting adjusted   nonskid shoes/slippers when out of bed   room organization consistent   safety round/check completed   toileting scheduled  Taken 6/28/2024 2159 by Edwina Lewis RN  Safety Promotion/Fall Prevention:   activity supervised   assistive device/personal items within reach   clutter free environment maintained   fall prevention program maintained   lighting adjusted   nonskid shoes/slippers when out of bed   room organization consistent   safety round/check completed   toileting scheduled  Intervention: Prevent Skin Injury  Recent Flowsheet Documentation  Taken 6/29/2024 0200 by Edwina Lewis  RN  Body Position: position changed independently  Skin Protection:   adhesive use limited   transparent dressing maintained   tubing/devices free from skin contact  Taken 6/29/2024 0000 by Edwina Lewis RN  Body Position: position changed independently  Skin Protection:   adhesive use limited   transparent dressing maintained   tubing/devices free from skin contact  Taken 6/28/2024 2200 by Edwina Lewis RN  Body Position: position changed independently  Skin Protection:   adhesive use limited   transparent dressing maintained   tubing/devices free from skin contact  Taken 6/28/2024 2159 by Edwina Lewis RN  Body Position: position changed independently  Skin Protection:   adhesive use limited   transparent dressing maintained   tubing/devices free from skin contact  Intervention: Prevent and Manage VTE (Venous Thromboembolism) Risk  Recent Flowsheet Documentation  Taken 6/29/2024 0200 by Edwina Lewis RN  Activity Management: activity minimized  Taken 6/29/2024 0000 by Edwina Lewis RN  Activity Management: activity minimized  Taken 6/28/2024 2200 by Edwina Lewis RN  Activity Management: activity minimized  Taken 6/28/2024 2159 by Edwina Lewis RN  Activity Management: activity minimized  Intervention: Prevent Infection  Recent Flowsheet Documentation  Taken 6/29/2024 0200 by Edwina Lewis RN  Infection Prevention:   environmental surveillance performed   equipment surfaces disinfected   hand hygiene promoted   rest/sleep promoted   single patient room provided   visitors restricted/screened  Taken 6/29/2024 0000 by Edwina Lewis RN  Infection Prevention:   environmental surveillance performed   equipment surfaces disinfected   hand hygiene promoted   rest/sleep promoted   single patient room provided   visitors restricted/screened  Taken 6/28/2024 2200 by Edwina Lewis RN  Infection Prevention:   environmental surveillance performed   equipment surfaces disinfected   hand  hygiene promoted   rest/sleep promoted   single patient room provided   visitors restricted/screened  Taken 6/28/2024 2159 by Edwina Lewis, RN  Infection Prevention:   environmental surveillance performed   equipment surfaces disinfected   hand hygiene promoted   rest/sleep promoted   single patient room provided   visitors restricted/screened  Goal: Optimal Comfort and Wellbeing  Intervention: Provide Person-Centered Care  Recent Flowsheet Documentation  Taken 6/28/2024 2159 by Edwina Lewis, RN  Trust Relationship/Rapport:   care explained   choices provided   questions encouraged   thoughts/feelings acknowledged   reassurance provided

## 2024-06-29 NOTE — PROGRESS NOTES
[facility] Medicine Services  PROGRESS NOTE    Patient Name: Angélica De La Cruz  : 1945  MRN: [id number]    Date of Admission: 2024  Primary Care Physician: Sussy Bloom MD    Subjective   Subjective     CC:  Palpitations f/u    HPI:  Sitting in chair watching TV, no acute overnight events. Denies CP or palpitations    Objective   Objective     Vital Signs:   Temp:  [97.8 °F (36.6 °C)-98.4 °F (36.9 °C)] 97.8 °F (36.6 °C)  Heart Rate:  [60-62] 61  Resp:  [16-18] 18  BP: (128-166)/(55-85) 155/63  Flow (L/min):  [2] 2     Physical Exam:  Constitutional: No acute distress, awake, alert female sitting up in bed  Respiratory: Clear to auscultation bilaterally, respiratory effort normal   Cardiovascular: RRR, normal rate, no murmurs, rubs, or gallops  Gastrointestinal: Soft, nontender, nondistended  Musculoskeletal: Muscle tone within normal limits, no joint effusions appreciated  Psychiatric: Appropriate affect, cooperative  Neurologic: Alert and oriented, facial movements symmetric and spontaneous movement of all 4 extremities grossly equal bilaterally, speech clear  Skin: No rashes    Results Reviewed:  LAB RESULTS:      Lab 24  0508 24  0332   WBC 8.22 9.05   HEMOGLOBIN 11.4* 10.0*   HEMATOCRIT 41.0 35.7   PLATELETS 306 278   NEUTROS ABS  --  6.69   IMMATURE GRANS (ABS)  --  0.03   LYMPHS ABS  --  0.70   MONOS ABS  --  0.83   EOS ABS  --  0.73*   MCV 80.2 77.9*         Lab 24  0508 24  0348 24  0318 24  1418 24  0332   SODIUM 143 140 138  --  143   POTASSIUM 3.6 3.7 4.4  4.4 3.6 3.2*   CHLORIDE 106 102 100  --  104   CO2 24.0 26.0 27.0  --  28.0   ANION GAP 13.0 12.0 11.0  --  11.0   BUN 25* 22 16  --  11   CREATININE 1.21* 1.24* 1.15*  --  1.03*   EGFR 45.1* 43.8* 48.0*  --  54.7*   GLUCOSE 89 101* 103*  --  97   CALCIUM 9.1 8.7 8.5*  --  8.5*   MAGNESIUM  --   --  2.9* 1.2*  --          Lab 24  0332   TOTAL PROTEIN 6.2    ALBUMIN 3.0*   GLOBULIN 3.2   ALT (SGPT) 18   AST (SGOT) 21   BILIRUBIN 0.4   ALK PHOS 108         Lab 06/28/24  0508   PROBNP 4,809.0*                     Lab 06/23/24  0913   PH, ARTERIAL 7.348*   PCO2, ARTERIAL 42.8   PO2 .0*   FIO2 36   HCO3 ART 23.5   BASE EXCESS ART -2.1*   CARBOXYHEMOGLOBIN 1.5     Brief Urine Lab Results  (Last result in the past 365 days)        Color   Clarity   Blood   Leuk Est   Nitrite   Protein   CREAT   Urine HCG        06/19/24 1212 Yellow   Clear   Negative   Negative   Negative   Negative                   Microbiology Results Abnormal       None            XR Chest PA & Lateral    Result Date: 6/28/2024  XR CHEST PA AND LATERAL Date of Exam: 6/28/2024 4:19 AM EDT Indication: Post ICD / Pacer Implant Comparison: 6/23/2024 Findings: There is been interval placement of a left subclavian transvenous pacemaker. No evidence of pneumothorax. Patient is again noted be status post median sternotomy. Heart size is within normal limits. Proximal pulmonary artery segments appear enlarged suggesting changes of pulmonary arterial hypertension. There is blunting of both costophrenic angles compatible small bilateral pleural effusions. No evidence pneumothorax. No focal airspace consolidation.     Impression: Impression: 1. Interval placement of left subclavian transvenous pacemaker. No evidence pneumothorax. 2. Stable small bilateral pleural effusions. 3. Prominent proximal pulmonary artery segments compatible changes of pulmonary arterial hypertension. Electronically Signed: Julian Bennett MD  6/28/2024 7:29 AM EDT  Workstation ID: [id number]    EP/CRM Study    Result Date: 6/27/2024  FINAL IMPRESSIONS: 1.   Successful implantation of a dual-chamber permanent pacemaker. RECOMMENDATION(S): The patient will be monitored on telemetry. If stable, the patient can be discharged to home tomorrow Follow up per protocol; Follow up with Dr. Albarran in 3 months Leif Martini MD Cardiac  Electrophysiologist [facility] 06/27/24 17:16 EDT FOR THE PATIENT - Pressure dressing from device site will be removed the morning after procedure or prior to discharge if patient goes home same day.   Patient will have an Aquacel dressing underneath.  Typically, no steri-strips or glue is used. The Aquacel dressing will be removed at wound check appointment (7-10 days). - Please do not lift more than 10 pounds or abduct the shoulder joint / or raise the arm above the shoulder for 6 weeks after device was implanted (this does not apply to subcutaneous ICDs). - Avoid activities that involve heavy lifting or rough contact that could result in blows to your implant site and to allow your incision time to heal. - Wear sling at bedtime for 4 weeks. - May shower day after procedure. - No creams, lotions, or powders to incision. - No bath tubs, hot tubs or swimming for 4 weeks. - No Driving preferably for 4 weeks, at minimum 2 weeks. - 7-10 day wound check. - 3 month hospital follow up with Device check. - Call your doctor if you have any swelling, redness or discharge around your incision, notice anything unusual or unexpected, or you develop a fever that does not go away in two or three days. - Call your doctor if you hear any beeping sounds / vibratory alerts from your device as this indicates your device needs to be checked immediately. - Carry your Medical Device ID Card with you at all times. - Please call our office () with any questions about the device or the wounds.      Results for orders placed during the hospital encounter of 06/19/24    Adult Transthoracic Echo Complete W/ Cont if Necessary Per Protocol    Interpretation Summary    Left ventricular ejection fraction appears to be 46 - 50%.    Mildly reduced right ventricular systolic function noted.    The left atrial cavity is dilated.    Left atrial volume is severely increased.    Estimated right  ventricular systolic pressure from tricuspid regurgitation is mildly elevated (35-45 mmHg). Calculated right ventricular systolic pressure from tricuspid regurgitation is 40 mmHg.      Current medications:  Scheduled Meds:[Held by provider] apixaban, 5 mg, Oral, Q12H  aspirin, 81 mg, Oral, Daily  atorvastatin, 40 mg, Oral, Daily  cetirizine, 10 mg, Oral, Daily  furosemide, 40 mg, Oral, Daily  gabapentin, 300 mg, Oral, Q12H  hydrALAZINE, 25 mg, Oral, BID  levothyroxine, 100 mcg, Oral, Daily  metoprolol succinate XL, 50 mg, Oral, Q24H  pantoprazole, 40 mg, Oral, Q AM  sertraline, 50 mg, Oral, Daily  sodium chloride, 10 mL, Intravenous, Q12H  sodium chloride, 10 mL, Intravenous, Q12H      Continuous Infusions:     PRN Meds:.  senna-docusate sodium **AND** polyethylene glycol **AND** bisacodyl **AND** bisacodyl    Calcium Replacement - Follow Nurse / BPA Driven Protocol    ipratropium-albuterol    Magnesium Standard Dose Replacement - Follow Nurse / BPA Driven Protocol    nitroglycerin    Phosphorus Replacement - Follow Nurse / BPA Driven Protocol    Potassium Replacement - Follow Nurse / BPA Driven Protocol    sodium chloride    sodium chloride    sodium chloride    sodium chloride    sodium chloride    sodium chloride    Assessment & Plan   Assessment & Plan     Active Hospital Problems    Diagnosis  POA    **A-fib [I48.91]  Yes    Symptomatic anemia [D64.9]  Yes    NSTEMI (non-ST elevated myocardial infarction) [I21.4]  Yes    Hypothyroidism (acquired) [E03.9]  Yes    Rheumatoid arthritis [M06.9]  Yes    Iron deficiency anemia due to chronic blood loss [D50.0]  Unknown    AMANDA (obstructive sleep apnea) [G47.33]  Yes    CKD (chronic kidney disease) [N18.9]  Yes    Hypertension [I10]  Yes      Resolved Hospital Problems   No resolved problems to display.        Brief Hospital Course to date:  Angélica De La Cruz is a 79 y.o. female w CAD s/p CABG c/b post-op Afib who presented w chest pain on 6/19. Rising troponin on  admission - cardiology consulted who attributed this to NSTEMI II.   Stay c/b persistent paroxysmal A-fib then sinus bradycardia 2/2 meds given for this. 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 Monitoring over weekend w plan for [facility] likely Monday if no recurrent Afib w RVR    Symptomatic tachy-john, now s/p PPM  Paroxysmal Afib  -PPM implantation 6/27, will need wound check ~7/4 which can be completed at [facility] - call Elsie Miles [phone] #4 at time of dc  -cont metoprolol  -restart eliquis 7/1  -cards will follow PRN over weekend, will see Monday which, if no complications, will be day of discharge to [facility]    NSTEMI, h/o CABG 2020  -patient troponin 36 --> 1500 on admission in setting of chest pain. Feel significant to attribute to patient Hbg 7.7 grant given chronicity before admission (Hbg ~8.8 on 5/23/24 outpatient). Maybe 2/2 paroxysmal Afib but does seem out of proportion  -ECHO EF 45-50%, down from ~60% prior on review  -ASA, statin  -cardiology deferring ischemic evaluation currently, mgmt of above. Will need to f/u OP for further discussion  --currently CP free    Iron deficiency anemia, acute on chronic   -s/p 2 units transfusion w significant response and stabilized thereafter  -s/p IV iron here, PO iron at dc  -EGD wnl 6/22, colonoscopy w cecal polyp but no bleeding    Hypomagnesemia/hypokalemia - replace per protocol  CKDIIIb - at baseline  AMANDA - wears cpap at home, cannot tolerate ours, nocturnal o2        Expected Discharge Location and Transportation: [facility]  Expected Discharge 7/1 (Discharge date is tentative pending patient's medical condition and is subject to change)  Expected Discharge Date: 7/1/2024; Expected Discharge Time:      VTE Prophylaxis:  Pharmacologic & mechanical VTE prophylaxis orders are present.         AM-PAC 6 Clicks Score (PT): 19 (06/29/24 0800)    CODE STATUS:   Code Status and Medical Interventions:   Ordered at: 06/19/24 1540      Medical Intervention Limits:    No intubation (DNI)     Code Status (Patient has no pulse and is not breathing):    No CPR (Do Not Attempt to Resuscitate)     Medical Interventions (Patient has pulse or is breathing):    Limited Support       Georgie Stevens MD  06/29/24

## 2024-06-29 NOTE — PLAN OF CARE
Problem: Skin Injury Risk Increased  Goal: Skin Health and Integrity  Outcome: Ongoing, Progressing  Intervention: Optimize Skin Protection  Recent Flowsheet Documentation  Taken 6/29/2024 1400 by Brianna Hickman RN  Head of Bed ([facility]) Positioning: [facility] elevated  Skin Protection:   adhesive use limited   tubing/devices free from skin contact   transparent dressing maintained  Taken 6/29/2024 1200 by Brianna Hickman RN  Head of Bed ([facility]) Positioning: [facility] elevated  Skin Protection:   adhesive use limited   tubing/devices free from skin contact   transparent dressing maintained  Taken 6/29/2024 1000 by Brianna Hickman RN  Head of Bed ([facility]) Positioning: [facility] elevated  Skin Protection:   adhesive use limited   transparent dressing maintained   tubing/devices free from skin contact  Taken 6/29/2024 0800 by Brianna Hickman RN  Head of Bed ([facility]) Positioning: [facility] elevated  Skin Protection:   adhesive use limited   tubing/devices free from skin contact   transparent dressing maintained     Problem: Fall Injury Risk  Goal: Absence of Fall and Fall-Related Injury  Outcome: Ongoing, Progressing  Intervention: Identify and Manage Contributors  Recent Flowsheet Documentation  Taken 6/29/2024 1400 by Brianna Hickman RN  Self-Care Promotion: independence encouraged  Taken 6/29/2024 1200 by Brianna Hickman RN  Self-Care Promotion:   independence encouraged   BADL personal objects within reach  Taken 6/29/2024 1000 by Brianna Hickman RN  Medication Review/Management: medications reviewed  Self-Care Promotion: independence encouraged  Taken 6/29/2024 0800 by Brianna Hickman RN  Medication Review/Management: medications reviewed  Self-Care Promotion: independence encouraged  Intervention: Promote Injury-Free Environment  Recent Flowsheet Documentation  Taken 6/29/2024 1400 by Brianna Hickman RN  Safety Promotion/Fall Prevention:   activity supervised   assistive device/personal items within reach   clutter free environment  maintained   fall prevention program maintained   toileting scheduled   room organization consistent   safety round/check completed   nonskid shoes/slippers when out of bed  Taken 6/29/2024 1200 by Brianna Hickman RN  Safety Promotion/Fall Prevention:   activity supervised   assistive device/personal items within reach   clutter free environment maintained   fall prevention program maintained   toileting scheduled   safety round/check completed   room organization consistent   nonskid shoes/slippers when out of bed  Taken 6/29/2024 1000 by Brianna Hickman RN  Safety Promotion/Fall Prevention:   activity supervised   assistive device/personal items within reach   clutter free environment maintained   fall prevention program maintained   toileting scheduled   safety round/check completed   room organization consistent   nonskid shoes/slippers when out of bed  Taken 6/29/2024 0800 by Brianna Hickman RN  Safety Promotion/Fall Prevention:   activity supervised   assistive device/personal items within reach   clutter free environment maintained   toileting scheduled   safety round/check completed   room organization consistent   nonskid shoes/slippers when out of bed   fall prevention program maintained     Problem: Adjustment to Illness (Sepsis/Septic Shock)  Goal: Optimal Coping  Outcome: Ongoing, Progressing  Intervention: Optimize Psychosocial Adjustment to Illness  Recent Flowsheet Documentation  Taken 6/29/2024 0800 by Brianna Hickman RN  Supportive Measures: active listening utilized  Family/Support System Care: self-care encouraged     Problem: Bleeding (Sepsis/Septic Shock)  Goal: Absence of Bleeding  Outcome: Ongoing, Progressing  Intervention: Monitor and Manage Bleeding  Recent Flowsheet Documentation  Taken 6/29/2024 0800 by Brianna Hickman RN  Bleeding Precautions: monitored for signs of bleeding  Bleeding Management: dressing monitored     Problem: Glycemic Control Impaired (Sepsis/Septic Shock)  Goal:  Blood Glucose Level Within Desired Range  Outcome: Ongoing, Progressing     Problem: Infection Progression (Sepsis/Septic Shock)  Goal: Absence of Infection Signs and Symptoms  Outcome: Ongoing, Progressing  Intervention: Initiate Sepsis Management  Recent Flowsheet Documentation  Taken 6/29/2024 1400 by Brianna Hickman RN  Infection Prevention:   environmental surveillance performed   hand hygiene promoted   rest/sleep promoted   single patient room provided  Taken 6/29/2024 1200 by Brianna Hickman RN  Infection Prevention:   environmental surveillance performed   rest/sleep promoted   hand hygiene promoted   single patient room provided  Taken 6/29/2024 1000 by Brianna Hickman RN  Infection Prevention:   environmental surveillance performed   hand hygiene promoted   rest/sleep promoted   single patient room provided  Taken 6/29/2024 0800 by Brianna Hickman RN  Infection Prevention:   environmental surveillance performed   equipment surfaces disinfected   hand hygiene promoted   rest/sleep promoted   single patient room provided  Intervention: Promote Recovery  Recent Flowsheet Documentation  Taken 6/29/2024 1400 by Brianna Hickman RN  Activity Management:   activity encouraged   up in chair  Taken 6/29/2024 1200 by Brianna Hickman RN  Activity Management:   activity encouraged   up in chair  Taken 6/29/2024 1000 by Brianna Hickman RN  Activity Management:   activity encouraged   up in chair  Taken 6/29/2024 0800 by Brianna Hickman RN  Activity Management:   ambulated to bathroom   activity encouraged  Sleep/Rest Enhancement: consistent schedule promoted     Problem: Nutrition Impaired (Sepsis/Septic Shock)  Goal: Optimal Nutrition Intake  Outcome: Ongoing, Progressing     Problem: Adult Inpatient Plan of Care  Goal: Plan of Care Review  Outcome: Ongoing, Progressing  Goal: Patient-Specific Goal (Individualized)  Outcome: Ongoing, Progressing  Goal: Absence of Hospital-Acquired Illness or Injury  Outcome:  Ongoing, Progressing  Intervention: Identify and Manage Fall Risk  Recent Flowsheet Documentation  Taken 6/29/2024 1400 by Brianna Hickman RN  Safety Promotion/Fall Prevention:   activity supervised   assistive device/personal items within reach   clutter free environment maintained   fall prevention program maintained   toileting scheduled   room organization consistent   safety round/check completed   nonskid shoes/slippers when out of bed  Taken 6/29/2024 1200 by Brianna Hickman RN  Safety Promotion/Fall Prevention:   activity supervised   assistive device/personal items within reach   clutter free environment maintained   fall prevention program maintained   toileting scheduled   safety round/check completed   room organization consistent   nonskid shoes/slippers when out of bed  Taken 6/29/2024 1000 by Brianna Hickman RN  Safety Promotion/Fall Prevention:   activity supervised   assistive device/personal items within reach   clutter free environment maintained   fall prevention program maintained   toileting scheduled   safety round/check completed   room organization consistent   nonskid shoes/slippers when out of bed  Taken 6/29/2024 0800 by Brianna Hickman RN  Safety Promotion/Fall Prevention:   activity supervised   assistive device/personal items within reach   clutter free environment maintained   toileting scheduled   safety round/check completed   room organization consistent   nonskid shoes/slippers when out of bed   fall prevention program maintained  Intervention: Prevent Skin Injury  Recent Flowsheet Documentation  Taken 6/29/2024 1400 by Brianna Hickman RN  Body Position: position changed independently  Skin Protection:   adhesive use limited   tubing/devices free from skin contact   transparent dressing maintained  Taken 6/29/2024 1200 by Brianna Hickman RN  Body Position: position changed independently  Skin Protection:   adhesive use limited   tubing/devices free from skin contact    transparent dressing maintained  Taken 6/29/2024 1000 by Brianna Hickman RN  Body Position: position changed independently  Skin Protection:   adhesive use limited   transparent dressing maintained   tubing/devices free from skin contact  Taken 6/29/2024 0800 by Brianna Hickman RN  Body Position: position changed independently  Skin Protection:   adhesive use limited   tubing/devices free from skin contact   transparent dressing maintained  Intervention: Prevent and Manage VTE (Venous Thromboembolism) Risk  Recent Flowsheet Documentation  Taken 6/29/2024 1400 by Brianna Hickman RN  Activity Management:   activity encouraged   up in chair  Taken 6/29/2024 1200 by Brianna Hickman RN  Activity Management:   activity encouraged   up in chair  Taken 6/29/2024 1000 by Brianna Hickman RN  Activity Management:   activity encouraged   up in chair  Taken 6/29/2024 0800 by Brianna Hickman RN  Activity Management:   ambulated to bathroom   activity encouraged  Range of Motion: active ROM (range of motion) encouraged  Intervention: Prevent Infection  Recent Flowsheet Documentation  Taken 6/29/2024 1400 by Brianna Hickman RN  Infection Prevention:   environmental surveillance performed   hand hygiene promoted   rest/sleep promoted   single patient room provided  Taken 6/29/2024 1200 by Brianna Hickman RN  Infection Prevention:   environmental surveillance performed   rest/sleep promoted   hand hygiene promoted   single patient room provided  Taken 6/29/2024 1000 by Brianna Hickman RN  Infection Prevention:   environmental surveillance performed   hand hygiene promoted   rest/sleep promoted   single patient room provided  Taken 6/29/2024 0800 by Brianna Hickman RN  Infection Prevention:   environmental surveillance performed   equipment surfaces disinfected   hand hygiene promoted   rest/sleep promoted   single patient room provided  Goal: Optimal Comfort and Wellbeing  Outcome: Ongoing, Progressing  Intervention:  Provide Person-Centered Care  Recent Flowsheet Documentation  Taken 6/29/2024 0800 by Brianna Hickman RN  Trust Relationship/Rapport:   care explained   choices provided   questions answered   questions encouraged   reassurance provided   thoughts/feelings acknowledged  Goal: Readiness for Transition of Care  Outcome: Ongoing, Progressing   Goal Outcome Evaluation:   VSS. Patient sitting up in chair. Ambulated to bathroom with walker. Tolerated well. O2 stats remained stable during shift. Patient resting in chair with call light in reach.

## 2024-06-30 LAB
ANION GAP SERPL CALCULATED.3IONS-SCNC: 10 MMOL/L (ref 5–15)
BASOPHILS # BLD AUTO: 0.08 10*3/MM3 (ref 0–0.2)
BASOPHILS NFR BLD AUTO: 1 % (ref 0–1.5)
BUN SERPL-MCNC: 31 MG/DL (ref 8–23)
BUN/CREAT SERPL: 29 (ref 7–25)
CALCIUM SPEC-SCNC: 9.1 MG/DL (ref 8.6–10.5)
CHLORIDE SERPL-SCNC: 104 MMOL/L (ref 98–107)
CO2 SERPL-SCNC: 30 MMOL/L (ref 22–29)
CREAT SERPL-MCNC: 1.07 MG/DL (ref 0.57–1)
DEPRECATED RDW RBC AUTO: 52.9 FL (ref 37–54)
EGFRCR SERPLBLD CKD-EPI 2021: 52.3 ML/MIN/1.73
EOSINOPHIL # BLD AUTO: 1.17 10*3/MM3 (ref 0–0.4)
EOSINOPHIL NFR BLD AUTO: 15.2 % (ref 0.3–6.2)
ERYTHROCYTE [DISTWIDTH] IN BLOOD BY AUTOMATED COUNT: 20 % (ref 12.3–15.4)
GLUCOSE SERPL-MCNC: 100 MG/DL (ref 65–99)
HCT VFR BLD AUTO: 41 % (ref 34–46.6)
HGB BLD-MCNC: 11.4 G/DL (ref 12–15.9)
IMM GRANULOCYTES # BLD AUTO: 0.09 10*3/MM3 (ref 0–0.05)
IMM GRANULOCYTES NFR BLD AUTO: 1.2 % (ref 0–0.5)
LYMPHOCYTES # BLD AUTO: 1 10*3/MM3 (ref 0.7–3.1)
LYMPHOCYTES NFR BLD AUTO: 13 % (ref 19.6–45.3)
MCH RBC QN AUTO: 22.4 PG (ref 26.6–33)
MCHC RBC AUTO-ENTMCNC: 27.8 G/DL (ref 31.5–35.7)
MCV RBC AUTO: 80.7 FL (ref 79–97)
MONOCYTES # BLD AUTO: 0.58 10*3/MM3 (ref 0.1–0.9)
MONOCYTES NFR BLD AUTO: 7.5 % (ref 5–12)
NEUTROPHILS NFR BLD AUTO: 4.8 10*3/MM3 (ref 1.7–7)
NEUTROPHILS NFR BLD AUTO: 62.1 % (ref 42.7–76)
NRBC BLD AUTO-RTO: 0 /100 WBC (ref 0–0.2)
PLATELET # BLD AUTO: 315 10*3/MM3 (ref 140–450)
PMV BLD AUTO: 9.3 FL (ref 6–12)
POTASSIUM SERPL-SCNC: 4.1 MMOL/L (ref 3.5–5.2)
RBC # BLD AUTO: 5.08 10*6/MM3 (ref 3.77–5.28)
SODIUM SERPL-SCNC: 144 MMOL/L (ref 136–145)
WBC NRBC COR # BLD AUTO: 7.72 10*3/MM3 (ref 3.4–10.8)

## 2024-06-30 PROCEDURE — 85025 COMPLETE CBC W/AUTO DIFF WBC: CPT | Performed by: STUDENT IN AN ORGANIZED HEALTH CARE EDUCATION/TRAINING PROGRAM

## 2024-06-30 PROCEDURE — 80048 BASIC METABOLIC PNL TOTAL CA: CPT | Performed by: STUDENT IN AN ORGANIZED HEALTH CARE EDUCATION/TRAINING PROGRAM

## 2024-06-30 PROCEDURE — 99232 SBSQ HOSP IP/OBS MODERATE 35: CPT | Performed by: STUDENT IN AN ORGANIZED HEALTH CARE EDUCATION/TRAINING PROGRAM

## 2024-06-30 RX ADMIN — SENNOSIDES AND DOCUSATE SODIUM 2 TABLET: 50; 8.6 TABLET ORAL at 10:44

## 2024-06-30 RX ADMIN — CETIRIZINE HYDROCHLORIDE 10 MG: 10 TABLET, FILM COATED ORAL at 08:21

## 2024-06-30 RX ADMIN — Medication 10 ML: at 22:21

## 2024-06-30 RX ADMIN — SERTRALINE 50 MG: 50 TABLET, FILM COATED ORAL at 08:21

## 2024-06-30 RX ADMIN — GABAPENTIN 300 MG: 300 CAPSULE ORAL at 22:21

## 2024-06-30 RX ADMIN — Medication 10 ML: at 22:22

## 2024-06-30 RX ADMIN — HYDRALAZINE HYDROCHLORIDE 25 MG: 25 TABLET ORAL at 22:21

## 2024-06-30 RX ADMIN — LEVOTHYROXINE SODIUM 100 MCG: 0.1 TABLET ORAL at 05:17

## 2024-06-30 RX ADMIN — BISACODYL 5 MG: 5 TABLET, COATED ORAL at 10:44

## 2024-06-30 RX ADMIN — FUROSEMIDE 40 MG: 40 TABLET ORAL at 08:21

## 2024-06-30 RX ADMIN — POLYETHYLENE GLYCOL 3350 17 G: 17 POWDER, FOR SOLUTION ORAL at 10:44

## 2024-06-30 RX ADMIN — METOPROLOL SUCCINATE 50 MG: 50 TABLET, EXTENDED RELEASE ORAL at 08:21

## 2024-06-30 RX ADMIN — GABAPENTIN 300 MG: 300 CAPSULE ORAL at 08:21

## 2024-06-30 RX ADMIN — PANTOPRAZOLE SODIUM 40 MG: 40 TABLET, DELAYED RELEASE ORAL at 05:17

## 2024-06-30 RX ADMIN — HYDRALAZINE HYDROCHLORIDE 25 MG: 25 TABLET ORAL at 08:21

## 2024-06-30 RX ADMIN — ASPIRIN 81 MG: 81 TABLET, COATED ORAL at 08:22

## 2024-06-30 NOTE — PLAN OF CARE
Goal Outcome Evaluation:           Progress: improving       Problem: Adult Inpatient Plan of Care  Goal: Plan of Care Review  Recent Flowsheet Documentation  Taken 6/30/2024 0512 by Edwina Lewis RN  Progress: improving  Goal: Absence of Hospital-Acquired Illness or Injury  Intervention: Identify and Manage Fall Risk  Recent Flowsheet Documentation  Taken 6/30/2024 0200 by Edwina Lewis RN  Safety Promotion/Fall Prevention:   activity supervised   assistive device/personal items within reach   clutter free environment maintained   fall prevention program maintained   lighting adjusted   nonskid shoes/slippers when out of bed   room organization consistent   safety round/check completed   toileting scheduled  Taken 6/30/2024 0000 by Edwina Lewis RN  Safety Promotion/Fall Prevention:   activity supervised   assistive device/personal items within reach   clutter free environment maintained   fall prevention program maintained   lighting adjusted   nonskid shoes/slippers when out of bed   room organization consistent   safety round/check completed   toileting scheduled  Taken 6/29/2024 2200 by Edwina Lewis RN  Safety Promotion/Fall Prevention:   activity supervised   assistive device/personal items within reach   clutter free environment maintained   fall prevention program maintained   lighting adjusted   nonskid shoes/slippers when out of bed   room organization consistent   safety round/check completed   toileting scheduled  Taken 6/29/2024 2000 by Edwina Lewis RN  Safety Promotion/Fall Prevention:   activity supervised   assistive device/personal items within reach   clutter free environment maintained   fall prevention program maintained   lighting adjusted   nonskid shoes/slippers when out of bed   room organization consistent   safety round/check completed   toileting scheduled  Taken 6/29/2024 1910 by Edwina Lewis, RN  Safety Promotion/Fall Prevention:   activity supervised    assistive device/personal items within reach   clutter free environment maintained   fall prevention program maintained   lighting adjusted   nonskid shoes/slippers when out of bed   room organization consistent   safety round/check completed   toileting scheduled  Intervention: Prevent Skin Injury  Recent Flowsheet Documentation  Taken 6/30/2024 0200 by Edwina Lewis RN  Body Position: position changed independently  Skin Protection:   adhesive use limited   transparent dressing maintained   tubing/devices free from skin contact  Taken 6/30/2024 0000 by Edwina Lewis RN  Body Position: position changed independently  Skin Protection:   adhesive use limited   transparent dressing maintained   tubing/devices free from skin contact  Taken 6/29/2024 2200 by Edwina Lewis RN  Body Position: position changed independently  Skin Protection:   adhesive use limited   transparent dressing maintained   tubing/devices free from skin contact  Taken 6/29/2024 2000 by Edwina Lewis RN  Skin Protection:   adhesive use limited   transparent dressing maintained  Intervention: Prevent and Manage VTE (Venous Thromboembolism) Risk  Recent Flowsheet Documentation  Taken 6/30/2024 0200 by Edwina Lewis RN  Activity Management: activity minimized  Taken 6/30/2024 0000 by Edwina Lewis RN  Activity Management: activity minimized  Taken 6/29/2024 2200 by Edwina Lewis RN  Activity Management: activity minimized  Taken 6/29/2024 2000 by Edwina Lewis RN  Activity Management: activity minimized  Taken 6/29/2024 1910 by Edwina Lewis RN  Activity Management: activity minimized  Intervention: Prevent Infection  Recent Flowsheet Documentation  Taken 6/30/2024 0200 by Edwina Lewis RN  Infection Prevention:   environmental surveillance performed   equipment surfaces disinfected   hand hygiene promoted   rest/sleep promoted   single patient room provided   visitors restricted/screened  Taken 6/30/2024 0000  by Edwina Lewis RN  Infection Prevention:   environmental surveillance performed   equipment surfaces disinfected   hand hygiene promoted   rest/sleep promoted   single patient room provided   visitors restricted/screened  Taken 6/29/2024 2200 by Edwina Lewis RN  Infection Prevention:   environmental surveillance performed   equipment surfaces disinfected   hand hygiene promoted   rest/sleep promoted   single patient room provided   visitors restricted/screened  Taken 6/29/2024 2000 by Edwina Lewis RN  Infection Prevention:   environmental surveillance performed   equipment surfaces disinfected   hand hygiene promoted   rest/sleep promoted   single patient room provided   visitors restricted/screened  Taken 6/29/2024 1910 by Edwina Lewis RN  Infection Prevention:   environmental surveillance performed   equipment surfaces disinfected   hand hygiene promoted   rest/sleep promoted   single patient room provided   visitors restricted/screened  Goal: Optimal Comfort and Wellbeing  Intervention: Monitor Pain and Promote Comfort  Recent Flowsheet Documentation  Taken 6/29/2024 2000 by Edwina Lewis RN  Pain Management Interventions: no interventions per patient request  Intervention: Provide Person-Centered Care  Recent Flowsheet Documentation  Taken 6/29/2024 2000 by Edwina Lewis RN  Trust Relationship/Rapport:   care explained   choices provided   questions encouraged   reassurance provided   thoughts/feelings acknowledged

## 2024-06-30 NOTE — PROGRESS NOTES
[facility] Medicine Services  PROGRESS NOTE    Patient Name: Angélica De La Cruz  : 1945  MRN: [id number]    Date of Admission: 2024  Primary Care Physician: Sussy Bloom MD    Subjective   Subjective     CC:  Palpitations f/u    HPI:  No new issues overnight, she is resting comfortably w/o complaints    Objective   Objective     Vital Signs:   Temp:  [97.6 °F (36.4 °C)-98.1 °F (36.7 °C)] 97.9 °F (36.6 °C)  Heart Rate:  [60-64] 60  Resp:  [16-18] 18  BP: (119-158)/(58-97) 137/58  Flow (L/min):  [1-2] 2     Physical Exam:  Constitutional: No acute distress, awake, alert female sitting up in chair  Respiratory: Clear to auscultation bilaterally, respiratory effort normal   Cardiovascular: RRR, normal rate, no murmurs, rubs, or gallops  Gastrointestinal: Soft, nontender, nondistended  Musculoskeletal: Muscle tone within normal limits, no joint effusions appreciated  Psychiatric: Appropriate affect, cooperative  Neurologic: Alert and oriented, facial movements symmetric and spontaneous movement of all 4 extremities grossly equal bilaterally, speech clear  Skin: No rashes    Results Reviewed:  LAB RESULTS:      Lab 24  0737 24  0508 24  0332   WBC 7.72 8.22 9.05   HEMOGLOBIN 11.4* 11.4* 10.0*   HEMATOCRIT 41.0 41.0 35.7   PLATELETS 315 306 278   NEUTROS ABS 4.80  --  6.69   IMMATURE GRANS (ABS) 0.09*  --  0.03   LYMPHS ABS 1.00  --  0.70   MONOS ABS 0.58  --  0.83   EOS ABS 1.17*  --  0.73*   MCV 80.7 80.2 77.9*         Lab 24  0737 24  0508 24  0348 24  0318 24  1418 24  0332   SODIUM 144 143 140 138  --  143   POTASSIUM 4.1 3.6 3.7 4.4  4.4 3.6 3.2*   CHLORIDE 104 106 102 100  --  104   CO2 30.0* 24.0 26.0 27.0  --  28.0   ANION GAP 10.0 13.0 12.0 11.0  --  11.0   BUN 31* 25* 22 16  --  11   CREATININE 1.07* 1.21* 1.24* 1.15*  --  1.03*   EGFR 52.3* 45.1* 43.8* 48.0*  --  54.7*   GLUCOSE 100* 89 101* 103*  --  97   CALCIUM  Spoke to pt and informed pt of lab results also mailing pt a low carb handout.   9.1 9.1 8.7 8.5*  --  8.5*   MAGNESIUM  --   --   --  2.9* 1.2*  --          Lab 06/24/24  0332   TOTAL PROTEIN 6.2   ALBUMIN 3.0*   GLOBULIN 3.2   ALT (SGPT) 18   AST (SGOT) 21   BILIRUBIN 0.4   ALK PHOS 108         Lab 06/28/24  0508   PROBNP 4,809.0*                       Brief Urine Lab Results  (Last result in the past 365 days)        Color   Clarity   Blood   Leuk Est   Nitrite   Protein   CREAT   Urine HCG        06/19/24 1212 Yellow   Clear   Negative   Negative   Negative   Negative                   Microbiology Results Abnormal       None            No radiology results from the last 24 hrs    Results for orders placed during the hospital encounter of 06/19/24    Adult Transthoracic Echo Complete W/ Cont if Necessary Per Protocol    Interpretation Summary    Left ventricular ejection fraction appears to be 46 - 50%.    Mildly reduced right ventricular systolic function noted.    The left atrial cavity is dilated.    Left atrial volume is severely increased.    Estimated right ventricular systolic pressure from tricuspid regurgitation is mildly elevated (35-45 mmHg). Calculated right ventricular systolic pressure from tricuspid regurgitation is 40 mmHg.      Current medications:  Scheduled Meds:[Held by provider] apixaban, 5 mg, Oral, Q12H  aspirin, 81 mg, Oral, Daily  atorvastatin, 40 mg, Oral, Daily  cetirizine, 10 mg, Oral, Daily  furosemide, 40 mg, Oral, Daily  gabapentin, 300 mg, Oral, Q12H  hydrALAZINE, 25 mg, Oral, BID  levothyroxine, 100 mcg, Oral, Daily  metoprolol succinate XL, 50 mg, Oral, Q24H  pantoprazole, 40 mg, Oral, Q AM  sertraline, 50 mg, Oral, Daily  sodium chloride, 10 mL, Intravenous, Q12H  sodium chloride, 10 mL, Intravenous, Q12H      Continuous Infusions:     PRN Meds:.  senna-docusate sodium **AND** polyethylene glycol **AND** bisacodyl **AND** bisacodyl    Calcium Replacement - Follow Nurse / BPA Driven Protocol    ipratropium-albuterol    Magnesium Standard Dose Replacement -  Follow Nurse / BPA Driven Protocol    nitroglycerin    Phosphorus Replacement - Follow Nurse / BPA Driven Protocol    Potassium Replacement - Follow Nurse / BPA Driven Protocol    sodium chloride    sodium chloride    sodium chloride    sodium chloride    sodium chloride    sodium chloride    Assessment & Plan   Assessment & Plan     Active Hospital Problems    Diagnosis  POA    **A-fib [I48.91]  Yes    Symptomatic anemia [D64.9]  Yes    NSTEMI (non-ST elevated myocardial infarction) [I21.4]  Yes    Hypothyroidism (acquired) [E03.9]  Yes    Rheumatoid arthritis [M06.9]  Yes    Iron deficiency anemia due to chronic blood loss [D50.0]  Unknown    AMANDA (obstructive sleep apnea) [G47.33]  Yes    CKD (chronic kidney disease) [N18.9]  Yes    Hypertension [I10]  Yes      Resolved Hospital Problems   No resolved problems to display.        Brief Hospital Course to date:  Angélica De La Cruz is a 79 y.o. female w CAD s/p CABG c/b post-op Afib who presented w chest pain on 6/19. Rising troponin on admission - cardiology consulted who attributed this to NSTEMI II.   Stay c/b persistent paroxysmal A-fib then sinus bradycardia 2/2 meds given for this. 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 Monitoring over weekend w plan for [facility] likely Monday if no recurrent Afib w RVR    Symptomatic tachy-john, now s/p PPM  Paroxysmal Afib  -PPM implantation 6/27, will need wound check ~7/4 which can be completed at [facility] [place] - call Elsie Miles [phone] #4 at time of dc  -cont metoprolol  -restart eliquis 7/1  -cards will follow PRN over weekend, will see Monday which, if no complications, will be day of discharge to [facility]    NSTEMI, h/o CABG 2020  -patient troponin 36 --> 1500 on admission in setting of chest pain. Feel significant to attribute to patient Hbg 7.7 grant given chronicity before admission (Hbg ~8.8 on 5/23/24 outpatient). Maybe 2/2 paroxysmal Afib but does seem out of proportion  -ECHO EF 45-50%,  down from ~60% prior on review  -ASA, statin  -cardiology deferring ischemic evaluation currently, mgmt of above. Will need to f/u OP for further discussion  --currently CP free    Iron deficiency anemia, acute on chronic   -s/p 2 units transfusion w significant response and stabilized thereafter  -s/p IV iron here, PO iron at dc  -EGD wnl 6/22, colonoscopy w cecal polyp but no bleeding    Hypomagnesemia/hypokalemia - replace per protocol  CKDIIIb - at baseline  AMANDA - wears cpap at home, cannot tolerate ours, nocturnal o2    Unchanged plan of care, patient stable and awaiting discharge to [facility] on Mon    Expected Discharge Location and Transportation: [facility]  Expected Discharge 7/1 (Discharge date is tentative pending patient's medical condition and is subject to change)  Expected Discharge Date: 7/1/2024; Expected Discharge Time:      VTE Prophylaxis:  Pharmacologic & mechanical VTE prophylaxis orders are present.         AM-PAC 6 Clicks Score (PT): 20 (06/30/24 0810)    CODE STATUS:   Code Status and Medical Interventions:   Ordered at: 06/19/24 1701     Medical Intervention Limits:    No intubation (DNI)     Code Status (Patient has no pulse and is not breathing):    No CPR (Do Not Attempt to Resuscitate)     Medical Interventions (Patient has pulse or is breathing):    Limited Support       Georgie Stevens MD  06/30/24

## 2024-07-01 VITALS
OXYGEN SATURATION: 99 % | HEART RATE: 60 BPM | HEIGHT: 64 IN | RESPIRATION RATE: 18 BRPM | BODY MASS INDEX: 29.43 KG/M2 | SYSTOLIC BLOOD PRESSURE: 132 MMHG | WEIGHT: 172.4 LBS | TEMPERATURE: 97.9 F | DIASTOLIC BLOOD PRESSURE: 74 MMHG

## 2024-07-01 PROCEDURE — 99239 HOSP IP/OBS DSCHRG MGMT >30: CPT | Performed by: STUDENT IN AN ORGANIZED HEALTH CARE EDUCATION/TRAINING PROGRAM

## 2024-07-01 RX ORDER — METOPROLOL SUCCINATE 50 MG/1
50 TABLET, EXTENDED RELEASE ORAL
Qty: 30 TABLET | Refills: 6 | Status: SHIPPED | OUTPATIENT
Start: 2024-07-02

## 2024-07-01 RX ORDER — FUROSEMIDE 40 MG/1
40 TABLET ORAL DAILY
Qty: 30 TABLET | Refills: 6 | Status: SHIPPED | OUTPATIENT
Start: 2024-07-02

## 2024-07-01 RX ADMIN — METOPROLOL SUCCINATE 50 MG: 50 TABLET, EXTENDED RELEASE ORAL at 08:04

## 2024-07-01 RX ADMIN — FUROSEMIDE 40 MG: 40 TABLET ORAL at 08:04

## 2024-07-01 RX ADMIN — PANTOPRAZOLE SODIUM 40 MG: 40 TABLET, DELAYED RELEASE ORAL at 05:32

## 2024-07-01 RX ADMIN — CETIRIZINE HYDROCHLORIDE 10 MG: 10 TABLET, FILM COATED ORAL at 08:03

## 2024-07-01 RX ADMIN — SERTRALINE 50 MG: 50 TABLET, FILM COATED ORAL at 08:05

## 2024-07-01 RX ADMIN — LEVOTHYROXINE SODIUM 100 MCG: 0.1 TABLET ORAL at 05:32

## 2024-07-01 RX ADMIN — ASPIRIN 81 MG: 81 TABLET, COATED ORAL at 08:05

## 2024-07-01 RX ADMIN — Medication 10 ML: at 08:09

## 2024-07-01 RX ADMIN — HYDRALAZINE HYDROCHLORIDE 25 MG: 25 TABLET ORAL at 08:04

## 2024-07-01 RX ADMIN — GABAPENTIN 300 MG: 300 CAPSULE ORAL at 08:05

## 2024-07-01 RX ADMIN — APIXABAN 5 MG: 5 TABLET, FILM COATED ORAL at 08:08

## 2024-07-01 RX ADMIN — Medication 10 ML: at 08:08

## 2024-07-01 NOTE — DISCHARGE SUMMARY
[facility] Medicine Services  DISCHARGE SUMMARY    Patient Name: Angélica De La Cruz  : 1945  MRN: [id number]    Date of Admission: 2024 11:06 AM  Date of Discharge:  2024  Primary Care Physician: Sussy Bloom MD    Consults       Date and Time Order Name Status Description    2024 10:48 AM Inpatient Gastroenterology Consult Completed     2024  5:11 PM Inpatient Cardiology Consult Completed             Hospital Course     Presenting Problem: chest pain    Active Hospital Problems    Diagnosis  POA    **A-fib [I48.91]  Yes    Symptomatic anemia [D64.9]  Yes    NSTEMI (non-ST elevated myocardial infarction) [I21.4]  Yes    Hypothyroidism (acquired) [E03.9]  Yes    Rheumatoid arthritis [M06.9]  Yes    Iron deficiency anemia due to chronic blood loss [D50.0]  Unknown    AMANDA (obstructive sleep apnea) [G47.33]  Yes    CKD (chronic kidney disease) [N18.9]  Yes    Hypertension [I10]  Yes      Resolved Hospital Problems   No resolved problems to display.          Hospital Course:  Angélica De La Cruz is a 79 y.o. female w CAD s/p CABG c/b post-op Afib who presented w chest pain on . Rising troponin on admission - cardiology consulted who attributed this to NSTEMI II.   Stay c/b persistent paroxysmal A-fib then sinus bradycardia 2/2 meds given for this. 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      Symptomatic tachy-john, now s/p PPM  Paroxysmal Afib  -PPM implantation , will need wound check ~ which can be completed at [facility] - call Elsie Wileyers [phone] #[phone] at time of dc  -cont metoprolol  -restart eliquis , today  -cards will follow up in 8 to 12 wks w [facility] interrogation     NSTEMI, h/o CABG   -patient troponin 36 --> 1500 on admission in setting of chest pain. Felt significant to attribute to patient Hbg 7.7 grant given chronicity before admission (Hbg ~8.8 on 24 outpatient). Maybe 2/2 paroxysmal Afib but  does seem out of proportion  -ECHO EF 45-50%, down from ~60% prior on review  -ASA, statin  -cardiology deferring ischemic evaluation currently, mgmt of above. Will need to f/u OP for further discussion  --currently CP free     Iron deficiency anemia, acute on chronic   -s/p 2 units transfusion w significant response and stabilized thereafter  -s/p IV iron here, PO iron at dc  -EGD wnl 6/22, colonoscopy w cecal polyp but no bleeding     Hypomagnesemia/hypokalemia - replaced per protocol  CKDIIIb - at baseline  AMANDA - wears cpap at home, cannot tolerate ours, nocturnal o2      Discharge Follow Up Recommendations for outpatient labs/diagnostics:   PCP, cardiology    Day of Discharge     HPI:   No new overnight issues, resting comfortably    Review of Systems  Gen- No fevers, chills  CV- No chest pain, palpitations  Resp- No cough, dyspnea  GI- No N/V/D, abd pain      Vital Signs:   Temp:  [97.5 °F (36.4 °C)-98.2 °F (36.8 °C)] 97.9 °F (36.6 °C)  Heart Rate:  [60-62] 60  Resp:  [18] 18  BP: (130-160)/(61-85) 132/74  Flow (L/min):  [2] 2      Physical Exam:  Constitutional: No acute distress, awake, alert female sitting up in chair  Respiratory: Clear to auscultation bilaterally, respiratory effort normal   Cardiovascular: RRR, normal rate, no murmurs, rubs, or gallops  Gastrointestinal: Soft, nontender, nondistended  Musculoskeletal: Muscle tone within normal limits, no joint effusions appreciated  Psychiatric: Appropriate affect, cooperative  Neurologic: Alert and oriented, facial movements symmetric and spontaneous movement of all 4 extremities grossly equal bilaterally, speech clear  Skin: No rashes       Pertinent  and/or Most Recent Results     LAB RESULTS:      Lab 06/30/24  0737 06/28/24  0508   WBC 7.72 8.22   HEMOGLOBIN 11.4* 11.4*   HEMATOCRIT 41.0 41.0   PLATELETS 315 306   NEUTROS ABS 4.80  --    IMMATURE GRANS (ABS) 0.09*  --    LYMPHS ABS 1.00  --    MONOS ABS 0.58  --    EOS ABS 1.17*  --    MCV 80.7 80.2          Lab 06/30/24  0737 06/28/24  0508 06/26/24  0348 06/25/24  0318 06/24/24  1418   SODIUM 144 143 140 138  --    POTASSIUM 4.1 3.6 3.7 4.4  4.4 3.6   CHLORIDE 104 106 102 100  --    CO2 30.0* 24.0 26.0 27.0  --    ANION GAP 10.0 13.0 12.0 11.0  --    BUN 31* 25* 22 16  --    CREATININE 1.07* 1.21* 1.24* 1.15*  --    EGFR 52.3* 45.1* 43.8* 48.0*  --    GLUCOSE 100* 89 101* 103*  --    CALCIUM 9.1 9.1 8.7 8.5*  --    MAGNESIUM  --   --   --  2.9* 1.2*             Lab 06/28/24  0508   PROBNP 4,809.0*                 Brief Urine Lab Results  (Last result in the past 365 days)        Color   Clarity   Blood   Leuk Est   Nitrite   Protein   CREAT   Urine HCG        06/19/24 1212 Yellow   Clear   Negative   Negative   Negative   Negative                 Microbiology Results (last 10 days)       ** No results found for the last 240 hours. **            XR Chest PA & Lateral    Result Date: 6/28/2024  XR CHEST PA AND LATERAL Date of Exam: 6/28/2024 4:19 AM EDT Indication: Post ICD / Pacer Implant Comparison: 6/23/2024 Findings: There is been interval placement of a left subclavian transvenous pacemaker. No evidence of pneumothorax. Patient is again noted be status post median sternotomy. Heart size is within normal limits. Proximal pulmonary artery segments appear enlarged suggesting changes of pulmonary arterial hypertension. There is blunting of both costophrenic angles compatible small bilateral pleural effusions. No evidence pneumothorax. No focal airspace consolidation.     Impression: 1. Interval placement of left subclavian transvenous pacemaker. No evidence pneumothorax. 2. Stable small bilateral pleural effusions. 3. Prominent proximal pulmonary artery segments compatible changes of pulmonary arterial hypertension. Electronically Signed: Julian Bennett MD  6/28/2024 7:29 AM EDT  Workstation ID: [id number]    EP/CRM Study    Result Date: 6/27/2024  FINAL IMPRESSIONS: 1.   Successful implantation of a  dual-chamber permanent pacemaker. RECOMMENDATION(S): The patient will be monitored on telemetry. If stable, the patient can be discharged to home tomorrow Follow up per protocol; Follow up with Dr. Albarran in 3 months Leif Martini MD Cardiac Electrophysiologist [facility] 06/27/24 17:16 EDT FOR THE PATIENT - Pressure dressing from device site will be removed the morning after procedure or prior to discharge if patient goes home same day.   Patient will have an Aquacel dressing underneath.  Typically, no steri-strips or glue is used. The Aquacel dressing will be removed at wound check appointment (7-10 days). - Please do not lift more than 10 pounds or abduct the shoulder joint / or raise the arm above the shoulder for 6 weeks after device was implanted (this does not apply to subcutaneous ICDs). - Avoid activities that involve heavy lifting or rough contact that could result in blows to your implant site and to allow your incision time to heal. - Wear sling at bedtime for 4 weeks. - May shower day after procedure. - No creams, lotions, or powders to incision. - No bath tubs, hot tubs or swimming for 4 weeks. - No Driving preferably for 4 weeks, at minimum 2 weeks. - 7-10 day wound check. - 3 month hospital follow up with Device check. - Call your doctor if you have any swelling, redness or discharge around your incision, notice anything unusual or unexpected, or you develop a fever that does not go away in two or three days. - Call your doctor if you hear any beeping sounds / vibratory alerts from your device as this indicates your device needs to be checked immediately. - Carry your Medical Device ID Card with you at all times. - Please call our office () with any questions about the device or the wounds.     XR Chest 1 View    Result Date: 6/23/2024  XR CHEST 1 VW Date of Exam: 6/23/2024 8:34 AM EDT Indication: Shortness of breath. Comparison: Chest radiograph  6/20/2024, chest CT 6/19/2024 Findings: Prior median sternotomy and CABG. Cardiomegaly stable from prior. Prominence of the central pulmonary vasculature similar to prior. Mild interstitial opacities stable from prior which could reflect mild edema. No significant effusion. 110 costophrenic angles similar to prior, difficult to exclude trace pleural fluid particularly on the right. No pneumothorax. Degenerative related osseous changes. Aortic atherosclerotic disease.     Impression: Stable radiographic appearance of the chest since 6/20/2024 comparison. Electronically Signed: Tre Lyon MD  6/23/2024 8:50 AM EDT  Workstation ID: [id number]    Adult Transthoracic Echo Complete W/ Cont if Necessary Per Protocol    Result Date: 6/22/2024    Left ventricular ejection fraction appears to be 46 - 50%.   Mildly reduced right ventricular systolic function noted.   The left atrial cavity is dilated.   Left atrial volume is severely increased.   Estimated right ventricular systolic pressure from tricuspid regurgitation is mildly elevated (35-45 mmHg). Calculated right ventricular systolic pressure from tricuspid regurgitation is 40 mmHg.      Results for orders placed during the hospital encounter of 11/24/20    Duplex Vein Mapping Lower Extremity - Bilateral CAR    Interpretation Summary  · Right GSV stripped from proximal thigh to mid lower leg  · Left GSV stripped from proximal thigh to knee      Results for orders placed during the hospital encounter of 11/24/20    Duplex Vein Mapping Lower Extremity - Bilateral CAR    Interpretation Summary  · Right GSV stripped from proximal thigh to mid lower leg  · Left GSV stripped from proximal thigh to knee      Results for orders placed during the hospital encounter of 06/19/24    Adult Transthoracic Echo Complete W/ Cont if Necessary Per Protocol    Interpretation Summary    Left ventricular ejection fraction appears to be 46 - 50%.    Mildly reduced right ventricular systolic  function noted.    The left atrial cavity is dilated.    Left atrial volume is severely increased.    Estimated right ventricular systolic pressure from tricuspid regurgitation is mildly elevated (35-45 mmHg). Calculated right ventricular systolic pressure from tricuspid regurgitation is 40 mmHg.      Plan for Follow-up of Pending Labs/Results: no pending results    Discharge Details        Discharge Medications        New Medications        Instructions Start Date   apixaban 5 MG tablet tablet  Commonly known as: ELIQUIS   5 mg, Oral, Every 12 Hours Scheduled      furosemide 40 MG tablet  Commonly known as: LASIX   40 mg, Oral, Daily   Start Date: July 2, 2024     metoprolol succinate XL 50 MG 24 hr tablet  Commonly known as: TOPROL-XL   50 mg, Oral, Every 24 Hours Scheduled   Start Date: July 2, 2024            Continue These Medications        Instructions Start Date   albuterol (2.5 MG/3ML) 0.083% nebulizer solution  Commonly known as: PROVENTIL   Take 2.5 mg by nebulization Every 6 (Six) Hours As Needed for Wheezing or Shortness of Air.      aspirin 81 MG chewable tablet   81 mg, Oral, Daily, OTC      atorvastatin 40 MG tablet  Commonly known as: LIPITOR   40 mg, Oral, Daily      Azelastine HCl 137 MCG/SPRAY solution   1 spray by Each Nare route 2 (Two) Times a Day As Needed (Allergies).      CENTRUM JR/IRON PO   1 tablet, Oral, Daily      estradiol 0.1 MG/GM vaginal cream  Commonly known as: ESTRACE   Insert 1 gram vaginally 3 times per week. (Uses Monday, Wednesday and Friday)      famotidine 40 MG tablet  Commonly known as: PEPCID   Take 1 tablet by mouth Every Night.      fluticasone 50 MCG/ACT nasal spray  Commonly known as: FLONASE   2 sprays, Nasal, Daily PRN      gabapentin 300 MG capsule  Commonly known as: NEURONTIN   600 mg, Oral, 2 Times Daily      guaiFENesin 600 MG 12 hr tablet  Commonly known as: MUCINEX   1 tablet, Oral, Daily, OTC      hydrALAZINE 25 MG tablet  Commonly known as: APRESOLINE    25 mg, Oral, 2 Times Daily      levocetirizine 5 MG tablet  Commonly known as: XYZAL   5 mg, Oral, Daily      levothyroxine 100 MCG tablet  Commonly known as: SYNTHROID, LEVOTHROID   100 mcg, Oral, Daily      pantoprazole 40 MG EC tablet  Commonly known as: PROTONIX   Take 1 tablet by mouth Daily.      sertraline 50 MG tablet  Commonly known as: ZOLOFT   50 mg, Oral, Daily             Stop These Medications      clopidogrel 75 MG tablet  Commonly known as: PLAVIX     hydroCHLOROthiazide 50 MG tablet     olmesartan 20 MG tablet  Commonly known as: BENICAR     potassium chloride ER 20 MEQ tablet controlled-release ER tablet  Commonly known as: K-TAB              Allergies   Allergen Reactions    Adhesive Tape Itching and Rash    Codeine Delirium    Morphine And Codeine Delirium    Cefaclor Other (See Comments)    Oxycodone-Acetaminophen Itching    Sulfa Antibiotics     Doxycycline Rash    Erythromycin Rash    Levofloxacin Rash    Oxytetracycline Rash    Penicillins Rash     Tolerates cefuroxime         Discharge Disposition:  Rehab Facility or Unit (DC - External)[facility]    Diet:  Hospital:  Diet Order   Procedures    Diet: Cardiac; Healthy Heart (2-3 Na+); Fluid Consistency: Thin (IDDSI 0)       Diet Instructions        Cardiac Heart Healthy Diet including a  2-3 grams sodium restriction daily.            Activity:  Activity Instructions      Pt discharged to [facility] for rehab.. They will facilitate a treatment plan..           Restrictions or Other Recommendations:  none       CODE STATUS:    Code Status and Medical Interventions:   Ordered at: 06/19/24 1702     Medical Intervention Limits:    No intubation (DNI)     Code Status (Patient has no pulse and is not breathing):    No CPR (Do Not Attempt to Resuscitate)     Medical Interventions (Patient has pulse or is breathing):    Limited Support       Future Appointments   Date Time Provider Department Center   7/22/2024  8:30 AM [facility]  P[facility]   7/22/2024  9:00 AM Taylor Carrion, MARCO [facility]       Additional Instructions for the Follow-ups that You Need to Schedule       Ambulatory Referral to Cardiac Rehab   As directed      Ambulatory Referral to Cardiac Rehab   As directed      Ambulatory Referral to Cardiac Rehab   As directed      Ambulatory Referral to Home Health   As directed      Face to Face Visit Date: 6/21/2024   Follow-up provider for Plan of Care?: I treated the patient in an acute care facility and will not continue treatment after discharge.   Follow-up provider: LUZ MEDINA [[id number]]   Reason/Clinical Findings: A-fib   Describe mobility limitations that make leaving home difficult: impaired functional mobility, gait, balance, and endurance   Nursing/Therapeutic Services Requested: Skilled Nursing Physical Therapy Occupational Therapy   Skilled nursing orders: Cardiopulmonary assessments Neurovascular assessments O2 instruction   PT orders: Home safety assessment Strengthening   Occupational orders: Activities of daily living Home safety assessment   Frequency: 1 Week 1                      Georgie Stevens MD  07/01/24      Time Spent on Discharge:  I spent  45  minutes on this discharge activity which included: face-to-face encounter with the patient, reviewing the data in the system, coordination of the care with the nursing staff as well as consultants, documentation, and entering orders.

## 2024-07-01 NOTE — PROGRESS NOTES
" [facility] - Progress Note    Angélica De La Cruz  1945  [id number]    07/01/24, 08:21 EDT      Chief Complaint: Following for PAF with RVR    Subjective:   Did well over weekend.  Has been up ambulating, working with PT.  Dressing remains intact, arm in sling.  Plan is for transfer to [facility] for rehab today.      Review of Systems:  Pertinent positives are listed above and in physical exam.  All others have been reviewed and are negative.    apixaban, 5 mg, Oral, Q12H  aspirin, 81 mg, Oral, Daily  atorvastatin, 40 mg, Oral, Daily  cetirizine, 10 mg, Oral, Daily  furosemide, 40 mg, Oral, Daily  gabapentin, 300 mg, Oral, Q12H  hydrALAZINE, 25 mg, Oral, BID  levothyroxine, 100 mcg, Oral, Daily  metoprolol succinate XL, 50 mg, Oral, Q24H  pantoprazole, 40 mg, Oral, Q AM  sertraline, 50 mg, Oral, Daily  sodium chloride, 10 mL, Intravenous, Q12H  sodium chloride, 10 mL, Intravenous, Q12H        Objective:  Vitals:   height is 162.6 cm (64.02\") and weight is 78.2 kg (172 lb 6.4 oz). Her oral temperature is 97.8 °F (36.6 °C). Her blood pressure is 152/85 and her pulse is 60. Her respiration is 18 and oxygen saturation is 99%.   No intake or output data in the 24 hours ending 07/01/24 0821      Physical Exam:  General:  WN, NAD  CV: Normal S1, S2. No murmur, rub, or gallop.  Resp:  CTA Mauricio, equal, nonlabored.  Abd:  Soft, + BS, no organomegaly. Nontender to palpation.  Extrem:  No edema BLE, 2+ pedal/PT pulses.            Results from last 7 days   Lab Units 06/30/24  0737   WBC 10*3/mm3 7.72   HEMOGLOBIN g/dL 11.4*   HEMATOCRIT % 41.0   PLATELETS 10*3/mm3 315     Results from last 7 days   Lab Units 06/30/24  0737   SODIUM mmol/L 144   POTASSIUM mmol/L 4.1   CHLORIDE mmol/L 104   CO2 mmol/L 30.0*   BUN mg/dL 31*   CREATININE mg/dL 1.07*   CALCIUM mg/dL 9.1   GLUCOSE mg/dL 100*                     Results from last 7 days   Lab Units 06/28/24  0508   PROBNP pg/mL 4,809.0*         Tele: " Paced    Assessment:  -79-year-old CF with PMHx CAD/CABG, HTN, HLP, hypothyroidism, AMANDA/CPAP and chronic dyspnea presents to [facility] ED with recent abnormal CXR as outpatient and progressive dyspnea, increased O2 demand at home.  -Anemia requiring 2 units PRBCs transfusion at admission, recent bowel habit changes  -A-fib with RVR in setting of profound anemia  -Elevated troponin, likely demand ischemia  -CKD  -RA  -Obesity           Plan:  -Admitted to hospitalist services  -GI signed off, s/p EGD and colonoscopy 6/22. Iron supplementation recommended.  -Echo EF 46 to 50% with LA dilation, RVSP 35 to 45 mmHg.  -Findings consistent with tachybradycardia syndrome.  S/P permanent pacemaker 6/27/2024, Dr. Martini.  Resume Eliquis today.  Continue Toprol-XL 50 mg p.o. daily.  -Agree with rehab placement.    -Cardiology will sign off.  She has a wound check scheduled through Dr. Martini's office.  We will see her in 8 to 12 weeks in the office with [facility] interrogation.    I discussed the patient's findings and my recommendations with the patient, any present family members, and the nursing staff.  Timothy Albarran MD saw and examined patient, verified hx and PE, read all radiographic studies, reviewed labs and micro data, and formulated dx, plan for treatment and all medical decision making.      Rosanna Nelson PA-C  07/01/24, 08:22 EDT

## 2024-07-01 NOTE — PLAN OF CARE
Goal Outcome Evaluation:  Plan of Care Reviewed With: patient           Outcome Evaluation: Patient alert and oriented, ra while awake and 2LNC while sleeping. Patient was sitting at the recleiner and then went to bed with one assistance. Uneventfull night

## 2024-07-01 NOTE — CASE MANAGEMENT/SOCIAL WORK
Case Management Discharge Note      Final Note: Patient has a bed today at [facility] acute rehab. She will be going to [facility]. RN to call report to [phone].  will pull the discharge summary out of Epic. I have arranged transport for the patient on the [facility] van today at 1430. RN please have the patient at the [address] no later than 1415 to prevent transport delays. IMM given to patient today. She had no questions or concerns today. Patient in agreement with the plan.    Provided Post Acute Provider List?: Yes  Post Acute Provider List: Inpatient Rehab  Provided Post Acute Provider Quality & Resource List?: Yes  Post Acute Provider Quality and Resource List: Inpatient Rehab  Delivered To: Patient  Method of Delivery: In person    Selected Continued Care - Admitted Since 6/19/2024       Destination Coordination complete.      Service Provider Selected Services Address Phone Fax Patient Preferred    [facility] [address] [facility] [id number] [phone] --              Durable Medical Equipment       Service Provider Selected Services Address Phone Fax Patient Preferred    [facility] [address] [address] [phone] --       Internal Comment last updated by Lisa Diallo, RN 6/24/2024 0819    [facility] 24/7                         Dialysis/Infusion    No services have been selected for the patient.                Home Medical Care Coordination complete.      Service Provider Selected Services Address Phone Fax Patient Preferred    [facility] ,  Home Nursing ,  Home Rehabilitation [address] [id number] [phone] --       Internal Comment last updated by Lisa Diallo, RN 6/24/2024 0917    SN/PT/OT                         Therapy    No services have been  selected for the patient.                Community [facility]    No services have been selected for the patient.                [facility]    No services have been selected for the patient.                    Transportation Services  Other: Other ([facility])    Final Discharge Disposition Code: 62 - inpatient rehab facility

## 2024-07-01 NOTE — NURSING NOTE
Discharge instructions given. Pt expressed thru teach back method what meds to take, what side effects to watch for and when to follow up with her physicians. Awaiting transport to [facility] via [facility] van.

## 2024-07-02 ENCOUNTER — DOCUMENTATION (OUTPATIENT)
Dept: CARDIAC REHAB | Facility: HOSPITAL | Age: 79
End: 2024-07-02
Payer: MEDICARE

## 2024-07-22 ENCOUNTER — OFFICE VISIT (OUTPATIENT)
Age: 79
End: 2024-07-22
Payer: MEDICARE

## 2024-07-22 VITALS
DIASTOLIC BLOOD PRESSURE: 72 MMHG | WEIGHT: 177.2 LBS | HEART RATE: 76 BPM | OXYGEN SATURATION: 94 % | BODY MASS INDEX: 30.25 KG/M2 | TEMPERATURE: 98.1 F | SYSTOLIC BLOOD PRESSURE: 140 MMHG | HEIGHT: 64 IN

## 2024-07-22 DIAGNOSIS — I21.4 NSTEMI (NON-ST ELEVATED MYOCARDIAL INFARCTION): ICD-10-CM

## 2024-07-22 DIAGNOSIS — G47.33 OSA (OBSTRUCTIVE SLEEP APNEA): ICD-10-CM

## 2024-07-22 DIAGNOSIS — J96.11 CHRONIC RESPIRATORY FAILURE WITH HYPOXIA: ICD-10-CM

## 2024-07-22 DIAGNOSIS — J30.89 NON-SEASONAL ALLERGIC RHINITIS, UNSPECIFIED TRIGGER: Primary | ICD-10-CM

## 2024-07-22 DIAGNOSIS — D50.0 IRON DEFICIENCY ANEMIA DUE TO CHRONIC BLOOD LOSS: ICD-10-CM

## 2024-07-22 PROCEDURE — 1160F RVW MEDS BY RX/DR IN RCRD: CPT | Performed by: NURSE PRACTITIONER

## 2024-07-22 PROCEDURE — 99214 OFFICE O/P EST MOD 30 MIN: CPT | Performed by: NURSE PRACTITIONER

## 2024-07-22 PROCEDURE — 94726 PLETHYSMOGRAPHY LUNG VOLUMES: CPT | Performed by: NURSE PRACTITIONER

## 2024-07-22 PROCEDURE — 3077F SYST BP >= 140 MM HG: CPT | Performed by: NURSE PRACTITIONER

## 2024-07-22 PROCEDURE — 3078F DIAST BP <80 MM HG: CPT | Performed by: NURSE PRACTITIONER

## 2024-07-22 PROCEDURE — 94060 EVALUATION OF WHEEZING: CPT | Performed by: NURSE PRACTITIONER

## 2024-07-22 PROCEDURE — 1159F MED LIST DOCD IN RCRD: CPT | Performed by: NURSE PRACTITIONER

## 2024-07-22 PROCEDURE — 94729 DIFFUSING CAPACITY: CPT | Performed by: NURSE PRACTITIONER

## 2024-07-22 RX ORDER — POTASSIUM CHLORIDE 750 MG/1
10 TABLET, EXTENDED RELEASE ORAL DAILY
COMMUNITY

## 2024-07-22 RX ORDER — LORATADINE 10 MG/1
CAPSULE, LIQUID FILLED ORAL
COMMUNITY

## 2024-07-22 RX ORDER — SENNOSIDES 8.6 MG
CAPSULE ORAL
COMMUNITY

## 2024-07-22 RX ORDER — LOSARTAN POTASSIUM 25 MG/1
TABLET ORAL
COMMUNITY

## 2024-07-22 RX ORDER — LEVALBUTEROL TARTRATE 45 UG/1
3 AEROSOL, METERED ORAL ONCE
Status: COMPLETED | OUTPATIENT
Start: 2024-07-22 | End: 2024-07-22

## 2024-07-22 RX ADMIN — LEVALBUTEROL TARTRATE 3 PUFF: 45 AEROSOL, METERED ORAL at 15:53

## 2024-07-22 NOTE — PROGRESS NOTES
[facility] Pulmonary Follow up    CHIEF COMPLAINT    Dyspnea with exertion    HISTORY OF PRESENT ILLNESS    Angélica De La Cruz is a 79 y.o.female here today for follow-up.  She was last seen in the office by me In June for a sick visit.  She was then hospitalized at [facility] on 6/19 to 7/1 for chest pain.  She had a elevated troponin and was found to have a NSTEMI II.  She was also in paroxysmal A-fib and sinus bradycardia and a pacemaker was placed on 6/27.  It was felt that she had this event due to a hemoglobin of 7.7.  She received 2 units of blood while hospitalized.  She was also given IV iron.  She had an EGD/colonoscopy but was not found to have any active bleeding.  Her symptoms improved and she was discharged home.    She states she is doing much better since her hospitalization.  She does feel like her shortness of breath has improved significantly.  She has not had to use her oxygen since discharge.  She does keep it around but does not have to use it.  She states that her oxygen is above 90% at all times.    She denies any sputum production or hemoptysis.  She denies any fever, chills or night sweats.    She is currently not using any inhalers.  She denies any worsening breathing difficulties.    She denies any chest pain or palpitations.  She denies any lower extremity edema or calf tenderness.    She denies any reflux symptoms.    She continues to use her CPAP nightly with 1 L bled in the machine.  She denies any sleeping difficulties.  She does feel rested in the mornings.    She quit smoking 51 years ago.    Patient Active Problem List   Diagnosis    Acute febrile illness    Abnormal stress test    CABG 11/25/20    Hypertension    CKD (chronic kidney disease)    Atrial fibrillation    Chronic cough    Non-seasonal allergic rhinitis    AMANDA (obstructive sleep apnea)    CAD (coronary artery disease) s/p CABG    Carotid stenosis, asymptomatic, right    A-fib    Symptomatic anemia    NSTEMI  (non-ST elevated myocardial infarction)    Hypothyroidism (acquired)    Rheumatoid arthritis    Iron deficiency anemia due to chronic blood loss       Allergies   Allergen Reactions    Adhesive Tape Itching and Rash    Codeine Delirium    Morphine And Codeine Delirium    Cefaclor Other (See Comments)    Oxycodone-Acetaminophen Itching    Sulfa Antibiotics     Doxycycline Rash    Erythromycin Rash    Levofloxacin Rash    Oxytetracycline Rash    Penicillins Rash     Tolerates cefuroxime       Current Outpatient Medications:     acetaminophen (Tylenol 8 Hour) 650 MG 8 hr tablet, Take 1 tablet every 8 hours by oral route as needed., Disp: , Rfl:     albuterol (PROVENTIL) (2.5 MG/3ML) 0.083% nebulizer solution, Take 2.5 mg by nebulization Every 6 (Six) Hours As Needed for Wheezing or Shortness of Air., Disp: , Rfl:     apixaban (ELIQUIS) 5 MG tablet tablet, Take 1 tablet by mouth Every 12 (Twelve) Hours. Indications: Atrial Fibrillation, Disp: 60 tablet, Rfl: 6    aspirin 81 MG chewable tablet, Chew 1 tablet Daily. OTC, Disp: , Rfl:     atorvastatin (LIPITOR) 40 MG tablet, Take 1 tablet by mouth Daily., Disp: , Rfl:     Azelastine HCl 137 MCG/SPRAY solution, 1 spray by Each Nare route 2 (Two) Times a Day As Needed (Allergies)., Disp: , Rfl:     estradiol (ESTRACE) 0.1 MG/GM vaginal cream, Insert 1 gram vaginally 3 times per week. (Uses Monday, Wednesday and Friday), Disp: , Rfl:     famotidine (PEPCID) 40 MG tablet, Take 1 tablet by mouth Every Night., Disp: , Rfl:     fluticasone (FLONASE) 50 MCG/ACT nasal spray, 2 sprays into the nostril(s) as directed by provider Daily As Needed for Allergies or Rhinitis., Disp: , Rfl:     furosemide (LASIX) 40 MG tablet, Take 1 tablet by mouth Daily., Disp: 30 tablet, Rfl: 6    gabapentin (NEURONTIN) 300 MG capsule, Take 2 capsules by mouth 2 (Two) Times a Day., Disp: , Rfl:     guaiFENesin (MUCINEX) 600 MG 12 hr tablet, Take 1 tablet by mouth Daily. OTC, Disp: , Rfl:     hydrALAZINE  (APRESOLINE) 25 MG tablet, Take 1 tablet by mouth 2 (Two) Times a Day., Disp: , Rfl:     levocetirizine (XYZAL) 5 MG tablet, Take 1 tablet by mouth Daily., Disp: , Rfl:     levothyroxine (SYNTHROID, LEVOTHROID) 100 MCG tablet, Take 1 tablet by mouth Daily., Disp: , Rfl:     Loratadine 10 MG capsule, Take  by mouth., Disp: , Rfl:     losartan (COZAAR) 25 MG tablet, Take 2 tablets every day by oral route., Disp: , Rfl:     metoprolol succinate XL (TOPROL-XL) 50 MG 24 hr tablet, Take 1 tablet by mouth Daily., Disp: 30 tablet, Rfl: 6    pantoprazole (PROTONIX) 40 MG EC tablet, Take 1 tablet by mouth Daily., Disp: , Rfl:     Pediatric Multivitamins-Iron (CENTRUM JR/IRON PO), Take 1 tablet by mouth Daily., Disp: , Rfl:     potassium chloride (KLOR-CON M10) 10 MEQ CR tablet, Take 1 tablet by mouth Daily., Disp: , Rfl:     sertraline (ZOLOFT) 50 MG tablet, Take 1 tablet by mouth Daily., Disp: , Rfl:   MEDICATION LIST AND ALLERGIES REVIEWED.    Social History     Tobacco Use    Smoking status: Former     Current packs/day: 0.00     Average packs/day: 1 pack/day for 15.0 years (15.0 ttl pk-yrs)     Types: Cigarettes     Start date:      Quit date:      Years since quittin.5    Smokeless tobacco: Never   Vaping Use    Vaping status: Never Used   Substance Use Topics    Alcohol use: No    Drug use: No       FAMILY AND SOCIAL HISTORY REVIEWED.    Review of Systems   Constitutional:  Positive for fatigue. Negative for activity change, appetite change, fever and unexpected weight change.   HENT:  Positive for congestion. Negative for postnasal drip, rhinorrhea, sinus pressure, sore throat and voice change.    Eyes:  Negative for visual disturbance.   Respiratory:  Positive for shortness of breath. Negative for cough, chest tightness and wheezing.    Cardiovascular:  Negative for chest pain, palpitations and leg swelling.   Gastrointestinal:  Negative for abdominal distention, abdominal pain, nausea and vomiting.  "  Endocrine: Negative for cold intolerance and heat intolerance.   Genitourinary:  Negative for difficulty urinating and urgency.   Musculoskeletal:  Negative for arthralgias, back pain and neck pain.   Skin:  Negative for color change and pallor.   Allergic/Immunologic: Negative for environmental allergies and food allergies.   Neurological:  Negative for dizziness, syncope, weakness and light-headedness.   Hematological:  Negative for adenopathy. Does not bruise/bleed easily.   Psychiatric/Behavioral:  Negative for agitation and behavioral problems.    .    /72   Pulse 76   Temp 98.1 °F (36.7 °C)   Ht 162.6 cm (64.02\")   Wt 80.4 kg (177 lb 3.2 oz)   SpO2 94% Comment: Room air at rest  BMI 30.40 kg/m²     Immunization History   Administered Date(s) Administered    COVID-19 (PFIZER) Purple Cap Monovalent 02/26/2021, 03/20/2021, 10/21/2021    COVID-19 F23 (MODERNA) 12YRS+ (SPIKEVAX) 11/08/2023    Fluzone High Dose =>65 Years (Vaxcare ONLY) 09/10/2018, 09/30/2019    Fluzone High-Dose 65+yrs 10/29/2021, 10/10/2022, 09/18/2023    Fluzone Quad >6mos (Multi-dose) 09/30/2017, 09/29/2020, 10/21/2021    Hepatitis A 12/02/2018, 09/18/2023    INFLUENZA SPLIT TRI 10/29/2021    Pneumococcal Conjugate 13-Valent (PCV13) 04/17/2017    Pneumococcal Conjugate 20-Valent (PCV20) 09/18/2023    Pneumococcal Polysaccharide (PPSV23) 04/28/2009    TD Preservative Free (Tenivac) 07/01/2019    Zostavax 10/07/2016       Physical Exam  Vitals and nursing note reviewed.   Constitutional:       Appearance: She is well-developed. She is not diaphoretic.   HENT:      Head: Normocephalic and atraumatic.   Eyes:      Pupils: Pupils are equal, round, and reactive to light.   Neck:      Thyroid: No thyromegaly.   Cardiovascular:      Rate and Rhythm: Normal rate and regular rhythm.      Heart sounds: Normal heart sounds. No murmur heard.     No friction rub. No gallop.   Pulmonary:      Effort: Pulmonary effort is normal. No respiratory " distress.      Breath sounds: Normal breath sounds. No wheezing or rales.   Chest:      Chest wall: No tenderness.   Abdominal:      General: Bowel sounds are normal.      Palpations: Abdomen is soft.      Tenderness: There is no abdominal tenderness.   Musculoskeletal:         General: No swelling. Normal range of motion.      Cervical back: Normal range of motion and neck supple.   Lymphadenopathy:      Cervical: No cervical adenopathy.   Skin:     General: Skin is warm and dry.      Capillary Refill: Capillary refill takes less than 2 seconds.   Neurological:      Mental Status: She is alert and oriented to person, place, and time.   Psychiatric:         Mood and Affect: Mood normal.         Behavior: Behavior normal.           RESULTS    Spirometry Interpretation: FVC 2.06 85% predicted, FEV1 1.33 73% predicted, FEV1/FVC 65% predicted, TLC 4.79 102% predicted, DLCO 51% predicted, mild obstruction with postbronchodilator response, no restriction and reduced DLCO.    PROBLEM LIST    Problem List Items Addressed This Visit          Allergies and Adverse Reactions    Non-seasonal allergic rhinitis - Primary       Cardiac and Vasculature    NSTEMI (non-ST elevated myocardial infarction)       Hematology and Neoplasia    Iron deficiency anemia due to chronic blood loss       Sleep    AMANDA (obstructive sleep apnea)         DISCUSSION    Ms. De La Cruz was here for follow-up.  She seems to be doing okay from a pulmonary standpoint.  She continues to follow closely with cardiology for her NSTEMI.  She is doing a lot better since that her hospitalization.    She is following with her PCP for her iron deficiency anemia.  She does follow-up with them soon.    She will continue to wear CPAP nightly.  She uses 1 L bled in the machine at night.  She denies any sleeping difficulties.    She will continue to take her allergy medicine as needed.   she will follow-up in 6 months      I personally spent a total of 32 minutes on patient  visit today including chart review, face to face with the patient obtaining the history and physical exam, review of pertinent images and tests, counseling and discussion and/or coordination of care as described above, and documentation.  Total time excludes time spent on other separate services such as performing procedures or test interpretation, if applicable.        Taylor Carrion, APRN 07/22/202409:21 EDT  Electronically signed     Please note that portions of this note were completed with a voice recognition program.        CC: Sussy Bloom MD

## 2024-09-03 ENCOUNTER — TRANSCRIBE ORDERS (OUTPATIENT)
Dept: ADMINISTRATIVE | Facility: HOSPITAL | Age: 79
End: 2024-09-03
Payer: MEDICARE

## 2024-09-03 DIAGNOSIS — Z12.31 VISIT FOR SCREENING MAMMOGRAM: Primary | ICD-10-CM

## 2024-09-06 PROBLEM — M79.7 FIBROMYALGIA: Status: ACTIVE | Noted: 2024-09-06

## 2024-10-16 ENCOUNTER — HOSPITAL ENCOUNTER (OUTPATIENT)
Dept: MAMMOGRAPHY | Facility: HOSPITAL | Age: 79
Discharge: HOME | End: 2024-10-16
Admitting: OBSTETRICS & GYNECOLOGY
Payer: MEDICARE

## 2024-10-16 DIAGNOSIS — Z12.31 VISIT FOR SCREENING MAMMOGRAM: ICD-10-CM

## 2024-10-16 PROCEDURE — 77067 SCR MAMMO BI INCL CAD: CPT

## 2024-10-16 PROCEDURE — 77063 BREAST TOMOSYNTHESIS BI: CPT

## 2024-11-11 DIAGNOSIS — M79.2 NEUROPATHIC PAIN: Primary | ICD-10-CM

## 2024-11-11 RX ORDER — GABAPENTIN 300 MG/1
600 CAPSULE ORAL 2 TIMES DAILY
Qty: 360 CAPSULE | Refills: 1 | Status: SHIPPED | OUTPATIENT
Start: 2024-11-11 | End: 2024-11-13

## 2024-11-11 NOTE — TELEPHONE ENCOUNTER
Caller: JOANNA WILKERSON/BONG WILKERSON    Relationship: [employer]    Best call back number: [phone]    Requested Prescriptions: GABAPENTIN 300 MG CAPSULE, 2 TABLETS 2X A DAY     Pharmacy where request should be sent: [facility] [place]     [address] [id number]     PHONE #: [phone]    Next office visit with prescribing clinician: 1/21/2025     Additional details provided by patient: PATIENT & PATIENT SPOUSE STATES SHE IS NEEDING A REFILL ON THIS MEDICATION, PLEASE ADVISE.     Does the patient have less than a 3 day supply:  [] Yes  [x] No    Would you like a call back once the refill request has been completed: [x] Yes [] No    If the office needs to give you a call back, can they leave a voicemail: [x] Yes [] No

## 2024-11-12 DIAGNOSIS — M79.2 NEUROPATHIC PAIN: ICD-10-CM

## 2024-11-13 RX ORDER — GABAPENTIN 300 MG/1
CAPSULE ORAL
Qty: 360 CAPSULE | Refills: 1 | Status: SHIPPED | OUTPATIENT
Start: 2024-11-13

## 2024-12-11 DIAGNOSIS — J01.90 ACUTE SINUSITIS, RECURRENCE NOT SPECIFIED, UNSPECIFIED LOCATION: ICD-10-CM

## 2024-12-11 DIAGNOSIS — J01.90 ACUTE SINUSITIS, RECURRENCE NOT SPECIFIED, UNSPECIFIED LOCATION: Primary | ICD-10-CM

## 2024-12-11 RX ORDER — AZITHROMYCIN 250 MG/1
TABLET, FILM COATED ORAL
Qty: 6 TABLET | Refills: 0 | Status: SHIPPED | OUTPATIENT
Start: 2024-12-11 | End: 2024-12-11

## 2024-12-11 RX ORDER — AZITHROMYCIN 250 MG/1
TABLET, FILM COATED ORAL
Qty: 6 TABLET | Refills: 0 | Status: SHIPPED | OUTPATIENT
Start: 2024-12-11

## 2024-12-11 RX ORDER — AZITHROMYCIN 250 MG/1
TABLET, FILM COATED ORAL
Qty: 6 TABLET | Refills: 0 | Status: SHIPPED | OUTPATIENT
Start: 2024-12-11 | End: 2024-12-11 | Stop reason: SDUPTHER

## 2025-01-09 ENCOUNTER — TELEPHONE (OUTPATIENT)
Age: 80
End: 2025-01-09
Payer: MEDICARE

## 2025-01-09 NOTE — TELEPHONE ENCOUNTER
Due to a provider emergency, we regret to inform you that your appointment with Dr. Daria Vidales has been cancelled. Dr. Vidales is out of the office and we are currently unable to reschedule any appointments with her until further notice. To reschedule with one of our practitioners, BRITT Dill or Allison Toledo, you can reach us at [phone]. We have added you to our waitlist as high priority in the case we have any cancellations in the future. If you are needing any refills, please let us know when you call to get rescheduled.     -  Rheumatology

## 2025-01-13 ENCOUNTER — TRANSCRIBE ORDERS (OUTPATIENT)
Dept: ADMINISTRATIVE | Facility: HOSPITAL | Age: 80
End: 2025-01-13
Payer: MEDICARE

## 2025-01-13 DIAGNOSIS — Z12.31 ENCOUNTER FOR SCREENING MAMMOGRAM FOR MALIGNANT NEOPLASM OF BREAST: Primary | ICD-10-CM

## 2025-01-20 DIAGNOSIS — R05.3 CHRONIC COUGH: Primary | ICD-10-CM

## 2025-01-20 DIAGNOSIS — J40 BRONCHITIS: ICD-10-CM

## 2025-01-20 RX ORDER — AZITHROMYCIN 250 MG/1
TABLET, FILM COATED ORAL
Qty: 6 TABLET | Refills: 0 | Status: SHIPPED | OUTPATIENT
Start: 2025-01-20

## 2025-01-20 RX ORDER — PREDNISONE 10 MG/1
TABLET ORAL
Qty: 31 TABLET | Refills: 0 | Status: SHIPPED | OUTPATIENT
Start: 2025-01-20

## 2025-03-13 PROBLEM — M06.9 RHEUMATOID ARTHRITIS: Status: RESOLVED | Noted: 2024-06-19 | Resolved: 2025-03-13

## 2025-03-13 PROBLEM — M19.90 INFLAMMATORY ARTHRITIS: Status: ACTIVE | Noted: 2025-03-13

## 2025-03-13 PROBLEM — R76.8 P-ANCA TITER POSITIVE: Status: ACTIVE | Noted: 2025-03-13

## 2025-03-13 PROBLEM — Z51.81 ENCOUNTER FOR MEDICATION MONITORING: Status: ACTIVE | Noted: 2025-03-13

## 2025-03-13 NOTE — ASSESSMENT & PLAN NOTE
sl + RF/ + FABIOLA/ NEG CCP/ NEG DNA; hxL xrays c/w djd; no nsaids due to renal; 8/16 sepsis--no dmard rec;  Previous meds:  plaquenil (made her feel weak, dizzy); No SSZ - sulfa allergy.  Currently off steroids.  Chronic L knee pain- improved with injections - needs TKR Dr. Lane   C/o popping in the neck with discomfort. Mild degenerative changes on XR. MRI pending.  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  Pain in her dips, pips, and Heberden's nodes currently.  Hand xrays 3/2024 showed degenerative changes    Continue gabapentin.  Not currently on DMARD or biologic therapy.  Tylenol Arthritis 2 twice daily.  She has had a recent steroid injection of her left knee.  She did not sleep until the next morning and felt hyped up.  She did have relief with knee swelling.  Comfort cool splint for thumbs from [email or web address]

## 2025-03-13 NOTE — ASSESSMENT & PLAN NOTE
Positive in the past on [facility] 2/23 9/23 PANCA >1:640,  anti pr3 1(0.9), anti mpo 2(0.9)  No tissue diagnosis or other dx of any kind of vasculitis.  3/2025: P anca > 1:640, anti-MPO 1.3    She has followed with pulmonologist.  Reviewed notes from 7/2024 pulmonology.  Issues with CKD. Would like for her to f/u with nephrology.

## 2025-03-13 NOTE — ASSESSMENT & PLAN NOTE
Chronic mm pain  She did have a cervical spine ablation in remote past with Dr. Rodríguez.  Parth alferd and behzad.  Better since stress is decreased.   (169) in 2/19- Cholesterol medication which she DC'd secondary to muscle pain and weakness.  She does flare depending on the stress she experiences.    Four Cornerstones of treatment include  1- Treat Sleep- restoration.  2- Exercise- chair aerobics or chair yoga; Water aerobics.  3- Treat anxiety and depression. Continue Zoloft.  4- Treat pain. Non addictive muscle relaxer. Biofreeze, Massage , Heat , ICE.      Tylenol Arthritis 2 twice daily - [employer] generic or BRAND.  CRP, ESR, CK and aldolase.

## 2025-03-13 NOTE — ASSESSMENT & PLAN NOTE
Gabapentin    Controlled substance agreement reviewed and signed 3/18/2025  PDMP reviewed  UDS ordered 3/18/2025  Low risk for abuse or diversion

## 2025-03-18 ENCOUNTER — TELEPHONE (OUTPATIENT)
Age: 80
End: 2025-03-18

## 2025-03-18 ENCOUNTER — OFFICE VISIT (OUTPATIENT)
Age: 80
End: 2025-03-18
Payer: MEDICARE

## 2025-03-18 ENCOUNTER — LAB (OUTPATIENT)
Facility: HOSPITAL | Age: 80
End: 2025-03-18
Payer: MEDICARE

## 2025-03-18 VITALS
TEMPERATURE: 97.8 F | DIASTOLIC BLOOD PRESSURE: 70 MMHG | BODY MASS INDEX: 29.11 KG/M2 | WEIGHT: 170.5 LBS | SYSTOLIC BLOOD PRESSURE: 110 MMHG | HEART RATE: 60 BPM | HEIGHT: 64 IN

## 2025-03-18 DIAGNOSIS — M19.90 INFLAMMATORY ARTHRITIS: Primary | ICD-10-CM

## 2025-03-18 DIAGNOSIS — M79.2 NEUROPATHIC PAIN: ICD-10-CM

## 2025-03-18 DIAGNOSIS — M19.90 INFLAMMATORY ARTHRITIS: ICD-10-CM

## 2025-03-18 DIAGNOSIS — M79.7 FIBROMYALGIA: ICD-10-CM

## 2025-03-18 DIAGNOSIS — Z51.81 ENCOUNTER FOR MEDICATION MONITORING: ICD-10-CM

## 2025-03-18 DIAGNOSIS — R76.8 P-ANCA TITER POSITIVE: ICD-10-CM

## 2025-03-18 DIAGNOSIS — M14.80 ARTHROPATHIES IN OTHER SPECIFIED DISEASES CLASSIFIED ELSEWHERE, UNSPECIFIED SITE: ICD-10-CM

## 2025-03-18 LAB
ALBUMIN SERPL-MCNC: 4 G/DL (ref 3.5–5.2)
ALBUMIN/GLOB SERPL: 1.1 G/DL
ALP SERPL-CCNC: 106 U/L (ref 39–117)
ALT SERPL W P-5'-P-CCNC: 17 U/L (ref 1–33)
ANION GAP SERPL CALCULATED.3IONS-SCNC: 12.5 MMOL/L (ref 5–15)
AST SERPL-CCNC: 24 U/L (ref 1–32)
BILIRUB SERPL-MCNC: 0.3 MG/DL (ref 0–1.2)
BUN SERPL-MCNC: 37 MG/DL (ref 8–23)
BUN/CREAT SERPL: 24.7 (ref 7–25)
CALCIUM SPEC-SCNC: 9.1 MG/DL (ref 8.6–10.5)
CHLORIDE SERPL-SCNC: 99 MMOL/L (ref 98–107)
CO2 SERPL-SCNC: 28.5 MMOL/L (ref 22–29)
CREAT SERPL-MCNC: 1.5 MG/DL (ref 0.57–1)
CRP SERPL-MCNC: 0.57 MG/DL (ref 0–0.5)
EGFRCR SERPLBLD CKD-EPI 2021: 34.6 ML/MIN/1.73
GLOBULIN UR ELPH-MCNC: 3.6 GM/DL
GLUCOSE SERPL-MCNC: 82 MG/DL (ref 65–99)
POTASSIUM SERPL-SCNC: 3 MMOL/L (ref 3.5–5.2)
PROT SERPL-MCNC: 7.6 G/DL (ref 6–8.5)
SODIUM SERPL-SCNC: 140 MMOL/L (ref 136–145)

## 2025-03-18 PROCEDURE — 80053 COMPREHEN METABOLIC PANEL: CPT

## 2025-03-18 PROCEDURE — 86140 C-REACTIVE PROTEIN: CPT

## 2025-03-18 PROCEDURE — 99214 OFFICE O/P EST MOD 30 MIN: CPT | Performed by: NURSE PRACTITIONER

## 2025-03-18 PROCEDURE — 36415 COLL VENOUS BLD VENIPUNCTURE: CPT

## 2025-03-18 PROCEDURE — 85652 RBC SED RATE AUTOMATED: CPT

## 2025-03-18 PROCEDURE — 85007 BL SMEAR W/DIFF WBC COUNT: CPT

## 2025-03-18 PROCEDURE — 1159F MED LIST DOCD IN RCRD: CPT | Performed by: NURSE PRACTITIONER

## 2025-03-18 PROCEDURE — 3074F SYST BP LT 130 MM HG: CPT | Performed by: NURSE PRACTITIONER

## 2025-03-18 PROCEDURE — 1160F RVW MEDS BY RX/DR IN RCRD: CPT | Performed by: NURSE PRACTITIONER

## 2025-03-18 PROCEDURE — 85027 COMPLETE CBC AUTOMATED: CPT

## 2025-03-18 PROCEDURE — 3078F DIAST BP <80 MM HG: CPT | Performed by: NURSE PRACTITIONER

## 2025-03-18 PROCEDURE — G2211 COMPLEX E/M VISIT ADD ON: HCPCS | Performed by: NURSE PRACTITIONER

## 2025-03-18 RX ORDER — POTASSIUM CHLORIDE 1500 MG/1
TABLET, EXTENDED RELEASE ORAL
COMMUNITY
Start: 2025-01-14

## 2025-03-18 RX ORDER — GABAPENTIN 300 MG/1
CAPSULE ORAL
Qty: 360 CAPSULE | Refills: 1 | Status: SHIPPED | OUTPATIENT
Start: 2025-03-18

## 2025-03-18 NOTE — PROGRESS NOTES
Office Visit       Date: 03/18/2025   Patient Name: Angélica De La Cruz  MRN: [id number]  YOB: 1945    Referring Physician: Sussy Bloom MD     Chief Complaint:   Chief Complaint   Patient presents with    Inflammatory arthritis       History of Present Illness: Angélica De La Cruz is a 80 y.o. female who is here today for follow-up of inflammatory arthritis.  She is a former patient of Dr. Daria Vidales.  Today she reports feeling worse.  She rates her pain 5.5 out of 10, global 4.5 out of 10 and 45 minutes of morning stiffness.  She is no longer taking steroids.  She is taking gabapentin 300 mg.  She continues to work as a .  She had a non ST elevated myocardial infarction 6/2024.  She was hospitalized and then finished recovery at [facility].  She does now have a pacemaker.  She has had multiple tragic deaths in her family over the last several months.  This has been very stressful.      Subjective   Review of Systems:  She did not complete the review of symptoms page today.    Past Medical History:   Past Medical History:   Diagnosis Date    Arthritis     Asthma     Autoimmune hepatitis     CKD (chronic kidney disease)     elevated creat caused by long term use of lisinopril     Coronary artery disease     Degenerative arthropathy     Degenerative joint disease of both lower legs     Encounter for medication monitoring 3/13/2025    Fibromyalgia     Hypertension     Lung nodule seen on imaging study     Mini stroke     AMANDA treated with BiPAP     Peritonitis     Raynaud's disease     Renal insufficiency     Sepsis     Skin nodule     Tendonitis        Past Surgical History:   Past Surgical History:   Procedure Laterality Date    APPENDECTOMY      CARDIAC CATHETERIZATION N/A 11/24/2020    Procedure: Left Heart Cath;  Surgeon: Sanya Borges MD;  Location: [facility] INVASIVE LOCATION;  Service: Cardiovascular;  Laterality: N/A;    CATARACT EXTRACTION, BILATERAL  Bilateral 2018    CHOLECYSTECTOMY      COLONOSCOPY N/A 2024    Procedure: COLONOSCOPY;  Surgeon: Brunner, Mark I, MD;  Location:  [facility];  Service: Gastroenterology;  Laterality: N/A;    CORONARY ARTERY BYPASS GRAFT N/A 2020    Procedure: MEDIAN STERNOTOMY, CORONARY ARTERY BYPASS GRAFTING X2, UTILIZNG THE LEFT  INTERNAL MAMMARY ARTERY GRAFT, ENDOSCOPIC VEIN HARVESTING CONVERTED TO OPEN OF THE RIGHT GREATER SAPHENOUS VEIN;  Surgeon: Wali Spicer MD;  Location:  [facility];  Service: Cardiothoracic;  Laterality: N/A;  VO: 0807  VR: 0807      ENDOSCOPY N/A 2024    Procedure: ESOPHAGOGASTRODUODENOSCOPY;  Surgeon: Brunner, Mark I, MD;  Location:  [facility];  Service: Gastroenterology;  Laterality: N/A;    HYSTERECTOMY      with BSO    OOPHORECTOMY Bilateral     PACEMAKER IMPLANTATION Left 2024    Procedure: Implant PPM DC;  Surgeon: Leif Martini MD;  Location:  [facility]VAS[facility];  Service: Cardiovascular;  Laterality: Left;       Family History:   Family History   Problem Relation Age of Onset    Heart disease Mother     Cerebral aneurysm Father     Heart disease Brother     Heart disease Maternal Grandmother     Rheum arthritis Maternal Grandmother     Kidney failure Half-Brother     Sudden death Half-Brother     No Known Problems Half-Sister     No Known Problems Half-Sister     Heart attack Daughter     Breast cancer Neg Hx     Ovarian cancer Neg Hx        Social History:   Social History     Socioeconomic History    Marital status:     Number of children: 2   Tobacco Use    Smoking status: Former     Current packs/day: 0.00     Average packs/day: 1 pack/day for 15.0 years (15.0 ttl pk-yrs)     Types: Cigarettes     Start date:      Quit date:      Years since quittin.2    Smokeless tobacco: Never   Vaping Use    Vaping status: Never Used   Substance and Sexual Activity    Alcohol use: No    Drug use: No    Sexual activity: Defer        Medications:   Current Outpatient Medications:     acetaminophen (Tylenol 8 Hour) 650 MG 8 hr tablet, Take 1 tablet every 8 hours by oral route as needed., Disp: , Rfl:     albuterol (PROVENTIL) (2.5 MG/3ML) 0.083% nebulizer solution, Take 2.5 mg by nebulization Every 6 (Six) Hours As Needed for Wheezing or Shortness of Air., Disp: , Rfl:     apixaban (ELIQUIS) 5 MG tablet tablet, Take 1 tablet by mouth Every 12 (Twelve) Hours. Indications: Atrial Fibrillation, Disp: 60 tablet, Rfl: 6    aspirin 81 MG chewable tablet, Chew 1 tablet Daily. OTC, Disp: , Rfl:     atorvastatin (LIPITOR) 40 MG tablet, Take 1 tablet by mouth Daily., Disp: , Rfl:     Azelastine HCl 137 MCG/SPRAY solution, 1 spray by Each Nare route 2 (Two) Times a Day As Needed (Allergies)., Disp: , Rfl:     estradiol (ESTRACE) 0.1 MG/GM vaginal cream, Insert 1 gram vaginally 3 times per week. (Uses Monday, Wednesday and Friday), Disp: , Rfl:     famotidine (PEPCID) 40 MG tablet, Take 1 tablet by mouth Every Night., Disp: , Rfl:     fluticasone (FLONASE) 50 MCG/ACT nasal spray, Administer 2 sprays into the nostril(s) as directed by provider Daily As Needed for Allergies or Rhinitis., Disp: , Rfl:     furosemide (LASIX) 40 MG tablet, Take 1 tablet by mouth Daily., Disp: 30 tablet, Rfl: 6    gabapentin (NEURONTIN) 300 MG capsule, Take two capsules po BID, Disp: 360 capsule, Rfl: 1    guaiFENesin (MUCINEX) 600 MG 12 hr tablet, Take 1 tablet by mouth Daily. OTC, Disp: , Rfl:     hydroCHLOROthiazide 50 MG tablet, Take 1 tablet by mouth Daily., Disp: , Rfl:     levocetirizine (XYZAL) 5 MG tablet, Take 1 tablet by mouth Daily., Disp: , Rfl:     levothyroxine (SYNTHROID, LEVOTHROID) 100 MCG tablet, Take 1 tablet by mouth Daily., Disp: , Rfl:     Loratadine 10 MG capsule, Take  by mouth., Disp: , Rfl:     metoprolol succinate XL (TOPROL-XL) 50 MG 24 hr tablet, Take 1 tablet by mouth Daily., Disp: 30 tablet, Rfl: 6    multivitamin with minerals (MULTIVITAMIN  "ADULT PO), Take 1 tablet by mouth Daily., Disp: , Rfl:     olmesartan (BENICAR) 20 MG tablet, Take 1 tablet by mouth Daily., Disp: , Rfl:     omeprazole (priLOSEC) 20 MG capsule, Take 1 capsule by mouth., Disp: , Rfl:     pantoprazole (PROTONIX) 40 MG EC tablet, Take 1 tablet by mouth Daily., Disp: , Rfl:     Pediatric Multiple Vitamins (FLINSTONES GUMMIES OMEGA-3 DHA PO), Take  by mouth., Disp: , Rfl:     Pediatric Multivitamins-Iron (CENTRUM JR/IRON PO), Take 1 tablet by mouth Daily., Disp: , Rfl:     potassium chloride (KLOR-CON M10) 10 MEQ CR tablet, Take 1 tablet by mouth Daily., Disp: , Rfl:     potassium chloride ER (K-TAB) 20 MEQ tablet controlled-release ER tablet, Take 1 tablet every day by oral route for 3 days., Disp: , Rfl:     sertraline (ZOLOFT) 50 MG tablet, Take 1 tablet by mouth Daily., Disp: , Rfl:     Allergies:   Allergies   Allergen Reactions    Cefaclor Other (See Comments)     unknown    Sulfa Antibiotics Anaphylaxis    Adhesive Tape Itching and Rash    Codeine Delirium    Morphine And Codeine Delirium    Doxycycline Rash    Erythromycin Rash    Levofloxacin Rash    Oxycodone-Acetaminophen Itching    Oxytetracycline Rash    Penicillins Rash     Tolerates cefuroxime       I have reviewed and updated the patient's chief complaint, history of present illness, review of systems, past medical history, surgical history, family history, social history, medications and allergy list as appropriate.     Objective    Vital Signs:   Vitals:    03/18/25 1512   BP: 110/70   BP Location: Right arm   Pulse: 60   Temp: 97.8 °F (36.6 °C)   Weight: 77.3 kg (170 lb 8 oz)   Height: 162.6 cm (64.02\")   PainSc: 5    PainLoc: Knee  Comment: Knees     Body mass index is 29.25 kg/m².   Defer to PCP       Physical Exam:  Physical Exam  Vitals reviewed.   Constitutional:       Appearance: Normal appearance.   HENT:      Head: Normocephalic and atraumatic.      Mouth/Throat:      Mouth: Mucous membranes are moist. "   Eyes:      Conjunctiva/sclera: Conjunctivae normal.   Cardiovascular:      Rate and Rhythm: Normal rate and regular rhythm.      Pulses: Normal pulses.      Heart sounds: Normal heart sounds.   Pulmonary:      Effort: Pulmonary effort is normal.      Breath sounds: Normal breath sounds.   Musculoskeletal:         General: Normal range of motion.      Cervical back: Normal range of motion and neck supple.      Comments: Tender left CMC  No synovitis  OA changes bilateral hands  Crepitus bilateral knees with mild soft tissue swelling of medial left knee.   Skin:     General: Skin is warm and dry.   Neurological:      General: No focal deficit present.      Mental Status: She is alert and oriented to person, place, and time. Mental status is at baseline.   Psychiatric:         Mood and Affect: Mood normal.         Behavior: Behavior normal.         Thought Content: Thought content normal.         Judgment: Judgment normal.          Results Review:   Imaging Results (Last 24 Hours)       ** No results found for the last 24 hours. **            Procedures    Assessment / Plan    Assessment/Plan:   Diagnoses and all orders for this visit:    1. Inflammatory arthritis (Primary)  Assessment & Plan:   sl + RF/ + FABIOLA/ NEG CCP/ NEG DNA; hxL xrays c/w djd; no nsaids due to renal; 8/16 sepsis--no dmard rec;  Previous meds:  plaquenil (made her feel weak, dizzy); No SSZ - sulfa allergy.  Currently off steroids.  Chronic L knee pain- improved with injections - needs TKR Dr. Lane   C/o popping in the neck with discomfort. Mild degenerative changes on XR. MRI pending.  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  Pain in her dips, pips, and Heberden's nodes currently.  Hand xrays 3/2024 showed degenerative changes    Continue gabapentin.  Not currently on DMARD or biologic therapy.  Tylenol Arthritis 2 twice daily.  She has had a recent steroid injection of her left knee.  She did not sleep until the next morning  and felt hyped up.  She did have relief with knee swelling.  Comfort cool splint for thumbs from [email or web address]    Orders:  -     CBC With Manual Differential; Future  -     Comprehensive Metabolic Panel; Future  -     C-reactive Protein; Future  -     Sedimentation Rate; Future    2. Fibromyalgia  Assessment & Plan:  Chronic mm pain  She did have a cervical spine ablation in remote past with Dr. Rodríguez.  Parth alfred and behzad.  Better since stress is decreased.   (169) in 2/19- Cholesterol medication which she DC'd secondary to muscle pain and weakness.  She does flare depending on the stress she experiences.    Four Cornerstones of treatment include  1- Treat Sleep- restoration.  2- Exercise- chair aerobics or chair yoga; Water aerobics.  3- Treat anxiety and depression. Continue Zoloft.  4- Treat pain. Non addictive muscle relaxer. Biofreeze, Massage , Heat , ICE.      Tylenol Arthritis 2 twice daily - [employer] generic or BRAND.  CRP, ESR, CK and aldolase.       3. P-ANCA titer positive  Assessment & Plan:  Positive in the past on [facility] 2/23 9/23 PANCA >1:640,  anti pr3 1(0.9), anti mpo 2(0.9)  No tissue diagnosis or other dx of any kind of vasculitis.  3/2025: P anca > 1:640, anti-MPO 1.3    She has followed with pulmonologist.  Reviewed notes from 7/2024 pulmonology.  Issues with CKD. Would like for her to f/u with nephrology.      4. Encounter for medication monitoring  Assessment & Plan:  Gabapentin    Controlled substance agreement reviewed and signed 3/18/2025  PDMP reviewed  UDS ordered 3/18/2025  Low risk for abuse or diversion    Orders:  -     Urine Drug Screen - Urine, Clean Catch; Future  -     Gabapentin, Urine -; Future    5. Neuropathic pain  -     gabapentin (NEURONTIN) 300 MG capsule; Take two capsules po BID  Dispense: 360 capsule; Refill: 1    6. Arthropathies in other specified diseases classified elsewhere, unspecified site  -     CBC With Manual Differential; Future        Follow Up:   Return  4-6 months, for Dr. Moeller.        MARCO Patel  [facility]

## 2025-03-19 LAB
ANISOCYTOSIS BLD QL: ABNORMAL
BASOPHILS # BLD MANUAL: 0.12 10*3/MM3 (ref 0–0.2)
BASOPHILS NFR BLD MANUAL: 1 % (ref 0–1.5)
DEPRECATED RDW RBC AUTO: 46.7 FL (ref 37–54)
EOSINOPHIL # BLD MANUAL: 1.43 10*3/MM3 (ref 0–0.4)
EOSINOPHIL NFR BLD MANUAL: 12.4 % (ref 0.3–6.2)
ERYTHROCYTE [DISTWIDTH] IN BLOOD BY AUTOMATED COUNT: 16.5 % (ref 12.3–15.4)
ERYTHROCYTE [SEDIMENTATION RATE] IN BLOOD: 52 MM/HR (ref 0–30)
HCT VFR BLD AUTO: 37.6 % (ref 34–46.6)
HGB BLD-MCNC: 10.9 G/DL (ref 12–15.9)
LYMPHOCYTES # BLD MANUAL: 1.08 10*3/MM3 (ref 0.7–3.1)
LYMPHOCYTES NFR BLD MANUAL: 4.1 % (ref 5–12)
MACROCYTES BLD QL SMEAR: ABNORMAL
MCH RBC QN AUTO: 22.5 PG (ref 26.6–33)
MCHC RBC AUTO-ENTMCNC: 29 G/DL (ref 31.5–35.7)
MCV RBC AUTO: 77.7 FL (ref 79–97)
MONOCYTES # BLD: 0.47 10*3/MM3 (ref 0.1–0.9)
NEUTROPHILS # BLD AUTO: 8.46 10*3/MM3 (ref 1.7–7)
NEUTROPHILS NFR BLD MANUAL: 73.2 % (ref 42.7–76)
PLAT MORPH BLD: NORMAL
PLATELET # BLD AUTO: 308 10*3/MM3 (ref 140–450)
PMV BLD AUTO: 9.6 FL (ref 6–12)
POIKILOCYTOSIS BLD QL SMEAR: ABNORMAL
POLYCHROMASIA BLD QL SMEAR: ABNORMAL
RBC # BLD AUTO: 4.84 10*6/MM3 (ref 3.77–5.28)
VARIANT LYMPHS NFR BLD MANUAL: 9.3 % (ref 19.6–45.3)
WBC MORPH BLD: NORMAL
WBC NRBC COR # BLD AUTO: 11.56 10*3/MM3 (ref 3.4–10.8)

## 2025-04-09 ENCOUNTER — OFFICE VISIT (OUTPATIENT)
Age: 80
End: 2025-04-09
Payer: MEDICARE

## 2025-04-09 VITALS
HEIGHT: 64 IN | OXYGEN SATURATION: 96 % | TEMPERATURE: 98.4 F | DIASTOLIC BLOOD PRESSURE: 58 MMHG | SYSTOLIC BLOOD PRESSURE: 108 MMHG | HEART RATE: 65 BPM | WEIGHT: 177 LBS | BODY MASS INDEX: 30.22 KG/M2

## 2025-04-09 DIAGNOSIS — J45.20 MILD INTERMITTENT ASTHMA WITHOUT COMPLICATION: Primary | ICD-10-CM

## 2025-04-09 DIAGNOSIS — G47.33 OSA (OBSTRUCTIVE SLEEP APNEA): ICD-10-CM

## 2025-04-09 DIAGNOSIS — J30.89 NON-SEASONAL ALLERGIC RHINITIS, UNSPECIFIED TRIGGER: ICD-10-CM

## 2025-04-09 DIAGNOSIS — R05.3 CHRONIC COUGH: ICD-10-CM

## 2025-04-09 RX ORDER — FLUTICASONE FUROATE, UMECLIDINIUM BROMIDE AND VILANTEROL TRIFENATATE 100; 62.5; 25 UG/1; UG/1; UG/1
1 POWDER RESPIRATORY (INHALATION)
Qty: 1 EACH | Refills: 0 | COMMUNITY
Start: 2025-04-09

## 2025-04-09 RX ORDER — LEVOTHYROXINE SODIUM 88 UG/1
88 TABLET ORAL
COMMUNITY

## 2025-04-09 RX ORDER — ALBUTEROL SULFATE 90 UG/1
2 INHALANT RESPIRATORY (INHALATION) EVERY 4 HOURS PRN
Qty: 6.7 G | Refills: 5 | Status: SHIPPED | OUTPATIENT
Start: 2025-04-09

## 2025-04-10 NOTE — PROGRESS NOTES
[facility] Pulmonary Follow up    CHIEF COMPLAINT    cough    HISTORY OF PRESENT ILLNESS    Angélica De La Cruz is a 80 y.o.female here today for follow-up.  She was last seen in the office by me in July.  She has been followed in this office for chronic cough.    She been hospitalized in June at [facility] for chest pain she was found to have a non-STEMI type II and was also in A-fib and a pacemaker was placed in June.  It was felt that this was precipitated by her anemia with a hemoglobin of 7.7.  She received 2 units of blood while hospitalized.  She continues to receive iron tablets and is following closely with her PCP.  Her last hemoglobin was 10.9.    She is currently using her albuterol occasionally.  She does not use it that often.  She does complain of a cough occasionally.    I treated her with antibiotics and a steroid pack in January for bronchitis.  She states her symptoms did improve.    She continues to have difficulty with her sinuses.  She does take allergy medicine regularly.    She had been on oxygen in the past but since she received blood transfusion she has not needed the oxygen that often.    She denies any chest pain or palpitations.  She denies any lower extremity edema or calf tenderness.    She continues to wear CPAP nightly with 1 L bled in the machine.  She denies any sleeping difficulties.    She quit smoking 51 years ago.    She is accompanied today by her .    Patient Active Problem List   Diagnosis    Acute febrile illness    Abnormal stress test    CABG 11/25/20    Hypertension    CKD (chronic kidney disease)    Atrial fibrillation    Chronic cough    Non-seasonal allergic rhinitis    AMANDA (obstructive sleep apnea)    CAD (coronary artery disease) s/p CABG    Carotid stenosis, asymptomatic, right    A-fib    Symptomatic anemia    NSTEMI (non-ST elevated myocardial infarction)    Hypothyroidism (acquired)    Iron deficiency anemia due to chronic blood loss     Fibromyalgia    Inflammatory arthritis    P-ANCA titer positive    Encounter for medication monitoring       Allergies   Allergen Reactions    Cefaclor Other (See Comments)     unknown    Sulfa Antibiotics Anaphylaxis    Adhesive Tape Itching and Rash    Codeine Delirium    Morphine And Codeine Delirium    Doxycycline Rash    Erythromycin Rash    Levofloxacin Rash    Oxycodone-Acetaminophen Itching    Oxytetracycline Rash    Penicillins Rash     Tolerates cefuroxime       Current Outpatient Medications:     acetaminophen (Tylenol 8 Hour) 650 MG 8 hr tablet, Take 1 tablet every 8 hours by oral route as needed., Disp: , Rfl:     albuterol (PROVENTIL) (2.5 MG/3ML) 0.083% nebulizer solution, Take 2.5 mg by nebulization Every 6 (Six) Hours As Needed for Wheezing or Shortness of Air., Disp: , Rfl:     apixaban (ELIQUIS) 5 MG tablet tablet, Take 1 tablet by mouth Every 12 (Twelve) Hours. Indications: Atrial Fibrillation, Disp: 60 tablet, Rfl: 6    aspirin 81 MG chewable tablet, Chew 1 tablet Daily. OTC, Disp: , Rfl:     atorvastatin (LIPITOR) 40 MG tablet, Take 1 tablet by mouth Daily., Disp: , Rfl:     Azelastine HCl 137 MCG/SPRAY solution, 1 spray by Each Nare route 2 (Two) Times a Day As Needed (Allergies)., Disp: , Rfl:     estradiol (ESTRACE) 0.1 MG/GM vaginal cream, Insert 1 gram vaginally 3 times per week. (Uses Monday, Wednesday and Friday), Disp: , Rfl:     famotidine (PEPCID) 40 MG tablet, Take 1 tablet by mouth Every Night., Disp: , Rfl:     fluticasone (FLONASE) 50 MCG/ACT nasal spray, Administer 2 sprays into the nostril(s) as directed by provider Daily As Needed for Allergies or Rhinitis., Disp: , Rfl:     furosemide (LASIX) 40 MG tablet, Take 1 tablet by mouth Daily., Disp: 30 tablet, Rfl: 6    gabapentin (NEURONTIN) 300 MG capsule, Take two capsules po BID, Disp: 360 capsule, Rfl: 1    guaiFENesin (MUCINEX) 600 MG 12 hr tablet, Take 1 tablet by mouth Daily. OTC, Disp: , Rfl:     hydroCHLOROthiazide 50 MG  tablet, Take 1 tablet by mouth Daily., Disp: , Rfl:     levocetirizine (XYZAL) 5 MG tablet, Take 1 tablet by mouth Daily., Disp: , Rfl:     levothyroxine (SYNTHROID, LEVOTHROID) 100 MCG tablet, Take 88 mcg by mouth Daily., Disp: , Rfl:     levothyroxine (SYNTHROID, LEVOTHROID) 88 MCG tablet, Take 1 tablet by mouth Every Morning., Disp: , Rfl:     Loratadine 10 MG capsule, Take  by mouth., Disp: , Rfl:     metoprolol succinate XL (TOPROL-XL) 50 MG 24 hr tablet, Take 1 tablet by mouth Daily., Disp: 30 tablet, Rfl: 6    multivitamin with minerals (MULTIVITAMIN ADULT PO), Take 1 tablet by mouth Daily., Disp: , Rfl:     olmesartan (BENICAR) 20 MG tablet, Take 1 tablet by mouth Daily., Disp: , Rfl:     omeprazole (priLOSEC) 20 MG capsule, Take 1 capsule by mouth., Disp: , Rfl:     pantoprazole (PROTONIX) 40 MG EC tablet, Take 1 tablet by mouth Daily., Disp: , Rfl:     Pediatric Multiple Vitamins (FLINSTONES GUMMIES OMEGA-3 DHA PO), Take  by mouth., Disp: , Rfl:     Pediatric Multivitamins-Iron (CENTRUM JR/IRON PO), Take 1 tablet by mouth Daily., Disp: , Rfl:     potassium chloride (KLOR-CON M10) 10 MEQ CR tablet, Take 1 tablet by mouth Daily., Disp: , Rfl:     potassium chloride ER (K-TAB) 20 MEQ tablet controlled-release ER tablet, Take 1 tablet every day by oral route for 3 days., Disp: , Rfl:     sertraline (ZOLOFT) 50 MG tablet, Take 1 tablet by mouth Daily., Disp: , Rfl:     albuterol sulfate  (90 Base) MCG/ACT inhaler, Inhale 2 puffs Every 4 (Four) Hours As Needed for Wheezing., Disp: 6.7 g, Rfl: 5    Fluticasone-Umeclidin-Vilant (Trelegy Ellipta) 100-62.5-25 MCG/ACT inhaler, Inhale 1 puff Daily., Disp: 1 each, Rfl: 0  MEDICATION LIST AND ALLERGIES REVIEWED.    Social History     Tobacco Use    Smoking status: Former     Current packs/day: 0.00     Average packs/day: 1 pack/day for 15.0 years (15.0 ttl pk-yrs)     Types: Cigarettes     Start date:      Quit date:      Years since quittin.3     "Smokeless tobacco: Never   Vaping Use    Vaping status: Never Used   Substance Use Topics    Alcohol use: No    Drug use: No       FAMILY AND SOCIAL HISTORY REVIEWED.    Review of Systems   Constitutional:  Negative for activity change, appetite change, fatigue, fever and unexpected weight change.   HENT:  Negative for congestion, postnasal drip, rhinorrhea, sinus pressure, sore throat and voice change.    Eyes:  Negative for visual disturbance.   Respiratory:  Positive for shortness of breath. Negative for cough, chest tightness and wheezing.    Cardiovascular:  Negative for chest pain, palpitations and leg swelling.   Gastrointestinal:  Negative for abdominal distention, abdominal pain, nausea and vomiting.   Endocrine: Negative for cold intolerance and heat intolerance.   Genitourinary:  Negative for difficulty urinating and urgency.   Musculoskeletal:  Negative for arthralgias, back pain and neck pain.   Skin:  Negative for color change and pallor.   Allergic/Immunologic: Negative for environmental allergies and food allergies.   Neurological:  Negative for dizziness, syncope, weakness and light-headedness.   Hematological:  Negative for adenopathy. Does not bruise/bleed easily.   Psychiatric/Behavioral:  Negative for agitation and behavioral problems.    .    /58   Pulse 65   Temp 98.4 °F (36.9 °C)   Ht 162.6 cm (64.02\")   Wt 80.3 kg (177 lb)   SpO2 96% Comment: Room air at rest  BMI 30.36 kg/m²     Immunization History   Administered Date(s) Administered    Arexvy (RSV, Adults 60+ yrs) 01/13/2025    COVID-19 (MODERNA) 12YRS+ (SPIKEVAX) 11/08/2023    COVID-19 (PFIZER) Purple Cap Monovalent 02/26/2021, 03/20/2021, 10/21/2021    Fluzone High-Dose 65+YRS 09/10/2018, 09/30/2019, 01/13/2025    Fluzone High-Dose 65+yrs 10/29/2021, 10/10/2022, 09/18/2023    Fluzone Quad >6mos (Multi-dose) 09/30/2017, 09/29/2020, 10/21/2021    Hepatitis A 12/02/2018, 09/18/2023    INFLUENZA SPLIT TRI 10/29/2021    " Pneumococcal Conjugate 13-Valent (PCV13) 04/17/2017    Pneumococcal Conjugate 20-Valent (PCV20) 09/18/2023    Pneumococcal Polysaccharide (PPSV23) 04/28/2009    TD Preservative Free (Tenivac) 07/01/2019    Zostavax 10/07/2016       Physical Exam  Vitals and nursing note reviewed.   Constitutional:       Appearance: She is well-developed. She is not diaphoretic.   HENT:      Head: Normocephalic and atraumatic.   Eyes:      Pupils: Pupils are equal, round, and reactive to light.   Neck:      Thyroid: No thyromegaly.   Cardiovascular:      Rate and Rhythm: Normal rate and regular rhythm.      Heart sounds: Normal heart sounds. No murmur heard.     No friction rub. No gallop.   Pulmonary:      Effort: Pulmonary effort is normal. No respiratory distress.      Breath sounds: Normal breath sounds. No wheezing or rales.   Chest:      Chest wall: No tenderness.   Abdominal:      General: Bowel sounds are normal.      Palpations: Abdomen is soft.      Tenderness: There is no abdominal tenderness.   Musculoskeletal:         General: Normal range of motion.      Cervical back: Normal range of motion and neck supple.   Lymphadenopathy:      Cervical: No cervical adenopathy.   Skin:     General: Skin is warm and dry.      Capillary Refill: Capillary refill takes less than 2 seconds.   Neurological:      Mental Status: She is alert and oriented to person, place, and time.   Psychiatric:         Behavior: Behavior normal.         RESULTS    PROBLEM LIST    Problem List Items Addressed This Visit          Allergies and Adverse Reactions    Non-seasonal allergic rhinitis       Pulmonary and Pneumonias    Chronic cough       Sleep    AMANDA (obstructive sleep apnea)     Other Visit Diagnoses         Mild intermittent asthma without complication    -  Primary    Relevant Medications    albuterol sulfate  (90 Base) MCG/ACT inhaler    Fluticasone-Umeclidin-Vilant (Trelegy Ellipta) 100-62.5-25 MCG/ACT inhaler               DISCUSSION    Ms. De La Cruz was here for follow-up.  Seems to doing okay from a pulmonary standpoint.  She does continue to have a cough.  I do feel like it is probably related to her allergies.  Interestingly we did review her lab work her CBC and her eosinophils have been elevated for the last 6 months.  She could possibly have eosinophilic asthma.    I am going to give her a Trelegy sample to try for at least 2 weeks and if she notices improvement in her breathing we will continue this for 3 months.  She may qualify for a biologic but has to be on a LABA for at least 3 months for insurance to qualify.    Will repeat PFTs at her next appointment and complete a chest x-ray.  We will also do more blood work at her next appointment if she has continual coughing and allergy symptoms.    She will continue to wear CPAP nightly for AMANDA.  She is using oxygen bled in the machine as well.    We will have her follow-up in 3 months with PFTs and a chest x-ray.    I personally spent a total of 33 minutes on patient visit today including chart review, face to face with the patient obtaining the history and physical exam, review of pertinent images and tests, counseling and discussion and/or coordination of care as described above, and documentation.  Total time excludes time spent on other separate services such as performing procedures or test interpretation, if applicable.        Taylor Carrion, MARCO  04/09/202508:07 EDT  Electronically signed     Please note that portions of this note were completed with a voice recognition program.        CC: Sussy Bloom MD

## 2025-07-09 ENCOUNTER — OFFICE VISIT (OUTPATIENT)
Age: 80
End: 2025-07-09
Payer: MEDICARE

## 2025-07-09 VITALS
TEMPERATURE: 97.8 F | DIASTOLIC BLOOD PRESSURE: 68 MMHG | SYSTOLIC BLOOD PRESSURE: 118 MMHG | WEIGHT: 188.2 LBS | BODY MASS INDEX: 32.13 KG/M2 | HEIGHT: 64 IN

## 2025-07-09 DIAGNOSIS — J30.89 NON-SEASONAL ALLERGIC RHINITIS, UNSPECIFIED TRIGGER: ICD-10-CM

## 2025-07-09 DIAGNOSIS — G47.33 OSA (OBSTRUCTIVE SLEEP APNEA): ICD-10-CM

## 2025-07-09 DIAGNOSIS — J45.20 MILD INTERMITTENT ASTHMA WITHOUT COMPLICATION: Primary | ICD-10-CM

## 2025-07-09 DIAGNOSIS — R05.3 CHRONIC COUGH: ICD-10-CM

## 2025-07-09 RX ORDER — MIRABEGRON 25 MG/1
25 TABLET, FILM COATED, EXTENDED RELEASE ORAL
COMMUNITY

## 2025-07-09 RX ORDER — ALBUTEROL SULFATE 90 UG/1
4 INHALANT RESPIRATORY (INHALATION) ONCE
Status: COMPLETED | OUTPATIENT
Start: 2025-07-09 | End: 2025-07-09

## 2025-07-09 RX ADMIN — ALBUTEROL SULFATE 4 PUFF: 90 INHALANT RESPIRATORY (INHALATION) at 15:15

## 2025-07-11 NOTE — PROGRESS NOTES
[facility]    CHIEF COMPLAINT    Dyspnea with heavy exertion    HISTORY OF PRESENT ILLNESS    Angélica De La Cruz is a 80 y.o.female here today for follow-up.  She was last seen in the office by me in April.  She denies any respiratory illnesses since her last appointment.    She has been followed in this office for chronic cough.    She had tried the Trelegy inhaler at her last appointment but did not feel like it helped any so she is using the albuterol alone.  She uses it occasionally.    She denies any sputum production or hemoptysis.  Denies any fever, chills or night sweats.    She does have mild shortness of breath with heavy exertion.  She does have to recover  with breaks.    She denies any chest pain or palpitations.  She denies any lower extremity edema or calf tenderness.    She continues to use BiPAP with 1 L bled in the machine.  She denies any sleeping difficulties.    She quit smoking 51 years ago.    She is companied today by her .  Patient Active Problem List   Diagnosis    Acute febrile illness    Abnormal stress test    CABG 11/25/20    Hypertension    CKD (chronic kidney disease)    Atrial fibrillation    Chronic cough    Non-seasonal allergic rhinitis    AMANDA (obstructive sleep apnea)    CAD (coronary artery disease) s/p CABG    Carotid stenosis, asymptomatic, right    A-fib    Symptomatic anemia    NSTEMI (non-ST elevated myocardial infarction)    Hypothyroidism (acquired)    Iron deficiency anemia due to chronic blood loss    Fibromyalgia    Inflammatory arthritis    P-ANCA titer positive    Encounter for medication monitoring       Allergies   Allergen Reactions    Cefaclor Other (See Comments)     unknown    Sulfa Antibiotics Anaphylaxis    Adhesive Tape Itching and Rash    Codeine Delirium    Morphine And Codeine Delirium    Doxycycline Rash    Erythromycin Rash    Levofloxacin Rash    Oxycodone-Acetaminophen Itching    Oxytetracycline Rash    Penicillins Rash      Tolerates cefuroxime       Current Outpatient Medications:     acetaminophen (Tylenol 8 Hour) 650 MG 8 hr tablet, Take 1 tablet every 8 hours by oral route as needed., Disp: , Rfl:     albuterol (PROVENTIL) (2.5 MG/3ML) 0.083% nebulizer solution, Take 2.5 mg by nebulization Every 6 (Six) Hours As Needed for Wheezing or Shortness of Air., Disp: , Rfl:     albuterol sulfate  (90 Base) MCG/ACT inhaler, Inhale 2 puffs Every 4 (Four) Hours As Needed for Wheezing., Disp: 6.7 g, Rfl: 5    apixaban (ELIQUIS) 5 MG tablet tablet, Take 1 tablet by mouth Every 12 (Twelve) Hours. Indications: Atrial Fibrillation, Disp: 60 tablet, Rfl: 6    aspirin 81 MG chewable tablet, Chew 1 tablet Daily. OTC, Disp: , Rfl:     atorvastatin (LIPITOR) 40 MG tablet, Take 1 tablet by mouth Daily., Disp: , Rfl:     Azelastine HCl 137 MCG/SPRAY solution, 1 spray by Each Nare route 2 (Two) Times a Day As Needed (Allergies)., Disp: , Rfl:     estradiol (ESTRACE) 0.1 MG/GM vaginal cream, Insert 1 gram vaginally 3 times per week. (Uses Monday, Wednesday and Friday), Disp: , Rfl:     famotidine (PEPCID) 40 MG tablet, Take 1 tablet by mouth Every Night., Disp: , Rfl:     fluticasone (FLONASE) 50 MCG/ACT nasal spray, Administer 2 sprays into the nostril(s) as directed by provider Daily As Needed for Allergies or Rhinitis., Disp: , Rfl:     furosemide (LASIX) 40 MG tablet, Take 1 tablet by mouth Daily., Disp: 30 tablet, Rfl: 6    gabapentin (NEURONTIN) 300 MG capsule, Take two capsules po BID, Disp: 360 capsule, Rfl: 1    guaiFENesin (MUCINEX) 600 MG 12 hr tablet, Take 1 tablet by mouth Daily. OTC, Disp: , Rfl:     hydroCHLOROthiazide 50 MG tablet, Take 1 tablet by mouth Daily., Disp: , Rfl:     levocetirizine (XYZAL) 5 MG tablet, Take 1 tablet by mouth Daily., Disp: , Rfl:     levothyroxine (SYNTHROID, LEVOTHROID) 88 MCG tablet, Take 1 tablet by mouth Every Morning., Disp: , Rfl:     Loratadine 10 MG capsule, Take  by mouth., Disp: , Rfl:      metoprolol succinate XL (TOPROL-XL) 50 MG 24 hr tablet, Take 1 tablet by mouth Daily., Disp: 30 tablet, Rfl: 6    Mirabegron ER (MYRBETRIQ) 25 MG tablet sustained-release 24 hour 24 hr tablet, 1 tablet., Disp: , Rfl:     multivitamin with minerals (MULTIVITAMIN ADULT PO), Take 1 tablet by mouth Daily., Disp: , Rfl:     olmesartan (BENICAR) 20 MG tablet, Take 1 tablet by mouth Daily., Disp: , Rfl:     omeprazole (priLOSEC) 20 MG capsule, Take 1 capsule by mouth., Disp: , Rfl:     pantoprazole (PROTONIX) 40 MG EC tablet, Take 1 tablet by mouth Daily., Disp: , Rfl:     Pediatric Multiple Vitamins (FLINSTONES GUMMIES OMEGA-3 DHA PO), Take  by mouth., Disp: , Rfl:     Pediatric Multivitamins-Iron (CENTRUM JR/IRON PO), Take 1 tablet by mouth Daily., Disp: , Rfl:     potassium chloride (KLOR-CON M10) 10 MEQ CR tablet, Take 1 tablet by mouth Daily., Disp: , Rfl:     potassium chloride ER (K-TAB) 20 MEQ tablet controlled-release ER tablet, Take 1 tablet every day by oral route for 3 days., Disp: , Rfl:     sertraline (ZOLOFT) 50 MG tablet, Take 1 tablet by mouth Daily., Disp: , Rfl:   MEDICATION LIST AND ALLERGIES REVIEWED.    Social History     Tobacco Use    Smoking status: Former     Current packs/day: 0.00     Average packs/day: 1 pack/day for 15.0 years (15.0 ttl pk-yrs)     Types: Cigarettes     Start date:      Quit date:      Years since quittin.5    Smokeless tobacco: Never   Vaping Use    Vaping status: Never Used   Substance Use Topics    Alcohol use: No    Drug use: No       FAMILY AND SOCIAL HISTORY REVIEWED.    Review of Systems   Constitutional:  Negative for activity change, appetite change, fatigue, fever and unexpected weight change.   HENT:  Negative for congestion, postnasal drip, rhinorrhea, sinus pressure, sore throat and voice change.    Eyes:  Negative for visual disturbance.   Respiratory:  Positive for cough and shortness of breath. Negative for chest tightness and wheezing.   "  Cardiovascular:  Negative for chest pain, palpitations and leg swelling.   Gastrointestinal:  Negative for abdominal distention, abdominal pain, nausea and vomiting.   Endocrine: Negative for cold intolerance and heat intolerance.   Genitourinary:  Negative for difficulty urinating and urgency.   Musculoskeletal:  Negative for arthralgias, back pain and neck pain.   Skin:  Negative for color change and pallor.   Allergic/Immunologic: Negative for environmental allergies and food allergies.   Neurological:  Negative for dizziness, syncope, weakness and light-headedness.   Hematological:  Negative for adenopathy. Does not bruise/bleed easily.   Psychiatric/Behavioral:  Negative for agitation and behavioral problems.    .    /68 (BP Location: Right arm, Patient Position: Sitting, Cuff Size: Adult)   Temp 97.8 °F (36.6 °C) (Temporal)   Ht 162.6 cm (64.02\")   Wt 85.4 kg (188 lb 3.2 oz)   BMI 32.29 kg/m²     Immunization History   Administered Date(s) Administered    Arexvy (RSV, Adults 60+ yrs) 01/13/2025    COVID-19 (MODERNA) 12YRS+ (SPIKEVAX) 11/08/2023    COVID-19 (PFIZER) Purple Cap Monovalent 02/26/2021, 03/20/2021, 10/21/2021    Fluzone High-Dose 65+YRS 09/10/2018, 09/30/2019, 01/13/2025    Fluzone High-Dose 65+yrs 10/29/2021, 10/10/2022, 09/18/2023    Fluzone Quad >6mos (Multi-dose) 09/30/2017, 09/29/2020, 10/21/2021    Hepatitis A 12/02/2018, 09/18/2023    INFLUENZA SPLIT TRI 10/29/2021    Pneumococcal Conjugate 13-Valent (PCV13) 04/17/2017    Pneumococcal Conjugate 20-Valent (PCV20) 09/18/2023    Pneumococcal Polysaccharide (PPSV23) 04/28/2009    TD Preservative Free (Tenivac) 07/01/2019    Zostavax 10/07/2016       Physical Exam  Vitals and nursing note reviewed.   Constitutional:       Appearance: She is well-developed. She is not diaphoretic.   HENT:      Head: Normocephalic and atraumatic.   Eyes:      Pupils: Pupils are equal, round, and reactive to light.   Neck:      Thyroid: No thyromegaly. "   Cardiovascular:      Rate and Rhythm: Normal rate and regular rhythm.      Heart sounds: Normal heart sounds. No murmur heard.     No friction rub. No gallop.   Pulmonary:      Effort: Pulmonary effort is normal. No respiratory distress.      Breath sounds: Normal breath sounds. No wheezing or rales.   Chest:      Chest wall: No tenderness.   Abdominal:      General: Bowel sounds are normal.      Palpations: Abdomen is soft.      Tenderness: There is no abdominal tenderness.   Musculoskeletal:         General: No swelling. Normal range of motion.      Cervical back: Normal range of motion and neck supple.   Lymphadenopathy:      Cervical: No cervical adenopathy.   Skin:     General: Skin is warm and dry.      Capillary Refill: Capillary refill takes less than 2 seconds.   Neurological:      Mental Status: She is alert and oriented to person, place, and time.   Psychiatric:         Mood and Affect: Mood normal.         Behavior: Behavior normal.           RESULTS    Spirometry Interpretation: FVC 2.39 100% predicted, FEV1 1.58 87% predicted, FEV1/FVC 66% predicted, TLC 5.10 109% predicted, DLCO 54% predicted, mild obstruction with no postbronchodilator response, no restriction and reduced DLCO.    BiPAP download: Patient is 98% compliant, average use is 7 hours 37 minutes, she is on a spontaneous BiPAP of IPAP 10, EPAP 6, average AHI is 1.4.    PROBLEM LIST    Problem List Items Addressed This Visit          Allergies and Adverse Reactions    Non-seasonal allergic rhinitis       Pulmonary and Pneumonias    Chronic cough       Sleep    AMANDA (obstructive sleep apnea)     Other Visit Diagnoses         Mild intermittent asthma without complication    -  Primary    Relevant Medications    albuterol sulfate HFA (PROVENTIL HFA;VENTOLIN HFA;PROAIR HFA) inhaler 4 puff (Completed)    Other Relevant Orders    Complete PFT - Pre & Post Bronchodilator (Completed)              DISCUSSION    Ms. De La Cruz was here for follow-up.   Seems been doing okay from a pulmonary standpoint.  She will continue to use the albuterol as needed for shortness of breath or wheezing.  I did review her PFTs and she continues have a mild obstruction.  She tried the Trelegy but did not notice an improvement so she stopped using it.    She will continue wear CPAP nightly.  She denies any sleeping difficulties.  I reviewed her download and she is compliant.    I did advise her to continue taking her allergy medicine.  I do suspect that some of her cough is related to postnasal drainage.    We will schedule follow-up in 3 months.    I personally spent a total of 31 minutes on patient visit today including chart review, face to face with the patient obtaining the history and physical exam, review of pertinent images and tests, counseling and discussion and/or coordination of care as described above, and documentation.  Total time excludes time spent on other separate services such as performing procedures or test interpretation, if applicable.        Taylor Carrion, MARCO  07/09/202510:23 EDT  Electronically signed     Please note that portions of this note were completed with a voice recognition program.        CC: Sussy Bloom MD

## (undated) DEVICE — ANTIBACTERIAL UNDYED BRAIDED (POLYGLACTIN 910), SYNTHETIC ABSORBABLE SUTURE: Brand: COATED VICRYL

## (undated) DEVICE — TEMP PACING WIRE: Brand: MYO/WIRE

## (undated) DEVICE — SKIN PREP TRAY W/CHG: Brand: MEDLINE INDUSTRIES, INC.

## (undated) DEVICE — LEVEL SENSORS PADS ARE USED TO ATTACH THE LEVEL SENSORS TO A HARD SHELL RESERVOIR. INCLUDES COUPLING GEL.: Brand: TERUMO® ADVANCED PERFUSION SYSTEM 1

## (undated) DEVICE — TTL1LYR 16FR10ML 100%SIL TMPST TR: Brand: MEDLINE

## (undated) DEVICE — EZ GLIDE AORTIC CANNULA: Brand: EDWARDS LIFESCIENCES EZ GLIDE AORTIC CANNULA

## (undated) DEVICE — GRADUATE CONTN 1000ML

## (undated) DEVICE — GLV SURG DERMASSURE GRN LF PF 7.0

## (undated) DEVICE — SET PRIMARY GRVTY 10DP MALE LL 104IN

## (undated) DEVICE — GLV SURG SENSICARE PI MIC PF SZ6 LF STRL

## (undated) DEVICE — CVR HNDL LIGHT RIGID

## (undated) DEVICE — GLV SURG SENSICARE PI MIC PF SZ7 LF STRL

## (undated) DEVICE — PK HEART OPN 10

## (undated) DEVICE — ST LINER SAFECAP GRN RED CP STRL

## (undated) DEVICE — SOL NACL 0.9PCT 1000ML

## (undated) DEVICE — CLTH CLENS READYCLEANSE PERI CARE PK/5

## (undated) DEVICE — AVANTI + 4F STD W/GW: Brand: AVANTI

## (undated) DEVICE — ADULT, W/LG. BACK PAD, RADIOTRANSPARENT ELEMENT AND LEAD WIRE COMPATIBLE W/: Brand: DEFIBRILLATION ELECTRODES

## (undated) DEVICE — ST INF PRI SMRTSTE 20DRP 2VLV 24ML 117

## (undated) DEVICE — SUT PROLN 3/0 SH D/A 36IN 8522H

## (undated) DEVICE — DEV COMP RAD PRELUDESYNC 24CM

## (undated) DEVICE — THE BITE BLOCK MAXI, LATEX FREE STRAP IS USED TO PROTECT THE ENDOSCOPE INSERTION TUBE FROM BEING BITTEN BY THE PATIENT.

## (undated) DEVICE — NDL PERC 1PRT THNWALL W/BASEPLT 18G 7CM

## (undated) DEVICE — TUBING, SUCTION, 1/4" X 10', STRAIGHT: Brand: MEDLINE

## (undated) DEVICE — CATH DIAG EXPO .045 FL3  5F 100CM

## (undated) DEVICE — CONNECT Y INTERSEPT W/LL 3/8 X 3/8 X 3/8IN

## (undated) DEVICE — FLTR RESERV PERFUS INTERSEPT 02 STRL

## (undated) DEVICE — Device

## (undated) DEVICE — 3M™ MEDIPORE™ H SOFT CLOTH SURGICAL TAPE, 2863, 3 IN X 10 YD, 12/CASE: Brand: 3M™ MEDIPORE™

## (undated) DEVICE — TBG SXN RIGD MINI/SUCKER 9F 4.75IN

## (undated) DEVICE — GLV SURG PREMIERPRO MIC LTX PF SZ6.5 BRN

## (undated) DEVICE — GLV SURG PREMIERPRO MIC LTX PF SZ7 BRN

## (undated) DEVICE — GLV SURG SENSICARE PI MIC PF SZ6.5 LF STRL

## (undated) DEVICE — SUT PDS 1 CTX 36IN VIO PDP371T

## (undated) DEVICE — IRRIGATOR BULB ASEPTO 60CC STRL

## (undated) DEVICE — SUT PROLN 4/0 RB1 D/A 36IN 8557H

## (undated) DEVICE — INTRO SHEATH PRELUDE IDEAL SPRNG COIL 021 6F 23X80CM

## (undated) DEVICE — SUT PROLN 7/0 CV BV1 24IN 8304H BX/36

## (undated) DEVICE — DECANT BG O JET

## (undated) DEVICE — TOWEL,OR,DSP,ST,BLUE,STD,4/PK,20PK/CS: Brand: MEDLINE

## (undated) DEVICE — GLV SURG BIOGEL LTX PF 8

## (undated) DEVICE — MEDI-VAC YANKAUER SUCTION HANDLE W/BULBOUS TIP: Brand: CARDINAL HEALTH

## (undated) DEVICE — AVID DUAL STAGE VENOUS DRAINAGE CANNULA: Brand: AVID DUAL STAGE VENOUS DRAINAGE CANNULA

## (undated) DEVICE — THE SINGLE USE ETRAP – POLYP TRAP IS USED FOR SUCTION RETRIEVAL OF ENDOSCOPICALLY REMOVED POLYPS.: Brand: ETRAP

## (undated) DEVICE — SWAN-GANZ CCOMBO V THERMODILUTION CATHETER: Brand: SWAN-GANZ CCOMBO V

## (undated) DEVICE — KT ORCA ORCAPOD DISP STRL

## (undated) DEVICE — ADULT NASAL CO2 SAMPLING WITH O2 DELIVERY CANNULA FOR CAPNOFLEX MODULE: Brand: VITAL SIGNS™

## (undated) DEVICE — LEX ELECTRO PHYSIOLOGY: Brand: MEDLINE INDUSTRIES, INC.

## (undated) DEVICE — SYS VASOVIEW HEMOPRO ENDOSCOPIC HARVST VESL

## (undated) DEVICE — INTRO ACCSR BLNT TP

## (undated) DEVICE — ELECTRD RETRN/GRND MEGADYNE SGL/PLT W/CORD 9FT DISP

## (undated) DEVICE — FRCP BX RADJAW4 NDL 2.8 240CM LG OG BX40

## (undated) DEVICE — GLV SURG DERMASSURE GRN LF PF SZ 6.5

## (undated) DEVICE — HYBRID CO2 TUBING/CAP SET FOR OLYMPUS® SCOPES & CO2 SOURCE: Brand: ERBE

## (undated) DEVICE — SNAR POLYP CAPTIVATOR RND STFF 2.4 240CM 10MM 1P/U

## (undated) DEVICE — SUT PROLN 6/0 C1 D/A 30IN 8706H

## (undated) DEVICE — SOLIDIFIER LIQ PREMISORB 1500CC

## (undated) DEVICE — OASIS DRAIN, SINGLE, INLINE & ATS COMPATIBLE: Brand: OASIS

## (undated) DEVICE — CONTN GRAD MEAS TRIANG 32OZ BLK

## (undated) DEVICE — CATH DIAG EXPO M/ PK 5F FL4/FR4 PIG

## (undated) DEVICE — LIMB HOLDER, WRIST/ANKLE: Brand: DEROYAL

## (undated) DEVICE — PK CATH CARD 10

## (undated) DEVICE — SAFELINER SUCTION CANISTER 1000CC: Brand: DEROYAL

## (undated) DEVICE — TRAP FLD MINIVAC MEGADYNE 100ML

## (undated) DEVICE — KT INTRO SHEATH PERC W/FULL DRP 9F

## (undated) DEVICE — CANN VESL DLP 1WY BLNT/TP 3MM

## (undated) DEVICE — CANN AORT ROOT DLP VNT 14G 7F

## (undated) DEVICE — ADAPT CLN LUM OLYMP AIR/H20

## (undated) DEVICE — SEALANT HEMO TACHOSIL FIBRIN PTCH 9.5X4.8CM

## (undated) DEVICE — SENSR CERBRL O2 PK/2

## (undated) DEVICE — ST EXT IV SMRTSTE 2VLV FIX M LL 6ML 41

## (undated) DEVICE — BLD SCLPL BEAVR MINI STR 2BVL 180D LF

## (undated) DEVICE — ADAPT/Y PERFUS DLP FML/LUER COLR/CODE/CLMP 8.9AND25.4CM

## (undated) DEVICE — PK PERFUS CUST W/CARDIOPLEGIA

## (undated) DEVICE — PK ATS CUST W CARDIOTOMY RESEVOIR

## (undated) DEVICE — PENCL SMOKE/EVAC MEGADYNE TELESCP 10FT

## (undated) DEVICE — GLV SURG BIOGEL LTX PF 7 1/2

## (undated) DEVICE — GW INQWIRE FC PTFE STD J/1.5 .035 260

## (undated) DEVICE — GLV SURG TRIUMPH MICRO PF LTX 8.5 STRL

## (undated) DEVICE — INTRO TEAR AWAY/LVD W/SD PRT 6F 13CM

## (undated) DEVICE — FIRST STEP BEDSIDE ADD WATER KIT - RESEALABLE STAND-UP POUCH, ENDOSCOPIC CLEANING PAD - 1 POUCH: Brand: FIRST STEP BEDSIDE ADD WATER KIT - RESEALABLE STAND-UP POUCH, ENDOSCOPIC CLEANIN

## (undated) DEVICE — PAD ARMBRD SURG CONVOL 7.5X20X2IN

## (undated) DEVICE — CAUTERY TIP POLISHER: Brand: DEVON

## (undated) DEVICE — MODEL BT2000 P/N 700287-012KIT CONTENTS: MANIFOLD WITH SALINE AND CONTRAST PORTS, SALINE TUBING WITH SPIKE AND HAND SYRINGE, TRANSDUCER: Brand: BT2000 AUTOMATED MANIFOLD KIT

## (undated) DEVICE — SYR LUERLOK 50ML

## (undated) DEVICE — SUT SILK 2 SUTUPAK TIE 60IN SA8H 2STRAND

## (undated) DEVICE — SOL IRR H2O BTL 1000ML STRL

## (undated) DEVICE — SUT SILK 0/0 CT2 18IN C027D

## (undated) DEVICE — 12 FOOT DISPOSABLE EXTENSION CABLE WITH SAFE CONNECT / SCREW-DOWN

## (undated) DEVICE — MODEL AT P65, P/N 701554-001KIT CONTENTS: HAND CONTROLLER, 3-WAY HIGH-PRESSURE STOPCOCK WITH ROTATING END AND PREMIUM HIGH-PRESSURE TUBING: Brand: ANGIOTOUCH® KIT

## (undated) DEVICE — TBG SXN INTRACARD RIDGID FLUT 24F .25X13IN A/

## (undated) DEVICE — GLV SURG PREMIERPRO MIC LTX PF SZ7.5 BRN

## (undated) DEVICE — CATH DIAG EXPO .045 AL1 5F 100CM

## (undated) DEVICE — SUT SILK 4/0 TIES 18IN A183H

## (undated) DEVICE — SUCTION CANISTER, 2500CC, RIGID: Brand: DEROYAL

## (undated) DEVICE — LUBE JELLY FOIL PACK 1.4 OZ: Brand: MEDLINE INDUSTRIES, INC.

## (undated) DEVICE — SUT PROLN 4/0 SH D/A 36IN 8521H